# Patient Record
Sex: MALE | Race: WHITE | NOT HISPANIC OR LATINO | Employment: UNEMPLOYED | URBAN - METROPOLITAN AREA
[De-identification: names, ages, dates, MRNs, and addresses within clinical notes are randomized per-mention and may not be internally consistent; named-entity substitution may affect disease eponyms.]

---

## 2022-08-20 ENCOUNTER — APPOINTMENT (OUTPATIENT)
Dept: GENERAL RADIOLOGY | Facility: CLINIC | Age: 47
End: 2022-08-20
Attending: EMERGENCY MEDICINE

## 2022-08-20 ENCOUNTER — APPOINTMENT (OUTPATIENT)
Dept: MRI IMAGING | Facility: CLINIC | Age: 47
End: 2022-08-20
Attending: EMERGENCY MEDICINE

## 2022-08-20 ENCOUNTER — HOSPITAL ENCOUNTER (EMERGENCY)
Facility: CLINIC | Age: 47
Discharge: HOME | End: 2022-08-20
Attending: EMERGENCY MEDICINE | Admitting: EMERGENCY MEDICINE

## 2022-08-20 ENCOUNTER — APPOINTMENT (OUTPATIENT)
Dept: CT IMAGING | Facility: CLINIC | Age: 47
End: 2022-08-20
Attending: EMERGENCY MEDICINE

## 2022-08-20 VITALS
OXYGEN SATURATION: 96 % | RESPIRATION RATE: 20 BRPM | SYSTOLIC BLOOD PRESSURE: 173 MMHG | DIASTOLIC BLOOD PRESSURE: 100 MMHG | TEMPERATURE: 98.4 F | HEART RATE: 75 BPM

## 2022-08-20 DIAGNOSIS — I10 ESSENTIAL HYPERTENSION, BENIGN: ICD-10-CM

## 2022-08-20 DIAGNOSIS — R51.9 NONINTRACTABLE HEADACHE, UNSPECIFIED CHRONICITY PATTERN, UNSPECIFIED HEADACHE TYPE: ICD-10-CM

## 2022-08-20 DIAGNOSIS — I16.0 HYPERTENSIVE URGENCY: ICD-10-CM

## 2022-08-20 DIAGNOSIS — Z20.822 CONTACT WITH AND (SUSPECTED) EXPOSURE TO COVID-19: ICD-10-CM

## 2022-08-20 LAB
ALBUMIN SERPL-MCNC: 4.4 G/DL (ref 3.4–5)
ALBUMIN UR-MCNC: 50 MG/DL
ALP SERPL-CCNC: 86 U/L (ref 40–150)
ALT SERPL W P-5'-P-CCNC: 24 U/L (ref 0–70)
ANION GAP SERPL CALCULATED.3IONS-SCNC: 5 MMOL/L (ref 3–14)
APPEARANCE UR: CLEAR
AST SERPL W P-5'-P-CCNC: 14 U/L (ref 0–45)
ATRIAL RATE - MUSE: 108 BPM
BASOPHILS # BLD AUTO: 0.1 10E3/UL (ref 0–0.2)
BASOPHILS NFR BLD AUTO: 1 %
BILIRUB SERPL-MCNC: 1.1 MG/DL (ref 0.2–1.3)
BILIRUB UR QL STRIP: NEGATIVE
BUN SERPL-MCNC: 16 MG/DL (ref 7–30)
CALCIUM SERPL-MCNC: 9.4 MG/DL (ref 8.5–10.1)
CHLORIDE BLD-SCNC: 108 MMOL/L (ref 94–109)
CO2 SERPL-SCNC: 26 MMOL/L (ref 20–32)
COLOR UR AUTO: YELLOW
CREAT SERPL-MCNC: 1.25 MG/DL (ref 0.66–1.25)
DIASTOLIC BLOOD PRESSURE - MUSE: NORMAL MMHG
EOSINOPHIL # BLD AUTO: 0.2 10E3/UL (ref 0–0.7)
EOSINOPHIL NFR BLD AUTO: 2 %
ERYTHROCYTE [DISTWIDTH] IN BLOOD BY AUTOMATED COUNT: 18.6 % (ref 10–15)
GFR SERPL CREATININE-BSD FRML MDRD: 72 ML/MIN/1.73M2
GLUCOSE BLD-MCNC: 102 MG/DL (ref 70–99)
GLUCOSE UR STRIP-MCNC: NEGATIVE MG/DL
HCT VFR BLD AUTO: 48.1 % (ref 40–53)
HGB BLD-MCNC: 15 G/DL (ref 13.3–17.7)
HGB UR QL STRIP: NEGATIVE
HOLD SPECIMEN: NORMAL
IMM GRANULOCYTES # BLD: 0.1 10E3/UL
IMM GRANULOCYTES NFR BLD: 1 %
INR PPP: 0.98 (ref 0.85–1.15)
INTERPRETATION ECG - MUSE: NORMAL
KETONES UR STRIP-MCNC: ABNORMAL MG/DL
LACTATE SERPL-SCNC: 1.4 MMOL/L (ref 0.7–2)
LEUKOCYTE ESTERASE UR QL STRIP: NEGATIVE
LYMPHOCYTES # BLD AUTO: 1.2 10E3/UL (ref 0.8–5.3)
LYMPHOCYTES NFR BLD AUTO: 11 %
MCH RBC QN AUTO: 19.5 PG (ref 26.5–33)
MCHC RBC AUTO-ENTMCNC: 31.2 G/DL (ref 31.5–36.5)
MCV RBC AUTO: 63 FL (ref 78–100)
MONOCYTES # BLD AUTO: 0.7 10E3/UL (ref 0–1.3)
MONOCYTES NFR BLD AUTO: 6 %
MUCOUS THREADS #/AREA URNS LPF: PRESENT /LPF
NEUTROPHILS # BLD AUTO: 9 10E3/UL (ref 1.6–8.3)
NEUTROPHILS NFR BLD AUTO: 79 %
NITRATE UR QL: NEGATIVE
NRBC # BLD AUTO: 0 10E3/UL
NRBC BLD AUTO-RTO: 0 /100
NT-PROBNP SERPL-MCNC: 1050 PG/ML (ref 0–450)
P AXIS - MUSE: 78 DEGREES
PH UR STRIP: 5.5 [PH] (ref 5–7)
PLAT MORPH BLD: NORMAL
PLATELET # BLD AUTO: 242 10E3/UL (ref 150–450)
POTASSIUM BLD-SCNC: 3.6 MMOL/L (ref 3.4–5.3)
PR INTERVAL - MUSE: 164 MS
PROT SERPL-MCNC: 8.4 G/DL (ref 6.8–8.8)
QRS DURATION - MUSE: 94 MS
QT - MUSE: 354 MS
QTC - MUSE: 474 MS
R AXIS - MUSE: -6 DEGREES
RBC # BLD AUTO: 7.68 10E6/UL (ref 4.4–5.9)
RBC MORPH BLD: NORMAL
RBC URINE: <1 /HPF
SARS-COV-2 RNA RESP QL NAA+PROBE: NEGATIVE
SODIUM SERPL-SCNC: 139 MMOL/L (ref 133–144)
SP GR UR STRIP: 1.02 (ref 1–1.03)
SYSTOLIC BLOOD PRESSURE - MUSE: NORMAL MMHG
T AXIS - MUSE: 63 DEGREES
TROPONIN I SERPL HS-MCNC: 17 NG/L
TSH SERPL DL<=0.005 MIU/L-ACNC: 0.69 MU/L (ref 0.4–4)
UROBILINOGEN UR STRIP-MCNC: NORMAL MG/DL
VENTRICULAR RATE- MUSE: 108 BPM
WBC # BLD AUTO: 11.2 10E3/UL (ref 4–11)
WBC URINE: 1 /HPF

## 2022-08-20 PROCEDURE — 96376 TX/PRO/DX INJ SAME DRUG ADON: CPT | Performed by: EMERGENCY MEDICINE

## 2022-08-20 PROCEDURE — 84484 ASSAY OF TROPONIN QUANT: CPT | Performed by: EMERGENCY MEDICINE

## 2022-08-20 PROCEDURE — 99285 EMERGENCY DEPT VISIT HI MDM: CPT | Mod: 25 | Performed by: EMERGENCY MEDICINE

## 2022-08-20 PROCEDURE — 96375 TX/PRO/DX INJ NEW DRUG ADDON: CPT | Performed by: EMERGENCY MEDICINE

## 2022-08-20 PROCEDURE — C9803 HOPD COVID-19 SPEC COLLECT: HCPCS | Performed by: EMERGENCY MEDICINE

## 2022-08-20 PROCEDURE — 250N000011 HC RX IP 250 OP 636: Performed by: EMERGENCY MEDICINE

## 2022-08-20 PROCEDURE — 85610 PROTHROMBIN TIME: CPT | Performed by: EMERGENCY MEDICINE

## 2022-08-20 PROCEDURE — 84443 ASSAY THYROID STIM HORMONE: CPT | Performed by: EMERGENCY MEDICINE

## 2022-08-20 PROCEDURE — 93010 ELECTROCARDIOGRAM REPORT: CPT | Performed by: EMERGENCY MEDICINE

## 2022-08-20 PROCEDURE — 96365 THER/PROPH/DIAG IV INF INIT: CPT | Performed by: EMERGENCY MEDICINE

## 2022-08-20 PROCEDURE — 82040 ASSAY OF SERUM ALBUMIN: CPT | Performed by: EMERGENCY MEDICINE

## 2022-08-20 PROCEDURE — 99291 CRITICAL CARE FIRST HOUR: CPT | Mod: 25 | Performed by: EMERGENCY MEDICINE

## 2022-08-20 PROCEDURE — 83880 ASSAY OF NATRIURETIC PEPTIDE: CPT | Performed by: EMERGENCY MEDICINE

## 2022-08-20 PROCEDURE — 83605 ASSAY OF LACTIC ACID: CPT | Performed by: EMERGENCY MEDICINE

## 2022-08-20 PROCEDURE — U0003 INFECTIOUS AGENT DETECTION BY NUCLEIC ACID (DNA OR RNA); SEVERE ACUTE RESPIRATORY SYNDROME CORONAVIRUS 2 (SARS-COV-2) (CORONAVIRUS DISEASE [COVID-19]), AMPLIFIED PROBE TECHNIQUE, MAKING USE OF HIGH THROUGHPUT TECHNOLOGIES AS DESCRIBED BY CMS-2020-01-R: HCPCS | Performed by: EMERGENCY MEDICINE

## 2022-08-20 PROCEDURE — 81001 URINALYSIS AUTO W/SCOPE: CPT | Performed by: EMERGENCY MEDICINE

## 2022-08-20 PROCEDURE — 93005 ELECTROCARDIOGRAM TRACING: CPT | Performed by: EMERGENCY MEDICINE

## 2022-08-20 PROCEDURE — 71045 X-RAY EXAM CHEST 1 VIEW: CPT

## 2022-08-20 PROCEDURE — 70450 CT HEAD/BRAIN W/O DYE: CPT

## 2022-08-20 PROCEDURE — 80053 COMPREHEN METABOLIC PANEL: CPT | Performed by: EMERGENCY MEDICINE

## 2022-08-20 PROCEDURE — 250N000013 HC RX MED GY IP 250 OP 250 PS 637: Performed by: EMERGENCY MEDICINE

## 2022-08-20 PROCEDURE — 70551 MRI BRAIN STEM W/O DYE: CPT

## 2022-08-20 PROCEDURE — 36415 COLL VENOUS BLD VENIPUNCTURE: CPT | Performed by: EMERGENCY MEDICINE

## 2022-08-20 PROCEDURE — 85025 COMPLETE CBC W/AUTO DIFF WBC: CPT | Performed by: EMERGENCY MEDICINE

## 2022-08-20 PROCEDURE — 120N000002 HC R&B MED SURG/OB UMMC

## 2022-08-20 RX ORDER — ONDANSETRON 2 MG/ML
4 INJECTION INTRAMUSCULAR; INTRAVENOUS EVERY 30 MIN PRN
Status: DISCONTINUED | OUTPATIENT
Start: 2022-08-20 | End: 2022-08-21 | Stop reason: HOSPADM

## 2022-08-20 RX ORDER — LABETALOL HYDROCHLORIDE 5 MG/ML
20 INJECTION, SOLUTION INTRAVENOUS
Status: COMPLETED | OUTPATIENT
Start: 2022-08-20 | End: 2022-08-20

## 2022-08-20 RX ORDER — AMLODIPINE BESYLATE 5 MG/1
5 TABLET ORAL DAILY
Status: DISCONTINUED | OUTPATIENT
Start: 2022-08-20 | End: 2022-08-21 | Stop reason: HOSPADM

## 2022-08-20 RX ORDER — ATENOLOL 50 MG/1
50 TABLET ORAL DAILY
Status: DISCONTINUED | OUTPATIENT
Start: 2022-08-20 | End: 2022-08-21 | Stop reason: HOSPADM

## 2022-08-20 RX ORDER — AMLODIPINE BESYLATE 5 MG/1
5 TABLET ORAL DAILY
Qty: 30 TABLET | Refills: 0 | Status: ON HOLD | OUTPATIENT
Start: 2022-08-20 | End: 2024-01-16

## 2022-08-20 RX ORDER — ATENOLOL 50 MG/1
50 TABLET ORAL DAILY
Qty: 30 TABLET | Refills: 0 | Status: ON HOLD | OUTPATIENT
Start: 2022-08-20 | End: 2024-01-16

## 2022-08-20 RX ORDER — CLONIDINE HYDROCHLORIDE 0.1 MG/1
0.1 TABLET ORAL ONCE
Status: COMPLETED | OUTPATIENT
Start: 2022-08-20 | End: 2022-08-20

## 2022-08-20 RX ADMIN — AMLODIPINE BESYLATE 5 MG: 5 TABLET ORAL at 21:35

## 2022-08-20 RX ADMIN — CLONIDINE HYDROCHLORIDE 0.1 MG: 0.1 TABLET ORAL at 19:32

## 2022-08-20 RX ADMIN — ATENOLOL 50 MG: 50 TABLET ORAL at 18:49

## 2022-08-20 RX ADMIN — LABETALOL HYDROCHLORIDE 20 MG: 5 INJECTION, SOLUTION INTRAVENOUS at 18:22

## 2022-08-20 RX ADMIN — LABETALOL HYDROCHLORIDE 20 MG: 5 INJECTION, SOLUTION INTRAVENOUS at 17:55

## 2022-08-20 RX ADMIN — LABETALOL HYDROCHLORIDE 20 MG: 5 INJECTION, SOLUTION INTRAVENOUS at 17:06

## 2022-08-20 RX ADMIN — NICARDIPINE HYDROCHLORIDE 2.5 MG/HR: 0.2 INJECTION INTRAVENOUS at 21:41

## 2022-08-20 ASSESSMENT — ACTIVITIES OF DAILY LIVING (ADL)
ADLS_ACUITY_SCORE: 35

## 2022-08-20 ASSESSMENT — ENCOUNTER SYMPTOMS
NUMBNESS: 1
HEADACHES: 1
SHORTNESS OF BREATH: 0
DIAPHORESIS: 1
VOMITING: 0
DIZZINESS: 1

## 2022-08-20 NOTE — ED NOTES
Pt stat placed to [place] bed and nurse brought to  Dr Fuentes alerted to the Pt being brought back and made aware of not ordering the sepsis protocol.

## 2022-08-20 NOTE — ED PROVIDER NOTES
[facility] EMERGENCY DEPARTMENT ([facility])     August 20, 2022      History     Chief Complaint   Patient presents with     Headache     HPI  Valdo Lyons is a 47 year old male who presents to the Emergency Department with complaints of numbness in his left hand for the last 2 weeks and associated diaphoresis with elevated blood pressure.  The patient apparently has had untreated hypertension and states he did not get it treated because he does not have medical insurance and cannot afford medications.  Today he noted a throbbing headache with some blurred vision and some nonvertiginous dizziness and diaphoresis and states that his blood pressure was 248 at home when he took it.  Patient denies any shortness of breath or chest discomfort he denies any vomiting denies any color changes in his urine and denies any numbness elsewhere except his left hand in a median nerve distribution.  The patient denies any difficulty speaking.  He presents here to the ER for evaluation.    This part of the medical record was transcribed by Yana Meyers, Medical Scribe, from a dictation done by Javi Fuentes MD.     Past Medical History  No past medical history on file.     No past surgical history on file.     No current outpatient medications on file.    No Known Allergies     Family History  No family history on file.   Positive for high blood pressure    Social History       Past medical history, past surgical history, medications, allergies, family history, and social history were reviewed with the patient. No additional pertinent items.       Review of Systems   Constitutional: Positive for diaphoresis.   Eyes: Positive for visual disturbance.   Respiratory: Negative for shortness of breath.    Cardiovascular: Negative for chest pain.   Gastrointestinal: Negative for vomiting.   Neurological: Positive for dizziness, numbness (left hand in median nerve distribution) and headaches.   All other systems reviewed  and are negative.    A complete review of systems was performed with pertinent positives and negatives noted in the HPI, and all other systems negative.    Physical Exam   BP: (!) 216/146  Pulse: (!) 134  Temp: 98.4  F (36.9  C)  Resp: 22  SpO2: 99 %  Physical Exam  Vitals and nursing note reviewed.   Constitutional:       General: He is in acute distress.      Appearance: He is diaphoretic.   HENT:      Head: Atraumatic.   Eyes:      Extraocular Movements: Extraocular movements intact.      Pupils: Pupils are equal, round, and reactive to light.   Cardiovascular:      Rate and Rhythm: Regular rhythm. Tachycardia present.      Heart sounds: No murmur heard.  Pulmonary:      Breath sounds: Normal breath sounds.   Abdominal:      Palpations: Abdomen is soft.      Tenderness: There is no abdominal tenderness.   Musculoskeletal:         General: No deformity.      Cervical back: Neck supple.   Neurological:      Mental Status: He is alert and oriented to person, place, and time.      Cranial Nerves: No cranial nerve deficit.      Comments: Grossly intact strength bilaterally   Psychiatric:      Comments: Anxious         ED Course      Procedures        Patient was initially seen in the hallway but was quickly moved to a monitored room.  IV was established and blood was drawn.  Patient was placed on cardiac monitor and oximetry.  EKG revealed sinus tachycardia with no acute ST or T wave changes significant for ischemia.  Patient had a normal axis.  This is read by me personally.    Patient was started on labetalol IV to control his blood pressure.    Medications   sodium chloride (PF) 0.9% PF flush 3 mL (3 mLs Intracatheter Given 8/20/22 1713)   sodium chloride (PF) 0.9% PF flush 3 mL (has no administration in time range)   ondansetron (ZOFRAN) injection 4 mg (has no administration in time range)   atenolol (TENORMIN) tablet 50 mg (50 mg Oral Given 8/20/22 1849)   amLODIPine (NORVASC) tablet 5 mg (5 mg Oral Given 8/20/22  2135)   niCARdipine 40 mg in 200 mL NS (CARDENE) infusion (5 mg/hr Intravenous Rate/Dose Change 8/20/22 2150)   labetalol (NORMODYNE/TRANDATE) injection 20 mg (20 mg Intravenous Given 8/20/22 1822)   cloNIDine (CATAPRES) tablet 0.1 mg (0.1 mg Oral Given 8/20/22 1932)          Results for orders placed or performed during the hospital encounter of 08/20/22   Head CT w/o contrast     Status: None    Narrative    EXAM: CT HEAD W/O CONTRAST  LOCATION: [facility]  DATE/TIME: 8/20/2022 5:42 PM    INDICATION: Hypertension with left hand numbness  COMPARISON: None.  TECHNIQUE: Routine CT Head without IV contrast. Multiplanar reformats. Dose reduction techniques were used.    FINDINGS:  INTRACRANIAL CONTENTS: No intracranial hemorrhage, extraaxial collection, or mass effect.  No CT evidence of acute cortical infarct. Severe presumed chronic small vessel ischemic changes with chronic lacunar infarcts in the right centrum semiovale and   left thalamus. Mild generalized volume loss. No hydrocephalus.     VISUALIZED ORBITS/SINUSES/MASTOIDS: No intraorbital abnormality. No paranasal sinus mucosal disease. No middle ear or mastoid effusion.    BONES/SOFT TISSUES: No acute abnormality.      Impression    IMPRESSION:  1.  No acute intracranial process.  2.  Presumed severe chronic microvascular ischemic change with chronic lacunar infarcts in the right centrum semiovale and left thalamus, significantly advanced for age.   XR Chest 1 View     Status: None    Narrative    EXAM: XR CHEST 1 VIEW  LOCATION: [facility]  DATE/TIME: 8/20/2022 5:32 PM    INDICATION: htn  COMPARISON: 10/12/2004      Impression    IMPRESSION: Negative chest.   MR Brain w/o Contrast     Status: None    Narrative    EXAM: MR BRAIN W/O CONTRAST  LOCATION: [facility]  DATE/TIME: 8/20/2022 8:55 PM    INDICATION: Hypertensive  urgency with left hand numbness  COMPARISON: Head CT from earlier in the day  TECHNIQUE: Routine multiplanar multisequence head MRI without intravenous contrast.    FINDINGS:  INTRACRANIAL CONTENTS: No evidence for acute or subacute infarction by diffusion-weighted imaging. Redemonstration of chronic lacunar infarctions of the right centrum semiovale, left thalamus, and bilateral periatrial white matter. No mass, acute   hemorrhage, or extra-axial fluid collections. Confluent nonspecific T2/FLAIR hyperintensities within the cerebral white matter most consistent with advanced microvascular ischemic change. Normal ventricles and sulci. Normal position of the cerebellar   tonsils.     SELLA: No abnormality accounting for technique.    OSSEOUS STRUCTURES/SOFT TISSUES: Normal marrow signal. The major intracranial vascular flow voids are maintained.     ORBITS: No abnormality accounting for technique.     SINUSES/MASTOIDS: Polypoid mucosal thickening in the maxillary sinuses, left greater than right. No middle ear or mastoid effusion.         Impression    IMPRESSION:  1.  No acute/subacute infarction, intracranial hemorrhage, mass effect, or hydrocephalus.  2.  Redemonstration of chronic lacunar infarctions of the right centrum semiovale, left thalamus, and bilateral periatrial white matter with background of advanced chronic small vessel seen disease, much greater than expected for patient age.   INR     Status: Normal   Result Value Ref Range    INR 0.98 0.85 - 1.15   Comprehensive metabolic panel     Status: Abnormal   Result Value Ref Range    Sodium 139 133 - 144 mmol/L    Potassium 3.6 3.4 - 5.3 mmol/L    Chloride 108 94 - 109 mmol/L    Carbon Dioxide (CO2) 26 20 - 32 mmol/L    Anion Gap 5 3 - 14 mmol/L    Urea Nitrogen 16 7 - 30 mg/dL    Creatinine 1.25 0.66 - 1.25 mg/dL    Calcium 9.4 8.5 - 10.1 mg/dL    Glucose 102 (H) 70 - 99 mg/dL    Alkaline Phosphatase 86 40 - 150 U/L    AST 14 0 - 45 U/L    ALT 24 0 - 70  U/L    Protein Total 8.4 6.8 - 8.8 g/dL    Albumin 4.4 3.4 - 5.0 g/dL    Bilirubin Total 1.1 0.2 - 1.3 mg/dL    GFR Estimate 72 >60 mL/min/1.73m2   Troponin I     Status: Normal   Result Value Ref Range    Troponin I High Sensitivity 17 <79 ng/L   Nt probnp inpatient (BNP)     Status: Abnormal   Result Value Ref Range    N terminal Pro BNP Inpatient 1,050 (H) 0 - 450 pg/mL   UA with Microscopic reflex to Culture     Status: Abnormal    Specimen: Urine, Midstream   Result Value Ref Range    Color Urine Yellow Colorless, Straw, Light Yellow, Yellow    Appearance Urine Clear Clear    Glucose Urine Negative Negative mg/dL    Bilirubin Urine Negative Negative    Ketones Urine Trace (A) Negative mg/dL    Specific Gravity Urine 1.023 1.003 - 1.035    Blood Urine Negative Negative    pH Urine 5.5 5.0 - 7.0    Protein Albumin Urine 50  (A) Negative mg/dL    Urobilinogen Urine Normal Normal, 2.0 mg/dL    Nitrite Urine Negative Negative    Leukocyte Esterase Urine Negative Negative    Mucus Urine Present (A) None Seen /LPF    RBC Urine <1 <=2 /HPF    WBC Urine 1 <=5 /HPF    Narrative    Urine Culture not indicated   TSH     Status: Normal   Result Value Ref Range    TSH 0.69 0.40 - 4.00 mU/L   Lactic acid whole blood STAT     Status: Normal   Result Value Ref Range    Lactic Acid 1.4 0.7 - 2.0 mmol/L   [place] Draw     Status: None    Narrative    The following orders were created for panel order [place] Draw.  Procedure                               Abnormality         Status                     ---------                               -----------         ------                     Extra Red Top Tube[[id number]]                               Final result                 Please view results for these tests on the individual orders.   Extra Red Top Tube     Status: None   Result Value Ref Range    Hold Specimen [facility]    CBC with platelets and differential     Status: Abnormal   Result Value Ref Range    WBC Count 11.2 (H) 4.0 -  11.0 10e3/uL    RBC Count 7.68 (H) 4.40 - 5.90 10e6/uL    Hemoglobin 15.0 13.3 - 17.7 g/dL    Hematocrit 48.1 40.0 - 53.0 %    MCV 63 (L) 78 - 100 fL    MCH 19.5 (L) 26.5 - 33.0 pg    MCHC 31.2 (L) 31.5 - 36.5 g/dL    RDW 18.6 (H) 10.0 - 15.0 %    Platelet Count 242 150 - 450 10e3/uL    % Neutrophils 79 %    % Lymphocytes 11 %    % Monocytes 6 %    % Eosinophils 2 %    % Basophils 1 %    % Immature Granulocytes 1 %    NRBCs per 100 WBC 0 <1 /100    Absolute Neutrophils 9.0 (H) 1.6 - 8.3 10e3/uL    Absolute Lymphocytes 1.2 0.8 - 5.3 10e3/uL    Absolute Monocytes 0.7 0.0 - 1.3 10e3/uL    Absolute Eosinophils 0.2 0.0 - 0.7 10e3/uL    Absolute Basophils 0.1 0.0 - 0.2 10e3/uL    Absolute Immature Granulocytes 0.1 <=0.4 10e3/uL    Absolute NRBCs 0.0 10e3/uL   RBC and Platelet Morphology     Status: None   Result Value Ref Range    Platelet Assessment  Automated Count Confirmed. Platelet morphology is normal.     Automated Count Confirmed. Platelet morphology is normal.    RBC Morphology Confirmed RBC Indices    Extra Tube     Status: None ()    Narrative    The following orders were created for panel order Extra Tube.  Procedure                               Abnormality         Status                     ---------                               -----------         ------                     Extra Purple Top Tube[[id number]]                                                         Please view results for these tests on the individual orders.   EKG 12 lead     Status: None (Preliminary result)   Result Value Ref Range    Systolic Blood Pressure  mmHg    Diastolic Blood Pressure  mmHg    Ventricular Rate 108 BPM    Atrial Rate 108 BPM    WA Interval 164 ms    QRS Duration 94 ms     ms    QTc 474 ms    P Axis 78 degrees    R AXIS -6 degrees    T Axis 63 degrees    Interpretation ECG       Sinus tachycardia  Voltage criteria for left ventricular hypertrophy  Nonspecific ST abnormality  Abnormal ECG     CBC with platelets  differential     Status: Abnormal    Narrative    The following orders were created for panel order CBC with platelets differential.  Procedure                               Abnormality         Status                     ---------                               -----------         ------                     CBC with platelets and d...[[id number]]  Abnormal            Final result               RBC and Platelet Morphology[[id number]]                      Final result                 Please view results for these tests on the individual orders.     Eventually patient received atenolol orally Norvasc orally clonidine orally and will be placed on a nicardipine drip because his blood pressures are still 203/130.    Medications   sodium chloride (PF) 0.9% PF flush 3 mL (3 mLs Intracatheter Given 8/20/22 1713)   sodium chloride (PF) 0.9% PF flush 3 mL (has no administration in time range)   ondansetron (ZOFRAN) injection 4 mg (has no administration in time range)   atenolol (TENORMIN) tablet 50 mg (50 mg Oral Given 8/20/22 1849)   amLODIPine (NORVASC) tablet 5 mg (5 mg Oral Given 8/20/22 2135)   niCARdipine 40 mg in 200 mL NS (CARDENE) infusion (5 mg/hr Intravenous Rate/Dose Change 8/20/22 2150)   labetalol (NORMODYNE/TRANDATE) injection 20 mg (20 mg Intravenous Given 8/20/22 1822)   cloNIDine (CATAPRES) tablet 0.1 mg (0.1 mg Oral Given 8/20/22 1932)        Assessments & Plan (with Medical Decision Making)     I have reviewed the nursing notes.    Patient agrees to be admitted knowing that his blood pressure is still high after all the medications of the been given.  Case was discussed with the ICU attending on the [facility] and the patient was excepted.  The etiology of the patient's hypertension is unclear at this time but historically is longstanding.    I have reviewed the findings, diagnosis, and plan with the patient.    This case required critical care greater than 60 minutes independent of procedures.    Final  diagnoses:   Hypertensive urgency     I, Yana Meyers, am serving as a trained medical scribe to document services personally performed by Javi Fuentes MD, based on the provider's statements to me.   Javi VELA MD, was physically present and have reviewed and verified the accuracy of this note documented by Yana Meyers.      Javi Fuentes Md  [facility] EMERGENCY DEPARTMENT  8/20/2022     Javi Fuentes MD  08/20/22 2204      Addendum  2255:    BP (!) 194/122   Pulse 72   Temp 98.4  F (36.9  C) (Oral)   Resp 15   SpO2 97%     Patient wishes to leave AGAINST MEDICAL ADVICE and understands that it is strongly recommended for him to be admitted to the hospital and he may suffer significant morbidity if not death should he leave and his blood pressure is not controlled.    Patient will be sent home on the medications below       START taking      Dose / Directions   amLODIPine 5 MG tablet  Commonly known as: NORVASC      Dose: 5 mg  Take 1 tablet (5 mg) by mouth daily for 30 days  Quantity: 30 tablet  Refills: 0     atenolol 50 MG tablet  Commonly known as: TENORMIN      Dose: 50 mg  Take 1 tablet (50 mg) by mouth daily for 30 days  Quantity: 30 tablet  Refills: 0           Where to get your medicines      Some of these will need a paper prescription and others can be bought over the counter. Ask your nurse if you have questions.    Bring a paper prescription for each of these medications    amLODIPine 5 MG tablet    atenolol 50 MG tablet           Fill your prescriptions and take as directed    Take your blood pressure every day, about the same time of day, and write it down for your follow-up appointment.    A referral for a primary care clinic was placed into the computer system.  They should call you in the next 48 hours to help you set up an appointment to be seen sometime in the next 1 to 2 weeks.  If they do not call you please call [facility]  (phone: [phone]).    Return to the emergency department for any problems or worsening symptoms.      Routine discharge instructions were given for this diagnosis      Javi Fuentes MD, MD Fuentes, Javi Jiménez MD  08/20/22 8038

## 2022-08-20 NOTE — ED TRIAGE NOTES
Pt states hx of untreated HTN, two wk hs of left arm numbness and cant  things with his left hand. Pt states today developed a Throbbing HA, blurred vision, dizziness and diaphoresis.  Pt states his BP has been running 240 systolic.       Triage Assessment     Row Name 08/20/22 0813       Triage Assessment (Adult)    Airway WDL WDL       Respiratory WDL    Respiratory WDL WDL       Skin Circulation/Temperature WDL    Skin Circulation/Temperature WDL WDL       Cardiac WDL    Cardiac WDL WDL       Peripheral/Neurovascular WDL    Peripheral Neurovascular WDL WDL       Cognitive/Neuro/Behavioral WDL    Cognitive/Neuro/Behavioral WDL WDL

## 2022-08-21 NOTE — DISCHARGE INSTRUCTIONS
Fill your prescriptions and take as directed    Take your blood pressure every day, about the same time of day, and write it down for your follow-up appointment.    A referral for a primary care clinic was placed into the computer system.  They should call you in the next 48 hours to help you set up an appointment to be seen sometime in the next 1 to 2 weeks.  If they do not call you please call them at [facility] (phone: [phone]).    Return to the emergency department for any problems or worsening symptoms.

## 2024-01-16 ENCOUNTER — APPOINTMENT (OUTPATIENT)
Dept: GENERAL RADIOLOGY | Facility: CLINIC | Age: 49
End: 2024-01-16
Attending: STUDENT IN AN ORGANIZED HEALTH CARE EDUCATION/TRAINING PROGRAM
Payer: MEDICAID

## 2024-01-16 ENCOUNTER — APPOINTMENT (OUTPATIENT)
Dept: GENERAL RADIOLOGY | Facility: CLINIC | Age: 49
End: 2024-01-16
Payer: MEDICAID

## 2024-01-16 ENCOUNTER — APPOINTMENT (OUTPATIENT)
Dept: CARDIOLOGY | Facility: CLINIC | Age: 49
End: 2024-01-16
Payer: MEDICAID

## 2024-01-16 ENCOUNTER — HOSPITAL ENCOUNTER (INPATIENT)
Facility: CLINIC | Age: 49
LOS: 19 days | Discharge: HOME | End: 2024-02-04
Attending: STUDENT IN AN ORGANIZED HEALTH CARE EDUCATION/TRAINING PROGRAM | Admitting: INTERNAL MEDICINE
Payer: MEDICAID

## 2024-01-16 DIAGNOSIS — F43.22 ADJUSTMENT DISORDER WITH ANXIOUS MOOD: ICD-10-CM

## 2024-01-16 DIAGNOSIS — I71.00 DISSECTION OF AORTA, UNSPECIFIED PORTION OF AORTA (H): Primary | ICD-10-CM

## 2024-01-16 DIAGNOSIS — I16.0 HYPERTENSIVE URGENCY: ICD-10-CM

## 2024-01-16 LAB
ABO/RH(D): NORMAL
ALBUMIN SERPL BCG-MCNC: 3.2 G/DL (ref 3.5–5.2)
ALLEN'S TEST: NORMAL
ALP SERPL-CCNC: 42 U/L (ref 40–150)
ALT SERPL W P-5'-P-CCNC: 7 U/L (ref 0–70)
ANION GAP SERPL CALCULATED.3IONS-SCNC: 9 MMOL/L (ref 7–15)
ANTIBODY SCREEN: NEGATIVE
APTT PPP: 38 SECONDS (ref 22–38)
AST SERPL W P-5'-P-CCNC: 12 U/L (ref 0–45)
ATRIAL RATE - MUSE: 81 BPM
BASE EXCESS BLDA CALC-SCNC: -2.3 MMOL/L (ref -9–1.8)
BILIRUB DIRECT SERPL-MCNC: <0.2 MG/DL (ref 0–0.3)
BILIRUB SERPL-MCNC: 0.5 MG/DL
BUN SERPL-MCNC: 12 MG/DL (ref 6–20)
CA-I BLD-MCNC: 4.5 MG/DL (ref 4.4–5.2)
CALCIUM SERPL-MCNC: 7.7 MG/DL (ref 8.6–10)
CHLORIDE SERPL-SCNC: 106 MMOL/L (ref 98–107)
CREAT SERPL-MCNC: 1.47 MG/DL (ref 0.67–1.17)
DEPRECATED HCO3 PLAS-SCNC: 20 MMOL/L (ref 22–29)
DIASTOLIC BLOOD PRESSURE - MUSE: NORMAL MMHG
EGFRCR SERPLBLD CKD-EPI 2021: 59 ML/MIN/1.73M2
ERYTHROCYTE [DISTWIDTH] IN BLOOD BY AUTOMATED COUNT: 15.7 % (ref 10–15)
FIBRINOGEN PPP-MCNC: 425 MG/DL (ref 170–490)
GLUCOSE BLDC GLUCOMTR-MCNC: 104 MG/DL (ref 70–99)
GLUCOSE BLDC GLUCOMTR-MCNC: 108 MG/DL (ref 70–99)
GLUCOSE BLDC GLUCOMTR-MCNC: 111 MG/DL (ref 70–99)
GLUCOSE BLDC GLUCOMTR-MCNC: 112 MG/DL (ref 70–99)
GLUCOSE BLDC GLUCOMTR-MCNC: 115 MG/DL (ref 70–99)
GLUCOSE SERPL-MCNC: 115 MG/DL (ref 70–99)
HBA1C MFR BLD: 6.1 %
HCO3 BLD-SCNC: 23 MMOL/L (ref 21–28)
HCT VFR BLD AUTO: 33.6 % (ref 40–53)
HGB BLD-MCNC: 10.6 G/DL (ref 13.3–17.7)
INTERPRETATION ECG - MUSE: NORMAL
LACTATE SERPL-SCNC: 0.8 MMOL/L (ref 0.7–2)
LVEF ECHO: NORMAL
MAGNESIUM SERPL-MCNC: 1.7 MG/DL (ref 1.7–2.3)
MCH RBC QN AUTO: 19.6 PG (ref 26.5–33)
MCHC RBC AUTO-ENTMCNC: 31.5 G/DL (ref 31.5–36.5)
MCV RBC AUTO: 62 FL (ref 78–100)
MRSA DNA SPEC QL NAA+PROBE: NEGATIVE
O2/TOTAL GAS SETTING VFR VENT: 8 %
OXYHGB MFR BLD: 95 % (ref 92–100)
P AXIS - MUSE: 36 DEGREES
PCO2 BLD: 38 MM HG (ref 35–45)
PH BLD: 7.38 [PH] (ref 7.35–7.45)
PHOSPHATE SERPL-MCNC: 2.2 MG/DL (ref 2.5–4.5)
PLATELET # BLD AUTO: 157 10E3/UL (ref 150–450)
PO2 BLD: 81 MM HG (ref 80–105)
POTASSIUM SERPL-SCNC: 4.3 MMOL/L (ref 3.4–5.3)
PR INTERVAL - MUSE: 172 MS
PROT SERPL-MCNC: 5.4 G/DL (ref 6.4–8.3)
QRS DURATION - MUSE: 88 MS
QT - MUSE: 394 MS
QTC - MUSE: 457 MS
R AXIS - MUSE: 1 DEGREES
RBC # BLD AUTO: 5.41 10E6/UL (ref 4.4–5.9)
SA TARGET DNA: NEGATIVE
SODIUM SERPL-SCNC: 135 MMOL/L (ref 135–145)
SPECIMEN EXPIRATION DATE: NORMAL
SYSTOLIC BLOOD PRESSURE - MUSE: NORMAL MMHG
T AXIS - MUSE: 54 DEGREES
TROPONIN T SERPL HS-MCNC: 18 NG/L
TROPONIN T SERPL HS-MCNC: 19 NG/L
TROPONIN T SERPL HS-MCNC: 27 NG/L
VENTRICULAR RATE- MUSE: 81 BPM
WBC # BLD AUTO: 15 10E3/UL (ref 4–11)

## 2024-01-16 PROCEDURE — 83605 ASSAY OF LACTIC ACID: CPT

## 2024-01-16 PROCEDURE — 250N000013 HC RX MED GY IP 250 OP 250 PS 637: Performed by: STUDENT IN AN ORGANIZED HEALTH CARE EDUCATION/TRAINING PROGRAM

## 2024-01-16 PROCEDURE — 93306 TTE W/DOPPLER COMPLETE: CPT | Mod: 26 | Performed by: INTERNAL MEDICINE

## 2024-01-16 PROCEDURE — 93306 TTE W/DOPPLER COMPLETE: CPT

## 2024-01-16 PROCEDURE — 250N000013 HC RX MED GY IP 250 OP 250 PS 637

## 2024-01-16 PROCEDURE — 71045 X-RAY EXAM CHEST 1 VIEW: CPT | Mod: 77

## 2024-01-16 PROCEDURE — 85730 THROMBOPLASTIN TIME PARTIAL: CPT

## 2024-01-16 PROCEDURE — 250N000009 HC RX 250

## 2024-01-16 PROCEDURE — 93005 ELECTROCARDIOGRAM TRACING: CPT

## 2024-01-16 PROCEDURE — 85384 FIBRINOGEN ACTIVITY: CPT

## 2024-01-16 PROCEDURE — 250N000011 HC RX IP 250 OP 636

## 2024-01-16 PROCEDURE — 87641 MR-STAPH DNA AMP PROBE: CPT

## 2024-01-16 PROCEDURE — 71045 X-RAY EXAM CHEST 1 VIEW: CPT

## 2024-01-16 PROCEDURE — 71045 X-RAY EXAM CHEST 1 VIEW: CPT | Mod: 26 | Performed by: RADIOLOGY

## 2024-01-16 PROCEDURE — 87640 STAPH A DNA AMP PROBE: CPT

## 2024-01-16 PROCEDURE — 85027 COMPLETE CBC AUTOMATED: CPT

## 2024-01-16 PROCEDURE — 83735 ASSAY OF MAGNESIUM: CPT

## 2024-01-16 PROCEDURE — 99222 1ST HOSP IP/OBS MODERATE 55: CPT | Performed by: STUDENT IN AN ORGANIZED HEALTH CARE EDUCATION/TRAINING PROGRAM

## 2024-01-16 PROCEDURE — 250N000011 HC RX IP 250 OP 636: Performed by: STUDENT IN AN ORGANIZED HEALTH CARE EDUCATION/TRAINING PROGRAM

## 2024-01-16 PROCEDURE — 258N000003 HC RX IP 258 OP 636: Performed by: STUDENT IN AN ORGANIZED HEALTH CARE EDUCATION/TRAINING PROGRAM

## 2024-01-16 PROCEDURE — 99233 SBSQ HOSP IP/OBS HIGH 50: CPT | Mod: GC | Performed by: INTERNAL MEDICINE

## 2024-01-16 PROCEDURE — 83036 HEMOGLOBIN GLYCOSYLATED A1C: CPT

## 2024-01-16 PROCEDURE — 82805 BLOOD GASES W/O2 SATURATION: CPT

## 2024-01-16 PROCEDURE — 36569 INSJ PICC 5 YR+ W/O IMAGING: CPT

## 2024-01-16 PROCEDURE — 84100 ASSAY OF PHOSPHORUS: CPT

## 2024-01-16 PROCEDURE — 272N000451 HC KIT SHRLOCK 5FR POWER PICC DOUBLE LUMEN

## 2024-01-16 PROCEDURE — 258N000003 HC RX IP 258 OP 636

## 2024-01-16 PROCEDURE — 93010 ELECTROCARDIOGRAM REPORT: CPT | Performed by: INTERNAL MEDICINE

## 2024-01-16 PROCEDURE — 82330 ASSAY OF CALCIUM: CPT

## 2024-01-16 PROCEDURE — 71045 X-RAY EXAM CHEST 1 VIEW: CPT | Mod: 26 | Performed by: STUDENT IN AN ORGANIZED HEALTH CARE EDUCATION/TRAINING PROGRAM

## 2024-01-16 PROCEDURE — 80053 COMPREHEN METABOLIC PANEL: CPT

## 2024-01-16 PROCEDURE — 200N000002 HC R&B ICU UMMC

## 2024-01-16 PROCEDURE — 82248 BILIRUBIN DIRECT: CPT

## 2024-01-16 PROCEDURE — 80048 BASIC METABOLIC PNL TOTAL CA: CPT

## 2024-01-16 PROCEDURE — 84484 ASSAY OF TROPONIN QUANT: CPT

## 2024-01-16 PROCEDURE — 86900 BLOOD TYPING SEROLOGIC ABO: CPT

## 2024-01-16 RX ORDER — MAGNESIUM SULFATE HEPTAHYDRATE 40 MG/ML
2 INJECTION, SOLUTION INTRAVENOUS ONCE
Status: COMPLETED | OUTPATIENT
Start: 2024-01-16 | End: 2024-01-16

## 2024-01-16 RX ORDER — ESMOLOL HYDROCHLORIDE 20 MG/ML
50-300 INJECTION, SOLUTION INTRAVENOUS CONTINUOUS
Status: DISCONTINUED | OUTPATIENT
Start: 2024-01-16 | End: 2024-01-23

## 2024-01-16 RX ORDER — ONDANSETRON 2 MG/ML
4 INJECTION INTRAMUSCULAR; INTRAVENOUS EVERY 6 HOURS PRN
Status: DISCONTINUED | OUTPATIENT
Start: 2024-01-16 | End: 2024-01-23

## 2024-01-16 RX ORDER — ACETAMINOPHEN 325 MG/1
650 TABLET ORAL EVERY 4 HOURS PRN
Status: DISCONTINUED | OUTPATIENT
Start: 2024-01-16 | End: 2024-01-23

## 2024-01-16 RX ORDER — HYDRALAZINE HYDROCHLORIDE 20 MG/ML
10-20 INJECTION INTRAMUSCULAR; INTRAVENOUS EVERY 30 MIN PRN
Status: DISCONTINUED | OUTPATIENT
Start: 2024-01-16 | End: 2024-01-22

## 2024-01-16 RX ORDER — AMLODIPINE BESYLATE 10 MG/1
10 TABLET ORAL DAILY
Status: DISCONTINUED | OUTPATIENT
Start: 2024-01-16 | End: 2024-01-23

## 2024-01-16 RX ORDER — NALOXONE HYDROCHLORIDE 0.4 MG/ML
0.2 INJECTION, SOLUTION INTRAMUSCULAR; INTRAVENOUS; SUBCUTANEOUS
Status: DISCONTINUED | OUTPATIENT
Start: 2024-01-16 | End: 2024-01-23

## 2024-01-16 RX ORDER — LIDOCAINE 40 MG/G
CREAM TOPICAL
Status: ACTIVE | OUTPATIENT
Start: 2024-01-16 | End: 2024-01-19

## 2024-01-16 RX ORDER — ONDANSETRON 4 MG/1
4 TABLET, ORALLY DISINTEGRATING ORAL EVERY 6 HOURS PRN
Status: DISCONTINUED | OUTPATIENT
Start: 2024-01-16 | End: 2024-01-23

## 2024-01-16 RX ORDER — LABETALOL HYDROCHLORIDE 5 MG/ML
10 INJECTION, SOLUTION INTRAVENOUS EVERY 10 MIN PRN
Status: DISCONTINUED | OUTPATIENT
Start: 2024-01-16 | End: 2024-01-22

## 2024-01-16 RX ORDER — NALOXONE HYDROCHLORIDE 0.4 MG/ML
0.4 INJECTION, SOLUTION INTRAMUSCULAR; INTRAVENOUS; SUBCUTANEOUS
Status: DISCONTINUED | OUTPATIENT
Start: 2024-01-16 | End: 2024-01-23

## 2024-01-16 RX ORDER — NICOTINE POLACRILEX 4 MG
15-30 LOZENGE BUCCAL
Status: DISCONTINUED | OUTPATIENT
Start: 2024-01-16 | End: 2024-01-23

## 2024-01-16 RX ORDER — BISACODYL 10 MG
10 SUPPOSITORY, RECTAL RECTAL DAILY PRN
Status: DISCONTINUED | OUTPATIENT
Start: 2024-01-16 | End: 2024-01-23

## 2024-01-16 RX ORDER — POTASSIUM PHOS IN 0.9 % NACL 15MMOL/250
15 PLASTIC BAG, INJECTION (ML) INTRAVENOUS ONCE
Status: COMPLETED | OUTPATIENT
Start: 2024-01-16 | End: 2024-01-16

## 2024-01-16 RX ORDER — PROCHLORPERAZINE 25 MG
25 SUPPOSITORY, RECTAL RECTAL EVERY 12 HOURS PRN
Status: DISCONTINUED | OUTPATIENT
Start: 2024-01-16 | End: 2024-01-23

## 2024-01-16 RX ORDER — PROCHLORPERAZINE MALEATE 10 MG
10 TABLET ORAL EVERY 6 HOURS PRN
Status: DISCONTINUED | OUTPATIENT
Start: 2024-01-16 | End: 2024-01-23

## 2024-01-16 RX ORDER — POLYETHYLENE GLYCOL 3350 17 G/17G
17 POWDER, FOR SOLUTION ORAL DAILY PRN
Status: DISCONTINUED | OUTPATIENT
Start: 2024-01-16 | End: 2024-01-18

## 2024-01-16 RX ORDER — LOSARTAN POTASSIUM 50 MG/1
100 TABLET ORAL DAILY
Status: DISCONTINUED | OUTPATIENT
Start: 2024-01-16 | End: 2024-01-22

## 2024-01-16 RX ORDER — HEPARIN SODIUM,PORCINE 10 UNIT/ML
5-20 VIAL (ML) INTRAVENOUS
Status: DISCONTINUED | OUTPATIENT
Start: 2024-01-16 | End: 2024-01-23

## 2024-01-16 RX ORDER — HYDRALAZINE HYDROCHLORIDE 20 MG/ML
10-20 INJECTION INTRAMUSCULAR; INTRAVENOUS
Status: DISCONTINUED | OUTPATIENT
Start: 2024-01-16 | End: 2024-01-16

## 2024-01-16 RX ORDER — AMOXICILLIN 250 MG
1 CAPSULE ORAL 2 TIMES DAILY PRN
Status: DISCONTINUED | OUTPATIENT
Start: 2024-01-16 | End: 2024-01-18

## 2024-01-16 RX ORDER — AMOXICILLIN 250 MG
2 CAPSULE ORAL 2 TIMES DAILY PRN
Status: DISCONTINUED | OUTPATIENT
Start: 2024-01-16 | End: 2024-01-18

## 2024-01-16 RX ORDER — HYDROMORPHONE HCL IN WATER/PF 6 MG/30 ML
0.2 PATIENT CONTROLLED ANALGESIA SYRINGE INTRAVENOUS
Status: DISCONTINUED | OUTPATIENT
Start: 2024-01-16 | End: 2024-01-18

## 2024-01-16 RX ORDER — DEXTROSE MONOHYDRATE 25 G/50ML
25-50 INJECTION, SOLUTION INTRAVENOUS
Status: DISCONTINUED | OUTPATIENT
Start: 2024-01-16 | End: 2024-01-23

## 2024-01-16 RX ORDER — ACETAMINOPHEN 650 MG/1
650 SUPPOSITORY RECTAL EVERY 4 HOURS PRN
Status: DISCONTINUED | OUTPATIENT
Start: 2024-01-16 | End: 2024-01-23

## 2024-01-16 RX ORDER — FENTANYL CITRATE 50 UG/ML
100 INJECTION, SOLUTION INTRAMUSCULAR; INTRAVENOUS ONCE
Status: COMPLETED | OUTPATIENT
Start: 2024-01-16 | End: 2024-01-16

## 2024-01-16 RX ORDER — HEPARIN SODIUM,PORCINE 10 UNIT/ML
5-20 VIAL (ML) INTRAVENOUS EVERY 24 HOURS
Status: DISCONTINUED | OUTPATIENT
Start: 2024-01-16 | End: 2024-01-23

## 2024-01-16 RX ORDER — CARVEDILOL 25 MG/1
25 TABLET ORAL 2 TIMES DAILY
Status: DISCONTINUED | OUTPATIENT
Start: 2024-01-16 | End: 2024-01-17

## 2024-01-16 RX ADMIN — SODIUM NITROPRUSSIDE 2.5 MCG/KG/MIN: 25 INJECTION, SOLUTION, CONCENTRATE INTRAVENOUS at 11:49

## 2024-01-16 RX ADMIN — LOSARTAN POTASSIUM 100 MG: 50 TABLET, FILM COATED ORAL at 15:04

## 2024-01-16 RX ADMIN — LABETALOL HYDROCHLORIDE 10 MG: 5 INJECTION, SOLUTION INTRAVENOUS at 19:29

## 2024-01-16 RX ADMIN — HYDRALAZINE HYDROCHLORIDE 20 MG: 20 INJECTION INTRAMUSCULAR; INTRAVENOUS at 20:19

## 2024-01-16 RX ADMIN — HYDRALAZINE HYDROCHLORIDE 20 MG: 20 INJECTION INTRAMUSCULAR; INTRAVENOUS at 14:53

## 2024-01-16 RX ADMIN — POTASSIUM PHOSPHATE, MONOBASIC POTASSIUM PHOSPHATE, DIBASIC 15 MMOL: 224; 236 INJECTION, SOLUTION, CONCENTRATE INTRAVENOUS at 05:27

## 2024-01-16 RX ADMIN — HYDRALAZINE HYDROCHLORIDE 20 MG: 20 INJECTION INTRAMUSCULAR; INTRAVENOUS at 18:58

## 2024-01-16 RX ADMIN — SODIUM NITROPRUSSIDE 2 MCG/KG/MIN: 25 INJECTION, SOLUTION, CONCENTRATE INTRAVENOUS at 07:48

## 2024-01-16 RX ADMIN — CARVEDILOL 25 MG: 25 TABLET, FILM COATED ORAL at 19:35

## 2024-01-16 RX ADMIN — Medication 300 MCG/KG/MIN: at 16:11

## 2024-01-16 RX ADMIN — LABETALOL HYDROCHLORIDE 10 MG: 5 INJECTION, SOLUTION INTRAVENOUS at 14:28

## 2024-01-16 RX ADMIN — SODIUM NITROPRUSSIDE 1 MCG/KG/MIN: 25 INJECTION, SOLUTION, CONCENTRATE INTRAVENOUS at 03:36

## 2024-01-16 RX ADMIN — CARVEDILOL 25 MG: 25 TABLET, FILM COATED ORAL at 11:57

## 2024-01-16 RX ADMIN — HYDRALAZINE HYDROCHLORIDE 10 MG: 20 INJECTION INTRAMUSCULAR; INTRAVENOUS at 14:20

## 2024-01-16 RX ADMIN — MAGNESIUM SULFATE IN WATER 2 G: 40 INJECTION, SOLUTION INTRAVENOUS at 05:23

## 2024-01-16 RX ADMIN — Medication 300 MCG/KG/MIN: at 22:45

## 2024-01-16 RX ADMIN — HYDRALAZINE HYDROCHLORIDE 20 MG: 20 INJECTION INTRAMUSCULAR; INTRAVENOUS at 17:12

## 2024-01-16 RX ADMIN — NICARDIPINE HYDROCHLORIDE 15 MG/HR: 0.2 INJECTION, SOLUTION INTRAVENOUS at 16:05

## 2024-01-16 RX ADMIN — AMLODIPINE BESYLATE 10 MG: 10 TABLET ORAL at 12:58

## 2024-01-16 RX ADMIN — Medication 300 MCG/KG/MIN: at 03:39

## 2024-01-16 RX ADMIN — LABETALOL HYDROCHLORIDE 10 MG: 5 INJECTION, SOLUTION INTRAVENOUS at 22:44

## 2024-01-16 RX ADMIN — Medication 300 MCG/KG/MIN: at 09:57

## 2024-01-16 RX ADMIN — LABETALOL HYDROCHLORIDE 10 MG: 5 INJECTION, SOLUTION INTRAVENOUS at 20:30

## 2024-01-16 RX ADMIN — LABETALOL HYDROCHLORIDE 10 MG: 5 INJECTION, SOLUTION INTRAVENOUS at 23:49

## 2024-01-16 RX ADMIN — LABETALOL HYDROCHLORIDE 10 MG: 5 INJECTION, SOLUTION INTRAVENOUS at 20:04

## 2024-01-16 RX ADMIN — SODIUM CHLORIDE 15 MG/HR: 9 INJECTION, SOLUTION INTRAVENOUS at 23:44

## 2024-01-16 RX ADMIN — HYDRALAZINE HYDROCHLORIDE 20 MG: 20 INJECTION INTRAMUSCULAR; INTRAVENOUS at 15:52

## 2024-01-16 RX ADMIN — SODIUM CHLORIDE 15 MG/HR: 9 INJECTION, SOLUTION INTRAVENOUS at 18:32

## 2024-01-16 RX ADMIN — LABETALOL HYDROCHLORIDE 10 MG: 5 INJECTION, SOLUTION INTRAVENOUS at 14:02

## 2024-01-16 RX ADMIN — LIDOCAINE HYDROCHLORIDE 3 ML: 10 INJECTION, SOLUTION EPIDURAL; INFILTRATION; INTRACAUDAL; PERINEURAL at 18:44

## 2024-01-16 ASSESSMENT — ACTIVITIES OF DAILY LIVING (ADL)
HEARING_DIFFICULTY_OR_DEAF: NO
DIFFICULTY_EATING/SWALLOWING: NO
WALKING_OR_CLIMBING_STAIRS_DIFFICULTY: NO
CHANGE_IN_FUNCTIONAL_STATUS_SINCE_ONSET_OF_CURRENT_ILLNESS/INJURY: NO
ADLS_ACUITY_SCORE: 22
ADLS_ACUITY_SCORE: 24
CONCENTRATING,_REMEMBERING_OR_MAKING_DECISIONS_DIFFICULTY: NO
ADLS_ACUITY_SCORE: 24
DOING_ERRANDS_INDEPENDENTLY_DIFFICULTY: NO
DRESSING/BATHING_DIFFICULTY: NO
ADLS_ACUITY_SCORE: 25
WEAR_GLASSES_OR_BLIND: NO
ADLS_ACUITY_SCORE: 22
ADLS_ACUITY_SCORE: 24
ADLS_ACUITY_SCORE: 24
DIFFICULTY_COMMUNICATING: NO
ADLS_ACUITY_SCORE: 24
TOILETING_ISSUES: NO
FALL_HISTORY_WITHIN_LAST_SIX_MONTHS: NO
ADLS_ACUITY_SCORE: 24
ADLS_ACUITY_SCORE: 18
DEPENDENT_IADLS:: INDEPENDENT

## 2024-01-16 NOTE — PLAN OF CARE
Goal Outcome Evaluation:      Plan of Care Reviewed With: patient          Outcome Evaluation: Care management will continue to provide assistance with any discharge needs. SW will follow plan of care.

## 2024-01-16 NOTE — H&P
CV ICU H&P    ASSESSMENT: Valdo Lyons is a 48 year old male with PMH of HTN, childhood seizures. Presents to [facility] for consideration of surgical repair of aortic dissection on 1/16/24 from [facility].    CO-MORBIDITIES:   Patient Active Problem List   Diagnosis    Hypertensive urgency    Aortic dissection (H)       PLAN:  Neuro/ pain/ sedation:  - Monitor neurological status. Notify the MD for any acute changes in exam.  #Acute pain  - PRN: acetaminophen, dilaudid    # Mariajuana use  - Typically smokes mariajuana every night  - Denies tobacco, alcohol, or other illicit drugs    # Recent fall  # Childhood seizure disorder  - Seizure hx noted, not on any antiepileptics at time of admission  - Presented to [facility] s/p fall, CT head reported as negative for intracranial abnormalities 1/12)    Pulmonary care:   # Acute hypoxic insufficiency  - Goal SpO2 > 92%  - Titrate oxygen to meet goal SpO2.   - Encourage IS q15-30 minutes when awake.    Cardiovascular:    # Type B dissection, c/f retrograde tear  # Hx HTN  - Nipride, esmolol gtt to achieve goal hemodynamics  - Monitor hemodynamic status.   - Goal MAP >65, SBP <120  - PRN labetalol, hydralazine  - Was not taking any PTA medications.  - Trend troponin x3  - STAT ECHO  - Bilateral arterial lines placed at [facility]. Will treat based on right arterial line.    GI care / Nutrition:   # At risk for protein malnutrition  - NPO until surgical plan determined.  - Nutrition consulted, appreciate recommendations  - PPI not indicated  - Bowel regimen: MiraLAX, senna    Renal / Fluids / Electrolytes:   # Chronic kidney disease vs MIKA  # volume status  # electrolyte derangement  Unknown baseline, was 1.6 on admission to [facility], last value in 2022 was 1.25  - Strict I/O, daily weights  - Avoid/limit nephrotoxins as able  - Replete lytes PRN per protocol  - BMP on arrival, daily.    Endocrine:    # Stress hyperglycemia  Preop A1c pending.  - Gluc checks per protocol  - Goal BG <180 for optimal  "healing    ID / Antibiotics:  # Stress induced leukocytosis  - No s/sx infection  - Monitor fever curve, WBC, and inflammatory markers as appropriate    Heme:     # Acute blood loss anemia  # Acute blood loss thrombocytopenia  No s/sx active bleeding  - Continue to monitor  - CBC on arrival  - Hgb goal > 7.0    MSK / Skin:  # no acute concerns  - PT/OT/CR    Prophylaxis:     - Mechanical ppx for DVT  - Chemical DVT ppx: HOLD until surgical plan determined  - PPI: n/a  - Bowel regimen: MiraLAX, senna    Lines / Tubes / Drains:  - Arterial Line x2 (left and right)  - PIV x3    Disposition:  - CVICU    Patient seen, findings and plan discussed with CVICU staff.    Total Critical Care time spent by me, excluding procedures, was 50 minutes.    BRIANA Lira CNP      Clinically Significant Risk Factors Present on Admission                  # Hypertension: Noted on problem list      # Obesity: Estimated body mass index is 30.77 kg/m  as calculated from the following:    Height as of this encounter: 1.727 m (5' 8\").    Weight as of this encounter: 91.8 kg (202 lb 6.1 oz).                      ====================================    HPI:   Presents to CVICU from outside hospital. Patient states he is here for high blood pressure. Unsure why he transferred to [facility]. Denies any pain, nausea/vomiting, difficulty breathing, or new numbness/weakness at time of admission.     Per chart review, patient presented to [facility] with syncope and back pain. Found to have type b aortic dissection extending from the origin of left subclavian artery to the diaphragm just superior to the celiac axis. Has not had consistent medical care. Last seen in ED 2022 with hypertensive urgency. Does not take any medications.     PAST MEDICAL HISTORY: No past medical history on file.    PAST SURGICAL HISTORY: No past surgical history on file.    FAMILY HISTORY: No family history on file.    SOCIAL HISTORY:   Social History     Tobacco Use    " Smoking status: Not on file    Smokeless tobacco: Not on file   Substance Use Topics    Alcohol use: Not on file         OBJECTIVE:  1. VITAL SIGNS:   Temp:  [99  F (37.2  C)] 99  F (37.2  C)  HR 82  /51 (73) via arterial line on right arm  SpO2 97% on oxymask at 8 LPM  No data recorded      2. INTAKE/ OUTPUT:   No intake/output data recorded.      3. PHYSICAL EXAMINATION:   General: laying in bed, conversant  Neuro: A&O, grossly intact  Resp: Clear lungs, on 8LPM via oxymask  CV: S1, S2, RRR, no m/r/g   Abdomen: Soft, non-distended, non-tender  Extremities: warm and well perfused    4. INVESTIGATIONS:   Arterial Blood Gases   Recent Labs   Lab 01/16/24  0335   PH 7.38   PCO2 38   PO2 81   HCO3 23     Complete Blood Count   No lab results found in last 7 days.  Basic Metabolic Panel  Recent Labs   Lab 01/16/24  0319   *     Liver Function Tests  No lab results found in last 7 days.  Pancreatic Enzymes  No lab results found in last 7 days.  Coagulation Profile  No lab results found in last 7 days.      5. RADIOLOGY:   No results found for this or any previous visit (from the past 24 hour(s)).    =========================================

## 2024-01-16 NOTE — PLAN OF CARE
Goal Outcome Evaluation:    Pt arrived to unit around 0300 on esmolol and nipride for BP control. L radial art line reading about 30-40 points lower than R radial Art line for SBP. Per CVTS will titrate off of R ART line. Patient denies any pain. Slightly drowsy due to fentanyl dose given at prior hospital but answers questions appropriately. On 3LNC. Has 2 PIVs. Phos and mag replaced this AM. Urinal at bedside. Call light within reach. All questions answered for patient.     Nipride @ 2  Esmolol @ 300      Plan of Care Reviewed With: patient    Overall Patient Progress: improvingOverall Patient Progress: improving

## 2024-01-16 NOTE — PHARMACY-ADMISSION MEDICATION HISTORY
Pharmacist Admission Medication History    Admission medication history is complete. The information provided in this note is only as accurate as the sources available at the time of the update.    Information Source(s): Hospital records and [facility]/Sure[employer]s via in-person    Pertinent Information: Patient endorsed no PTA medications at OSH and upon admission to [facility] per chart review, this was verified by lack of [employer] information.     Changes made to PTA medication list:  Added: None  Deleted: amlodipine, atenolol   Changed: None    Medication Affordability:not assessed     Allergies reviewed with patient and updates made in EHR: unable to assess    Medication History Completed By: Norma Dee [facility] 1/16/2024 3:04 PM    No outpatient medications have been marked as taking for the 1/16/24 encounter (Hospital Encounter).

## 2024-01-16 NOTE — PROGRESS NOTES
CV ICU PROGRESS NOTE  January 16, 2024      CO-MORBIDITIES:   Aortic Dissection  Hypertensive urgency     ASSESSMENT: Valdo Lyons is a 48 year old male with PMH of HTN, childhood seizures. Patient presented to [facility] [place] [place] on 1/12 with acute type B aortic dissection from the origin of the left subclavian to the diaphragm just superior to the celiac axis.Yesterday he had worsening back pain and increased variation in BP from arterial lines in his right and left hands, so repeat CT scan was done which showed Type A dissection with a thrombosed retrograde false lumen, so he was transferred to [facility].     TODAY'S PROGRESS:   Re-scan 1/17.  MAP goals as low as able - go off of right a-line readings, not left.   Consult vascular for further surgical vs medical management decisions (see below).      PLAN:  Neuro/ pain/ sedation:  - Monitor neurological status. Notify the MD for any acute changes in exam.  #Acute pain  - PRN: acetaminophen, dilaudid     # Mariajuana use  - Typically smokes mariajuana every night  - Denies tobacco, alcohol, or other illicit drugs     # Recent fall  # Childhood seizure disorder  - Seizure hx noted, not on any antiepileptics at time of admission  - Presented to [facility] s/p fall, CT head reported as negative for intracranial abnormalities 1/12)     Pulmonary care:   # Acute hypoxic insufficiency  - Goal SpO2 > 92%  - Titrate oxygen to meet goal SpO2.   - Encourage IS q15-30 minutes when awake.     Cardiovascular:    # Type B dissection, c/f retrograde tear  # Hx HTN  - nicardipine, esmolol gtt to achieve goal hemodynamics (wean esmolol first then nicardipine).   -Carvedilol 25 mg BID and amlodipine 10 mg daily.   -Consider ACEi 1/17    - Monitor hemodynamic status.   - Goal MAP >65 but less than 80, SBP <120.  - PRN labetalol, hydralazine  - Was not taking any PTA medications.  - Bilateral arterial lines placed at [facility]. Will treat based on right arterial line.  - Ongoing discussion between  cardiothoracic surgery and vascular surgery for final decision of management of the above dissection.   Current vascular surgery recs:  RECOMMENDATIONS:  - Keep SBP <120, HR <70 with esmolol gtt and nicardipine gtt (2nd line), PO coreg BID and norvasc daily. Hydralazine and labetalol PRN (please use consistently in order to facilitate titrating down gtts)  - Serial abdominal and pulse checks  - Repeat CTA ordered for tomorrow     - if managing medically will transfer patient [place] [facility] [place]     GI care / Nutrition:   # At risk for protein malnutrition  - NPO until surgical plan determined (awaiting vascular and CT surgery plans).   - Nutrition consulted, appreciate recommendations  - PPI not indicated  - Bowel regimen: MiraLAX, senna     Renal / Fluids / Electrolytes:   # Chronic kidney disease vs MIKA  # volume status  # electrolyte derangement  Unknown baseline, was 1.6 on admission to [facility], last value in 2022 was 1.25  - Strict I/O, daily weights  - Avoid/limit nephrotoxins as able  - Replete lytes PRN per protocol  - BMP on arrival, daily.     Endocrine:    # Stress hyperglycemia  Preop A1c pending.  - Gluc checks per protocol  - Goal BG <180 for optimal healing     ID / Antibiotics:  # Stress induced leukocytosis  - No s/sx infection  - Monitor fever curve, WBC, and inflammatory markers as appropriate     Heme:     # Acute blood loss anemia  # Acute blood loss thrombocytopenia  No s/sx active bleeding  - Continue to monitor  - CBC on arrival  - Hgb goal > 7.0     MSK / Skin:  # no acute concerns  - PT/OT/CR     Prophylaxis:     - Mechanical ppx for DVT  - Chemical DVT ppx: HOLD until surgical plan determined  - PPI: n/a  - Bowel regimen: MiraLAX, senna     Lines / Tubes / Drains:  - Arterial Line x2 (left and right)  - PIV x3     Disposition:  - CVICU    Patient seen, findings and plan discussed with CV ICU staff, Dr. Meyer.     Shannon Galicia MD (CA1)  Anesthesiology Resident        ====================================    SUBJECTIVE:   presents to CVICU from outside hospital. Patient states he is here for high blood pressure. Unsure why he transferred to [facility]. Denies any pain, nausea/vomiting, difficulty breathing, or new numbness/weakness at time of admission.      Per chart review, patient presented to [facility] with syncope and back pain. Found to have type b aortic dissection extending from the origin of left subclavian artery to the diaphragm just superior to the celiac axis. Has not had consistent medical care. Last seen in ED 2022 with hypertensive urgency. Does not take any medications.    OBJECTIVE:   1. VITAL SIGNS:   Temp:  [97.9  F (36.6  C)-99  F (37.2  C)] 97.9  F (36.6  C)  Pulse:  [73-88] 85  Resp:  [4-26] 22  BP: (126-140)/(56-58) 126/56  MAP:  [62 mmHg-105 mmHg] 84 mmHg  Arterial Line BP: ()/(42-74) 137/56  SpO2:  [88 %-98 %] 88 %  Resp: 22      2. INTAKE/ OUTPUT:   I/O last 3 completed shifts:  In: 545.75 [I.V.:545.75]  Out: -     3. PHYSICAL EXAMINATION:   General: laying in bed, conversant  Neuro: A&O, grossly intact  Resp: Clear lungs, on 8LPM via oxymask  CV: S1, S2, RRR, no m/r/g   Abdomen: Soft, non-distended, non-tender  Extremities: warm and well perfused    4. INVESTIGATIONS:   Arterial Blood Gases   Recent Labs   Lab 01/16/24 0335   PH 7.38   PCO2 38   PO2 81   HCO3 23     Complete Blood Count   Recent Labs   Lab 01/16/24 0334   WBC 15.0*   HGB 10.6*        Basic Metabolic Panel  Recent Labs   Lab 01/16/24  1203 01/16/24  0906 01/16/24  0334 01/16/24 0319   NA  --   --  135  --    POTASSIUM  --   --  4.3  --    CHLORIDE  --   --  106  --    CO2  --   --  20*  --    BUN  --   --  12.0  --    CR  --   --  1.47*  --    * 108* 115* 112*     Liver Function Tests  Recent Labs   Lab 01/16/24  0334   AST 12   ALT 7   ALKPHOS 42   BILITOTAL 0.5   ALBUMIN 3.2*     Pancreatic Enzymes  No lab results found in last 7 days.  Coagulation Profile  Recent Labs    Lab 24  0334   PTT 38         5. RADIOLOGY:   Recent Results (from the past 24 hour(s))   Echocardiogram Complete   Result Value    LVEF  55-60%    Narrative    [id number]  [id number]  [id number]  [id number]^IGNACIO^BLAYNE     [facility]  Echocardiography Laboratory  [address] [place] [address]     Name: AME CHOW  MRN: [id number]  : 1975  Study Date: 2024 03:39 AM  Age: 48 yrs  Gender: Male  Patient Location: [facility]  Reason For Study: Aortic Dissection  Ordering Physician: BLAYNE PIERRE  Referring Physician: LESLY SILVEIRA  Performed By: Komal Solorio RDCS     BSA: 2.0 m2  Height: 68 in  Weight: 194 lb  BP: 173/64 mmHg  ______________________________________________________________________________  Procedure  Echocardiogram with two-dimensional, color and spectral Doppler performed.  ______________________________________________________________________________  Interpretation Summary  Left ventricular size, wall motion and function are normal. The ejection  fraction is 55-60%.  Moderate concentric wall thickening consistent with left ventricular  hypertrophy is present.  Right ventricular function, chamber size, wall motion, and thickness are  normal.     Dissection flap seen in aortic arch, descending and abdominal aorta. Aortic  root appear normal. Likely type B aortic dissection. Recommend dedicated CTA.     No pericardial effusion is present.     Previous study not available for comparison.  ______________________________________________________________________________  Left Ventricle  Left ventricular size, wall motion and function are normal. The ejection  fraction is 55-60%. Moderate concentric wall thickening consistent with left  ventricular hypertrophy is present. Left ventricular diastolic function is  normal.     Right Ventricle  Right ventricular function, chamber size, wall motion, and thickness are  normal.      Atria  Both atria appear normal.     Mitral Valve  The mitral valve is normal.     Aortic Valve  The aortic valve is tricuspid. Trace aortic insufficiency is present.     Tricuspid Valve  The tricuspid valve is normal.     Pulmonic Valve  The pulmonic valve is normal.     Vessels  Dissection flap seen in aortic arch, descending and abdominal aorta. Aortic  root appear normal. Likley type B aoartic dissection.Recommend dedicated CTA.  The aorta root is normal. Dilation of the inferior vena cava is present with  abnormal respiratory variation in diameter.     Pericardium  No pericardial effusion is present.     Miscellaneous  No significant valvular abnormalities present.     Compared to Previous Study  Previous study not available for comparison.  ______________________________________________________________________________  MMode/2D Measurements & Calculations  asc Aorta Diam: 3.7 cm  LVOT diam: 2.3 cm  LVOT area: 4.3 cm2  Asc Ao diam index BSA (cm/m2): 1.8  Asc Ao diam index Ht(cm/m): 2.1     ______________________________________________________________________________  Report approved by: Cristhian VAUGHN 01/16/2024 06:34 AM         XR Chest Port 1 View    Narrative    Exam: XR CHEST PORT 1 VIEW, 1/16/2024 4:42 AM    Indication: hypoxia    Comparison: X-ray chest 6/20/2022, multiple priors.    Findings:   AP portable upright radiograph of the chest. There is mild widening of  the mediastinum superiorly as well as aortic silhouette projecting  over the heart compared to prior radiograph 6/20/2022, consistent with  patient's history of known type B aortic dissection. Cardiac  silhouette is borderline enlarged, likely projectional.     The trachea is midline. There are no focal consolidations. Bibasilar  streaky atelectasis. No significant pleural effusions. No definitive  pneumothorax. Left midlung linear opacity, likely linear atelectasis.      Impression    Impression:   1. Mild widening of the mediastinum  compared to prior x-ray 6/20/2022,  consistent with patient's history of known type B aortic dissection  seen on outside imaging (imaging unavailable).  2. No focal consolidations. There is bibasilar atelectasis.  3. No significant pleural effusion, no discernible pneumothorax.    I have personally reviewed the examination and initial interpretation  and I agree with the findings.    GHISLAINE BOWEN MD         SYSTEM ID:  [id number]       =========================================

## 2024-01-16 NOTE — CONSULTS
Vascular Surgery Consult Note  01/16/2024    Reason for consult: Aortic Dissection  Consult requested by: CVICU      ASSESSMENT:   Patient is a 48M who presented to [facility] [place] on 1/12 with acute type B aortic dissection from the origin of the left subclavian to the diaphragm just superior to the celiac axis.Yesterday he had worsening back pain and increased variation in BP from arterial lines in his right and left hands, so repeat CT scan was done which showed Type A dissection with a thrombosed retrograde false lumen, so he was transferred to [facility]. At this point he remains asymptomatic from a dissection standpoint.    RECOMMENDATIONS:    - Keep SBP <120, HR <70 with esmolol gtt and nicardipine gtt (2nd line), PO coreg BID and norvasc daily. Hydralazine and labetalol PRN (please use consistently in order to facilitate titrating down gtts)  - Serial abdominal and pulse checks  - Repeat CTA ordered for tomorrow      Discussed with staff Dr. Warren.    Jorge Dodge MD  Vascular Surgery Resident      =======================    History of Present Illness:    Patient is a 48M with hx HTN who presented to [facility] on 1/12 with syncope and back pain and was found to have a Type B aortic dissection. On 1/15 he had worsening back pain and was found to have unequal pressures between arterial lines on his right and left arms, so repeat CT scan was done which showed a Type A dissection with thrombosis of the false lumen proximal to the left subclavian. He was transferred to [facility] for surgical evaluation.    On exam, patient appears confused but denies any discomfort, weakness, or loss of sensation. He does not remember the reason why he came to [facility] originally. He denies any abdominal or back pain at this time. He states that he has a history of hypertension but does not take any medications for this.    Past Medical History:  No past medical history on file.    Past Surgical History  No past surgical history on file.    Family  History:  No family history on file.    Social History:  Social History     Social History Narrative    Not on file        Medications:  No current outpatient medications on file.       Allergies:  No Known Allergies    Review of Symptoms:  A 10 point review of symptoms has been conducted and is negative except for that mentioned in the above HPI.    Physical Exam:    Temp:  [97.9  F (36.6  C)-99  F (37.2  C)] 97.9  F (36.6  C)  Pulse:  [73-83] 82  Resp:  [4-26] 14  BP: (140)/(58) 140/58  MAP:  [62 mmHg-105 mmHg] 74 mmHg  Arterial Line BP: ()/(44-74) 136/54  SpO2:  [90 %-98 %] 93 %    Gen: NAD, resting comfortably  HEENT: NCAT, sclera anicteric  CV: RR by pedal pulse  Resp: nonlabored respirations, comfortable on NC  Abd: soft, nontender, nondistended   Ext: Palpable femoral and DP pulses bilaterally. Sensorimotor intact and symmetric. A-lines dyssynchronous with L SBP 70-80s, R -140s  Neuro: no focal deficits  Skin: No rashes    Labs:  Last Comprehensive Metabolic Panel:  Lab Results   Component Value Date     01/16/2024    POTASSIUM 4.3 01/16/2024    CHLORIDE 106 01/16/2024    CO2 20 (L) 01/16/2024    ANIONGAP 9 01/16/2024     (H) 01/16/2024    BUN 12.0 01/16/2024    CR 1.47 (H) 01/16/2024    GFRESTIMATED 59 (L) 01/16/2024    YARON 7.7 (L) 01/16/2024       CBC RESULTS:   Recent Labs   Lab Test 01/16/24  0334   WBC 15.0*   RBC 5.41   HGB 10.6*   HCT 33.6*   MCV 62*   MCH 19.6*   MCHC 31.5   RDW 15.7*          Imaging:  CTA 1/12:  Impression:   1. Type B aortic dissection extending from the origin of the left subclavian artery to the diaphragm just superior to the celiac axis.   2. Incidental early filling and dilation of the splenic vein suggestive of splenic arteriovenous malformation.     CTA 1/16:  Type A aortic dissection with thrombosed false lumen proximal to L subclavian. All major vessels coming off true lumen. Left subclavian with limited flow.

## 2024-01-16 NOTE — CONSULTS
Care Management Initial Consult    General Information  Assessment completed with: PatientValdo  Type of CM/SW Visit: Initial Assessment    Primary Care Provider verified and updated as needed: No   Readmission within the last 30 days: no previous admission in last 30 days      Reason for Consult: insurance concerns  Advance Care Planning: Advance Care Planning Reviewed: no concerns identified  NA  General Information Comments: Placed Financial Counseling request to screen patient for MA    Communication Assessment  Patient's communication style: spoken language (English or Bilingual)    Hearing Difficulty or Deaf: no   Wear Glasses or Blind: no    Cognitive  Cognitive/Neuro/Behavioral: WDL                      Living Environment:   People in home: parent(s)  patientValdo, pt's motherKristen  Current living Arrangements: house      Able to return to prior arrangements: yes  Living Arrangement Comments: NA    Family/Social Support:  Care provided by: self  Provides care for: no one  Marital Status:   Parent(s)          Description of Support System: Other (see comments) (unable to assess at this time)    Support Assessment: Other (see comments) (Unable to assess at this time)    Current Resources:   Patient receiving home care services: No     Community Resources: None  Equipment currently used at home: none  Supplies currently used at home: None    Employment/Financial:  Employment Status: other (see comments) (unable to assess)     Employment/ Comments: NA  Financial Concerns: insurance, none   Referral to Financial Worker: Yes  Finance Comments: no current insurance - requested Financial counselor follow up with patient    Does the patient's insurance plan have a 3 day qualifying hospital stay waiver?  No    Lifestyle & Psychosocial Needs:  Social Determinants of Health     Food Insecurity: Not on file   Depression: Not on file   Housing Stability: Not on file   Tobacco Use: Not on file    Financial Resource Strain: Not on file   Alcohol Use: Not on file   Transportation Needs: Not on file   Physical Activity: Not on file   Interpersonal Safety: Not on file   Stress: Not on file   Social Connections: Not on file       Functional Status:  Prior to admission patient needed assistance:   Dependent ADLs:: Independent  Dependent IADLs:: Independent  Assesssment of Functional Status: At functional baseline    Mental Health Status:  Mental Health Status: No Current Concerns       Chemical Dependency Status:  Chemical Dependency Status: No Current Concerns             Values/Beliefs:  Spiritual, Cultural Beliefs, Latter day Practices, Values that affect care: no               Additional Information:  SW placed referral to financial counselor as patient does not have insurance and requested help getting connected to insurance. SW placed request for patient to get a call to schedule an appointment with [facility] PCP at patient's request as patient does not currently have a PCP. SW could not assess for certain aspects of the CMA at this time as patient was in pain. SW will return another day to finish assessment.     Care management will continue to follow to provide assistance with any discharge needs. SW will continue to follow to assist with insurance.     LUIS ALFREDO Perez, [facility]  4A & 4E ICU   Pager: [phone]  Phone: [phone]       LUIS ALFREDO Monson

## 2024-01-17 ENCOUNTER — APPOINTMENT (OUTPATIENT)
Dept: CT IMAGING | Facility: CLINIC | Age: 49
End: 2024-01-17
Attending: STUDENT IN AN ORGANIZED HEALTH CARE EDUCATION/TRAINING PROGRAM
Payer: MEDICAID

## 2024-01-17 LAB
ALBUMIN SERPL BCG-MCNC: 3.2 G/DL (ref 3.5–5.2)
ALP SERPL-CCNC: 42 U/L (ref 40–150)
ALT SERPL W P-5'-P-CCNC: 6 U/L (ref 0–70)
ANION GAP SERPL CALCULATED.3IONS-SCNC: 6 MMOL/L (ref 7–15)
ANION GAP SERPL CALCULATED.3IONS-SCNC: 9 MMOL/L (ref 7–15)
AST SERPL W P-5'-P-CCNC: 12 U/L (ref 0–45)
BILIRUB DIRECT SERPL-MCNC: 0.25 MG/DL (ref 0–0.3)
BILIRUB SERPL-MCNC: 0.7 MG/DL
BUN SERPL-MCNC: 15.5 MG/DL (ref 6–20)
BUN SERPL-MCNC: 22 MG/DL (ref 6–20)
CA-I BLD-MCNC: 4.6 MG/DL (ref 4.4–5.2)
CALCIUM SERPL-MCNC: 8 MG/DL (ref 8.6–10)
CALCIUM SERPL-MCNC: 8.2 MG/DL (ref 8.6–10)
CHLORIDE SERPL-SCNC: 109 MMOL/L (ref 98–107)
CHLORIDE SERPL-SCNC: 110 MMOL/L (ref 98–107)
CREAT SERPL-MCNC: 1.02 MG/DL (ref 0.67–1.17)
CREAT SERPL-MCNC: 1.06 MG/DL (ref 0.67–1.17)
DEPRECATED HCO3 PLAS-SCNC: 18 MMOL/L (ref 22–29)
DEPRECATED HCO3 PLAS-SCNC: 18 MMOL/L (ref 22–29)
EGFRCR SERPLBLD CKD-EPI 2021: 87 ML/MIN/1.73M2
EGFRCR SERPLBLD CKD-EPI 2021: >90 ML/MIN/1.73M2
ERYTHROCYTE [DISTWIDTH] IN BLOOD BY AUTOMATED COUNT: 16.5 % (ref 10–15)
GLUCOSE BLDC GLUCOMTR-MCNC: 112 MG/DL (ref 70–99)
GLUCOSE BLDC GLUCOMTR-MCNC: 132 MG/DL (ref 70–99)
GLUCOSE SERPL-MCNC: 109 MG/DL (ref 70–99)
GLUCOSE SERPL-MCNC: 148 MG/DL (ref 70–99)
HCT VFR BLD AUTO: 33.8 % (ref 40–53)
HGB BLD-MCNC: 10.7 G/DL (ref 13.3–17.7)
INR PPP: 1.18 (ref 0.85–1.15)
LACTATE SERPL-SCNC: 0.8 MMOL/L (ref 0.7–2)
LACTATE SERPL-SCNC: 1.4 MMOL/L (ref 0.7–2)
MAGNESIUM SERPL-MCNC: 2.2 MG/DL (ref 1.7–2.3)
MAGNESIUM SERPL-MCNC: 2.3 MG/DL (ref 1.7–2.3)
MCH RBC QN AUTO: 19.1 PG (ref 26.5–33)
MCHC RBC AUTO-ENTMCNC: 31.7 G/DL (ref 31.5–36.5)
MCV RBC AUTO: 61 FL (ref 78–100)
PHOSPHATE SERPL-MCNC: 1.7 MG/DL (ref 2.5–4.5)
PHOSPHATE SERPL-MCNC: 2.8 MG/DL (ref 2.5–4.5)
PLATELET # BLD AUTO: 193 10E3/UL (ref 150–450)
POTASSIUM SERPL-SCNC: 4 MMOL/L (ref 3.4–5.3)
POTASSIUM SERPL-SCNC: 4.4 MMOL/L (ref 3.4–5.3)
PROT SERPL-MCNC: 5.7 G/DL (ref 6.4–8.3)
RBC # BLD AUTO: 5.59 10E6/UL (ref 4.4–5.9)
SODIUM SERPL-SCNC: 134 MMOL/L (ref 135–145)
SODIUM SERPL-SCNC: 136 MMOL/L (ref 135–145)
WBC # BLD AUTO: 15.5 10E3/UL (ref 4–11)

## 2024-01-17 PROCEDURE — 99233 SBSQ HOSP IP/OBS HIGH 50: CPT | Mod: GC | Performed by: INTERNAL MEDICINE

## 2024-01-17 PROCEDURE — 85610 PROTHROMBIN TIME: CPT

## 2024-01-17 PROCEDURE — 83605 ASSAY OF LACTIC ACID: CPT

## 2024-01-17 PROCEDURE — 250N000011 HC RX IP 250 OP 636

## 2024-01-17 PROCEDURE — 71275 CT ANGIOGRAPHY CHEST: CPT | Mod: 26 | Performed by: RADIOLOGY

## 2024-01-17 PROCEDURE — 71275 CT ANGIOGRAPHY CHEST: CPT

## 2024-01-17 PROCEDURE — 82248 BILIRUBIN DIRECT: CPT

## 2024-01-17 PROCEDURE — 84100 ASSAY OF PHOSPHORUS: CPT

## 2024-01-17 PROCEDURE — 82310 ASSAY OF CALCIUM: CPT | Performed by: STUDENT IN AN ORGANIZED HEALTH CARE EDUCATION/TRAINING PROGRAM

## 2024-01-17 PROCEDURE — 200N000002 HC R&B ICU UMMC

## 2024-01-17 PROCEDURE — 258N000003 HC RX IP 258 OP 636: Performed by: STUDENT IN AN ORGANIZED HEALTH CARE EDUCATION/TRAINING PROGRAM

## 2024-01-17 PROCEDURE — 82330 ASSAY OF CALCIUM: CPT

## 2024-01-17 PROCEDURE — 85027 COMPLETE CBC AUTOMATED: CPT

## 2024-01-17 PROCEDURE — 250N000013 HC RX MED GY IP 250 OP 250 PS 637: Performed by: STUDENT IN AN ORGANIZED HEALTH CARE EDUCATION/TRAINING PROGRAM

## 2024-01-17 PROCEDURE — 74174 CTA ABD&PLVS W/CONTRAST: CPT | Mod: 26 | Performed by: RADIOLOGY

## 2024-01-17 PROCEDURE — 83605 ASSAY OF LACTIC ACID: CPT | Performed by: STUDENT IN AN ORGANIZED HEALTH CARE EDUCATION/TRAINING PROGRAM

## 2024-01-17 PROCEDURE — 250N000013 HC RX MED GY IP 250 OP 250 PS 637

## 2024-01-17 PROCEDURE — 80053 COMPREHEN METABOLIC PANEL: CPT

## 2024-01-17 PROCEDURE — 250N000011 HC RX IP 250 OP 636: Performed by: STUDENT IN AN ORGANIZED HEALTH CARE EDUCATION/TRAINING PROGRAM

## 2024-01-17 PROCEDURE — 250N000009 HC RX 250

## 2024-01-17 PROCEDURE — 83735 ASSAY OF MAGNESIUM: CPT | Performed by: STUDENT IN AN ORGANIZED HEALTH CARE EDUCATION/TRAINING PROGRAM

## 2024-01-17 PROCEDURE — 83735 ASSAY OF MAGNESIUM: CPT

## 2024-01-17 PROCEDURE — 84100 ASSAY OF PHOSPHORUS: CPT | Performed by: STUDENT IN AN ORGANIZED HEALTH CARE EDUCATION/TRAINING PROGRAM

## 2024-01-17 RX ORDER — HYDROXYZINE HYDROCHLORIDE 25 MG/1
25 TABLET, FILM COATED ORAL EVERY 6 HOURS PRN
Status: DISCONTINUED | OUTPATIENT
Start: 2024-01-17 | End: 2024-01-23

## 2024-01-17 RX ORDER — HYDROXYZINE HYDROCHLORIDE 25 MG/1
50 TABLET, FILM COATED ORAL EVERY 6 HOURS PRN
Status: DISCONTINUED | OUTPATIENT
Start: 2024-01-17 | End: 2024-01-23

## 2024-01-17 RX ORDER — OLANZAPINE 10 MG/2ML
2.5 INJECTION, POWDER, FOR SOLUTION INTRAMUSCULAR EVERY 5 MIN PRN
Status: DISCONTINUED | OUTPATIENT
Start: 2024-01-17 | End: 2024-01-22

## 2024-01-17 RX ORDER — HALOPERIDOL 5 MG/ML
2 INJECTION INTRAMUSCULAR EVERY 5 MIN PRN
Status: DISCONTINUED | OUTPATIENT
Start: 2024-01-17 | End: 2024-01-23

## 2024-01-17 RX ORDER — CARVEDILOL 25 MG/1
50 TABLET ORAL 2 TIMES DAILY
Status: DISCONTINUED | OUTPATIENT
Start: 2024-01-17 | End: 2024-01-23

## 2024-01-17 RX ORDER — IOPAMIDOL 755 MG/ML
90 INJECTION, SOLUTION INTRAVASCULAR ONCE
Status: COMPLETED | OUTPATIENT
Start: 2024-01-17 | End: 2024-01-17

## 2024-01-17 RX ADMIN — LABETALOL HYDROCHLORIDE 10 MG: 5 INJECTION, SOLUTION INTRAVENOUS at 00:21

## 2024-01-17 RX ADMIN — LABETALOL HYDROCHLORIDE 10 MG: 5 INJECTION, SOLUTION INTRAVENOUS at 21:47

## 2024-01-17 RX ADMIN — LABETALOL HYDROCHLORIDE 10 MG: 5 INJECTION, SOLUTION INTRAVENOUS at 06:56

## 2024-01-17 RX ADMIN — LABETALOL HYDROCHLORIDE 10 MG: 5 INJECTION, SOLUTION INTRAVENOUS at 02:45

## 2024-01-17 RX ADMIN — Medication 200 MCG/KG/MIN: at 22:28

## 2024-01-17 RX ADMIN — HYDRALAZINE HYDROCHLORIDE 20 MG: 20 INJECTION INTRAMUSCULAR; INTRAVENOUS at 05:44

## 2024-01-17 RX ADMIN — LABETALOL HYDROCHLORIDE 10 MG: 5 INJECTION, SOLUTION INTRAVENOUS at 00:35

## 2024-01-17 RX ADMIN — HYDRALAZINE HYDROCHLORIDE 20 MG: 20 INJECTION INTRAMUSCULAR; INTRAVENOUS at 09:11

## 2024-01-17 RX ADMIN — SODIUM CHLORIDE 15 MG/HR: 9 INJECTION, SOLUTION INTRAVENOUS at 07:59

## 2024-01-17 RX ADMIN — Medication 5 ML: at 19:48

## 2024-01-17 RX ADMIN — LABETALOL HYDROCHLORIDE 10 MG: 5 INJECTION, SOLUTION INTRAVENOUS at 05:02

## 2024-01-17 RX ADMIN — Medication 250 MCG/KG/MIN: at 11:23

## 2024-01-17 RX ADMIN — HYDRALAZINE HYDROCHLORIDE 20 MG: 20 INJECTION INTRAMUSCULAR; INTRAVENOUS at 03:09

## 2024-01-17 RX ADMIN — Medication 300 MCG/KG/MIN: at 07:59

## 2024-01-17 RX ADMIN — LOSARTAN POTASSIUM 100 MG: 50 TABLET, FILM COATED ORAL at 08:25

## 2024-01-17 RX ADMIN — LABETALOL HYDROCHLORIDE 10 MG: 5 INJECTION, SOLUTION INTRAVENOUS at 12:33

## 2024-01-17 RX ADMIN — LABETALOL HYDROCHLORIDE 10 MG: 5 INJECTION, SOLUTION INTRAVENOUS at 12:06

## 2024-01-17 RX ADMIN — CARVEDILOL 25 MG: 25 TABLET, FILM COATED ORAL at 08:25

## 2024-01-17 RX ADMIN — HYDRALAZINE HYDROCHLORIDE 20 MG: 20 INJECTION INTRAMUSCULAR; INTRAVENOUS at 12:18

## 2024-01-17 RX ADMIN — SODIUM CHLORIDE 15 MG/HR: 9 INJECTION, SOLUTION INTRAVENOUS at 05:41

## 2024-01-17 RX ADMIN — AMLODIPINE BESYLATE 10 MG: 10 TABLET ORAL at 08:25

## 2024-01-17 RX ADMIN — SODIUM CHLORIDE 15 MG/HR: 9 INJECTION, SOLUTION INTRAVENOUS at 19:25

## 2024-01-17 RX ADMIN — Medication 300 MCG/KG/MIN: at 04:56

## 2024-01-17 RX ADMIN — HYDRALAZINE HYDROCHLORIDE 20 MG: 20 INJECTION INTRAMUSCULAR; INTRAVENOUS at 00:52

## 2024-01-17 RX ADMIN — LABETALOL HYDROCHLORIDE 10 MG: 5 INJECTION, SOLUTION INTRAVENOUS at 07:54

## 2024-01-17 RX ADMIN — CARVEDILOL 50 MG: 25 TABLET, FILM COATED ORAL at 19:41

## 2024-01-17 RX ADMIN — SODIUM CHLORIDE 10 MG/HR: 9 INJECTION, SOLUTION INTRAVENOUS at 11:23

## 2024-01-17 RX ADMIN — IOPAMIDOL 90 ML: 755 INJECTION, SOLUTION INTRAVENOUS at 08:07

## 2024-01-17 ASSESSMENT — ACTIVITIES OF DAILY LIVING (ADL)
ADLS_ACUITY_SCORE: 25

## 2024-01-17 NOTE — PROGRESS NOTES
Shift updates:    Pt disoriented to situation/place at times. Very drowsy at start of shift but compliant with instructions. At midnight assessment patient refused to participate in assessment instructions. RN explained the important of regular assessments while in the ICU as well as CVTS provider. Patient also refusing to wear oxymask so nasal cannula 6L utilized but will intermittently pull it off. PRN hydroxyzine ordered but refused by patient.  SBP goal < 120 HR <70. Remains on esmolol @ 300 and Cardene @ 15 with several PRN doses of labetalol and hydralazine given. Plan for CTA in AM.

## 2024-01-17 NOTE — PLAN OF CARE
ICU End of Shift Summary. See flowsheets for vital signs and detailed assessment.    Changes this shift: A&O x3, disoriented to situation. Lethargic through most of the shift, however opens eyes to voice, and follows commands, PERRL. Updated team about the lethargy, team okay with not doing head CT at this time, per pt he feels tired. NPO except meds, until CVTS confirms about surgery. Voiding via urinal.     Plan: Will continue with POC and update team with changes.

## 2024-01-17 NOTE — PLAN OF CARE
Major Shift Events:  Pt is A&Ox3, disoriented to situation. Intermittent confusion, will state he does not know what is going on. Situation explained to pt multiple times. Follows commands. Afebrile. Bedrest per order. No pain. NSR, goal HR <70, HR has been <80 all day. MAP goal >65, Systolic <120. On esmolol and nicardipine, maxed doses. Using PRN hydralazine and labetalol to keep BP <120. Going off R radial ART line, placed d/t L radial ART line being dampened, difference of 20-30 SBP below. Team aware of difficulty with maintaining SBP <120. Switched from nipride to cardene, BP better managed on nipride. 6L O2 NC. No BM. Voiding in urinal. PICC line placed in evening, waiting on XR for verification. Family visited briefly in afternoon, pt BP went up to 150's so family left. Updated wife.  Plan: Continue w/ POC. CT tomorrow.  For vital signs and complete assessments, please see documentation flowsheets.

## 2024-01-17 NOTE — PROCEDURES
[facility]    Double Lumen PICC Placement    Date/Time: 1/16/2024 6:39 PM    Performed by: Makayla Bae RN  Authorized by: Shannon Galicia MD  Indications: nicardipine gtt.      UNIVERSAL PROTOCOL   Site Marked: Yes  Prior Images Obtained and Reviewed:  Yes  Required items: Required blood products, implants, devices and special equipment available    Patient identity confirmed:  Verbally with patient, arm band, provided demographic data and hospital-assigned identification number  Patient was reevaluated immediately before administering moderate or deep sedation or anesthesia  Confirmation Checklist:  Patient's identity using two indicators, relevant allergies, procedure was appropriate and matched the consent or emergent situation and correct equipment/implants were available  Time out: Immediately prior to the procedure a time out was called    Universal Protocol: the [facility] Universal Protocol was followed    Preparation: Patient was prepped and draped in usual sterile fashion       ANESTHESIA    Anesthesia:  See MAR for details  Local Anesthetic:  Lidocaine 1% with epinephrine  Anesthetic Total (mL):  3      SEDATION    Patient Sedated: No        Preparation: skin prepped with ChloraPrep  Skin prep agent: skin prep agent completely dried prior to procedure  Sterile barriers: maximum sterile barriers were used: cap, mask, sterile gown, sterile gloves, and large sterile sheet  Hand hygiene: hand hygiene performed prior to central venous catheter insertion  Type of line used: PICC  Catheter type: double lumen  Lumen type: non-valved and power PICC  Lumen Identification: Purple and Red  Catheter size: 5 Fr  Brand: Bard  Lot number: [id number]  Placement method: venipuncture, MST, ultrasound and tip navigation system  Number of attempts: 1  Difficulty threading catheter: no  Successful placement: yes  Orientation: right  Catheter to Vein (%): 27  Location:  basilic vein (vein diameter- 0.66cm)  Tip Location: SVC/RA Junction  Arm circumference: adults 10 cm  Extremity circumference: 34  Visible catheter length: 1  Total catheter length: 43  Dressing and securement: alcohol impregnated caps, blood cleaned with CHG, glue, site cleansed, statlock, sterile dressing applied, thrombin hemostasis patch applied and transparent dressing  Post procedure assessment: blood return through all ports, free fluid flow and placement verified by x-ray  PROCEDURE   Patient Tolerance:  Patient tolerated the procedure well with no immediate complicationsDescribe Procedure: Consent for PICC already taken by MD.  PICC tip will be verified by chest x-ray.  PICC tip at cavo atrial junction. PICC okay to use.  Disposal: sharps and needle count correct at the end of procedure, needles and guidewire disposed in sharps container

## 2024-01-17 NOTE — PROGRESS NOTES
"VASCULAR SURGERY PROGRESS NOTE    Subjective:  Patient feeling well this morning, denies any abdominal, chest, or back pain. On max esmolol and nicardipine gtts and used PRNs often overnight.     Objective:  Intake/Output Summary (Last 24 hours) at 1/17/2024 1011  Last data filed at 1/17/2024 0900  Gross per 24 hour   Intake 3494.97 ml   Output 1100 ml   Net 2394.97 ml     Labs:  ROUTINE IP LABS (Last four results)  BMP  Recent Labs   Lab 01/17/24  0837 01/17/24 0316 01/16/24 2353 01/16/24 2029 01/16/24  0906 01/16/24  0334   NA  --  136  --   --   --  135   POTASSIUM  --  4.0  --   --   --  4.3   CHLORIDE  --  109*  --   --   --  106   YARON  --  8.0*  --   --   --  7.7*   CO2  --  18*  --   --   --  20*   BUN  --  15.5  --   --   --  12.0   CR  --  1.06  --   --   --  1.47*   * 109* 132* 111*   < > 115*    < > = values in this interval not displayed.     CBC  Recent Labs   Lab 01/17/24 0316 01/16/24 0334   WBC 15.5* 15.0*   RBC 5.59 5.41   HGB 10.7* 10.6*   HCT 33.8* 33.6*   MCV 61* 62*   MCH 19.1* 19.6*   MCHC 31.7 31.5   RDW 16.5* 15.7*    157     INR  Recent Labs   Lab 01/17/24 0316   INR 1.18*     PHYSICAL EXAM:  /52   Pulse 70   Temp 97.5  F (36.4  C) (Oral)   Resp 20   Ht 1.727 m (5' 8\")   Wt 94.1 kg (207 lb 7.3 oz)   SpO2 91%   BMI 31.54 kg/m    General: The patient is somnolent but awakens to voice  Psych: pleasant affect, answers questions appropriately  Skin: Color appropriate for race, warm, dry.  Neuro: Sensorimotor intact in BUE and BLE, CN II-XII grossly intact  Respiratory: The patient does require 6L NC supplemental oxygen. Breathing unlabored  GI:  Abdomen soft, nontender to light palpation.  Extremities: Bilateral femoral and DP pulses palpable,  no edema noted.     Temperature normal   Sensory function: normal   Flexion (plantar/dorsi): normal range       DATA:   CTA CHEST ABDOMEN PELVIS WITH CONTRAST PANEL   Final Result   IMPRESSION:   1. Aortic dissection " extends from the left subclavian origin to the   supraceliac aorta. Dissection extends through the visualized left   subclavian artery. Left vertebral artery is thought to originate from   the true lumen. Left internal thoracic artery originates from the true   lumen.      2. Non specific fluid along the ascending aorta to arch.      3. 7 mm right upper lobe nodule. Per Fleischner Society   Recommendations, if the patient is at low risk of lung cancer, further   evaluation with CT in 6-12 months is suggested with optional CT in   18-24 months. If the patient is at high risk for lung cancer, further   evaluation with CT in 6-12 months and CT at 18-24 months is suggested.      LIZBETH BEACH MD            SYSTEM ID:  [id number]      XR Chest Port 1 View   Final Result   IMPRESSION:   1.  Right upper extremity PICC tip projects over the superior cava   junction.   2.  Stable cardiomediastinal silhouette.   3.  Streaky bibasilar opacities, right more than left may represent   atelectasis versus pulmonary edema/atypical infection.      I have personally reviewed the examination and initial interpretation   and I agree with the findings.      DEANNE FOWLER MD            SYSTEM ID:  [id number]      XR Chest Port 1 View   Final Result   Impression:    1. Mild widening of the mediastinum compared to prior x-ray 6/20/2022,   consistent with patient's history of known type B aortic dissection   seen on outside imaging (imaging unavailable).   2. No focal consolidations. There is bibasilar atelectasis.   3. No significant pleural effusion, no discernible pneumothorax.      I have personally reviewed the examination and initial interpretation   and I agree with the findings.      GHISLAINE BOWEN MD            SYSTEM ID:  [id number]      Echocardiogram Complete   Final Result          ASSESSMENT / PLAN:  Patient is a 48M who presented to [facility] on 1/12 with acute type B aortic dissection from the origin of the left subclavian to the  diaphragm just superior to the celiac axis.Due to worsening back pain and increased variation in BP from arterial lines in his right and left hands, repeat CT scan was done which showed Type A dissection with a thrombosed retrograde false lumen, so he was transferred to [facility]. He continues to remain asymptomatic from a dissection standpoint. Repeat CTA on 1/17 stable.    - Continue IV and PO regimen for SBP and HR control with goals <120 and <70  - Increase Coreg to 50 mg BID  - Will discuss whether patient needs intervention with CV Surgery team    Discussed pt history, exam, assessment and plan with Dr. Warren of the vascular surgery service, who is in agreement with the above.    Jorge Dodge MD  Vascular Surgery Resident

## 2024-01-17 NOTE — PROGRESS NOTES
CV ICU PROGRESS NOTE  January 17, 2024      CO-MORBIDITIES:   Aortic Dissection  Hypertensive urgency     ASSESSMENT: Valdo Lyons is a 48 year old male with PMH of HTN, childhood seizures. Patient presented to [facility] on 1/12 with acute type B aortic dissection from the origin of the left subclavian to the diaphragm just superior to the celiac axis.Yesterday he had worsening back pain and increased variation in BP from arterial lines in his right and left hands, so repeat CT scan was done which showed Type A dissection with a thrombosed retrograde false lumen, so he was transferred to [facility]. Repeat CTA 1/17 for vascular to decide on further management (surgery vs medical management, if medically managed transfer back to [facility]).     TODAY'S PROGRESS:   CTA at 0800    Then see what vascular says as far as a plan.     PLAN:  Neuro/ pain/ sedation:  - Monitor neurological status. Notify the MD for any acute changes in exam.  - ask mom what his baseline neurologic status is/understanding of the situation   #Acute pain  - PRN: acetaminophen, dilaudid     # Mariajuana use  - Typically smokes mariajuana every night  - Denies tobacco, alcohol, or other illicit drugs     # Recent fall  # Childhood seizure disorder  - Seizure hx noted, not on any antiepileptics at time of admission  - Presented to [facility] s/p fall, CT head reported as negative for intracranial abnormalities 1/12)     Pulmonary care:   # Acute hypoxic insufficiency  Intermittently requiring 4-6L NC to achieve SPO2% goals.   - Goal SpO2 > 92%  - Encourage IS q15-30 minutes when awake (though patient has been refusing).      Cardiovascular:    # Type B dissection, c/f retrograde tear  # Hx HTN  Patient was not taking any PTA medications for his high blood pressure. Bilateral arterial lines placed at [facility]. Will treat based on right arterial line as the left is likely dissected.   - Goal MAP >65 but less than 80, SBP <120.  - nicardipine, esmolol gtt to achieve goal  hemodynamics (wean esmolol first then nicardipine if able).   - Carvedilol 25 mg BID and amlodipine 10 mg daily.   - Losartan 100 mg daily   - PRN labetalol, hydralazine  - Was not taking any PTA medications.  - repeat CTA today - awaiting final decision on management from vascular surgery.  - if managing medically will transfer patient back [place] [facility].     GI care / Nutrition:   # At risk for protein malnutrition  - NPO until surgical plan determined (awaiting vascular and CT surgery plans).   - PPI not indicated  - Bowel regimen: MiraLAX, senna     Renal / Fluids / Electrolytes:   # Chronic kidney disease vs MIKA  # volume status  # electrolyte derangement  Unknown baseline, was 1.6 on admission to [facility], last value in 2022 was 1.25. Cr continues to improve.   - Daily BMP  - Strict I/O, daily weights  - Avoid/limit nephrotoxins as able  - Replete lytes PRN per protocol    Endocrine:    # Stress hyperglycemia  Preop A1c pending.  - Gluc checks per protocol  - Goal BG <180 for optimal healing     ID / Antibiotics:  # Stress induced leukocytosis  - No s/sx infection  - Monitor fever curve, WBC, and inflammatory markers as appropriate     Heme:     # Acute blood loss anemia, considered but ruled out   # Acute blood loss thrombocytopenia, considered and ruled out   No s/sx active bleeding  - Continue to monitor  - CBC on arrival  - Hgb goal > 7.0     MSK / Skin:  # no acute concerns  - PT/OT/CR     Prophylaxis:     - Mechanical ppx for DVT  - Chemical DVT ppx: HOLD until surgical plan determined  - PPI: n/a  - Bowel regimen: MiraLAX, senna     Lines / Tubes / Drains:  - Arterial Line x2 (left and right)  - PIV x3  - PICC      Disposition:  - CVICU    Patient seen, findings and plan discussed with CV ICU staff, Dr. Meyer.     Shannon Galicia MD (CA1)  Anesthesiology Resident       ====================================    SUBJECTIVE:   Patient sleeping comfortably. Expresses frustration with situation, would like to know what is  going to happen.     OBJECTIVE:   1. VITAL SIGNS:   Temp:  [97.5  F (36.4  C)-98  F (36.7  C)] 97.5  F (36.4  C)  Pulse:  [65-88] 70  Resp:  [4-29] 21  BP: (122-140)/(50-64) 125/52  MAP:  [61 mmHg-104 mmHg] 75 mmHg  Arterial Line BP: (102-164)/(40-72) 126/53  SpO2:  [86 %-95 %] 90 %  Resp: 21      2. INTAKE/ OUTPUT:   I/O last 3 completed shifts:  In: 3554.3 [P.O.:550; I.V.:3004.3]  Out: 1500 [Urine:1500]    3. PHYSICAL EXAMINATION:   General: laying in bed, conversant  Neuro: A&O, grossly intact  Resp: 4LPM via oxymask  CV: S1, S2, RRR, no m/r/g   Abdomen: Soft, non-distended, non-tender  Extremities: warm and well perfused    4. INVESTIGATIONS:   Arterial Blood Gases   Recent Labs   Lab 01/16/24 0335   PH 7.38   PCO2 38   PO2 81   HCO3 23     Complete Blood Count   Recent Labs   Lab 01/17/24 0316 01/16/24  0334   WBC 15.5* 15.0*   HGB 10.7* 10.6*    157     Basic Metabolic Panel  Recent Labs   Lab 01/17/24  0837 01/17/24 0316 01/16/24  2353 01/16/24 2029 01/16/24  0906 01/16/24  0334   NA  --  136  --   --   --  135   POTASSIUM  --  4.0  --   --   --  4.3   CHLORIDE  --  109*  --   --   --  106   CO2  --  18*  --   --   --  20*   BUN  --  15.5  --   --   --  12.0   CR  --  1.06  --   --   --  1.47*   * 109* 132* 111*   < > 115*    < > = values in this interval not displayed.     Liver Function Tests  Recent Labs   Lab 01/17/24 0316 01/16/24  0334   AST 12 12   ALT 6 7   ALKPHOS 42 42   BILITOTAL 0.7 0.5   ALBUMIN 3.2* 3.2*   INR 1.18*  --      Pancreatic Enzymes  No lab results found in last 7 days.  Coagulation Profile  Recent Labs   Lab 01/17/24  0316 01/16/24  0334   INR 1.18*  --    PTT  --  38         5. RADIOLOGY:   Recent Results (from the past 24 hour(s))   XR Chest Port 1 View    Narrative    EXAM: XR CHEST PORT 1 VIEW 1/16/2024 6:55 PM     HISTORY: Hypoxia, History of type B aortic dissection       COMPARISON: 1/16/2024     FINDINGS: Stable cardiomediastinal silhouette. Streaky  basilar  opacities. No pleural effusion or pneumothorax. Right upper extremity  PICC tip projects over the superior cavoatrial junction. Streaky  bibasilar opacities.      Impression    IMPRESSION:  1.  Right upper extremity PICC tip projects over the superior cava  junction.  2.  Stable cardiomediastinal silhouette.  3.  Streaky bibasilar opacities, right more than left may represent  atelectasis versus pulmonary edema/atypical infection.    I have personally reviewed the examination and initial interpretation  and I agree with the findings.    DEANNE FOWLER MD         SYSTEM ID:  [id number]   CTA CHEST ABDOMEN PELVIS WITH CONTRAST PANEL    Narrative    CTA CHEST, ABDOMEN, AND PELVIS 2024 8:34 AM    CLINICAL HISTORY: Aortic dissection.     COMPARISONS: No images available. Report from [facility] chest, abdomen,  and pelvis 2024.    REFERRING PROVIDER: ALEKS YARBROUGH    TECHNIQUE: Unenhanced CT performed through the chest, abdomen, and  pelvis. After the uneventful administration of intravenous contrast,  EKG gated CTA performed through the chest, abdomen, and pelvis.  Coronal and sagittal reconstructions were produced. Lung MIP produced.    CONTRAST: 90 mL Isovue 370    DOSE (DLP): 1526 mGy*cm    FINDINGS: CTA: Dissection extends from the left subclavian artery to  the supraceliac aorta. Dissection extends through the visualized left  subclavian artery. It is difficult to tell which lumen the left  vertebral artery originates from but its is thought to originate from  the true lumen. The left internal thoracic artery originates from the  true lumen.    Non specific fluid along the ascending aorta to the arch.    Normal aortic branching pattern. Right innominate, subclavian, and  carotid, left carotid, and left subclavian arteries are patent.  Bilateral vertebral arteries patent.    Aortic measurements:       Sinuses of Valsalva: 36 mm       Sinotubular junction: 33 mm       Ascendin mm       Arch: 27  mm       Proximal descending (including true and false lumens): 35 mm       Mid descending (including true and false lumens): 33 mm       Diaphragm (including true and false lumens): 30 mm       Infrarenal: 21 mm       Above the bifurcation: 19 mm    Celiac, superior mesenteric, renal, and inferior mesenteric arteries  patent.    Bilateral common, internal, and external iliac arteries patent.  Bilateral corona mortis. Bilateral common femoral arteries patent.    CT: Right PICC spirals through the right innominate vein with its tip  in the high superior vena cava.    7 mm right upper lobe nodule. 6 mm calcific focus thought to be a left  lower lobe calcified granuloma. Bilateral effusions and lower lobe  consolidation.    Gallstones.    Renal cysts.      Impression    IMPRESSION:  1. Aortic dissection extends from the left subclavian origin to the  supraceliac aorta. Dissection extends through the visualized left  subclavian artery. Left vertebral artery is thought to originate from  the true lumen. Left internal thoracic artery originates from the true  lumen.    2. Non specific fluid along the ascending aorta to arch.    3. 7 mm right upper lobe nodule. Per Fleischner Society  Recommendations, if the patient is at low risk of lung cancer, further  evaluation with CT in 6-12 months is suggested with optional CT in  18-24 months. If the patient is at high risk for lung cancer, further  evaluation with CT in 6-12 months and CT at 18-24 months is suggested.    LIZBETH BEACH MD         SYSTEM ID:  [id number]       =========================================       ATTENDING ATTESTATION:   I personally examined and evaluated this patient today.   I have reviewed today's vital signs, medications, labs, and imaging results. I have reviewed and edited, as necessary, the history, review of systems, physical examination, and assessment and plan. I discussed the patient with the resident and care team, and agree with the assessment  and plan of care as documented in the note below.       Charles Meyer MD, PhD  Interventional/Critical Care Cardiology  [phone]     January 17, 2024

## 2024-01-17 NOTE — CONSULTS
This patient, who is currently NPO for possible surgery today.  Nursing and I were unable to get him to understand the need for NPO status. I explained that he would need to have an empty stomach if he needed surgery, but he didn't understand that he may need chest surgery today. He continued to demand water, and started to escalate, so I withdrew and notified nursing of his risk for a code 21.   He is also confused and disoriented; clearly will need to involve his mother in decisions regarding his medical care and disposition.   QTc is 457 ms 1/16.   If he escalates;  Haldol 2-5 mg IV q 4 hours for severe agitation, and follow QTc   IM Zyprexa 5 mg q 6 hours PRN if IM preferred vs Haldol 5 mg plus 2 mg Ativan IM is option (more sedating)   I may be a good idea to offer him 5 mg of Zyprexa Zydis BID for baseline agitation.   Please re-consult us as needed     Page me as needed.  Fausto De Dios M.D.   [facility]   Contact information available via [facility] Paging/Directory or [employer]  If I am not available, then [facility] CL line ([phone]) should know who   Is covering our consult service.     No charge encounter

## 2024-01-17 NOTE — PLAN OF CARE
Time: 1300 - 1930    Major Shift Events: Neuro intact, follows commands, intermittent confusion, moods can change quickly; at 1430 pt sat up, took gown and leads off, and refused to let staff put them back in place unless he was given water; pt given ice chips, after which he has been redirectable- PRN haldol and Zyprexa ordered if needed. Of note, this episode happened after inpatient psych spoke with him. Afebrile. Denies pain. Strict bedrest. Esmolol gtt for HR <70, nicardipine gtt titrated for SBP <120, MAP goal 65-85. Tele NSR. On 2-4 L NC, but frequently removes O2; sats in low 90's on RA. Advanced to regular diet, tolerating well, denies nausea. No BM. Voiding via urinal.     Plan: Continue to maintain BP goal parameters, bedrest. Surgery likely next week per Dr. Shaw. Follow plan of care, update MD with changes.     For vital signs and complete assessments, please see documentation flowsheets.     Goal Outcome Evaluation:      Plan of Care Reviewed With: patient    Overall Patient Progress: no change

## 2024-01-18 ENCOUNTER — PREP FOR PROCEDURE (OUTPATIENT)
Dept: CARDIOLOGY | Facility: CLINIC | Age: 49
End: 2024-01-18
Payer: COMMERCIAL

## 2024-01-18 DIAGNOSIS — I71.00 DISSECTION OF AORTA, UNSPECIFIED PORTION OF AORTA (H): ICD-10-CM

## 2024-01-18 DIAGNOSIS — I77.70 DISSECTION OF UNSPECIFIED ARTERY (H): Primary | ICD-10-CM

## 2024-01-18 LAB
ALBUMIN SERPL BCG-MCNC: 3 G/DL (ref 3.5–5.2)
ALLEN'S TEST: ABNORMAL
ALP SERPL-CCNC: 41 U/L (ref 40–150)
ALT SERPL W P-5'-P-CCNC: 5 U/L (ref 0–70)
ANION GAP SERPL CALCULATED.3IONS-SCNC: 10 MMOL/L (ref 7–15)
ANION GAP SERPL CALCULATED.3IONS-SCNC: 10 MMOL/L (ref 7–15)
AST SERPL W P-5'-P-CCNC: 12 U/L (ref 0–45)
BASE EXCESS BLDA CALC-SCNC: -4.9 MMOL/L (ref -3–3)
BILIRUB DIRECT SERPL-MCNC: 0.26 MG/DL (ref 0–0.3)
BILIRUB SERPL-MCNC: 0.8 MG/DL
BUN SERPL-MCNC: 19.3 MG/DL (ref 6–20)
BUN SERPL-MCNC: 21.7 MG/DL (ref 6–20)
CA-I BLD-MCNC: 4.6 MG/DL (ref 4.4–5.2)
CALCIUM SERPL-MCNC: 7.2 MG/DL (ref 8.6–10)
CALCIUM SERPL-MCNC: 7.9 MG/DL (ref 8.6–10)
CHLORIDE SERPL-SCNC: 109 MMOL/L (ref 98–107)
CHLORIDE SERPL-SCNC: 112 MMOL/L (ref 98–107)
COHGB MFR BLD: 93.3 % (ref 96–97)
CREAT SERPL-MCNC: 0.98 MG/DL (ref 0.67–1.17)
CREAT SERPL-MCNC: 1.12 MG/DL (ref 0.67–1.17)
DEPRECATED HCO3 PLAS-SCNC: 16 MMOL/L (ref 22–29)
DEPRECATED HCO3 PLAS-SCNC: 17 MMOL/L (ref 22–29)
EGFRCR SERPLBLD CKD-EPI 2021: 82 ML/MIN/1.73M2
EGFRCR SERPLBLD CKD-EPI 2021: >90 ML/MIN/1.73M2
ERYTHROCYTE [DISTWIDTH] IN BLOOD BY AUTOMATED COUNT: 17 % (ref 10–15)
GLUCOSE SERPL-MCNC: 110 MG/DL (ref 70–99)
GLUCOSE SERPL-MCNC: 92 MG/DL (ref 70–99)
HCO3 BLD-SCNC: 18 MMOL/L (ref 21–28)
HCT VFR BLD AUTO: 34.4 % (ref 40–53)
HGB BLD-MCNC: 11.2 G/DL (ref 13.3–17.7)
INR PPP: 1.24 (ref 0.85–1.15)
LACTATE SERPL-SCNC: 0.7 MMOL/L (ref 0.7–2)
MAGNESIUM SERPL-MCNC: 1.8 MG/DL (ref 1.7–2.3)
MAGNESIUM SERPL-MCNC: 2 MG/DL (ref 1.7–2.3)
MCH RBC QN AUTO: 19.5 PG (ref 26.5–33)
MCHC RBC AUTO-ENTMCNC: 32.6 G/DL (ref 31.5–36.5)
MCV RBC AUTO: 60 FL (ref 78–100)
O2/TOTAL GAS SETTING VFR VENT: 29 %
PCO2 BLD: 28 MM HG (ref 35–45)
PH BLD: 7.43 [PH] (ref 7.35–7.45)
PHOSPHATE SERPL-MCNC: 2.9 MG/DL (ref 2.5–4.5)
PHOSPHATE SERPL-MCNC: 4 MG/DL (ref 2.5–4.5)
PLATELET # BLD AUTO: 215 10E3/UL (ref 150–450)
PO2 BLD: 64 MM HG (ref 80–105)
POTASSIUM SERPL-SCNC: 3.4 MMOL/L (ref 3.4–5.3)
POTASSIUM SERPL-SCNC: 4.3 MMOL/L (ref 3.4–5.3)
PROT SERPL-MCNC: 5.3 G/DL (ref 6.4–8.3)
RBC # BLD AUTO: 5.75 10E6/UL (ref 4.4–5.9)
SAO2 % BLDA: 92 % (ref 92–100)
SODIUM SERPL-SCNC: 136 MMOL/L (ref 135–145)
SODIUM SERPL-SCNC: 138 MMOL/L (ref 135–145)
TSH SERPL DL<=0.005 MIU/L-ACNC: 0.57 UIU/ML (ref 0.3–4.2)
WBC # BLD AUTO: 19.1 10E3/UL (ref 4–11)

## 2024-01-18 PROCEDURE — 250N000013 HC RX MED GY IP 250 OP 250 PS 637

## 2024-01-18 PROCEDURE — 85610 PROTHROMBIN TIME: CPT

## 2024-01-18 PROCEDURE — 83605 ASSAY OF LACTIC ACID: CPT

## 2024-01-18 PROCEDURE — 99231 SBSQ HOSP IP/OBS SF/LOW 25: CPT | Performed by: NURSE PRACTITIONER

## 2024-01-18 PROCEDURE — 258N000003 HC RX IP 258 OP 636: Performed by: STUDENT IN AN ORGANIZED HEALTH CARE EDUCATION/TRAINING PROGRAM

## 2024-01-18 PROCEDURE — 85027 COMPLETE CBC AUTOMATED: CPT

## 2024-01-18 PROCEDURE — 250N000011 HC RX IP 250 OP 636: Performed by: STUDENT IN AN ORGANIZED HEALTH CARE EDUCATION/TRAINING PROGRAM

## 2024-01-18 PROCEDURE — 82248 BILIRUBIN DIRECT: CPT

## 2024-01-18 PROCEDURE — 82330 ASSAY OF CALCIUM: CPT

## 2024-01-18 PROCEDURE — 84443 ASSAY THYROID STIM HORMONE: CPT | Performed by: STUDENT IN AN ORGANIZED HEALTH CARE EDUCATION/TRAINING PROGRAM

## 2024-01-18 PROCEDURE — 80053 COMPREHEN METABOLIC PANEL: CPT

## 2024-01-18 PROCEDURE — 250N000009 HC RX 250

## 2024-01-18 PROCEDURE — 82805 BLOOD GASES W/O2 SATURATION: CPT | Performed by: STUDENT IN AN ORGANIZED HEALTH CARE EDUCATION/TRAINING PROGRAM

## 2024-01-18 PROCEDURE — 99233 SBSQ HOSP IP/OBS HIGH 50: CPT | Mod: GC | Performed by: INTERNAL MEDICINE

## 2024-01-18 PROCEDURE — 250N000009 HC RX 250: Performed by: STUDENT IN AN ORGANIZED HEALTH CARE EDUCATION/TRAINING PROGRAM

## 2024-01-18 PROCEDURE — 84100 ASSAY OF PHOSPHORUS: CPT | Performed by: STUDENT IN AN ORGANIZED HEALTH CARE EDUCATION/TRAINING PROGRAM

## 2024-01-18 PROCEDURE — 250N000011 HC RX IP 250 OP 636

## 2024-01-18 PROCEDURE — 83735 ASSAY OF MAGNESIUM: CPT | Performed by: STUDENT IN AN ORGANIZED HEALTH CARE EDUCATION/TRAINING PROGRAM

## 2024-01-18 PROCEDURE — 200N000002 HC R&B ICU UMMC

## 2024-01-18 RX ORDER — FUROSEMIDE 10 MG/ML
80 INJECTION INTRAMUSCULAR; INTRAVENOUS ONCE
Status: COMPLETED | OUTPATIENT
Start: 2024-01-18 | End: 2024-01-18

## 2024-01-18 RX ORDER — AMOXICILLIN 250 MG
1 CAPSULE ORAL 2 TIMES DAILY
Status: DISCONTINUED | OUTPATIENT
Start: 2024-01-18 | End: 2024-01-19

## 2024-01-18 RX ORDER — POLYETHYLENE GLYCOL 3350 17 G/17G
17 POWDER, FOR SOLUTION ORAL DAILY
Status: DISCONTINUED | OUTPATIENT
Start: 2024-01-18 | End: 2024-01-19

## 2024-01-18 RX ORDER — POTASSIUM CHLORIDE 29.8 MG/ML
20 INJECTION INTRAVENOUS
Status: COMPLETED | OUTPATIENT
Start: 2024-01-18 | End: 2024-01-19

## 2024-01-18 RX ORDER — MAGNESIUM SULFATE HEPTAHYDRATE 40 MG/ML
2 INJECTION, SOLUTION INTRAVENOUS ONCE
Status: COMPLETED | OUTPATIENT
Start: 2024-01-18 | End: 2024-01-19

## 2024-01-18 RX ORDER — HYDRALAZINE HYDROCHLORIDE 25 MG/1
100 TABLET, FILM COATED ORAL EVERY 6 HOURS
Status: DISCONTINUED | OUTPATIENT
Start: 2024-01-18 | End: 2024-01-23

## 2024-01-18 RX ORDER — AMOXICILLIN 250 MG
2 CAPSULE ORAL 2 TIMES DAILY
Status: DISCONTINUED | OUTPATIENT
Start: 2024-01-18 | End: 2024-01-19

## 2024-01-18 RX ORDER — MAGNESIUM SULFATE HEPTAHYDRATE 40 MG/ML
2 INJECTION, SOLUTION INTRAVENOUS ONCE
Status: COMPLETED | OUTPATIENT
Start: 2024-01-18 | End: 2024-01-18

## 2024-01-18 RX ADMIN — Medication 300 MCG/KG/MIN: at 22:18

## 2024-01-18 RX ADMIN — SODIUM PHOSPHATE, MONOBASIC, MONOHYDRATE AND SODIUM PHOSPHATE, DIBASIC, ANHYDROUS 15 MMOL: 142; 276 INJECTION, SOLUTION INTRAVENOUS at 02:26

## 2024-01-18 RX ADMIN — MAGNESIUM SULFATE IN WATER 2 G: 40 INJECTION, SOLUTION INTRAVENOUS at 05:04

## 2024-01-18 RX ADMIN — AMLODIPINE BESYLATE 10 MG: 10 TABLET ORAL at 07:51

## 2024-01-18 RX ADMIN — LABETALOL HYDROCHLORIDE 10 MG: 5 INJECTION, SOLUTION INTRAVENOUS at 20:40

## 2024-01-18 RX ADMIN — Medication 250 MCG/KG/MIN: at 15:53

## 2024-01-18 RX ADMIN — SENNOSIDES AND DOCUSATE SODIUM 2 TABLET: 8.6; 5 TABLET ORAL at 19:51

## 2024-01-18 RX ADMIN — SODIUM CHLORIDE 15 MG/HR: 9 INJECTION, SOLUTION INTRAVENOUS at 02:27

## 2024-01-18 RX ADMIN — HYDRALAZINE HYDROCHLORIDE 100 MG: 25 TABLET, FILM COATED ORAL at 09:29

## 2024-01-18 RX ADMIN — HYDROXYZINE HYDROCHLORIDE 25 MG: 25 TABLET, FILM COATED ORAL at 09:29

## 2024-01-18 RX ADMIN — SODIUM CHLORIDE 15 MG/HR: 9 INJECTION, SOLUTION INTRAVENOUS at 22:57

## 2024-01-18 RX ADMIN — Medication 250 MCG/KG/MIN: at 07:26

## 2024-01-18 RX ADMIN — HYDRALAZINE HYDROCHLORIDE 20 MG: 20 INJECTION INTRAMUSCULAR; INTRAVENOUS at 21:00

## 2024-01-18 RX ADMIN — MAGNESIUM SULFATE IN WATER 2 G: 40 INJECTION, SOLUTION INTRAVENOUS at 23:31

## 2024-01-18 RX ADMIN — CARVEDILOL 50 MG: 25 TABLET, FILM COATED ORAL at 19:51

## 2024-01-18 RX ADMIN — LABETALOL HYDROCHLORIDE 10 MG: 5 INJECTION, SOLUTION INTRAVENOUS at 05:14

## 2024-01-18 RX ADMIN — DOCUSATE SODIUM 50 MG AND SENNOSIDES 8.6 MG 1 TABLET: 8.6; 5 TABLET, FILM COATED ORAL at 09:29

## 2024-01-18 RX ADMIN — FUROSEMIDE 80 MG: 10 INJECTION, SOLUTION INTRAVENOUS at 09:33

## 2024-01-18 RX ADMIN — POTASSIUM CHLORIDE 20 MEQ: 29.8 INJECTION, SOLUTION INTRAVENOUS at 22:50

## 2024-01-18 RX ADMIN — HYDRALAZINE HYDROCHLORIDE 100 MG: 25 TABLET, FILM COATED ORAL at 22:14

## 2024-01-18 RX ADMIN — LABETALOL HYDROCHLORIDE 10 MG: 5 INJECTION, SOLUTION INTRAVENOUS at 20:07

## 2024-01-18 RX ADMIN — HYDRALAZINE HYDROCHLORIDE 100 MG: 25 TABLET, FILM COATED ORAL at 15:50

## 2024-01-18 RX ADMIN — SODIUM CHLORIDE 15 MG/HR: 9 INJECTION, SOLUTION INTRAVENOUS at 16:25

## 2024-01-18 RX ADMIN — SODIUM PHOSPHATE, MONOBASIC, MONOHYDRATE AND SODIUM PHOSPHATE, DIBASIC, ANHYDROUS 15 MMOL: 142; 276 INJECTION, SOLUTION INTRAVENOUS at 00:04

## 2024-01-18 RX ADMIN — HYDRALAZINE HYDROCHLORIDE 20 MG: 20 INJECTION INTRAMUSCULAR; INTRAVENOUS at 07:03

## 2024-01-18 RX ADMIN — LOSARTAN POTASSIUM 100 MG: 50 TABLET, FILM COATED ORAL at 07:51

## 2024-01-18 RX ADMIN — CARVEDILOL 50 MG: 25 TABLET, FILM COATED ORAL at 07:51

## 2024-01-18 RX ADMIN — SODIUM CHLORIDE 15 MG/HR: 9 INJECTION, SOLUTION INTRAVENOUS at 09:28

## 2024-01-18 ASSESSMENT — ACTIVITIES OF DAILY LIVING (ADL)
ADLS_ACUITY_SCORE: 25

## 2024-01-18 NOTE — PROGRESS NOTES
Care Management Follow Up    Length of Stay (days): 2    Expected Discharge Date: 01/20/2024     Concerns to be Addressed: financial/insurance     Patient plan of care discussed at interdisciplinary rounds: Yes    Anticipated Discharge Disposition: Other (Comments) (TBD)     Anticipated Discharge Services: None  Anticipated Discharge DME: None    Patient/family educated on Medicare website which has current facility and service quality ratings: other (see comments)  Education Provided on the Discharge Plan: Other (see comments)  Patient/Family in Agreement with the Plan: unable to assess    Referrals Placed by CM/SW:  (TBD)  Private pay costs discussed: Not applicable    Additional Information:  SW read financial counseling notes which indicated that Jamila could not get a hold of patient. SW asked financial counselor if writer could provide financial counselors number to patient so that he could reach out. SW provided Jamila's phone number [phone] and showed patient where and how to operate his bedside phone. SW also confirmed with patient that the phone number in his chart is incorrect. SW will remove phone number from his chart. SW told Jamila that she could call patient at his bedside phone number and that her phone number was provided to patient.     LUIS ALFREDO Perez, LGSW  4A & 4E ICU   Pager: [phone]  Phone: [phone]       LUIS ALFREDO Monson

## 2024-01-18 NOTE — PLAN OF CARE
Patient continues on esmolol and nicardipine gtts to meet goals. Refer to emar for dosing. Has denied any pain throughout the day. Has poor appetite and has eaten very little today. Refuses to get out of bed to chair. Is incontinent of urine at times due to going while sleeping. Refer to flowsheets for documentation.

## 2024-01-18 NOTE — CONSULTS
Cardiac Surgery Consultation      Valdo Lyons MRN# [id number]   YOB: 1975 Age: 48 year old   Date of Admission: 1/16/2024     Reason for consult: Retrograde IMH; Type B aortic dissection          Assessment and Plan:   Mr. Valdo Lyons. is a 48-year-old man with a type B aortic dissection complicated by retrograde propagation of the dissection into the left subclavian artery and a retrograde IMH involving the arch and ascending aorta.    There remains no evidence of malperfusion. An initial trial of medical management has been attempted but his hypertension has cassandra difficult to control. There is an approximate 40 point difference in the SBP between right and left upper extremities (RUE>LUE). His left upper ext appears warm and well perfused.    We will repeat a scan tomorrow AM. If hypertension remains refractory to medical management, we will likely need to intervene surgically.    Cont anti-impulse therapy.  Repeat CTA tomorrow.         History of Present Illness:   This patient is a 48 year old male with a PMHx significant for hypertension and childhood seizures who [place] [facility] [place] Type B aortic dissection, which was managed medically. He had a repeat CT scan performed, which demonstrated retrograde propagation of the dissection into the left subclavian artery and a new IMH involving the aortic arch. proximally to the ascending aorta. The aortic root appears uninvolved and there was no evidence of aortic insufficiency or any wall motion abnormalities on echocardiography obtained her on admission.    He denies any significant pain.                Past Medical History:   Hypertension  Childhoood seizures          Past Surgical History:   This patient has no significant past surgical history            Social History:   I have reviewed this patient's social history          Family History:   This patient has no significant family history          Medications:   I have  reviewed this patient's current medications          Review of Systems:     The 10 point Review of Systems is negative other than noted in the HPI            Physical Exam:   Vitals were reviewed  All vitals stable    Gen: resting comfortably in NAD  CV: RRR  Pulm: normal work of breathing on room air  Abd: soft, non-tender, non-distended  Ext: warm and well perfused         Data:   All laboratory data reviewed  All cardiac studies reviewed by me.  All imaging studies reviewed by me.      KHALIF Shaw MD  Cardiothoracic Surgery  Cell: [phone]; Pager [phone]

## 2024-01-18 NOTE — PROGRESS NOTES
"VASCULAR SURGERY PROGRESS NOTE    Subjective:  Seen by psychiatry yesterday, patient was found to be disoriented and confused. This morning alert and oriented x 3, joking, verbalizing clear understanding of his dissection and surgery needed. Noted that he \"did this to myself by not taking my medications\" and now is willing to have surgery. His mental status is completed changed from yesterday.     Objective:  Intake/Output Summary (Last 24 hours) at 1/18/2024 0719  Last data filed at 1/18/2024 0500  Gross per 24 hour   Intake 2983.57 ml   Output 1200 ml   Net 1783.57 ml     Labs:  ROUTINE IP LABS (Last four results)  BMP  Recent Labs   Lab 01/18/24  0349 01/17/24  2131 01/17/24  0837 01/17/24 0316 01/16/24  0906 01/16/24  0334    134*  --  136  --  135   POTASSIUM 4.3 4.4  --  4.0  --  4.3   CHLORIDE 109* 110*  --  109*  --  106   YARON 7.9* 8.2*  --  8.0*  --  7.7*   CO2 17* 18*  --  18*  --  20*   BUN 21.7* 22.0*  --  15.5  --  12.0   CR 0.98 1.02  --  1.06  --  1.47*   * 148* 112* 109*   < > 115*    < > = values in this interval not displayed.     CBC  Recent Labs   Lab 01/18/24 0349 01/17/24 0316 01/16/24  0334   WBC 19.1* 15.5* 15.0*   RBC 5.75 5.59 5.41   HGB 11.2* 10.7* 10.6*   HCT 34.4* 33.8* 33.6*   MCV 60* 61* 62*   MCH 19.5* 19.1* 19.6*   MCHC 32.6 31.7 31.5   RDW 17.0* 16.5* 15.7*    193 157     INR  Recent Labs   Lab 01/18/24 0349 01/17/24  0316   INR 1.24* 1.18*     PHYSICAL EXAM:  /52   Pulse 72   Temp 97.7  F (36.5  C) (Oral)   Resp 13   Ht 1.727 m (5' 8\")   Wt 94.1 kg (207 lb 7.3 oz)   SpO2 92%   BMI 31.54 kg/m    General: The patient is alert, oriented to self, place, situation.   Psych: Pleasant affect, answers questions appropriately  Skin: Color appropriate for race, warm, dry.  Neuro: Sensorimotor intact in BUE and BLE, CN II-XII grossly intact  Respiratory: The patient does require 6L NC supplemental oxygen. Breathing unlabored  GI:  Abdomen soft, nontender " to light palpation.  Extremities: Bilateral femoral and DP pulses palpable,  no edema noted.     Temperature normal   Sensory function: normal   Flexion (plantar/dorsi): normal range       DATA:   CTA CHEST ABDOMEN PELVIS WITH CONTRAST PANEL   Final Result   IMPRESSION:   1. Aortic dissection extends from the left subclavian origin to the   supraceliac aorta. Dissection extends through the visualized left   subclavian artery. Left vertebral artery is thought to originate from   the true lumen. Left internal thoracic artery originates from the true   lumen.      2. Non specific fluid along the ascending aorta to arch.      3. 7 mm right upper lobe nodule. Per Fleischner Society   Recommendations, if the patient is at low risk of lung cancer, further   evaluation with CT in 6-12 months is suggested with optional CT in   18-24 months. If the patient is at high risk for lung cancer, further   evaluation with CT in 6-12 months and CT at 18-24 months is suggested.      LIZBETH BEACH MD            SYSTEM ID:  [id number]      XR Chest Port 1 View   Final Result   IMPRESSION:   1.  Right upper extremity PICC tip projects over the superior cava   junction.   2.  Stable cardiomediastinal silhouette.   3.  Streaky bibasilar opacities, right more than left may represent   atelectasis versus pulmonary edema/atypical infection.      I have personally reviewed the examination and initial interpretation   and I agree with the findings.      DEANNE FOWLER MD            SYSTEM ID:  [id number]      XR Chest Port 1 View   Final Result   Impression:    1. Mild widening of the mediastinum compared to prior x-ray 6/20/2022,   consistent with patient's history of known type B aortic dissection   seen on outside imaging (imaging unavailable).   2. No focal consolidations. There is bibasilar atelectasis.   3. No significant pleural effusion, no discernible pneumothorax.      I have personally reviewed the examination and initial interpretation    and I agree with the findings.      GHISLAINE BOWEN MD            SYSTEM ID:  [id number]      Echocardiogram Complete   Final Result          ASSESSMENT / PLAN:  Patient is a 48M who presented to [facility] on 1/12 with acute type B aortic dissection from the origin of the left subclavian to the diaphragm just superior to the celiac axis.Due to worsening back pain and increased variation in BP from arterial lines in his right and left hands, repeat CT scan was done which showed Type A dissection with a thrombosed retrograde false lumen, so he was transferred to [facility]. He continues to remain asymptomatic from a dissection standpoint. Repeat CTA on 1/17 stable.    - Continue IV and PO regimen for SBP and HR control with goals <120 and <70. Please to not increase continuous infusion of Esmolol or Nicardipine before utilizing PRNs.   - Consider hydralazine PO, wean drips as tolerated.   - Patient is planned to go to OR Tuesday with CVTS and Vascular Surgery  - Okay with OOB to chair, no walking outside room (okay with Dr. Warren and Dr. Shaw)    Seen and discussed pt history, exam, assessment and plan with Dr. Warren of the vascular surgery service, who is in agreement with the above.    Candie Parkinson [facility]  Vascular Surgery  Pager: [phone]  [email or web address]  Send message or 10 digit call back number Securely via [employer] with the Vocera Web Console (learn more here)

## 2024-01-18 NOTE — PROGRESS NOTES
CV ICU PROGRESS NOTE  January 18, 2024      CO-MORBIDITIES:   Aortic Dissection  Hypertensive urgency     ASSESSMENT: Valdo Lyons is a 48 year old male with PMH of HTN, childhood seizures. [place] [facility] [place] with acute type B aortic dissection from the origin of the left subclavian to the diaphragm just superior to the celiac axis.Yesterday he had worsening back pain and increased variation in BP from arterial lines in his right and left hands, so repeat CT scan was done which showed Type A dissection with a thrombosed retrograde false lumen, so he was transferred to [facility]. Repeat CTA 1/17 for vascular to decide on further management (surgery vs medical management, if medically managed [place] [facility] CT or vascular to operate early next week (~1/22).     TODAY'S PROGRESS:   Remove left a-line  Encourage PT/OT as tolerated by BP.  Diuresis lasix 80mg x1  Start scheduled hydalazine  Add nipride gtt prn    Urine metanephrines, TSH, T4  Renal US tomorrow (NPO for 8 hours)  - close monitor for pain - can be an indicator of worsening dissection, take it serious    PLAN:  Neuro/ pain/ sedation:  Psychiatry saw patient, see note for recs. Nothing actionable.   - Monitor neurological status. Notify the MD for any acute changes in exam.  #Acute pain  - PRN: acetaminophen      # Recent fall, atraumatic  # Childhood seizure disorder  - Seizure hx noted, not on any antiepileptics at time of admission  - Presented to [facility] s/p fall, CT head reported as negative for intracranial abnormalities 1/12)    # Hx Mariajuana use  - Typically smokes mariajuana every night  - Denies tobacco, alcohol, or other illicit drugs     Pulmonary care:   # Acute hypoxic insufficiency  Intermittently requiring 4-6L NC to achieve SPO2% goals.   - Goal SpO2 > 92%  - Encourage IS q15-30 minutes when awake (though patient has been refusing).   - Encourage up out of bed activity. Get into the chair.      Cardiovascular:    # Type  "B dissection, c/f retrograde tear  # Hx uncontrolled HTN  Patient was not taking any PTA medications for his high blood pressure. Bilateral arterial lines placed at [facility]. Will treat based on right arterial line as the left is likely dissected. Will remove left. Ordered additional testing, as his BP medication requirements are exceeding expectations, rule out other/organic causes of elevated BP.   - Goal MAP >65 but less than 80, SBP <120.  - nicardipine, esmolol, nipride gtt to achieve goal hemodynamics (wean esmolol first then nicardipine if able).   - Carvedilol 25 mg BID and amlodipine 10 mg daily, hydralazine 100mg q6h, Losartan 100 mg daily   - PRN labetalol, hydralazine  - Remove left a-line   - Surgery early next week with CV and vascular combined case   - close monitor for pain - can be an indicator of worsening dissection, take it serios   - call rads to re-read the CTA to look for renal mass, \"simple cysts bilaterally, but no other renal masses noted, unlikely pheo\"   - urine metanephrines   - renal US in AM   - TSH, T4    GI care / Nutrition:   # At risk for protein malnutrition  - Regular diet, NPO at midnight for Renal US tomorrow   - PPI not indicated  - Bowel regimen: MiraLAX, senna     Renal / Fluids / Electrolytes:   # Chronic kidney disease vs MIKA  # volume status  # electrolyte derangement  Unknown baseline, was 1.6 on admission to [facility], last value in 2022 was 1.25. Cr continues to improve.   - Daily BMP  - Strict I/O, daily weights  - Avoid/limit nephrotoxins as able  - Replete lytes PRN per protocol  - Diuresis with lasix 80 mg 1x.     Endocrine:    # Stress hyperglycemia  Preop A1c pending.  - Gluc checks per protocol  - Goal BG <180 for optimal healing     ID / Antibiotics:  # Stress induced leukocytosis  - No s/sx infection  - Monitor fever curve, WBC, and inflammatory markers as appropriate     Heme:     # Acute blood loss anemia, considered but ruled out   # Acute blood loss thrombocytopenia, " considered and ruled out   No s/sx active bleeding  - Continue to monitor  - CBC on arrival  - Hgb goal > 7.0     MSK / Skin:  # no acute concerns  - PT/OT/CR - ordered      Prophylaxis:     - Mechanical ppx for DVT  - Chemical DVT ppx: HOLD due to risk of bleed  - PPI: n/a  - Bowel regimen: MiraLAX, senna - scheduled     Lines / Tubes / Drains:  - Arterial Line (Rt. Radial)  - PIV x3  - PICC      Disposition:  - CVICU    Patient seen, findings and plan discussed with CV ICU staff, Dr. Meyer.     Shannon Galicia MD (CA1)  Anesthesiology Resident       ====================================    SUBJECTIVE:   Patient sleeping comfortably. Expresses frustration with situation, would like to know what is going to happen.     OBJECTIVE:   1. VITAL SIGNS:   Temp:  [96.7  F (35.9  C)-98.2  F (36.8  C)] 98.2  F (36.8  C)  Pulse:  [55-80] 77  Resp:  [9-27] 19  MAP:  [58 mmHg-90 mmHg] 71 mmHg  Arterial Line BP: ()/(36-68) 116/51  SpO2:  [87 %-95 %] 93 %  Resp: 19      2. INTAKE/ OUTPUT:   I/O last 3 completed shifts:  In: 3100.27 [P.O.:600; I.V.:2500.27]  Out: 1200 [Urine:1200]    3. PHYSICAL EXAMINATION:   General: laying in bed, conversant  Neuro: A&O, grossly intact  Resp: RA, NAD  CV: RR  Extremities: warm and well perfused    4. INVESTIGATIONS:   Arterial Blood Gases   Recent Labs   Lab 01/18/24  0351 01/16/24  0335   PH 7.43 7.38   PCO2 28* 38   PO2 64* 81   HCO3 18* 23     Complete Blood Count   Recent Labs   Lab 01/18/24  0349 01/17/24  0316 01/16/24  0334   WBC 19.1* 15.5* 15.0*   HGB 11.2* 10.7* 10.6*    193 157     Basic Metabolic Panel  Recent Labs   Lab 01/18/24  0349 01/17/24  2131 01/17/24  0837 01/17/24  0316 01/16/24  0906 01/16/24  0334    134*  --  136  --  135   POTASSIUM 4.3 4.4  --  4.0  --  4.3   CHLORIDE 109* 110*  --  109*  --  106   CO2 17* 18*  --  18*  --  20*   BUN 21.7* 22.0*  --  15.5  --  12.0   CR 0.98 1.02  --  1.06  --  1.47*   * 148* 112* 109*   < > 115*    < > =  values in this interval not displayed.     Liver Function Tests  Recent Labs   Lab 01/18/24  0349 01/17/24  0316 01/16/24  0334   AST 12 12 12   ALT 5 6 7   ALKPHOS 41 42 42   BILITOTAL 0.8 0.7 0.5   ALBUMIN 3.0* 3.2* 3.2*   INR 1.24* 1.18*  --      Pancreatic Enzymes  No lab results found in last 7 days.  Coagulation Profile  Recent Labs   Lab 01/18/24  0349 01/17/24  0316 01/16/24  0334   INR 1.24* 1.18*  --    PTT  --   --  38         5. RADIOLOGY:   No results found for this or any previous visit (from the past 24 hour(s)).      =========================================     ATTENDING ATTESTATION:   I personally examined and evaluated this patient today.   I have reviewed today's vital signs, medications, labs, and imaging results. I have reviewed and edited, as necessary, the history, review of systems, physical examination, and assessment and plan. I discussed the patient with the resident and care team, and agree with the assessment and plan of care as documented in the note below.       Charles Meyer MD, PhD  Interventional/Critical Care Cardiology  [phone]     January 18, 2024

## 2024-01-18 NOTE — PROGRESS NOTES
CV Surgery Progress Note    48 year old male who [place] [facility] [place] Type B aortic dissection without evidence of malperfusion and was initially managed medically. He had a repeat CT scan performed, which demonstrated retrograde propagation of the dissection into the left subclavian artery and a new IMH involving the aortic arch. proximally to the ascending aorta. The aortic root appears uninvolved and there was no evidence of aortic insufficiency or any wall motion abnormalities on echocardiography, which was obtained when he was transferred to [facility] for further management.     He was pain free on initial exam and therefore a trial of medical management was attempted given limited size of the IMH and no evidence of ongoing malperfusion.    A repeat CT scan was obtained yesterday, which demonstrated stability of the retrograde dissection/IMH.     In the interim, he has had hypertension that has been difficult to control despite high doses of anti-hypertensive medication.    I discussed this case with my surgical partners and colleagues from Vascular surgery. We are in agreement that management of the IMH in his ascending aorta should be performed given young age and that concomitant coverage of the entry tear is indicated given refractory hypertension.     We are planning on a combined cardiac/vascular procedure (ascending/hemiarch repair with TEVAR) tentatively early next week, 1/23/23.     I met the patient yesterday, 1/17/23, and explained the plan and rationale to him. He expressed understanding.    For now,     Cont aggressive BP control as outlined per Vascular.   Ok for limited ambulation; OOB to chair  Consider diuresing to achieve net even/slightly negative fluid balance given effusions on CT  Repeat CTA with any change in exam and notify cardiac/vascular surgery stat.    K. Irineo Shaw MD  Cardiothoracic Surgery  Pager [phone]

## 2024-01-19 ENCOUNTER — APPOINTMENT (OUTPATIENT)
Dept: ULTRASOUND IMAGING | Facility: CLINIC | Age: 49
End: 2024-01-19
Attending: STUDENT IN AN ORGANIZED HEALTH CARE EDUCATION/TRAINING PROGRAM
Payer: MEDICAID

## 2024-01-19 ENCOUNTER — APPOINTMENT (OUTPATIENT)
Dept: OCCUPATIONAL THERAPY | Facility: CLINIC | Age: 49
End: 2024-01-19
Attending: STUDENT IN AN ORGANIZED HEALTH CARE EDUCATION/TRAINING PROGRAM
Payer: MEDICAID

## 2024-01-19 DIAGNOSIS — I71.00 AORTIC DISSECTION (H): Primary | ICD-10-CM

## 2024-01-19 LAB
ALBUMIN SERPL BCG-MCNC: 2.8 G/DL (ref 3.5–5.2)
ALBUMIN UR-MCNC: 70 MG/DL
ALP SERPL-CCNC: 40 U/L (ref 40–150)
ALT SERPL W P-5'-P-CCNC: <5 U/L (ref 0–70)
ANION GAP SERPL CALCULATED.3IONS-SCNC: 10 MMOL/L (ref 7–15)
APPEARANCE UR: CLEAR
AST SERPL W P-5'-P-CCNC: 11 U/L (ref 0–45)
BILIRUB DIRECT SERPL-MCNC: <0.2 MG/DL (ref 0–0.3)
BILIRUB SERPL-MCNC: 0.7 MG/DL
BILIRUB UR QL STRIP: NEGATIVE
BUN SERPL-MCNC: 18.2 MG/DL (ref 6–20)
CA-I BLD-MCNC: 4.4 MG/DL (ref 4.4–5.2)
CALCIUM SERPL-MCNC: 7.6 MG/DL (ref 8.6–10)
CHLORIDE SERPL-SCNC: 111 MMOL/L (ref 98–107)
COLOR UR AUTO: YELLOW
CREAT SERPL-MCNC: 1.13 MG/DL (ref 0.67–1.17)
DEPRECATED HCO3 PLAS-SCNC: 16 MMOL/L (ref 22–29)
EGFRCR SERPLBLD CKD-EPI 2021: 81 ML/MIN/1.73M2
ERYTHROCYTE [DISTWIDTH] IN BLOOD BY AUTOMATED COUNT: 16 % (ref 10–15)
GLUCOSE SERPL-MCNC: 94 MG/DL (ref 70–99)
GLUCOSE UR STRIP-MCNC: NEGATIVE MG/DL
HCT VFR BLD AUTO: 32.4 % (ref 40–53)
HGB BLD-MCNC: 10.5 G/DL (ref 13.3–17.7)
HGB UR QL STRIP: NEGATIVE
INR PPP: 1.14 (ref 0.85–1.15)
KETONES UR STRIP-MCNC: NEGATIVE MG/DL
LEUKOCYTE ESTERASE UR QL STRIP: NEGATIVE
MAGNESIUM SERPL-MCNC: 2.2 MG/DL (ref 1.7–2.3)
MCH RBC QN AUTO: 19.4 PG (ref 26.5–33)
MCHC RBC AUTO-ENTMCNC: 32.4 G/DL (ref 31.5–36.5)
MCV RBC AUTO: 60 FL (ref 78–100)
MUCOUS THREADS #/AREA URNS LPF: PRESENT /LPF
NITRATE UR QL: NEGATIVE
PH UR STRIP: 5.5 [PH] (ref 5–7)
PHOSPHATE SERPL-MCNC: 2.9 MG/DL (ref 2.5–4.5)
PLATELET # BLD AUTO: 222 10E3/UL (ref 150–450)
POTASSIUM SERPL-SCNC: 3.7 MMOL/L (ref 3.4–5.3)
POTASSIUM SERPL-SCNC: 3.7 MMOL/L (ref 3.4–5.3)
PROT SERPL-MCNC: 5.1 G/DL (ref 6.4–8.3)
RBC # BLD AUTO: 5.42 10E6/UL (ref 4.4–5.9)
RBC URINE: 0 /HPF
SARS-COV-2 RNA RESP QL NAA+PROBE: NEGATIVE
SODIUM SERPL-SCNC: 137 MMOL/L (ref 135–145)
SP GR UR STRIP: 1.02 (ref 1–1.03)
UROBILINOGEN UR STRIP-MCNC: NORMAL MG/DL
WBC # BLD AUTO: 15.2 10E3/UL (ref 4–11)
WBC URINE: <1 /HPF

## 2024-01-19 PROCEDURE — 99233 SBSQ HOSP IP/OBS HIGH 50: CPT | Mod: GC | Performed by: INTERNAL MEDICINE

## 2024-01-19 PROCEDURE — 85027 COMPLETE CBC AUTOMATED: CPT

## 2024-01-19 PROCEDURE — 250N000009 HC RX 250

## 2024-01-19 PROCEDURE — 250N000013 HC RX MED GY IP 250 OP 250 PS 637: Performed by: STUDENT IN AN ORGANIZED HEALTH CARE EDUCATION/TRAINING PROGRAM

## 2024-01-19 PROCEDURE — 250N000013 HC RX MED GY IP 250 OP 250 PS 637

## 2024-01-19 PROCEDURE — 250N000011 HC RX IP 250 OP 636

## 2024-01-19 PROCEDURE — 97165 OT EVAL LOW COMPLEX 30 MIN: CPT | Mod: GO

## 2024-01-19 PROCEDURE — 93975 VASCULAR STUDY: CPT

## 2024-01-19 PROCEDURE — 80053 COMPREHEN METABOLIC PANEL: CPT

## 2024-01-19 PROCEDURE — 84100 ASSAY OF PHOSPHORUS: CPT

## 2024-01-19 PROCEDURE — 87635 SARS-COV-2 COVID-19 AMP PRB: CPT | Performed by: PHYSICIAN ASSISTANT

## 2024-01-19 PROCEDURE — 76770 US EXAM ABDO BACK WALL COMP: CPT | Mod: 26 | Performed by: STUDENT IN AN ORGANIZED HEALTH CARE EDUCATION/TRAINING PROGRAM

## 2024-01-19 PROCEDURE — 258N000003 HC RX IP 258 OP 636: Performed by: STUDENT IN AN ORGANIZED HEALTH CARE EDUCATION/TRAINING PROGRAM

## 2024-01-19 PROCEDURE — 200N000002 HC R&B ICU UMMC

## 2024-01-19 PROCEDURE — 85610 PROTHROMBIN TIME: CPT

## 2024-01-19 PROCEDURE — 83735 ASSAY OF MAGNESIUM: CPT

## 2024-01-19 PROCEDURE — 93975 VASCULAR STUDY: CPT | Mod: 26 | Performed by: STUDENT IN AN ORGANIZED HEALTH CARE EDUCATION/TRAINING PROGRAM

## 2024-01-19 PROCEDURE — 81001 URINALYSIS AUTO W/SCOPE: CPT | Performed by: PHYSICIAN ASSISTANT

## 2024-01-19 PROCEDURE — 82330 ASSAY OF CALCIUM: CPT

## 2024-01-19 PROCEDURE — 250N000011 HC RX IP 250 OP 636: Performed by: STUDENT IN AN ORGANIZED HEALTH CARE EDUCATION/TRAINING PROGRAM

## 2024-01-19 PROCEDURE — 97535 SELF CARE MNGMENT TRAINING: CPT | Mod: GO

## 2024-01-19 PROCEDURE — 82248 BILIRUBIN DIRECT: CPT

## 2024-01-19 PROCEDURE — 97530 THERAPEUTIC ACTIVITIES: CPT | Mod: GO

## 2024-01-19 RX ORDER — AMOXICILLIN 250 MG
3 CAPSULE ORAL 2 TIMES DAILY
Status: DISCONTINUED | OUTPATIENT
Start: 2024-01-19 | End: 2024-01-23

## 2024-01-19 RX ORDER — HEPARIN SODIUM 5000 [USP'U]/.5ML
5000 INJECTION, SOLUTION INTRAVENOUS; SUBCUTANEOUS EVERY 8 HOURS
Status: DISCONTINUED | OUTPATIENT
Start: 2024-01-19 | End: 2024-01-23

## 2024-01-19 RX ORDER — POLYETHYLENE GLYCOL 3350 17 G/17G
17 POWDER, FOR SOLUTION ORAL 3 TIMES DAILY
Status: DISCONTINUED | OUTPATIENT
Start: 2024-01-19 | End: 2024-01-23

## 2024-01-19 RX ORDER — AMOXICILLIN 250 MG
2 CAPSULE ORAL 2 TIMES DAILY
Status: DISCONTINUED | OUTPATIENT
Start: 2024-01-19 | End: 2024-01-23

## 2024-01-19 RX ADMIN — AMLODIPINE BESYLATE 10 MG: 10 TABLET ORAL at 08:27

## 2024-01-19 RX ADMIN — SODIUM CHLORIDE 15 MG/HR: 9 INJECTION, SOLUTION INTRAVENOUS at 05:49

## 2024-01-19 RX ADMIN — HEPARIN SODIUM 5000 UNITS: 10000 INJECTION, SOLUTION INTRAVENOUS; SUBCUTANEOUS at 19:35

## 2024-01-19 RX ADMIN — Medication 300 MCG/KG/MIN: at 18:09

## 2024-01-19 RX ADMIN — LABETALOL HYDROCHLORIDE 10 MG: 5 INJECTION, SOLUTION INTRAVENOUS at 01:30

## 2024-01-19 RX ADMIN — LOSARTAN POTASSIUM 100 MG: 50 TABLET, FILM COATED ORAL at 08:27

## 2024-01-19 RX ADMIN — POTASSIUM CHLORIDE 20 MEQ: 29.8 INJECTION, SOLUTION INTRAVENOUS at 00:37

## 2024-01-19 RX ADMIN — Medication 300 MCG/KG/MIN: at 11:13

## 2024-01-19 RX ADMIN — HYDRALAZINE HYDROCHLORIDE 100 MG: 25 TABLET, FILM COATED ORAL at 22:13

## 2024-01-19 RX ADMIN — CARVEDILOL 50 MG: 25 TABLET, FILM COATED ORAL at 19:35

## 2024-01-19 RX ADMIN — DOCUSATE SODIUM 50 MG AND SENNOSIDES 8.6 MG 1 TABLET: 8.6; 5 TABLET, FILM COATED ORAL at 08:27

## 2024-01-19 RX ADMIN — POLYETHYLENE GLYCOL 3350 17 G: 17 POWDER, FOR SOLUTION ORAL at 19:34

## 2024-01-19 RX ADMIN — POLYETHYLENE GLYCOL 3350 17 G: 17 POWDER, FOR SOLUTION ORAL at 15:11

## 2024-01-19 RX ADMIN — POLYETHYLENE GLYCOL 3350 17 G: 17 POWDER, FOR SOLUTION ORAL at 08:27

## 2024-01-19 RX ADMIN — SENNOSIDES AND DOCUSATE SODIUM 2 TABLET: 8.6; 5 TABLET ORAL at 19:35

## 2024-01-19 RX ADMIN — HYDRALAZINE HYDROCHLORIDE 20 MG: 20 INJECTION INTRAMUSCULAR; INTRAVENOUS at 07:52

## 2024-01-19 RX ADMIN — HYDRALAZINE HYDROCHLORIDE 100 MG: 25 TABLET, FILM COATED ORAL at 15:11

## 2024-01-19 RX ADMIN — HYDRALAZINE HYDROCHLORIDE 100 MG: 25 TABLET, FILM COATED ORAL at 04:19

## 2024-01-19 RX ADMIN — SODIUM CHLORIDE 15 MG/HR: 9 INJECTION, SOLUTION INTRAVENOUS at 18:35

## 2024-01-19 RX ADMIN — HYDRALAZINE HYDROCHLORIDE 100 MG: 25 TABLET, FILM COATED ORAL at 09:45

## 2024-01-19 RX ADMIN — CARVEDILOL 50 MG: 25 TABLET, FILM COATED ORAL at 08:27

## 2024-01-19 RX ADMIN — Medication 300 MCG/KG/MIN: at 04:49

## 2024-01-19 ASSESSMENT — ACTIVITIES OF DAILY LIVING (ADL)
ADLS_ACUITY_SCORE: 25
ADLS_ACUITY_SCORE: 27
ADLS_ACUITY_SCORE: 25

## 2024-01-19 NOTE — PROGRESS NOTES
"VASCULAR SURGERY PROGRESS NOTE    Subjective:  On max dose esmolol and nicardipine, maxed carvedilol, amlodipine, hydralazine and losartan. Pending OR Tuesday: combined cardiac/vascular procedure (ascending/hemiarch repair with TEVAR) tentatively early next week, 1/23/23. Denies abdominal pain or any other discomfort. \"tired of being here in the hospital\". Urine metanephrines are pending.     Objective:  Intake/Output Summary (Last 24 hours) at 1/19/2024 0744  Last data filed at 1/19/2024 0700  Gross per 24 hour   Intake 3961.6 ml   Output 2975 ml   Net 986.6 ml     Labs:  ROUTINE IP LABS (Last four results)  BMP  Recent Labs   Lab 01/19/24 0415 01/18/24 2116 01/18/24 0349 01/17/24  2131    138 136 134*   POTASSIUM 3.7  3.7 3.4 4.3 4.4   CHLORIDE 111* 112* 109* 110*   AYRON 7.6* 7.2* 7.9* 8.2*   CO2 16* 16* 17* 18*   BUN 18.2 19.3 21.7* 22.0*   CR 1.13 1.12 0.98 1.02   GLC 94 92 110* 148*     CBC  Recent Labs   Lab 01/19/24 0415 01/18/24 0349 01/17/24 0316 01/16/24  0334   WBC 15.2* 19.1* 15.5* 15.0*   RBC 5.42 5.75 5.59 5.41   HGB 10.5* 11.2* 10.7* 10.6*   HCT 32.4* 34.4* 33.8* 33.6*   MCV 60* 60* 61* 62*   MCH 19.4* 19.5* 19.1* 19.6*   MCHC 32.4 32.6 31.7 31.5   RDW 16.0* 17.0* 16.5* 15.7*    215 193 157     INR  Recent Labs   Lab 01/19/24 0415 01/18/24 0349 01/17/24  0316   INR 1.14 1.24* 1.18*     PHYSICAL EXAM:  /52   Pulse 73   Temp 98.3  F (36.8  C) (Oral)   Resp 20   Ht 1.727 m (5' 8\")   Wt 94.1 kg (207 lb 7.3 oz)   SpO2 91%   BMI 31.54 kg/m    General: The patient is alert, oriented to self, place, situation.   Psych: Pleasant affect, answers questions appropriately  Skin: Color appropriate for race, warm, dry.  Neuro: Sensorimotor intact in BUE and BLE, CN II-XII grossly intact  Respiratory: The patient does require 2L NC supplemental oxygen. Breathing unlabored  GI:  Abdomen soft, nontender to light palpation.  Extremities: Bilateral femoral and DP pulses palpable,  no " edema noted.     Temperature normal   Sensory function: normal   Flexion (plantar/dorsi): normal range     DATA:   CTA CHEST ABDOMEN PELVIS WITH CONTRAST PANEL   Final Result   IMPRESSION:   1. Aortic dissection extends from the left subclavian origin to the   supraceliac aorta. Dissection extends through the visualized left   subclavian artery. Left vertebral artery is thought to originate from   the true lumen. Left internal thoracic artery originates from the true   lumen.      2. Non specific fluid along the ascending aorta to arch.      3. 7 mm right upper lobe nodule. Per Fleischner Society   Recommendations, if the patient is at low risk of lung cancer, further   evaluation with CT in 6-12 months is suggested with optional CT in   18-24 months. If the patient is at high risk for lung cancer, further   evaluation with CT in 6-12 months and CT at 18-24 months is suggested.      LIZBETH BEACH MD            SYSTEM ID:  [id number]      XR Chest Port 1 View   Final Result   IMPRESSION:   1.  Right upper extremity PICC tip projects over the superior cava   junction.   2.  Stable cardiomediastinal silhouette.   3.  Streaky bibasilar opacities, right more than left may represent   atelectasis versus pulmonary edema/atypical infection.      I have personally reviewed the examination and initial interpretation   and I agree with the findings.      DEANNE FOWLER MD            SYSTEM ID:  [id number]      XR Chest Port 1 View   Final Result   Impression:    1. Mild widening of the mediastinum compared to prior x-ray 6/20/2022,   consistent with patient's history of known type B aortic dissection   seen on outside imaging (imaging unavailable).   2. No focal consolidations. There is bibasilar atelectasis.   3. No significant pleural effusion, no discernible pneumothorax.      I have personally reviewed the examination and initial interpretation   and I agree with the findings.      GHISLAINE BOWEN MD            SYSTEM ID:   [id number]      Echocardiogram Complete   Final Result      US Renal Complete w Arterial Duplex    (Results Pending)       ASSESSMENT / PLAN:  Patient is a 48M who presented to [facility] on 1/12 with acute type B aortic dissection from the origin of the left subclavian to the diaphragm just superior to the celiac axis.Due to worsening back pain and increased variation in BP from arterial lines in his right and left hands, repeat CT scan was done which showed Type A dissection with a thrombosed retrograde false lumen, so he was transferred to [facility]. He continues to remain asymptomatic from a dissection standpoint. Repeat CTA on 1/17 stable.    - Continue IV and PO regimen for SBP and HR control with goals <120 and <70.  - Patient is planned to go to OR Tuesday: combined cardiac/vascular procedure (ascending/hemiarch repair with TEVAR) tentatively early next week, 1/23/23.   - Okay with OOB to chair, no walking outside room (okay with Dr. Warren and Dr. Shaw)    Seen and discussed pt history, exam, assessment and plan with Dr. Warren of the vascular surgery service, who is in agreement with the above.    Candie Parkinson, [facility]  Vascular Surgery  Pager: [phone]  [email or web address]  Send message or 10 digit call back number Securely via [employer] with the Vocera Web Console (learn more here)

## 2024-01-19 NOTE — PROGRESS NOTES
CV ICU PROGRESS NOTE  January 19, 2024      CO-MORBIDITIES:   Aortic Dissection  Hypertensive urgency     ASSESSMENT: Valdo Lyons is a 48 year old male with PMH of HTN, childhood seizures. 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 Repeat CTA 1/17 for vascular to decide on further management (surgery vs medical management). CT or vascular to operate early next week (~1/23). BP management until then.     TODAY'S PROGRESS:   Encourage PT/OT as tolerated by BP.  Urine metanephrines  Renal US   - close monitor for pain - can be an indicator of worsening dissection  Miralax and senna increased. No BM yet.     PLAN:  Neuro/ pain/ sedation:  Psychiatry saw patient, see note for recs. Nothing actionable.   - Monitor neurological status. Notify the MD for any acute changes in exam.  #Acute pain - resolved  - PRN: acetaminophen      # Recent fall, atraumatic  # Childhood seizure disorder  - Seizure hx noted, not on any antiepileptics at time of admission  - Presented to [facility] s/p fall, CT head reported as negative for intracranial abnormalities 1/12)    # Hx Mariajuana use  - Typically smokes mariajuana every night  - Denies tobacco, alcohol, or other illicit drugs     Pulmonary care:   # Acute hypoxic insufficiency  Intermittently requiring 4-6L NC to achieve SPO2% goals.   - Goal SpO2 > 92%  - Encourage IS q15-30 minutes when awake (though patient has been refusing).   - Encourage up out of bed activity. Get into the chair.      Cardiovascular:    # Type B dissection, c/f retrograde tear  # Hx uncontrolled HTN  Patient was not taking any PTA medications for his  high blood pressure. Bilateral arterial lines placed at [facility]. Will treat based on right arterial line as the left is likely dissected. Will remove left. Ordered additional testing, as his BP medication requirements are exceeding expectations, rule out other/organic causes of elevated BP. TSH normal. 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 Surgery planned for ~1/23.  - Goal MAP >65 but less than 80, SBP <120.  - nicardipine, esmolol, nipride gtt to achieve goal hemodynamics (wean esmolol first then nicardipine if able).   - Carvedilol 25 mg BID and amlodipine 10 mg daily, hydralazine 100mg q6h, Losartan 100 mg daily   - PRN labetalol, hydralazine  - renal US in AM     GI care / Nutrition:   # At risk for protein malnutrition  - Regular diet after renal US.   - PPI not indicated  - Bowel regimen: MiraLAX, senna, increased 1/19 - still no BM    Renal / Fluids / Electrolytes:   # Chronic kidney disease vs MIKA  # volume status  # electrolyte derangement, resolved   Unknown baseline, was 1.6 on admission to [facility], last value in 2022 was 1.25. Cr continues to improve. Decent response to lasix x1.   - Daily BMP  - Strict I/O, daily weights  - Avoid/limit nephrotoxins as able  - Replete lytes PRN per protocol    Endocrine:    # Stress hyperglycemia  Preop A1c pending.  - Gluc checks per protocol  - Goal BG <180 for optimal healing     ID / Antibiotics:  # Stress induced leukocytosis  - No s/sx infection  - Monitor fever curve, WBC, and inflammatory markers as appropriate     Heme:     # Acute blood loss anemia, considered but ruled out   # Acute blood loss thrombocytopenia, considered and ruled out   No s/sx active bleeding  - Continue to monitor  - CBC on arrival  - Hgb goal > 7.0     MSK / Skin:  # no acute concerns  - PT/OT/CR - ordered      Prophylaxis:     - Mechanical ppx for DVT  - Chemical DVT ppx: HOLD due to risk of bleed  - PPI: n/a  - Bowel regimen: MiraLAX,  senna - scheduled     Lines / Tubes / Drains:  - Arterial Line (Rt. Radial)  - PIV x3  - PICC      Disposition:  - CVICU    Patient seen, findings and plan discussed with CV ICU staff, Dr. Meyer.     Shannon Galicia MD (CA1)  Anesthesiology Resident       ====================================    SUBJECTIVE:   Patient sleeping comfortably. Expresses frustration with situation, would like to know what is going to happen.     OBJECTIVE:   1. VITAL SIGNS:   Temp:  [98  F (36.7  C)-98.3  F (36.8  C)] 98.3  F (36.8  C)  Pulse:  [68-80] 73  Resp:  [11-24] 20  MAP:  [58 mmHg-87 mmHg] 75 mmHg  Arterial Line BP: ()/(43-65) 124/53  SpO2:  [88 %-96 %] 90 %  Resp: 20      2. INTAKE/ OUTPUT:   I/O last 3 completed shifts:  In: 3836.9 [P.O.:840; I.V.:2996.9]  Out: 2975 [Urine:2975]    3. PHYSICAL EXAMINATION:   General: laying in bed, conversant  Neuro: A&O, grossly intact  Resp: RA, NAD  CV: RR  Extremities: warm and well perfused    4. INVESTIGATIONS:   Arterial Blood Gases   Recent Labs   Lab 01/18/24  0351 01/16/24  0335   PH 7.43 7.38   PCO2 28* 38   PO2 64* 81   HCO3 18* 23     Complete Blood Count   Recent Labs   Lab 01/19/24  0415 01/18/24  0349 01/17/24  0316 01/16/24  0334   WBC 15.2* 19.1* 15.5* 15.0*   HGB 10.5* 11.2* 10.7* 10.6*    215 193 157     Basic Metabolic Panel  Recent Labs   Lab 01/19/24  0415 01/18/24  2116 01/18/24  0349 01/17/24  2131    138 136 134*   POTASSIUM 3.7  3.7 3.4 4.3 4.4   CHLORIDE 111* 112* 109* 110*   CO2 16* 16* 17* 18*   BUN 18.2 19.3 21.7* 22.0*   CR 1.13 1.12 0.98 1.02   GLC 94 92 110* 148*     Liver Function Tests  Recent Labs   Lab 01/19/24 0415 01/18/24 0349 01/17/24  0316 01/16/24  0334   AST 11 12 12 12   ALT <5 5 6 7   ALKPHOS 40 41 42 42   BILITOTAL 0.7 0.8 0.7 0.5   ALBUMIN 2.8* 3.0* 3.2* 3.2*   INR 1.14 1.24* 1.18*  --      Pancreatic Enzymes  No lab results found in last 7 days.  Coagulation Profile  Recent Labs   Lab 01/19/24 0415 01/18/24  0349  01/17/24  0316 01/16/24  0334   INR 1.14 1.24* 1.18*  --    PTT  --   --   --  38         5. RADIOLOGY:   No results found for this or any previous visit (from the past 24 hour(s)).      =========================================     ATTENDING ATTESTATION:   I personally examined and evaluated this patient today.   I have reviewed today's vital signs, medications, labs, and imaging results. I have reviewed and edited, as necessary, the history, review of systems, physical examination, and assessment and plan. I discussed the patient with the resident and care team, and agree with the assessment and plan of care as documented in the note below.       Charles Meyer MD, PhD  Interventional/Critical Care Cardiology  [phone]     January 19, 2024

## 2024-01-19 NOTE — PROGRESS NOTES
01/19/24 1000   Appointment Info   Signing Clinician's Name / Credentials (OT) Janneth Andrews OTR/L   Living Environment   People in Home parent(s)   Current Living Arrangements house  (Split level)   Home Accessibility stairs to enter home;stairs within home   Number of Stairs, Main Entrance 3   Stair Railings, Main Entrance none   Number of Stairs, Within Home, Primary six  (Up to ML and down to basement)   Stair Railings, Within Home, Primary railings safe and in good condition   Transportation Anticipated family or friend will provide   Living Environment Comments Pt reports living with mother in a split level home. Pt lives in basement with a tub/shower no DME.   Self-Care   Usual Activity Tolerance good   Current Activity Tolerance moderate   Regular Exercise No   Equipment Currently Used at Home none   Fall history within last six months no   Activity/Exercise/Self-Care Comment Pt report PLOF as IND with all mobility and cares. Pt not working and would not state activities he completed in the home or home often his mother in home.   General Information   Onset of Illness/Injury or Date of Surgery 01/16/23   Referring Physician Bruce Starr MD   Patient/Family Therapy Goal Statement (OT) Return home   Additional Occupational Profile Info/Pertinent History of Current Problem Valdo Lyons is a 48 year old male with PMH of HTN, childhood seizures. 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 Repeat CTA 1/17 for vascular to decide on further management (surgery vs medical management). CT or vascular to operate early next week (~1/23). BP management  until then.   Existing Precautions/Restrictions cardiac   Limitations/Impairments safety/cognitive   Left Upper Extremity (Weight-bearing Status) full weight-bearing (FWB)   Right Upper Extremity (Weight-bearing Status) full weight-bearing (FWB)   Left Lower Extremity (Weight-bearing Status) full weight-bearing (FWB)   Right Lower Extremity (Weight-bearing Status) full weight-bearing (FWB)   Cognitive Status Examination   Orientation Status orientation to person, place and time   Behavioral Issues withdrawn;disinhibition   Affect/Mental Status (Cognitive) confused;flat/blunted affect;low arousal/lethargic   Follows Commands follows one-step commands;50-74% accuracy   Safety Deficit judgment;problem-solving;safety precautions awareness;insight into deficits/self-awareness   Cognitive Status Comments Unclear from EMR if pt has baseline cog deficits but showing flat affect and poor safety awarness this time. Will benefit from cog screening in furture session   Visual Perception   Visual Impairment/Limitations WNL   Sensory   Sensory Quick Adds sensation intact   Pain Assessment   Patient Currently in Pain No   Posture   Posture not impaired   Range of Motion Comprehensive   General Range of Motion bilateral upper extremity ROM WFL   Strength Comprehensive (MMT)   General Manual Muscle Testing (MMT) Assessment no strength deficits identified   Coordination   Upper Extremity Coordination No deficits were identified   Bed Mobility   Bed Mobility supine-sit   Comment (Bed Mobility) CGA   Balance   Balance Assessment sitting dynamic balance;standing dynamic balance   Balance Comments CGA   Activities of Daily Living   BADL Assessment/Intervention lower body dressing;toileting;bathing   Bathing Assessment/Intervention   [place] Level (Bathing) minimum assist (75% patient effort)   Comment, (Bathing) Per clinical judgement   Lower Body Dressing Assessment/Training   [place] Level (Lower Body Dressing) maximum assist  (25% patient effort)   Toileting   [place] Level (Toileting) maximum assist (25% patient effort)   Clinical Impression   Criteria for Skilled Therapeutic Interventions Met (OT) Yes, treatment indicated   OT Diagnosis Decreased ADL function   OT Problem List-Impairments impacting ADL problems related to;activity tolerance impaired;cognition;balance;mobility   Assessment of Occupational Performance 1-3 Performance Deficits   Planned Therapy Interventions (OT) ADL retraining;IADL retraining;cognition;home program guidelines;progressive activity/exercise;strengthening;transfer training   Clinical Decision Making Complexity (OT) problem focused assessment/low complexity   Risk & Benefits of therapy have been explained evaluation/treatment results reviewed;care plan/treatment goals reviewed;risks/benefits reviewed;current/potential barriers reviewed;participants voiced agreement with care plan;participants included;patient   Clinical Impression Comments Pt below baseline and will benefit from skilled OT throughout hospital stay to promote return to functional baseline and assist with d/c planning after OP.   OT Total Evaluation Time   OT Eval, Low Complexity Minutes (16329) 10   OT Goals   Therapy Frequency (OT) 5 times/week   OT Predicted Duration/Target Date for Goal Attainment 02/16/24   OT Goals Lower Body Bathing;Lower Body Dressing;Toilet Transfer/Toileting;Cognition;Home Management   OT: Lower Body Dressing Modified independent;using adaptive equipment   OT: Lower Body Bathing Modified independent;using adaptive equipment   OT: Toilet Transfer/Toileting Modified independent;using adaptive equipment   OT: Home Management Modified independent;with light demand household tasks;ambulatory level   OT: Cognitive Patient/caregiver will verbalize understanding of cognitive assessment results/recommendations as needed for safe discharge planning   OT Discharge Planning   OT Plan Cog assessment, standing ADLs post op,  LB dressing   OT Discharge Recommendation (DC Rec) home with outpatient cardiac rehab   OT Rationale for DC Rec Pt with planned card OP next week. Pending LOS and post op presentation, anticipate pt progressing well with therapy and being safe for d/c home with family support and OP card therapy when medically ready.   OT Brief overview of current status CGA STS and pivot

## 2024-01-20 LAB
ALBUMIN SERPL BCG-MCNC: 3 G/DL (ref 3.5–5.2)
ALP SERPL-CCNC: 44 U/L (ref 40–150)
ALT SERPL W P-5'-P-CCNC: 7 U/L (ref 0–70)
ANION GAP SERPL CALCULATED.3IONS-SCNC: 7 MMOL/L (ref 7–15)
AST SERPL W P-5'-P-CCNC: 13 U/L (ref 0–45)
BILIRUB DIRECT SERPL-MCNC: <0.2 MG/DL (ref 0–0.3)
BILIRUB SERPL-MCNC: 0.5 MG/DL
BUN SERPL-MCNC: 21.9 MG/DL (ref 6–20)
CA-I BLD-MCNC: 4.7 MG/DL (ref 4.4–5.2)
CALCIUM SERPL-MCNC: 7.8 MG/DL (ref 8.6–10)
CHLORIDE SERPL-SCNC: 112 MMOL/L (ref 98–107)
CREAT SERPL-MCNC: 1.11 MG/DL (ref 0.67–1.17)
DEPRECATED HCO3 PLAS-SCNC: 18 MMOL/L (ref 22–29)
EGFRCR SERPLBLD CKD-EPI 2021: 82 ML/MIN/1.73M2
ERYTHROCYTE [DISTWIDTH] IN BLOOD BY AUTOMATED COUNT: 15.9 % (ref 10–15)
GLUCOSE SERPL-MCNC: 93 MG/DL (ref 70–99)
HCT VFR BLD AUTO: 31.1 % (ref 40–53)
HGB BLD-MCNC: 10.3 G/DL (ref 13.3–17.7)
INR PPP: 1.13 (ref 0.85–1.15)
MAGNESIUM SERPL-MCNC: 2 MG/DL (ref 1.7–2.3)
MCH RBC QN AUTO: 20 PG (ref 26.5–33)
MCHC RBC AUTO-ENTMCNC: 33.1 G/DL (ref 31.5–36.5)
MCV RBC AUTO: 61 FL (ref 78–100)
PHOSPHATE SERPL-MCNC: 3.3 MG/DL (ref 2.5–4.5)
PLATELET # BLD AUTO: 240 10E3/UL (ref 150–450)
POTASSIUM SERPL-SCNC: 3.8 MMOL/L (ref 3.4–5.3)
PROT SERPL-MCNC: 5.2 G/DL (ref 6.4–8.3)
RBC # BLD AUTO: 5.14 10E6/UL (ref 4.4–5.9)
SODIUM SERPL-SCNC: 137 MMOL/L (ref 135–145)
WBC # BLD AUTO: 12.5 10E3/UL (ref 4–11)

## 2024-01-20 PROCEDURE — 250N000013 HC RX MED GY IP 250 OP 250 PS 637

## 2024-01-20 PROCEDURE — 250N000011 HC RX IP 250 OP 636

## 2024-01-20 PROCEDURE — 83735 ASSAY OF MAGNESIUM: CPT

## 2024-01-20 PROCEDURE — 85610 PROTHROMBIN TIME: CPT

## 2024-01-20 PROCEDURE — 250N000013 HC RX MED GY IP 250 OP 250 PS 637: Performed by: STUDENT IN AN ORGANIZED HEALTH CARE EDUCATION/TRAINING PROGRAM

## 2024-01-20 PROCEDURE — 200N000002 HC R&B ICU UMMC

## 2024-01-20 PROCEDURE — 85014 HEMATOCRIT: CPT

## 2024-01-20 PROCEDURE — 250N000011 HC RX IP 250 OP 636: Performed by: STUDENT IN AN ORGANIZED HEALTH CARE EDUCATION/TRAINING PROGRAM

## 2024-01-20 PROCEDURE — 250N000009 HC RX 250

## 2024-01-20 PROCEDURE — 258N000003 HC RX IP 258 OP 636: Performed by: STUDENT IN AN ORGANIZED HEALTH CARE EDUCATION/TRAINING PROGRAM

## 2024-01-20 PROCEDURE — 82374 ASSAY BLOOD CARBON DIOXIDE: CPT

## 2024-01-20 PROCEDURE — 80076 HEPATIC FUNCTION PANEL: CPT

## 2024-01-20 PROCEDURE — 84100 ASSAY OF PHOSPHORUS: CPT

## 2024-01-20 PROCEDURE — 82330 ASSAY OF CALCIUM: CPT

## 2024-01-20 PROCEDURE — 99233 SBSQ HOSP IP/OBS HIGH 50: CPT | Mod: GC | Performed by: INTERNAL MEDICINE

## 2024-01-20 RX ORDER — MAGNESIUM OXIDE 400 MG/1
400 TABLET ORAL EVERY 4 HOURS
Status: COMPLETED | OUTPATIENT
Start: 2024-01-20 | End: 2024-01-20

## 2024-01-20 RX ADMIN — LABETALOL HYDROCHLORIDE 10 MG: 5 INJECTION, SOLUTION INTRAVENOUS at 18:55

## 2024-01-20 RX ADMIN — Medication 5 ML: at 19:44

## 2024-01-20 RX ADMIN — POLYETHYLENE GLYCOL 3350 17 G: 17 POWDER, FOR SOLUTION ORAL at 08:07

## 2024-01-20 RX ADMIN — SENNOSIDES AND DOCUSATE SODIUM 2 TABLET: 8.6; 5 TABLET ORAL at 19:47

## 2024-01-20 RX ADMIN — Medication 300 MCG/KG/MIN: at 13:27

## 2024-01-20 RX ADMIN — SENNOSIDES AND DOCUSATE SODIUM 2 TABLET: 8.6; 5 TABLET ORAL at 08:07

## 2024-01-20 RX ADMIN — AMLODIPINE BESYLATE 10 MG: 10 TABLET ORAL at 08:07

## 2024-01-20 RX ADMIN — HYDRALAZINE HYDROCHLORIDE 100 MG: 25 TABLET, FILM COATED ORAL at 03:48

## 2024-01-20 RX ADMIN — SODIUM CHLORIDE 15 MG/HR: 9 INJECTION, SOLUTION INTRAVENOUS at 06:33

## 2024-01-20 RX ADMIN — HYDRALAZINE HYDROCHLORIDE 100 MG: 25 TABLET, FILM COATED ORAL at 15:28

## 2024-01-20 RX ADMIN — Medication 300 MCG/KG/MIN: at 00:18

## 2024-01-20 RX ADMIN — SODIUM CHLORIDE 12.5 MG/HR: 9 INJECTION, SOLUTION INTRAVENOUS at 13:28

## 2024-01-20 RX ADMIN — SODIUM CHLORIDE 15 MG/HR: 9 INJECTION, SOLUTION INTRAVENOUS at 18:25

## 2024-01-20 RX ADMIN — HEPARIN SODIUM 5000 UNITS: 10000 INJECTION, SOLUTION INTRAVENOUS; SUBCUTANEOUS at 03:49

## 2024-01-20 RX ADMIN — HYDRALAZINE HYDROCHLORIDE 100 MG: 25 TABLET, FILM COATED ORAL at 11:39

## 2024-01-20 RX ADMIN — CARVEDILOL 50 MG: 25 TABLET, FILM COATED ORAL at 19:43

## 2024-01-20 RX ADMIN — POLYETHYLENE GLYCOL 3350 17 G: 17 POWDER, FOR SOLUTION ORAL at 13:28

## 2024-01-20 RX ADMIN — HEPARIN SODIUM 5000 UNITS: 10000 INJECTION, SOLUTION INTRAVENOUS; SUBCUTANEOUS at 19:44

## 2024-01-20 RX ADMIN — LABETALOL HYDROCHLORIDE 10 MG: 5 INJECTION, SOLUTION INTRAVENOUS at 23:15

## 2024-01-20 RX ADMIN — MAGNESIUM OXIDE TAB 400 MG (241.3 MG ELEMENTAL MG) 400 MG: 400 (241.3 MG) TAB at 06:33

## 2024-01-20 RX ADMIN — HYDRALAZINE HYDROCHLORIDE 100 MG: 25 TABLET, FILM COATED ORAL at 22:12

## 2024-01-20 RX ADMIN — Medication 300 MCG/KG/MIN: at 18:25

## 2024-01-20 RX ADMIN — Medication 300 MCG/KG/MIN: at 06:34

## 2024-01-20 RX ADMIN — SODIUM CHLORIDE 15 MG/HR: 9 INJECTION, SOLUTION INTRAVENOUS at 01:21

## 2024-01-20 RX ADMIN — Medication 5 ML: at 13:32

## 2024-01-20 RX ADMIN — POLYETHYLENE GLYCOL 3350 17 G: 17 POWDER, FOR SOLUTION ORAL at 19:44

## 2024-01-20 RX ADMIN — LOSARTAN POTASSIUM 100 MG: 50 TABLET, FILM COATED ORAL at 08:07

## 2024-01-20 RX ADMIN — MAGNESIUM OXIDE TAB 400 MG (241.3 MG ELEMENTAL MG) 400 MG: 400 (241.3 MG) TAB at 11:38

## 2024-01-20 RX ADMIN — HEPARIN SODIUM 5000 UNITS: 10000 INJECTION, SOLUTION INTRAVENOUS; SUBCUTANEOUS at 11:39

## 2024-01-20 RX ADMIN — CARVEDILOL 50 MG: 25 TABLET, FILM COATED ORAL at 08:07

## 2024-01-20 RX ADMIN — HYDROXYZINE HYDROCHLORIDE 50 MG: 25 TABLET, FILM COATED ORAL at 22:49

## 2024-01-20 ASSESSMENT — ACTIVITIES OF DAILY LIVING (ADL)
ADLS_ACUITY_SCORE: 27
ADLS_ACUITY_SCORE: 29
ADLS_ACUITY_SCORE: 26
ADLS_ACUITY_SCORE: 25
ADLS_ACUITY_SCORE: 27
ADLS_ACUITY_SCORE: 27
ADLS_ACUITY_SCORE: 28
ADLS_ACUITY_SCORE: 26
ADLS_ACUITY_SCORE: 27
ADLS_ACUITY_SCORE: 28
ADLS_ACUITY_SCORE: 27
ADLS_ACUITY_SCORE: 25

## 2024-01-20 NOTE — PROGRESS NOTES
CV ICU PROGRESS NOTE  January 20, 2024      CO-MORBIDITIES:   Aortic Dissection  Hypertensive urgency     ASSESSMENT: Valdo Lyons is a 48 year old male with PMH of HTN, childhood seizures. 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 Repeat CTA 1/17 for vascular to decide on further management (surgery vs medical management). CT or vascular to operate early next week (~1/23). BP management until then.     TODAY'S PROGRESS:   Encourage PT/OT up in chair as tolerated by BP  - close monitor for pain - can be an indicator of worsening dissection  Miralax and senna increased. No BM yet - been refusing bowel regimen.     PLAN:  Neuro/ pain/ sedation:  Psychiatry saw patient, see note for recs. Nothing actionable.   - Monitor neurological status. Notify the MD for any acute changes in exam.  #Acute pain - resolved  - PRN: acetaminophen      # Recent fall, atraumatic  # Hx Childhood seizure disorder  - Seizure hx noted, not on any antiepileptics at time of admission  - Presented to [facility] s/p fall, CT head reported as negative for intracranial abnormalities 1/12)    # Hx Mariajuana use  - Typically smokes mariajuana every night  - Denies tobacco, alcohol, or other illicit drugs     Pulmonary care:   # Acute hypoxic insufficiency  Intermittently requiring 4-6L NC to achieve SPO2% goals.   - Goal SpO2 > 92%  - Encourage IS q15-30 minutes when awake (though patient has been refusing).   - Encourage up out of bed activity. Get into the chair.      Cardiovascular:    # Type B dissection, c/f retrograde tear  # Hx uncontrolled HTN  Patient was not taking any PTA  medications for his high blood pressure. Bilateral arterial lines placed at [facility]. Will treat based on right arterial line as the left is likely dissected. Will remove left. Ordered additional testing, as his BP medication requirements are exceeding expectations, rule out other/organic causes of elevated BP. TSH normal. 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 Surgery planned for ~1/23.  - Goal MAP >65 but less than 80, SBP <120.  - nicardipine, esmolol, nipride gtt to achieve goal hemodynamics (wean esmolol first then nicardipine if able).   - Carvedilol 25 mg BID and amlodipine 10 mg daily, hydralazine 100mg q6h, Losartan 100 mg daily   - PRN labetalol, hydralazine  - renal US in AM     GI care / Nutrition:   # At risk for protein malnutrition  - Regular diet after renal US.   - PPI not indicated  - Bowel regimen: MiraLAX, senna, increased 1/19 - still no BM    Renal / Fluids / Electrolytes:   # Chronic kidney disease vs MIKA  # volume status  # electrolyte derangement, resolved   Unknown baseline, was 1.6 on admission to [facility], last value in 2022 was 1.25. Cr continues to improve. Decent response to lasix x1. Renal ultrasound showed expected changes from the dissection (increased velocities) and a right pleural effusion (NTD).   - Daily BMP  - Strict I/O, daily weights - patient intermittently incontinent   - Avoid/limit nephrotoxins as able  - Replete lytes PRN per protocol  - ua showed mucous in urine     Endocrine:    # Stress hyperglycemia  Preop A1c pending.  - Gluc checks per protocol  - Goal BG <180 for optimal healing     ID / Antibiotics:  # Stress induced leukocytosis  - No s/sx infection  - Monitor fever curve, WBC, and inflammatory markers as appropriate     Heme:     # Acute blood loss anemia, considered but ruled out   # Acute blood loss thrombocytopenia, considered and ruled out   No s/sx active bleeding  - Continue to monitor  - CBC on arrival  -  Hgb goal > 7.0     MSK / Skin:  # no acute concerns  - PT/OT/CR - ordered      Prophylaxis:     - Mechanical ppx for DVT  - Chemical DVT ppx: SQH  - PPI: n/a  - Bowel regimen: MiraLAX, senna - scheduled     Lines / Tubes / Drains:  - Arterial Line (Rt. Radial)  - PIV x3  - PICC      Disposition:  - CVICU    Patient seen, findings and plan discussed with CV ICU staff, Dr. Meyer.     Shannon Galicia MD (CA1)  Anesthesiology Resident       ====================================    SUBJECTIVE:   Patient sleeping comfortably. Expresses frustration with situation, would like to know what is going to happen.     OBJECTIVE:   1. VITAL SIGNS:   Temp:  [97.4  F (36.3  C)-97.9  F (36.6  C)] 97.4  F (36.3  C)  Pulse:  [66-77] 67  Resp:  [10-25] 21  MAP:  [58 mmHg-84 mmHg] 71 mmHg  Arterial Line BP: ()/(38-64) 114/49  SpO2:  [87 %-96 %] 93 %  Resp: 21      2. INTAKE/ OUTPUT:   I/O last 3 completed shifts:  In: 3262.76 [P.O.:360; I.V.:2902.76]  Out: 1300 [Urine:1300]    3. PHYSICAL EXAMINATION:   General: laying in bed, conversant  Neuro: A&O, grossly intact  Resp: RA, NAD  CV: RR  Extremities: warm and well perfused    4. INVESTIGATIONS:   Arterial Blood Gases   Recent Labs   Lab 01/18/24  0351 01/16/24  0335   PH 7.43 7.38   PCO2 28* 38   PO2 64* 81   HCO3 18* 23     Complete Blood Count   Recent Labs   Lab 01/20/24  0357 01/19/24  0415 01/18/24  0349 01/17/24  0316   WBC 12.5* 15.2* 19.1* 15.5*   HGB 10.3* 10.5* 11.2* 10.7*    222 215 193     Basic Metabolic Panel  Recent Labs   Lab 01/20/24  0357 01/19/24  0415 01/18/24 2116 01/18/24  0349    137 138 136   POTASSIUM 3.8 3.7  3.7 3.4 4.3   CHLORIDE 112* 111* 112* 109*   CO2 18* 16* 16* 17*   BUN 21.9* 18.2 19.3 21.7*   CR 1.11 1.13 1.12 0.98   GLC 93 94 92 110*     Liver Function Tests  Recent Labs   Lab 01/20/24  0357 01/19/24  0415 01/18/24  0349 01/17/24  0316   AST 13 11 12 12   ALT 7 <5 5 6   ALKPHOS 44 40 41 42   BILITOTAL 0.5 0.7 0.8 0.7   ALBUMIN 3.0*  2.8* 3.0* 3.2*   INR 1.13 1.14 1.24* 1.18*     Pancreatic Enzymes  No lab results found in last 7 days.  Coagulation Profile  Recent Labs   Lab 01/20/24  0357 01/19/24  0415 01/18/24  0349 01/17/24  0316 01/16/24  0334   INR 1.13 1.14 1.24* 1.18*  --    PTT  --   --   --   --  38         5. RADIOLOGY:   No results found for this or any previous visit (from the past 24 hour(s)).        =========================================     ATTENDING ATTESTATION:   I personally examined and evaluated this patient today.   I have reviewed today's vital signs, medications, labs, and imaging results. I have reviewed and edited, as necessary, the history, review of systems, physical examination, and assessment and plan. I discussed the patient with the resident and care team, and agree with the assessment and plan of care as documented in the note below.       Charles Meyer MD, PhD  Interventional/Critical Care Cardiology  [phone]     January 20, 2024

## 2024-01-20 NOTE — PLAN OF CARE
Major Shift Events:  Pt remains on vasoactive gtts for BP and HR management, awaiting aortic dissection repair. No acute events.     Labs/Protocols: High K and Mag protocols. Standard Phos protocol. No electrolyte or product replacements this shift.   Neuro: Alert. Oriented to person, intermittently to place. Disoriented to place and situation. Impulsive/Restless, sitter at bedside, bed and chair alarms in place for safety. Denied pain throughout shift. Afebrile.   Cardiac: SR, HR 60s-70s. HR goal <70, maxed on esmolol gtt throughout shift. SBP goal <120, managed with cardene gtt, PRN IV and scheduled PO medications. PRN labetolol given x1.  Respiratory: 2-4L O2 per NC.   GI/: No BM since admit, bowel meds given. Incontinent of urine intermittently this shift, unable to assess strict I&O.   Skin: No concerns  LDAs: R DL PICC. R radial A line. R PIV. L PIV.   GTTs: Esmolol, Cardene  Diet: Regular, poor appetite.   Activity: SBA, no mobility deficits, but lacks awareness of lines/safety measures, sitter remains at bedside for safety.   Teaching: Patient reoriented frequently throughout shift. Patient educated on plan of care and cares throughout shift.     Plan: Continue strict HR and BP management, continue reorientation/delirium prevention measures  For vital signs and complete assessments, please see documentation flowsheets.     Goal Outcome Evaluation:      Plan of Care Reviewed With: patient    Overall Patient Progress: no change

## 2024-01-20 NOTE — PLAN OF CARE
Patient continues on esmolol and nicardipine gtts to meet goals. Refer to emar for dosing. Has denied any pain throughout the day. Has poor appetite and has eaten 1/2 of a pizza. Refuses to get out of bed to chair. Is incontinent of urine at times due to going while sleeping. Intermittently confused and problems following directions. No awareness of safety or restrictions. Bed alarm on. Refer to flowsheets for documentation.

## 2024-01-21 LAB
ALBUMIN SERPL BCG-MCNC: 3 G/DL (ref 3.5–5.2)
ALP SERPL-CCNC: 47 U/L (ref 40–150)
ALT SERPL W P-5'-P-CCNC: 8 U/L (ref 0–70)
ANION GAP SERPL CALCULATED.3IONS-SCNC: 10 MMOL/L (ref 7–15)
ANION GAP SERPL CALCULATED.3IONS-SCNC: 9 MMOL/L (ref 7–15)
AST SERPL W P-5'-P-CCNC: 12 U/L (ref 0–45)
BILIRUB DIRECT SERPL-MCNC: <0.2 MG/DL (ref 0–0.3)
BILIRUB SERPL-MCNC: 0.5 MG/DL
BLD PROD TYP BPU: NORMAL
BLOOD COMPONENT TYPE: NORMAL
BUN SERPL-MCNC: 18.1 MG/DL (ref 6–20)
BUN SERPL-MCNC: 22.2 MG/DL (ref 6–20)
CA-I BLD-MCNC: 4.6 MG/DL (ref 4.4–5.2)
CALCIUM SERPL-MCNC: 7.9 MG/DL (ref 8.6–10)
CALCIUM SERPL-MCNC: 8.1 MG/DL (ref 8.6–10)
CHLORIDE SERPL-SCNC: 107 MMOL/L (ref 98–107)
CHLORIDE SERPL-SCNC: 112 MMOL/L (ref 98–107)
CODING SYSTEM: NORMAL
CREAT SERPL-MCNC: 1.11 MG/DL (ref 0.67–1.17)
CREAT SERPL-MCNC: 1.62 MG/DL (ref 0.67–1.17)
CROSSMATCH: NORMAL
DEPRECATED HCO3 PLAS-SCNC: 18 MMOL/L (ref 22–29)
DEPRECATED HCO3 PLAS-SCNC: 18 MMOL/L (ref 22–29)
EGFRCR SERPLBLD CKD-EPI 2021: 52 ML/MIN/1.73M2
EGFRCR SERPLBLD CKD-EPI 2021: 82 ML/MIN/1.73M2
ERYTHROCYTE [DISTWIDTH] IN BLOOD BY AUTOMATED COUNT: 15.8 % (ref 10–15)
GLUCOSE SERPL-MCNC: 122 MG/DL (ref 70–99)
GLUCOSE SERPL-MCNC: 89 MG/DL (ref 70–99)
HCT VFR BLD AUTO: 30.7 % (ref 40–53)
HGB BLD-MCNC: 9.9 G/DL (ref 13.3–17.7)
INR PPP: 1.21 (ref 0.85–1.15)
ISSUE DATE AND TIME: NORMAL
MAGNESIUM SERPL-MCNC: 1.9 MG/DL (ref 1.7–2.3)
MAGNESIUM SERPL-MCNC: 2 MG/DL (ref 1.7–2.3)
MCH RBC QN AUTO: 19.6 PG (ref 26.5–33)
MCHC RBC AUTO-ENTMCNC: 32.2 G/DL (ref 31.5–36.5)
MCV RBC AUTO: 61 FL (ref 78–100)
PHOSPHATE SERPL-MCNC: 3 MG/DL (ref 2.5–4.5)
PLATELET # BLD AUTO: 265 10E3/UL (ref 150–450)
POTASSIUM SERPL-SCNC: 3.6 MMOL/L (ref 3.4–5.3)
POTASSIUM SERPL-SCNC: 3.7 MMOL/L (ref 3.4–5.3)
PROT SERPL-MCNC: 5.4 G/DL (ref 6.4–8.3)
RBC # BLD AUTO: 5.06 10E6/UL (ref 4.4–5.9)
SODIUM SERPL-SCNC: 135 MMOL/L (ref 135–145)
SODIUM SERPL-SCNC: 139 MMOL/L (ref 135–145)
UNIT ABO/RH: NORMAL
UNIT NUMBER: NORMAL
UNIT STATUS: NORMAL
UNIT TYPE ISBT: 5100
WBC # BLD AUTO: 10.8 10E3/UL (ref 4–11)

## 2024-01-21 PROCEDURE — 250N000009 HC RX 250

## 2024-01-21 PROCEDURE — 258N000003 HC RX IP 258 OP 636

## 2024-01-21 PROCEDURE — 250N000011 HC RX IP 250 OP 636

## 2024-01-21 PROCEDURE — 83735 ASSAY OF MAGNESIUM: CPT

## 2024-01-21 PROCEDURE — 85610 PROTHROMBIN TIME: CPT

## 2024-01-21 PROCEDURE — 250N000013 HC RX MED GY IP 250 OP 250 PS 637: Performed by: STUDENT IN AN ORGANIZED HEALTH CARE EDUCATION/TRAINING PROGRAM

## 2024-01-21 PROCEDURE — 250N000011 HC RX IP 250 OP 636: Performed by: STUDENT IN AN ORGANIZED HEALTH CARE EDUCATION/TRAINING PROGRAM

## 2024-01-21 PROCEDURE — 250N000013 HC RX MED GY IP 250 OP 250 PS 637

## 2024-01-21 PROCEDURE — 83735 ASSAY OF MAGNESIUM: CPT | Performed by: STUDENT IN AN ORGANIZED HEALTH CARE EDUCATION/TRAINING PROGRAM

## 2024-01-21 PROCEDURE — 86923 COMPATIBILITY TEST ELECTRIC: CPT

## 2024-01-21 PROCEDURE — 86900 BLOOD TYPING SEROLOGIC ABO: CPT | Performed by: PHYSICIAN ASSISTANT

## 2024-01-21 PROCEDURE — 80053 COMPREHEN METABOLIC PANEL: CPT

## 2024-01-21 PROCEDURE — 82330 ASSAY OF CALCIUM: CPT

## 2024-01-21 PROCEDURE — 250N000011 HC RX IP 250 OP 636: Mod: JZ

## 2024-01-21 PROCEDURE — 200N000002 HC R&B ICU UMMC

## 2024-01-21 PROCEDURE — 250N000011 HC RX IP 250 OP 636: Mod: JZ | Performed by: STUDENT IN AN ORGANIZED HEALTH CARE EDUCATION/TRAINING PROGRAM

## 2024-01-21 PROCEDURE — 258N000003 HC RX IP 258 OP 636: Performed by: STUDENT IN AN ORGANIZED HEALTH CARE EDUCATION/TRAINING PROGRAM

## 2024-01-21 PROCEDURE — 250N000011 HC RX IP 250 OP 636: Performed by: NURSE PRACTITIONER

## 2024-01-21 PROCEDURE — 85027 COMPLETE CBC AUTOMATED: CPT

## 2024-01-21 PROCEDURE — 99233 SBSQ HOSP IP/OBS HIGH 50: CPT | Mod: GC | Performed by: INTERNAL MEDICINE

## 2024-01-21 PROCEDURE — 250N000009 HC RX 250: Performed by: STUDENT IN AN ORGANIZED HEALTH CARE EDUCATION/TRAINING PROGRAM

## 2024-01-21 PROCEDURE — 84100 ASSAY OF PHOSPHORUS: CPT

## 2024-01-21 RX ORDER — FUROSEMIDE 10 MG/ML
60 INJECTION INTRAMUSCULAR; INTRAVENOUS EVERY 6 HOURS
Status: COMPLETED | OUTPATIENT
Start: 2024-01-21 | End: 2024-01-21

## 2024-01-21 RX ORDER — MAGNESIUM OXIDE 400 MG/1
400 TABLET ORAL EVERY 4 HOURS
Status: COMPLETED | OUTPATIENT
Start: 2024-01-21 | End: 2024-01-21

## 2024-01-21 RX ORDER — DEXMEDETOMIDINE HYDROCHLORIDE 4 UG/ML
.1-1.2 INJECTION, SOLUTION INTRAVENOUS CONTINUOUS
Status: DISCONTINUED | OUTPATIENT
Start: 2024-01-21 | End: 2024-01-23

## 2024-01-21 RX ORDER — QUETIAPINE FUMARATE 25 MG/1
25 TABLET, FILM COATED ORAL
Status: DISCONTINUED | OUTPATIENT
Start: 2024-01-21 | End: 2024-01-23

## 2024-01-21 RX ADMIN — HEPARIN SODIUM 5000 UNITS: 10000 INJECTION, SOLUTION INTRAVENOUS; SUBCUTANEOUS at 19:35

## 2024-01-21 RX ADMIN — Medication 5 ML: at 19:36

## 2024-01-21 RX ADMIN — CARVEDILOL 50 MG: 25 TABLET, FILM COATED ORAL at 19:35

## 2024-01-21 RX ADMIN — MAGNESIUM OXIDE TAB 400 MG (241.3 MG ELEMENTAL MG) 400 MG: 400 (241.3 MG) TAB at 23:59

## 2024-01-21 RX ADMIN — HALOPERIDOL LACTATE 2 MG: 5 INJECTION, SOLUTION INTRAMUSCULAR at 08:20

## 2024-01-21 RX ADMIN — DOCUSATE SODIUM 50 MG AND SENNOSIDES 8.6 MG 3 TABLET: 8.6; 5 TABLET, FILM COATED ORAL at 19:36

## 2024-01-21 RX ADMIN — SODIUM CHLORIDE 15 MG/HR: 9 INJECTION, SOLUTION INTRAVENOUS at 12:06

## 2024-01-21 RX ADMIN — MAGNESIUM OXIDE TAB 400 MG (241.3 MG ELEMENTAL MG) 400 MG: 400 (241.3 MG) TAB at 05:09

## 2024-01-21 RX ADMIN — QUETIAPINE FUMARATE 25 MG: 25 TABLET ORAL at 21:23

## 2024-01-21 RX ADMIN — HYDRALAZINE HYDROCHLORIDE 20 MG: 20 INJECTION INTRAMUSCULAR; INTRAVENOUS at 03:45

## 2024-01-21 RX ADMIN — Medication 300 MCG/KG/MIN: at 01:00

## 2024-01-21 RX ADMIN — POLYETHYLENE GLYCOL 3350 17 G: 17 POWDER, FOR SOLUTION ORAL at 07:39

## 2024-01-21 RX ADMIN — DEXMEDETOMIDINE HYDROCHLORIDE 0.2 MCG/KG/HR: 4 INJECTION, SOLUTION INTRAVENOUS at 08:55

## 2024-01-21 RX ADMIN — SODIUM CHLORIDE 15 MG/HR: 9 INJECTION, SOLUTION INTRAVENOUS at 20:12

## 2024-01-21 RX ADMIN — DOCUSATE SODIUM 50 MG AND SENNOSIDES 8.6 MG 3 TABLET: 8.6; 5 TABLET, FILM COATED ORAL at 07:38

## 2024-01-21 RX ADMIN — HEPARIN SODIUM 5000 UNITS: 10000 INJECTION, SOLUTION INTRAVENOUS; SUBCUTANEOUS at 12:04

## 2024-01-21 RX ADMIN — FUROSEMIDE 60 MG: 10 INJECTION, SOLUTION INTRAMUSCULAR; INTRAVENOUS at 16:29

## 2024-01-21 RX ADMIN — DEXMEDETOMIDINE HYDROCHLORIDE 1 MCG/KG/HR: 4 INJECTION, SOLUTION INTRAVENOUS at 20:47

## 2024-01-21 RX ADMIN — SODIUM CHLORIDE 15 MG/HR: 9 INJECTION, SOLUTION INTRAVENOUS at 01:01

## 2024-01-21 RX ADMIN — POLYETHYLENE GLYCOL 3350 17 G: 17 POWDER, FOR SOLUTION ORAL at 15:05

## 2024-01-21 RX ADMIN — HYDRALAZINE HYDROCHLORIDE 100 MG: 25 TABLET, FILM COATED ORAL at 10:29

## 2024-01-21 RX ADMIN — LABETALOL HYDROCHLORIDE 10 MG: 5 INJECTION, SOLUTION INTRAVENOUS at 02:46

## 2024-01-21 RX ADMIN — HEPARIN SODIUM 5000 UNITS: 10000 INJECTION, SOLUTION INTRAVENOUS; SUBCUTANEOUS at 03:28

## 2024-01-21 RX ADMIN — HALOPERIDOL LACTATE 2 MG: 5 INJECTION, SOLUTION INTRAMUSCULAR at 08:35

## 2024-01-21 RX ADMIN — DEXMEDETOMIDINE HYDROCHLORIDE 1.2 MCG/KG/HR: 4 INJECTION, SOLUTION INTRAVENOUS at 12:05

## 2024-01-21 RX ADMIN — HYDRALAZINE HYDROCHLORIDE 100 MG: 25 TABLET, FILM COATED ORAL at 03:00

## 2024-01-21 RX ADMIN — SODIUM CHLORIDE 15 MG/HR: 9 INJECTION, SOLUTION INTRAVENOUS at 06:34

## 2024-01-21 RX ADMIN — FUROSEMIDE 60 MG: 10 INJECTION, SOLUTION INTRAMUSCULAR; INTRAVENOUS at 10:29

## 2024-01-21 RX ADMIN — POLYETHYLENE GLYCOL 3350 17 G: 17 POWDER, FOR SOLUTION ORAL at 19:35

## 2024-01-21 RX ADMIN — HYDRALAZINE HYDROCHLORIDE 100 MG: 25 TABLET, FILM COATED ORAL at 15:06

## 2024-01-21 RX ADMIN — MAGNESIUM OXIDE TAB 400 MG (241.3 MG ELEMENTAL MG) 400 MG: 400 (241.3 MG) TAB at 21:23

## 2024-01-21 RX ADMIN — HYDRALAZINE HYDROCHLORIDE 100 MG: 25 TABLET, FILM COATED ORAL at 21:23

## 2024-01-21 RX ADMIN — Medication 300 MCG/KG/MIN: at 06:34

## 2024-01-21 RX ADMIN — HYDROXYZINE HYDROCHLORIDE 50 MG: 25 TABLET, FILM COATED ORAL at 03:55

## 2024-01-21 RX ADMIN — Medication 300 MCG/KG/MIN: at 18:29

## 2024-01-21 RX ADMIN — Medication 300 MCG/KG/MIN: at 12:05

## 2024-01-21 RX ADMIN — SODIUM NITROPRUSSIDE 0.25 MCG/KG/MIN: 25 INJECTION, SOLUTION, CONCENTRATE INTRAVENOUS at 05:09

## 2024-01-21 RX ADMIN — MAGNESIUM OXIDE TAB 400 MG (241.3 MG ELEMENTAL MG) 400 MG: 400 (241.3 MG) TAB at 10:29

## 2024-01-21 ASSESSMENT — ACTIVITIES OF DAILY LIVING (ADL)
ADLS_ACUITY_SCORE: 26
ADLS_ACUITY_SCORE: 26
ADLS_ACUITY_SCORE: 30
ADLS_ACUITY_SCORE: 26
ADLS_ACUITY_SCORE: 26
ADLS_ACUITY_SCORE: 30
ADLS_ACUITY_SCORE: 26
ADLS_ACUITY_SCORE: 30
ADLS_ACUITY_SCORE: 26
ADLS_ACUITY_SCORE: 30

## 2024-01-21 NOTE — PLAN OF CARE
ICU End of Shift Summary. See flowsheets for vital signs, I&Os, and detailed assessment.     Changes this shift:   Neuro: A&Ox1-3. Intermittently restless overnight. Cooperative, impulsive but redirectable. Frequently reoriented throughout shift, sitter at bedside for safety. SBA, lack of awareness of lines.     CV: SR 70s -130.     Goal SBP <120, HR <70, MAP 65-80. Pt is maxed on Nicardipine and Esmolol gtts throughout shift. Hydralazine IV administered x1, Labetalol x2 overnight. Nitroprusside gtt started.     Pulm: NC 4 LPM overnight.     GI/: BM PTA- bowel meds given. Voids spontaneously, intermittently incontinent of urine.     Plan: Monitor hemodynamic status, monitor electrolytes and replace per protocol. Plan for OR on 01/23.

## 2024-01-21 NOTE — PROGRESS NOTES
CV ICU PROGRESS NOTE  January 16, 2024      CO-MORBIDITIES:   Aortic Dissection  Hypertensive urgency     ASSESSMENT: Valdo Lyons is a 48 year old male with PMH of HTN, childhood seizures. [place] [facility] [place] with acute type B aortic dissection from the origin of the left subclavian to the diaphragm just superior to the celiac axis.Yesterday he had worsening back pain and increased variation in BP from arterial lines in his right and left hands, so repeat CT scan was done which showed Type A dissection with a thrombosed retrograde false lumen, so he was transferred to [facility]. Repeat CTA 1/17 for vascular to decide on further management (surgery vs medical management). CT or vascular to operate early next week (~1/23). BP management until then.     TODAY'S PROGRESS:   - close monitor for pain - can be an indicator of worsening dissection  Milk of Mag today. No BM yet.    PLAN:  Neuro/ pain/ sedation:  Psychiatry saw patient, see note for recs. Nothing actionable.   - Monitor neurological status. Notify the MD for any acute changes in exam.  - precedex gtt started d/t agitation  #Acute pain - resolved  - PRN: acetaminophen    # Recent fall, atraumatic  # Hx Childhood seizure disorder  - Seizure hx noted, not on any antiepileptics at time of admission  - Presented to [facility] s/p fall, CT head reported as negative for intracranial abnormalities 1/12)    # Hx Mariajuana use  - Typically smokes mariajuana every night  - Denies tobacco, alcohol, or other illicit drugs     Pulmonary care:   # Acute hypoxic insufficiency  Intermittently requiring 4-6L NC to achieve SPO2% goals.   - Goal SpO2 > 92%  - Encourage IS q15-30 minutes when awake (though patient has been refusing).   - Encourage up out of bed activity. Get into the chair.      Cardiovascular:    # Type B dissection, c/f retrograde tear  # Hx uncontrolled HTN  Patient was not taking any PTA medications for his high blood pressure. Bilateral arterial  lines placed at [facility]. Will treat based on right arterial line. Ordered additional testing, as his BP medication requirements are exceeding expectations, rule out other/organic causes of elevated BP. TSH normal. 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 Surgery planned for ~1/23.   - Goal MAP >65 but less than 80, SBP <120.  - nicardipine, esmolol, nipride gtt to achieve goal hemodynamics (wean esmolol first then nicardipine if able).   - Carvedilol 25 mg BID and amlodipine 10 mg daily, hydralazine 100mg q6h, Losartan 100 mg daily   - PRN labetalol, hydralazine    GI care / Nutrition:   # At risk for protein malnutrition  - Regular diet after renal US.   - PPI not indicated  - Bowel regimen: MiraLAX TID, senna 2 tablet BID - still no BM, will trial milk of magnesia today    Renal / Fluids / Electrolytes:   # Chronic kidney disease vs MIKA  # volume status  # electrolyte derangement, resolved   Unknown baseline, was 1.6 on admission to [facility], last value in 2022 was 1.25. Cr continues to improve. Decent response to lasix x1. Renal ultrasound showed expected changes from the dissection (increased velocities) and a right pleural effusion (NTD).   - Daily BMP  - Strict I/O, daily weights - patient intermittently incontinent   - Avoid/limit nephrotoxins as able  - Replete lytes PRN per protocol  - ua showed mucous in urine   - Lasix 60mg x2 today, goal net negative    Endocrine:    # Stress hyperglycemia  Preop A1c pending.  - Gluc checks per protocol  - Goal BG <180 for optimal healing     ID / Antibiotics:  # Stress induced leukocytosis  - No s/sx infection  - Monitor fever curve, WBC, and inflammatory markers as appropriate     Heme:     # Acute blood loss anemia, considered but ruled out   # Acute blood loss thrombocytopenia, considered and ruled out   No s/sx active bleeding  - Continue to monitor  - CBC on arrival  - Hgb goal > 7.0     MSK / Skin:  # no acute  concerns  - PT/OT/CR - ordered      Prophylaxis:     - Mechanical ppx for DVT  - Chemical DVT ppx: SQH  - PPI: n/a  - Bowel regimen: MiraLAX, senna - scheduled     Lines / Tubes / Drains:  - Arterial Line (Rt. Radial)  - PIV x3  - PICC      Disposition:  - CVICU    Patient seen, findings and plan discussed with CV ICU staff, Dr. Meyer.     Luis Brown MD on 1/21/2024 at 8:22 AM   Anesthesiology Resident PGY3      ====================================    SUBJECTIVE:   Alert to name, place, and time. Aware of why he is in the hospital. Intermittently confused. Frustrated.     OBJECTIVE:   1. VITAL SIGNS:   Temp:  [97.4  F (36.3  C)-97.9  F (36.6  C)] 97.4  F (36.3  C)  Pulse:  [65-80] 74  Resp:  [13-27] 20  MAP:  [56 mmHg-87 mmHg] 71 mmHg  Arterial Line BP: ()/(38-64) 109/55  SpO2:  [86 %-97 %] 86 %  Resp: 20      2. INTAKE/ OUTPUT:   I/O last 3 completed shifts:  In: 4100.96 [P.O.:1200; I.V.:2900.96]  Out: -     3. PHYSICAL EXAMINATION:   General: laying in bed, conversant  Neuro: A&O, grossly intact  Resp: RA, NAD  CV: RR  Extremities: warm and well perfused    4. INVESTIGATIONS:   Arterial Blood Gases   Recent Labs   Lab 01/18/24  0351 01/16/24 0335   PH 7.43 7.38   PCO2 28* 38   PO2 64* 81   HCO3 18* 23     Complete Blood Count   Recent Labs   Lab 01/21/24  0339 01/20/24  0357 01/19/24  0415 01/18/24  0349   WBC 10.8 12.5* 15.2* 19.1*   HGB 9.9* 10.3* 10.5* 11.2*    240 222 215     Basic Metabolic Panel  Recent Labs   Lab 01/21/24  0339 01/20/24  0357 01/19/24  0415 01/18/24 2116    137 137 138   POTASSIUM 3.7 3.8 3.7  3.7 3.4   CHLORIDE 112* 112* 111* 112*   CO2 18* 18* 16* 16*   BUN 18.1 21.9* 18.2 19.3   CR 1.11 1.11 1.13 1.12   GLC 89 93 94 92     Liver Function Tests  Recent Labs   Lab 01/21/24  0339 01/20/24  0357 01/19/24  0415 01/18/24  0349   AST 12 13 11 12   ALT 8 7 <5 5   ALKPHOS 47 44 40 41   BILITOTAL 0.5 0.5 0.7 0.8   ALBUMIN 3.0* 3.0* 2.8* 3.0*   INR 1.21* 1.13 1.14 1.24*      Pancreatic Enzymes  No lab results found in last 7 days.  Coagulation Profile  Recent Labs   Lab 01/21/24  0339 01/20/24  0357 01/19/24  0415 01/18/24  0349 01/17/24  0316 01/16/24  0334   INR 1.21* 1.13 1.14 1.24*   < >  --    PTT  --   --   --   --   --  38    < > = values in this interval not displayed.         5. RADIOLOGY:   No results found for this or any previous visit (from the past 24 hour(s)).        =========================================

## 2024-01-21 NOTE — PROGRESS NOTES
"VASCULAR SURGERY PROGRESS NOTE    Subjective:  NAEON. On max dose nicardipine and esmolol so nitroprusside was added. Somnolent this morning. Denies any abdominal pain or discomfort.    Objective:  Intake/Output Summary (Last 24 hours) at 1/19/2024 0744  Last data filed at 1/19/2024 0700  Gross per 24 hour   Intake 3961.6 ml   Output 2975 ml   Net 986.6 ml     Labs:  ROUTINE IP LABS (Last four results)  BMP  Recent Labs   Lab 01/21/24 0339 01/20/24 0357 01/19/24 0415 01/18/24  2116    137 137 138   POTASSIUM 3.7 3.8 3.7  3.7 3.4   CHLORIDE 112* 112* 111* 112*   YARON 7.9* 7.8* 7.6* 7.2*   CO2 18* 18* 16* 16*   BUN 18.1 21.9* 18.2 19.3   CR 1.11 1.11 1.13 1.12   GLC 89 93 94 92     CBC  Recent Labs   Lab 01/21/24 0339 01/20/24 0357 01/19/24 0415 01/18/24 0349   WBC 10.8 12.5* 15.2* 19.1*   RBC 5.06 5.14 5.42 5.75   HGB 9.9* 10.3* 10.5* 11.2*   HCT 30.7* 31.1* 32.4* 34.4*   MCV 61* 61* 60* 60*   MCH 19.6* 20.0* 19.4* 19.5*   MCHC 32.2 33.1 32.4 32.6   RDW 15.8* 15.9* 16.0* 17.0*    240 222 215     INR  Recent Labs   Lab 01/21/24 0339 01/20/24 0357 01/19/24 0415 01/18/24  0349   INR 1.21* 1.13 1.14 1.24*     PHYSICAL EXAM:  /52   Pulse 66   Temp 97.4  F (36.3  C) (Oral)   Resp 22   Ht 1.727 m (5' 8\")   Wt 99.9 kg (220 lb 3.8 oz)   SpO2 100%   BMI 33.49 kg/m    General: The patient is somnolent but arousable  Psych: Pleasant affect, answers questions appropriately  Skin: Color appropriate for race, warm, dry.  Neuro: Sensorimotor intact in BUE and BLE, CN II-XII grossly intact  Respiratory: The patient does require 4L NC supplemental oxygen. Breathing unlabored  GI:  Abdomen soft, nontender to light palpation.  Extremities: Bilateral DP pulses palpable,  no edema noted.     Temperature normal   Sensory function: normal   Flexion (plantar/dorsi): normal range     DATA:   US Renal Complete w Arterial Duplex   Final Result   Impression:   1. Normal grayscale and color Doppler evaluation " of the kidneys.   2. Large gradient of the peak velocities between the suprarenal and   infrarenal abdominal aorta which is likely related to the aortic   dissection flap in the upper abdominal aorta.  After comparing with   the CT from 1/17/2024, it seems as though the scan was likely of the   true lumen in the upper abdomen.  This finding is of unknown clinical   significance.    3. Right pleural effusion.      I have personally reviewed the examination and initial interpretation   and I agree with the findings.      VANDANA HERRERA MD            SYSTEM ID:  [id number]      CTA CHEST ABDOMEN PELVIS WITH CONTRAST PANEL   Final Result   IMPRESSION:   1. Aortic dissection extends from the left subclavian origin to the   supraceliac aorta. Dissection extends through the visualized left   subclavian artery. Left vertebral artery is thought to originate from   the true lumen. Left internal thoracic artery originates from the true   lumen.      2. Non specific fluid along the ascending aorta to arch.      3. 7 mm right upper lobe nodule. Per Fleischner Society   Recommendations, if the patient is at low risk of lung cancer, further   evaluation with CT in 6-12 months is suggested with optional CT in   18-24 months. If the patient is at high risk for lung cancer, further   evaluation with CT in 6-12 months and CT at 18-24 months is suggested.      LIZBETH BEACH MD            SYSTEM ID:  [id number]      XR Chest Port 1 View   Final Result   IMPRESSION:   1.  Right upper extremity PICC tip projects over the superior cava   junction.   2.  Stable cardiomediastinal silhouette.   3.  Streaky bibasilar opacities, right more than left may represent   atelectasis versus pulmonary edema/atypical infection.      I have personally reviewed the examination and initial interpretation   and I agree with the findings.      DEANNE FOWLER MD            SYSTEM ID:  [id number]      XR Chest Port 1 View   Final Result   Impression:    1.  Mild widening of the mediastinum compared to prior x-ray 6/20/2022,   consistent with patient's history of known type B aortic dissection   seen on outside imaging (imaging unavailable).   2. No focal consolidations. There is bibasilar atelectasis.   3. No significant pleural effusion, no discernible pneumothorax.      I have personally reviewed the examination and initial interpretation   and I agree with the findings.      GHISLIANE BOWEN MD            SYSTEM ID:  [id number]      Echocardiogram Complete   Final Result          ASSESSMENT / PLAN:  Patient is a 48M who [place] [facility] [place] acute type B aortic dissection from the origin of the left subclavian to the diaphragm just superior to the celiac axis.Due to worsening back pain and increased variation in BP from arterial lines in his right and left hands, repeat CT scan was done which showed Type A dissection with a thrombosed retrograde false lumen, so he was transferred to [facility]. He continues to remain asymptomatic from a dissection standpoint. Repeat CTA on 1/17 stable.    - Continue IV and PO regimen for SBP and HR control with goals <120 and <70.  - Patient is planned to go to OR Tuesday: combined cardiac/vascular procedure (ascending/hemiarch repair with TEVAR) 1/23/23    Seen and discussed pt history, exam, assessment and plan with Dr. Warren of the vascular surgery service, who is in agreement with the above.    Jorge Dodge MD  Vascular Surgery Resident

## 2024-01-21 NOTE — PLAN OF CARE
Goal Outcome Evaluation:      Plan of Care Reviewed With: patient, family    Overall Patient Progress: no changeOverall Patient Progress: no change    Outcome Evaluation: Requiring 2-3 IV vasodialators for BP control. Oriented to self only. Minimal PO intake.    Major Shift Events:  Pt agitated, aggressive, and impulsive at beginning of shift. Noted to be pushing nursing staff, spitting out medications, and intentionally knocking over water pitcher. Continuous attempts to exit room despite redirection attempts from 1:1 assistant and nursing staff. PRN haldol given x2 with no improved behavior, precedex gtt started per MD order, titrated to RASS goal. Alert to self only, 1:1 in place for fall/line safety. Sinus rhythm. Esmolol, nicardipine, and nitroprusside titrated for BP and HR control. Sats >92% on 4L via NC. Minimal appetite, no BM. Lasix given x1, unmeasured urine output d/t incontinence.     Plan: Surgery 1/23. Maintain BP WDL.    For vital signs and complete assessments, please see documentation flowsheets.

## 2024-01-22 ENCOUNTER — ANESTHESIA EVENT (OUTPATIENT)
Dept: SURGERY | Facility: CLINIC | Age: 49
End: 2024-01-22
Payer: MEDICAID

## 2024-01-22 ENCOUNTER — APPOINTMENT (OUTPATIENT)
Dept: OCCUPATIONAL THERAPY | Facility: CLINIC | Age: 49
End: 2024-01-22
Attending: STUDENT IN AN ORGANIZED HEALTH CARE EDUCATION/TRAINING PROGRAM
Payer: MEDICAID

## 2024-01-22 PROBLEM — N17.9 ACUTE KIDNEY FAILURE, UNSPECIFIED (H): Status: ACTIVE | Noted: 2024-01-22

## 2024-01-22 LAB
ABO/RH(D): NORMAL
ALBUMIN SERPL BCG-MCNC: 3 G/DL (ref 3.5–5.2)
ALP SERPL-CCNC: 47 U/L (ref 40–150)
ALT SERPL W P-5'-P-CCNC: 8 U/L (ref 0–70)
ANION GAP SERPL CALCULATED.3IONS-SCNC: 10 MMOL/L (ref 7–15)
ANION GAP SERPL CALCULATED.3IONS-SCNC: 8 MMOL/L (ref 7–15)
ANTIBODY SCREEN: NEGATIVE
AST SERPL W P-5'-P-CCNC: 12 U/L (ref 0–45)
BILIRUB DIRECT SERPL-MCNC: <0.2 MG/DL (ref 0–0.3)
BILIRUB SERPL-MCNC: 0.4 MG/DL
BLD PROD TYP BPU: NORMAL
BLD PROD TYP BPU: NORMAL
BLOOD COMPONENT TYPE: NORMAL
BLOOD COMPONENT TYPE: NORMAL
BUN SERPL-MCNC: 22.6 MG/DL (ref 6–20)
BUN SERPL-MCNC: 24.7 MG/DL (ref 6–20)
CA-I BLD-MCNC: 4.6 MG/DL (ref 4.4–5.2)
CALCIUM SERPL-MCNC: 7.9 MG/DL (ref 8.6–10)
CALCIUM SERPL-MCNC: 8.1 MG/DL (ref 8.6–10)
CHLORIDE SERPL-SCNC: 108 MMOL/L (ref 98–107)
CHLORIDE SERPL-SCNC: 108 MMOL/L (ref 98–107)
CODING SYSTEM: NORMAL
CODING SYSTEM: NORMAL
CREAT SERPL-MCNC: 1.65 MG/DL (ref 0.67–1.17)
CREAT SERPL-MCNC: 1.72 MG/DL (ref 0.67–1.17)
CROSSMATCH: NORMAL
CROSSMATCH: NORMAL
DEPRECATED HCO3 PLAS-SCNC: 19 MMOL/L (ref 22–29)
DEPRECATED HCO3 PLAS-SCNC: 19 MMOL/L (ref 22–29)
EGFRCR SERPLBLD CKD-EPI 2021: 49 ML/MIN/1.73M2
EGFRCR SERPLBLD CKD-EPI 2021: 51 ML/MIN/1.73M2
ERYTHROCYTE [DISTWIDTH] IN BLOOD BY AUTOMATED COUNT: 16.5 % (ref 10–15)
GLUCOSE BLDC GLUCOMTR-MCNC: 97 MG/DL (ref 70–99)
GLUCOSE SERPL-MCNC: 91 MG/DL (ref 70–99)
GLUCOSE SERPL-MCNC: 92 MG/DL (ref 70–99)
HCT VFR BLD AUTO: 28.7 % (ref 40–53)
HGB BLD-MCNC: 9 G/DL (ref 13.3–17.7)
INR PPP: 1.17 (ref 0.85–1.15)
ISSUE DATE AND TIME: NORMAL
ISSUE DATE AND TIME: NORMAL
MAGNESIUM SERPL-MCNC: 2 MG/DL (ref 1.7–2.3)
MAGNESIUM SERPL-MCNC: 2.2 MG/DL (ref 1.7–2.3)
MCH RBC QN AUTO: 19.6 PG (ref 26.5–33)
MCHC RBC AUTO-ENTMCNC: 31.4 G/DL (ref 31.5–36.5)
MCV RBC AUTO: 62 FL (ref 78–100)
PHOSPHATE SERPL-MCNC: 3.9 MG/DL (ref 2.5–4.5)
PHOSPHATE SERPL-MCNC: 4.1 MG/DL (ref 2.5–4.5)
PLATELET # BLD AUTO: 229 10E3/UL (ref 150–450)
POTASSIUM SERPL-SCNC: 3.3 MMOL/L (ref 3.4–5.3)
POTASSIUM SERPL-SCNC: 3.4 MMOL/L (ref 3.4–5.3)
POTASSIUM SERPL-SCNC: 3.6 MMOL/L (ref 3.4–5.3)
PROT SERPL-MCNC: 5.2 G/DL (ref 6.4–8.3)
RBC # BLD AUTO: 4.6 10E6/UL (ref 4.4–5.9)
SODIUM SERPL-SCNC: 135 MMOL/L (ref 135–145)
SODIUM SERPL-SCNC: 137 MMOL/L (ref 135–145)
SPECIMEN EXPIRATION DATE: NORMAL
UNIT ABO/RH: NORMAL
UNIT ABO/RH: NORMAL
UNIT NUMBER: NORMAL
UNIT NUMBER: NORMAL
UNIT STATUS: NORMAL
UNIT STATUS: NORMAL
UNIT TYPE ISBT: 5100
UNIT TYPE ISBT: 5100
WBC # BLD AUTO: 8.8 10E3/UL (ref 4–11)

## 2024-01-22 PROCEDURE — 80053 COMPREHEN METABOLIC PANEL: CPT

## 2024-01-22 PROCEDURE — 258N000003 HC RX IP 258 OP 636: Performed by: STUDENT IN AN ORGANIZED HEALTH CARE EDUCATION/TRAINING PROGRAM

## 2024-01-22 PROCEDURE — 250N000013 HC RX MED GY IP 250 OP 250 PS 637: Performed by: STUDENT IN AN ORGANIZED HEALTH CARE EDUCATION/TRAINING PROGRAM

## 2024-01-22 PROCEDURE — 250N000009 HC RX 250: Performed by: STUDENT IN AN ORGANIZED HEALTH CARE EDUCATION/TRAINING PROGRAM

## 2024-01-22 PROCEDURE — 250N000013 HC RX MED GY IP 250 OP 250 PS 637

## 2024-01-22 PROCEDURE — 83735 ASSAY OF MAGNESIUM: CPT | Performed by: STUDENT IN AN ORGANIZED HEALTH CARE EDUCATION/TRAINING PROGRAM

## 2024-01-22 PROCEDURE — 250N000013 HC RX MED GY IP 250 OP 250 PS 637: Performed by: NURSE PRACTITIONER

## 2024-01-22 PROCEDURE — 84100 ASSAY OF PHOSPHORUS: CPT | Performed by: STUDENT IN AN ORGANIZED HEALTH CARE EDUCATION/TRAINING PROGRAM

## 2024-01-22 PROCEDURE — 250N000011 HC RX IP 250 OP 636: Performed by: STUDENT IN AN ORGANIZED HEALTH CARE EDUCATION/TRAINING PROGRAM

## 2024-01-22 PROCEDURE — 250N000011 HC RX IP 250 OP 636: Performed by: NURSE PRACTITIONER

## 2024-01-22 PROCEDURE — 97535 SELF CARE MNGMENT TRAINING: CPT | Mod: GO

## 2024-01-22 PROCEDURE — 84132 ASSAY OF SERUM POTASSIUM: CPT | Performed by: STUDENT IN AN ORGANIZED HEALTH CARE EDUCATION/TRAINING PROGRAM

## 2024-01-22 PROCEDURE — 84132 ASSAY OF SERUM POTASSIUM: CPT | Performed by: NURSE PRACTITIONER

## 2024-01-22 PROCEDURE — 82330 ASSAY OF CALCIUM: CPT

## 2024-01-22 PROCEDURE — 85610 PROTHROMBIN TIME: CPT

## 2024-01-22 PROCEDURE — 250N000011 HC RX IP 250 OP 636

## 2024-01-22 PROCEDURE — 250N000009 HC RX 250

## 2024-01-22 PROCEDURE — 200N000002 HC R&B ICU UMMC

## 2024-01-22 PROCEDURE — 99291 CRITICAL CARE FIRST HOUR: CPT | Mod: 24 | Performed by: ANESTHESIOLOGY

## 2024-01-22 PROCEDURE — 85027 COMPLETE CBC AUTOMATED: CPT

## 2024-01-22 PROCEDURE — 82248 BILIRUBIN DIRECT: CPT

## 2024-01-22 PROCEDURE — 97530 THERAPEUTIC ACTIVITIES: CPT | Mod: GO

## 2024-01-22 PROCEDURE — 83735 ASSAY OF MAGNESIUM: CPT

## 2024-01-22 PROCEDURE — 84100 ASSAY OF PHOSPHORUS: CPT

## 2024-01-22 RX ORDER — GABAPENTIN 300 MG/1
300 CAPSULE ORAL
Status: DISCONTINUED | OUTPATIENT
Start: 2024-01-23 | End: 2024-01-23 | Stop reason: HOSPADM

## 2024-01-22 RX ORDER — FAMOTIDINE 20 MG/1
20 TABLET, FILM COATED ORAL
Status: DISCONTINUED | OUTPATIENT
Start: 2024-01-23 | End: 2024-01-23 | Stop reason: HOSPADM

## 2024-01-22 RX ORDER — NOREPINEPHRINE BITARTRATE 0.06 MG/ML
.01-.1 INJECTION, SOLUTION INTRAVENOUS CONTINUOUS
Status: DISCONTINUED | OUTPATIENT
Start: 2024-01-22 | End: 2024-01-23 | Stop reason: HOSPADM

## 2024-01-22 RX ORDER — SODIUM CHLORIDE, SODIUM GLUCONATE, SODIUM ACETATE, POTASSIUM CHLORIDE AND MAGNESIUM CHLORIDE 526; 502; 368; 37; 30 MG/100ML; MG/100ML; MG/100ML; MG/100ML; MG/100ML
1-3 INJECTION, SOLUTION INTRAVENOUS CONTINUOUS
Status: DISCONTINUED | OUTPATIENT
Start: 2024-01-22 | End: 2024-01-23 | Stop reason: HOSPADM

## 2024-01-22 RX ORDER — MAGNESIUM OXIDE 400 MG/1
400 TABLET ORAL EVERY 4 HOURS
Status: COMPLETED | OUTPATIENT
Start: 2024-01-22 | End: 2024-01-22

## 2024-01-22 RX ORDER — FUROSEMIDE 10 MG/ML
60 INJECTION INTRAMUSCULAR; INTRAVENOUS EVERY 6 HOURS
Status: COMPLETED | OUTPATIENT
Start: 2024-01-22 | End: 2024-01-22

## 2024-01-22 RX ORDER — ISOSORBIDE DINITRATE 40 MG/1
40 TABLET ORAL
Status: DISCONTINUED | OUTPATIENT
Start: 2024-01-22 | End: 2024-01-23

## 2024-01-22 RX ORDER — DEXMEDETOMIDINE HYDROCHLORIDE 4 UG/ML
.1-1.2 INJECTION, SOLUTION INTRAVENOUS CONTINUOUS
Status: DISCONTINUED | OUTPATIENT
Start: 2024-01-22 | End: 2024-01-23 | Stop reason: HOSPADM

## 2024-01-22 RX ORDER — POTASSIUM CHLORIDE 750 MG/1
40 TABLET, EXTENDED RELEASE ORAL ONCE
Status: COMPLETED | OUTPATIENT
Start: 2024-01-22 | End: 2024-01-22

## 2024-01-22 RX ORDER — LABETALOL HYDROCHLORIDE 5 MG/ML
20 INJECTION, SOLUTION INTRAVENOUS
Status: DISCONTINUED | OUTPATIENT
Start: 2024-01-22 | End: 2024-01-23

## 2024-01-22 RX ORDER — NOREPINEPHRINE BITARTRATE 0.06 MG/ML
.01-.6 INJECTION, SOLUTION INTRAVENOUS CONTINUOUS
Status: CANCELLED | OUTPATIENT
Start: 2024-01-22

## 2024-01-22 RX ORDER — POTASSIUM CHLORIDE 29.8 MG/ML
20 INJECTION INTRAVENOUS
Status: COMPLETED | OUTPATIENT
Start: 2024-01-22 | End: 2024-01-22

## 2024-01-22 RX ORDER — LIDOCAINE 40 MG/G
CREAM TOPICAL
Status: DISCONTINUED | OUTPATIENT
Start: 2024-01-22 | End: 2024-01-23

## 2024-01-22 RX ORDER — CHLORHEXIDINE GLUCONATE ORAL RINSE 1.2 MG/ML
10 SOLUTION DENTAL ONCE
Qty: 15 ML | Refills: 0 | Status: DISCONTINUED | OUTPATIENT
Start: 2024-01-23 | End: 2024-01-23 | Stop reason: HOSPADM

## 2024-01-22 RX ORDER — ESMOLOL HYDROCHLORIDE 20 MG/ML
50-300 INJECTION, SOLUTION INTRAVENOUS CONTINUOUS
Status: CANCELLED | OUTPATIENT
Start: 2024-01-22

## 2024-01-22 RX ORDER — CEFAZOLIN SODIUM/WATER 2 G/20 ML
2 SYRINGE (ML) INTRAVENOUS
Status: DISCONTINUED | OUTPATIENT
Start: 2024-01-22 | End: 2024-01-23 | Stop reason: HOSPADM

## 2024-01-22 RX ORDER — LIDOCAINE 40 MG/G
CREAM TOPICAL
Status: DISCONTINUED | OUTPATIENT
Start: 2024-01-22 | End: 2024-01-23 | Stop reason: HOSPADM

## 2024-01-22 RX ORDER — ACETAMINOPHEN 325 MG/1
975 TABLET ORAL ONCE
Qty: 3 TABLET | Refills: 0 | Status: DISCONTINUED | OUTPATIENT
Start: 2024-01-23 | End: 2024-01-23 | Stop reason: HOSPADM

## 2024-01-22 RX ORDER — PHENYLEPHRINE HCL IN 0.9% NACL 50MG/250ML
.1-6 PLASTIC BAG, INJECTION (ML) INTRAVENOUS CONTINUOUS
Status: DISCONTINUED | OUTPATIENT
Start: 2024-01-22 | End: 2024-01-23 | Stop reason: HOSPADM

## 2024-01-22 RX ORDER — HYDRALAZINE HYDROCHLORIDE 20 MG/ML
20 INJECTION INTRAMUSCULAR; INTRAVENOUS
Status: DISCONTINUED | OUTPATIENT
Start: 2024-01-22 | End: 2024-01-23

## 2024-01-22 RX ORDER — SODIUM CHLORIDE, SODIUM LACTATE, POTASSIUM CHLORIDE, CALCIUM CHLORIDE 600; 310; 30; 20 MG/100ML; MG/100ML; MG/100ML; MG/100ML
INJECTION, SOLUTION INTRAVENOUS CONTINUOUS
Status: DISCONTINUED | OUTPATIENT
Start: 2024-01-22 | End: 2024-01-23 | Stop reason: HOSPADM

## 2024-01-22 RX ORDER — CEFAZOLIN SODIUM/WATER 2 G/20 ML
2 SYRINGE (ML) INTRAVENOUS SEE ADMIN INSTRUCTIONS
Status: DISCONTINUED | OUTPATIENT
Start: 2024-01-22 | End: 2024-01-23 | Stop reason: HOSPADM

## 2024-01-22 RX ORDER — SODIUM CHLORIDE, SODIUM LACTATE, POTASSIUM CHLORIDE, CALCIUM CHLORIDE 600; 310; 30; 20 MG/100ML; MG/100ML; MG/100ML; MG/100ML
1-3 INJECTION, SOLUTION INTRAVENOUS CONTINUOUS
Status: DISCONTINUED | OUTPATIENT
Start: 2024-01-22 | End: 2024-01-23 | Stop reason: HOSPADM

## 2024-01-22 RX ADMIN — DEXMEDETOMIDINE HYDROCHLORIDE 1 MCG/KG/HR: 4 INJECTION, SOLUTION INTRAVENOUS at 00:42

## 2024-01-22 RX ADMIN — MAGNESIUM OXIDE TAB 400 MG (241.3 MG ELEMENTAL MG) 400 MG: 400 (241.3 MG) TAB at 04:11

## 2024-01-22 RX ADMIN — FUROSEMIDE 60 MG: 10 INJECTION, SOLUTION INTRAMUSCULAR; INTRAVENOUS at 16:42

## 2024-01-22 RX ADMIN — HEPARIN SODIUM 5000 UNITS: 10000 INJECTION, SOLUTION INTRAVENOUS; SUBCUTANEOUS at 11:17

## 2024-01-22 RX ADMIN — FUROSEMIDE 60 MG: 10 INJECTION, SOLUTION INTRAMUSCULAR; INTRAVENOUS at 21:32

## 2024-01-22 RX ADMIN — LOSARTAN POTASSIUM 100 MG: 50 TABLET, FILM COATED ORAL at 08:15

## 2024-01-22 RX ADMIN — SODIUM CHLORIDE 10 MG/HR: 9 INJECTION, SOLUTION INTRAVENOUS at 15:09

## 2024-01-22 RX ADMIN — HEPARIN SODIUM 5000 UNITS: 10000 INJECTION, SOLUTION INTRAVENOUS; SUBCUTANEOUS at 21:37

## 2024-01-22 RX ADMIN — HYDRALAZINE HYDROCHLORIDE 100 MG: 25 TABLET, FILM COATED ORAL at 11:16

## 2024-01-22 RX ADMIN — SENNOSIDES AND DOCUSATE SODIUM 2 TABLET: 8.6; 5 TABLET ORAL at 08:15

## 2024-01-22 RX ADMIN — MAGNESIUM OXIDE TAB 400 MG (241.3 MG ELEMENTAL MG) 400 MG: 400 (241.3 MG) TAB at 08:15

## 2024-01-22 RX ADMIN — ISOSORBIDE DINITRATE 40 MG: 40 TABLET ORAL at 16:42

## 2024-01-22 RX ADMIN — MAGNESIUM HYDROXIDE 30 ML: 400 SUSPENSION ORAL at 11:29

## 2024-01-22 RX ADMIN — POTASSIUM CHLORIDE 20 MEQ: 29.8 INJECTION, SOLUTION INTRAVENOUS at 21:00

## 2024-01-22 RX ADMIN — POLYETHYLENE GLYCOL 3350 17 G: 17 POWDER, FOR SOLUTION ORAL at 15:09

## 2024-01-22 RX ADMIN — HYDRALAZINE HYDROCHLORIDE 100 MG: 25 TABLET, FILM COATED ORAL at 16:42

## 2024-01-22 RX ADMIN — AMLODIPINE BESYLATE 10 MG: 10 TABLET ORAL at 08:15

## 2024-01-22 RX ADMIN — ISOSORBIDE DINITRATE 40 MG: 40 TABLET ORAL at 11:16

## 2024-01-22 RX ADMIN — Medication 300 MCG/KG/MIN: at 13:03

## 2024-01-22 RX ADMIN — CARVEDILOL 50 MG: 25 TABLET, FILM COATED ORAL at 08:15

## 2024-01-22 RX ADMIN — Medication 300 MCG/KG/MIN: at 00:42

## 2024-01-22 RX ADMIN — SODIUM CHLORIDE 15 MG/HR: 9 INJECTION, SOLUTION INTRAVENOUS at 08:16

## 2024-01-22 RX ADMIN — FUROSEMIDE 60 MG: 10 INJECTION, SOLUTION INTRAMUSCULAR; INTRAVENOUS at 11:16

## 2024-01-22 RX ADMIN — HYDRALAZINE HYDROCHLORIDE 100 MG: 25 TABLET, FILM COATED ORAL at 21:32

## 2024-01-22 RX ADMIN — POLYETHYLENE GLYCOL 3350 17 G: 17 POWDER, FOR SOLUTION ORAL at 08:15

## 2024-01-22 RX ADMIN — Medication 300 MCG/KG/MIN: at 18:56

## 2024-01-22 RX ADMIN — Medication 300 MCG/KG/MIN: at 07:09

## 2024-01-22 RX ADMIN — CARVEDILOL 50 MG: 25 TABLET, FILM COATED ORAL at 21:03

## 2024-01-22 RX ADMIN — POTASSIUM CHLORIDE 40 MEQ: 750 TABLET, EXTENDED RELEASE ORAL at 04:11

## 2024-01-22 RX ADMIN — HYDRALAZINE HYDROCHLORIDE 100 MG: 25 TABLET, FILM COATED ORAL at 04:11

## 2024-01-22 RX ADMIN — HEPARIN SODIUM 5000 UNITS: 10000 INJECTION, SOLUTION INTRAVENOUS; SUBCUTANEOUS at 04:11

## 2024-01-22 RX ADMIN — SODIUM CHLORIDE 15 MG/HR: 9 INJECTION, SOLUTION INTRAVENOUS at 01:30

## 2024-01-22 RX ADMIN — LABETALOL HYDROCHLORIDE 20 MG: 5 INJECTION, SOLUTION INTRAVENOUS at 11:16

## 2024-01-22 RX ADMIN — POTASSIUM CHLORIDE 20 MEQ: 29.8 INJECTION, SOLUTION INTRAVENOUS at 22:18

## 2024-01-22 ASSESSMENT — ACTIVITIES OF DAILY LIVING (ADL)
ADLS_ACUITY_SCORE: 26

## 2024-01-22 ASSESSMENT — ENCOUNTER SYMPTOMS: SEIZURES: 1

## 2024-01-22 NOTE — ANESTHESIA PREPROCEDURE EVALUATION
Anesthesia Pre-Procedure Evaluation    Patient: Valdo Lyons   MRN: [id number] : 1975        Procedure : Procedure(s):  ASCENDING AORTA ANEURYSM REPAIR AND ALL ASSOCIATED PROCEDURES  possible Thoracic endovascular aortic repair          No past medical history on file.   Past Surgical History:   Procedure Laterality Date    PICC DOUBLE LUMEN PLACEMENT Right 2024    5FR DL PICC, basilic vein. L-43cm, 1cm out.      No Known Allergies   Social History     Tobacco Use    Smoking status: Not on file    Smokeless tobacco: Not on file   Substance Use Topics    Alcohol use: Not on file      Wt Readings from Last 1 Encounters:   24 102 kg (224 lb 13.9 oz)        Anesthesia Evaluation   Pt has had prior anesthetic.         ROS/MED HX  ENT/Pulmonary:  - neg pulmonary ROS     Neurologic: Comment: Hx of baseline cognitive dysfunction    (+)       seizures, last seizure: In childhood. Not on AEDs,                        Cardiovascular: Comment: Type A dissection extending to diaphragm, just above celiac   CTA 24:  FINDINGS: CTA: Dissection extends from the left subclavian artery to  the supraceliac aorta. Dissection extends through the visualized left  subclavian artery. It is difficult to tell which lumen the left  vertebral artery originates from but its is thought to originate from  the true lumen. The left internal thoracic artery originates from the  true lumen.      (+)  hypertension- -   -  - -                                 Previous cardiac testing   Echo: Date: 24 Results:  Interpretation Summary  Left ventricular size, wall motion and function are normal. The ejection  fraction is 55-60%.  Moderate concentric wall thickening consistent with left ventricular  hypertrophy is present.  Right ventricular function, chamber size, wall motion, and thickness are  normal.     Dissection flap seen in aortic arch, descending and abdominal aorta. Aortic  root appear normal. Likely type B aortic  "dissection. Recommend dedicated CTA.     No pericardial effusion is present.     Previous study not available for comparison.    Stress Test:  Date: Results:    ECG Reviewed:  Date: 1/16/24 Results:  NSR  Cath:  Date: Results:      METS/Exercise Tolerance:     Hematologic:     (+)      anemia,          Musculoskeletal:  - neg musculoskeletal ROS     GI/Hepatic:  - neg GI/hepatic ROS     Renal/Genitourinary:     (+) renal disease, type: CRI and ARF, Pt does not require dialysis,           Endo:  - neg endo ROS     Psychiatric/Substance Use:  - neg psychiatric ROS   (+)     Recreational drug usage: Cannabis.    Infectious Disease:  - neg infectious disease ROS     Malignancy:  - neg malignancy ROS     Other:            Physical Exam    Airway        Mallampati: II   TM distance: > 3 FB   Neck ROM: full   Mouth opening: > 3 cm    Respiratory Devices and Support         Dental       (+) Minor Abnormalities - some fillings, tiny chips      Cardiovascular          Rhythm and rate: regular and normal     Pulmonary           breath sounds clear to auscultation           OUTSIDE LABS:  CBC:   Lab Results   Component Value Date    WBC 10.8 01/21/2024    WBC 12.5 (H) 01/20/2024    HGB 9.9 (L) 01/21/2024    HGB 10.3 (L) 01/20/2024    HCT 30.7 (L) 01/21/2024    HCT 31.1 (L) 01/20/2024     01/21/2024     01/20/2024     BMP:   Lab Results   Component Value Date     01/22/2024     01/21/2024    POTASSIUM 3.6 01/22/2024    POTASSIUM 3.4 01/22/2024    CHLORIDE 108 (H) 01/22/2024    CHLORIDE 107 01/21/2024    CO2 19 (L) 01/22/2024    CO2 18 (L) 01/21/2024    BUN 22.6 (H) 01/22/2024    BUN 22.2 (H) 01/21/2024    CR 1.65 (H) 01/22/2024    CR 1.62 (H) 01/21/2024    GLC 97 01/22/2024    GLC 91 01/22/2024     COAGS:   Lab Results   Component Value Date    PTT 38 01/16/2024    INR 1.17 (H) 01/22/2024    FIBR 425 01/16/2024     POC: No results found for: \"BGM\", \"HCG\", \"HCGS\"  HEPATIC:   Lab Results   Component Value " Date    ALBUMIN 3.0 (L) 01/22/2024    PROTTOTAL 5.2 (L) 01/22/2024    ALT 8 01/22/2024    AST 12 01/22/2024    ALKPHOS 47 01/22/2024    BILITOTAL 0.4 01/22/2024     OTHER:   Lab Results   Component Value Date    PH 7.43 01/18/2024    LACT 0.7 01/18/2024    A1C 6.1 (H) 01/16/2024    YARON 7.9 (L) 01/22/2024    PHOS 4.1 01/22/2024    MAG 2.0 01/22/2024    TSH 0.57 01/18/2024       Anesthesia Plan    ASA Status:  4    NPO Status:  NPO Appropriate    Anesthesia Type: General.     - Airway: ETT   Induction: Intravenous.   Maintenance: Balanced.   Techniques and Equipment:     - Lines/Monitors: 2nd IV, Arterial Line, Central Line, CVP, BIS, NIRS, WILLIAM, PICC in situ            WILLIAM Absolute Contra-indication: NONE            WILLIAM Relative Contra-indication: NONE     - Blood: Blood in Room     - Drips/Meds: Norepi, Vasopressin, Epinephrine, Nitroprusside, Nicardipine     Consents    Anesthesia Plan(s) and associated risks, benefits, and realistic alternatives discussed. Questions answered and patient/representative(s) expressed understanding.     - Discussed:     - Discussed with:  Patient      - Extended Intubation/Ventilatory Support Discussed: Yes.      - Patient is DNR/DNI Status: No     Use of blood products discussed: Yes.     - Discussed with: Patient, Parent (Mother and/or Father).     - Consented: consented to blood products     Postoperative Care    Pain management: IV analgesics, Multi-modal analgesia.        Comments:               Myra Villalobos MD    I have reviewed the pertinent notes and labs in the chart from the past 30 days and (re)examined the patient.  Any updates or changes from those notes are reflected in this note.           I have seen and evaluated the patient myself and agree with the above assessment and plan.  I have explained the risks/benefits of anesthesia to the patient who understands and agrees to proceed.    Carlotta Ratliff MD  Staff Anesthesiologist  Pager [phone]

## 2024-01-22 NOTE — CONSULTS
"Care Management Follow Up    Length of Stay (days): 6    Expected Discharge Date:       Concerns to be Addressed: financial/insurance     Patient plan of care discussed at interdisciplinary rounds: Yes    Anticipated Discharge Disposition: Other (Comments) (TBD)     Anticipated Discharge Services: None  Anticipated Discharge DME: None    Patient/family educated on Medicare website which has current facility and service quality ratings: other (see comments)  Education Provided on the Discharge Plan: Other (see comments)  Patient/Family in Agreement with the Plan: unable to assess    Referrals Placed by CM/SW:  (TBD)  Private pay costs discussed: Not applicable    Additional Information:  KATHIE received consult request to speak with patient's mom regarding insurance for the patient. KATHIE called patient's mom she said \"she looks after him and helps him.\" She stated \"he has no income and no savings.\" KATHIE provided her the financial counselor's number so that she can assist with providing the necessary information to get him screened for MA. KATHIE also told financial counselor that she can call patient's mom and possibly obtain the information she needs to screen him for MA.     KATHIE will continue to follow to provide assistance with any discharge needs.     Addendum 1:30pm  Jamila, the financial counselor messaged to say that she connected with patient's mother and that his mom requested the MA application via email. The patient's mother stated she would fill it out and return it to [employer].   Addendum 1:48pm  KATHIE met with patient and patient's mother bedside. She stated that she received the application from [employer] and would reach out to Jamila with any questions.             LUIS ALFREDO Perez, LGSW  4A & 4E ICU   Pager: [phone]  Phone: [phone]           "

## 2024-01-22 NOTE — PLAN OF CARE
ICU End of Shift Summary. See flowsheets for vital signs, I&Os, and detailed assessment.    Changes this shift:   Neuro: Alert. Oriented to self, occasionally to place. Disoriented to time and situation. Impulsive. Overnight PRN Seroquel and Dexmedetomidine gtt for agitation.    CV: SR HR 60s-70s. HR goal <70. SBP goal <120. Pt is on scheduled PO medications, as well as Esmolol, Nicardipine, and Nitroprusside gtts for BP management. See eMAR for dosing.    Pulm: RA during day, 2-4L NC while sleeping.    GI: Regular diet, minimal intake. LBM PTA.    : Incontinent, primofit in place. Adequate UOP.  Plan: Monitor hemodynamic status, monitor electrolytes and replace per protocol. Plan for OR on 01/23, scheduled for 0800.     Plan of Care Reviewed With: patient, family    Overall Patient Progress: no changeOverall Patient Progress: no change

## 2024-01-22 NOTE — PROGRESS NOTES
"VASCULAR SURGERY PROGRESS NOTE    Subjective:  JOSE ELIAS. On max dose nicardipine and esmolol as well as nitroprusside gtt to keep MAP <120 and HR <70. He denies any pain or discomfort this morning.     Objective:    Intake/Output Summary (Last 24 hours) at 1/22/2024 0854  Last data filed at 1/22/2024 0700  Gross per 24 hour   Intake 3365.98 ml   Output 2450 ml   Net 915.98 ml       Labs:  ROUTINE IP LABS (Last four results)  BMP  Recent Labs   Lab 01/22/24  0307 01/21/24  1833 01/21/24  0339 01/20/24 0357    135 139 137   POTASSIUM 3.4 3.6 3.7 3.8   CHLORIDE 108* 107 112* 112*   YARON 7.9* 8.1* 7.9* 7.8*   CO2 19* 18* 18* 18*   BUN 22.6* 22.2* 18.1 21.9*   CR 1.65* 1.62* 1.11 1.11   GLC 91 122* 89 93     CBC  Recent Labs   Lab 01/21/24 0339 01/20/24 0357 01/19/24 0415 01/18/24  0349   WBC 10.8 12.5* 15.2* 19.1*   RBC 5.06 5.14 5.42 5.75   HGB 9.9* 10.3* 10.5* 11.2*   HCT 30.7* 31.1* 32.4* 34.4*   MCV 61* 61* 60* 60*   MCH 19.6* 20.0* 19.4* 19.5*   MCHC 32.2 33.1 32.4 32.6   RDW 15.8* 15.9* 16.0* 17.0*    240 222 215     INR  Recent Labs   Lab 01/22/24  0307 01/21/24  0339 01/20/24 0357 01/19/24  0415   INR 1.17* 1.21* 1.13 1.14     PHYSICAL EXAM:  /52   Pulse 68   Temp 97.5  F (36.4  C) (Oral)   Resp 17   Ht 1.727 m (5' 8\")   Wt 102 kg (224 lb 13.9 oz)   SpO2 93%   BMI 34.19 kg/m    General: The patient is awake and alert, NAD  Psych: Pleasant affect, answers questions appropriately  Skin: Color appropriate for race, warm, dry.  Neuro: Sensorimotor intact in BUE and BLE, CN II-XII grossly intact  Respiratory: The patient does require 3L NC supplemental oxygen. Breathing unlabored  GI:  Abdomen soft, nontender to light palpation.  Extremities: Bilateral DP pulses palpable,  no edema noted.     Temperature normal   Sensory function: normal   Flexion (plantar/dorsi): normal range     DATA:   US Renal Complete w Arterial Duplex   Final Result   Impression:   1. Normal grayscale and color " Doppler evaluation of the kidneys.   2. Large gradient of the peak velocities between the suprarenal and   infrarenal abdominal aorta which is likely related to the aortic   dissection flap in the upper abdominal aorta.  After comparing with   the CT from 1/17/2024, it seems as though the scan was likely of the   true lumen in the upper abdomen.  This finding is of unknown clinical   significance.    3. Right pleural effusion.      I have personally reviewed the examination and initial interpretation   and I agree with the findings.      VANDANA HERRERA MD            SYSTEM ID:  [id number]      CTA CHEST ABDOMEN PELVIS WITH CONTRAST PANEL   Final Result   IMPRESSION:   1. Aortic dissection extends from the left subclavian origin to the   supraceliac aorta. Dissection extends through the visualized left   subclavian artery. Left vertebral artery is thought to originate from   the true lumen. Left internal thoracic artery originates from the true   lumen.      2. Non specific fluid along the ascending aorta to arch.      3. 7 mm right upper lobe nodule. Per Fleischner Society   Recommendations, if the patient is at low risk of lung cancer, further   evaluation with CT in 6-12 months is suggested with optional CT in   18-24 months. If the patient is at high risk for lung cancer, further   evaluation with CT in 6-12 months and CT at 18-24 months is suggested.      LIZBETH BEACH MD            SYSTEM ID:  [id number]      XR Chest Port 1 View   Final Result   IMPRESSION:   1.  Right upper extremity PICC tip projects over the superior cava   junction.   2.  Stable cardiomediastinal silhouette.   3.  Streaky bibasilar opacities, right more than left may represent   atelectasis versus pulmonary edema/atypical infection.      I have personally reviewed the examination and initial interpretation   and I agree with the findings.      DEANNE FOWLER MD            SYSTEM ID:  [id number]      XR Chest Port 1 View   Final Result    Impression:    1. Mild widening of the mediastinum compared to prior x-ray 6/20/2022,   consistent with patient's history of known type B aortic dissection   seen on outside imaging (imaging unavailable).   2. No focal consolidations. There is bibasilar atelectasis.   3. No significant pleural effusion, no discernible pneumothorax.      I have personally reviewed the examination and initial interpretation   and I agree with the findings.      GHISLAINE BOWEN MD            SYSTEM ID:  [id number]      Echocardiogram Complete   Final Result          ASSESSMENT / PLAN:  Patient is a 48M who presented to [facility] on 1/12 with acute type B aortic dissection from the origin of the left subclavian to the diaphragm just superior to the celiac axis.Due to worsening back pain and increased variation in BP from arterial lines in his right and left hands, repeat CT scan was done which showed Type A dissection with a thrombosed retrograde false lumen, so he was transferred to [facility]. He continues to remain asymptomatic from a dissection standpoint. Repeat CTA on 1/17 stable. Plan for OR 1/23.    - Continue IV and PO regimen for SBP and HR control with goals <120 and <70.  - Patient is planned to go to OR Tuesday: combined cardiac/vascular procedure (ascending/hemiarch repair with TEVAR) 1/23/23  - Will obtain consent from patient today    Seen and discussed pt history, exam, assessment and plan with Dr. Singh of the vascular surgery service, who is in agreement with the above.    Jorge Dodge MD  Vascular Surgery Resident

## 2024-01-22 NOTE — PROGRESS NOTES
CV ICU PROGRESS NOTE  January 16, 2024      CO-MORBIDITIES:   Aortic Dissection  Hypertensive urgency     ASSESSMENT: Valdo Lyons is a 48 year old male with PMH of uncontrolled HTN, childhood seizures. [place] [facility] [place] with acute type B aortic dissection from the origin of the left subclavian to the diaphragm just superior to the celiac axis.Yesterday he had worsening back pain and increased variation in BP from arterial lines in his right and left hands, so repeat CT scan was done which showed Type A dissection with a thrombosed retrograde false lumen, so he was transferred to [facility]. Repeat CTA 1/17 for vascular to decide on further management (surgery vs medical management). CT or vascular to operate 1/23. BP management until then.     TODAY'S PROGRESS:   Close monitor for pain - can be an indicator of worsening dissection  No BM yet. Milk of mag today.   SW consult today  NPO at midnight for procedure tomorrow      PLAN:  Neuro/ pain/ sedation:  # Baseline cognitive dysfunction  Patient's mother is his caregiver at home due to baseline cognitive dysfunction. The patient's overall understanding of the situation is inconsistent. He also developed some agitation/behavioral issues (spitting and kicking) 1/21 requiring sedation. Psychiatry saw patient earlier in his stay, see note for recs. Nothing actionable.   - Monitor neurological status. Notify the MD for any acute changes in exam.  - precedex gtt at night started d/t agitation  - Lights on during the day - delirium prevention measures  - Melatonin HS  - Haldol prn for agitation   - discontinue IM zyprexa  - SW consult per mother's request for additional caregiver resources.      #Acute pain - resolved  - PRN: acetaminophen    # Recent fall, atraumatic  # Hx Childhood seizure disorder  - Seizure hx noted, not on any antiepileptics at time of admission  - Presented to [facility] s/p fall, CT head reported as negative for intracranial abnormalities  1/12.    # Hx Mariajuana use  - Typically smokes mariajuana every night  - Denies tobacco, alcohol, or other illicit drugs     Pulmonary care:   # Acute hypoxic insufficiency - resolved  Intermittently requiring 3L NC to achieve SPO2% goals.   - Goal SpO2 > 92%  - Encourage IS q15-30 minutes when awake (though patient has been refusing).   - Encourage up out of bed activity. Get into the chair.      Cardiovascular:    # Type B dissection, c/f retrograde tear  # Hx uncontrolled HTN  Patient was not taking any PTA medications for his high blood pressure. Bilateral arterial lines placed at [facility]. Will treat based on right arterial line. Ordered additional testing, as his BP medication requirements are exceeding expectations, rule out other/organic causes of elevated BP. TSH normal. 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 Surgery planned for ~1/23.   - Goal MAP >65 but less than 80, SBP <120.  - nicardipine, esmolol, nipride gtt to achieve goal hemodynamics (wean nipride first if able, then wean nicardipine)    - Carvedilol 25 mg BID, amlodipine 10 mg daily, hydralazine 100mg q6h, Losartan 100 mg daily   - Add isodril 40 mg TID  - PRN labetalol, hydralazine q1h   - discontinue losaartan to hold for procedure  - Lasix 60 x2 again today   - NPO at midnight     GI care / Nutrition:   # At risk for protein malnutrition  - Regular diet.   - PPI not indicated  - Bowel regimen: MiraLAX TID, senna 2 tablet BID - still no BM, will trial milk of magnesia today. Can add lactulose tomorrow if still no BM after surgery.     Renal / Fluids / Electrolytes:   # Chronic kidney disease vs MIKA  # volume status  # electrolyte derangement, resolved   Unknown baseline, was 1.6 on admission to [facility], last value in 2022 was 1.25. Cr continues to improve. Decent response to lasix x1. Renal ultrasound showed expected changes from the dissection (increased velocities) and a right pleural  effusion (NTD). Ua with mucous in urine. Monitor Cr, increased after lasix dose.   - Daily BMP  - BMP 6pm  - Strict I/O, daily weights - patient intermittently incontinent   - Avoid/limit nephrotoxins as able  - Replete lytes PRN per protocol  - s/p Lasix x2 with good response - will repeat this again today   - Aim for net negative 1L goal      Endocrine:    # Stress hyperglycemia - resolved   Preop A1c pending.  - Gluc checks per protocol  - Goal BG <180 for optimal healing     ID / Antibiotics:  # Stress induced leukocytosis - resolved   - No s/sx infection  - Monitor fever curve, WBC, and inflammatory markers as appropriate     Heme:     # Acute blood loss anemia, considered but ruled out   # Acute blood loss thrombocytopenia, considered and ruled out   No s/sx active bleeding  - CBC daily   - Hgb goal > 7.0     MSK / Skin:  # no acute concerns  - PT/OT - ordered      Prophylaxis:     - Mechanical ppx for DVT  - Chemical DVT ppx: SQH  - PPI: n/a  - Bowel regimen: MiraLAX, senna - scheduled. Milk of mag.      Lines / Tubes / Drains:  - Arterial Line (Rt. Radial, 1/16)  - PIV (1/16)  - PICC (1/16)     Disposition:  - CVICU    CVICU checklist run through and appropriately applied to the patient's care.     Patient seen, findings and plan discussed with CV ICU staff and cardiothoracic service attending and fellow.     Shannon Galicia MD   Anesthesiology Resident PGY2      ====================================    SUBJECTIVE:   Sleeping comfortably with the precedex on board.     OBJECTIVE:   1. VITAL SIGNS:   Temp:  [97.1  F (36.2  C)-97.9  F (36.6  C)] 97.1  F (36.2  C)  Pulse:  [58-81] 70  Resp:  [17-18] 18  MAP:  [55 mmHg-94 mmHg] 65 mmHg  Arterial Line BP: ()/(35-82) 118/47  SpO2:  [86 %-97 %] 93 %  Resp: 18      2. INTAKE/ OUTPUT:   I/O last 3 completed shifts:  In: 3603.98 [P.O.:420; I.V.:3183.98]  Out: 2450 [Urine:2450]    3. PHYSICAL EXAMINATION:   General: laying in bed, conversant, cooperative with  bedside TTE.   Neuro: A&O, grossly intact  Resp: RA, NAD  CV: RR  Extremities: warm and well perfused    4. INVESTIGATIONS:   Arterial Blood Gases   Recent Labs   Lab 01/18/24 0351 01/16/24 0335   PH 7.43 7.38   PCO2 28* 38   PO2 64* 81   HCO3 18* 23     Complete Blood Count   Recent Labs   Lab 01/21/24 0339 01/20/24 0357 01/19/24 0415 01/18/24 0349   WBC 10.8 12.5* 15.2* 19.1*   HGB 9.9* 10.3* 10.5* 11.2*    240 222 215     Basic Metabolic Panel  Recent Labs   Lab 01/22/24  0845 01/22/24  0844 01/22/24 0307 01/21/24  1833 01/21/24 0339 01/20/24 0357   NA  --   --  135 135 139 137   POTASSIUM  --  3.6 3.4 3.6 3.7 3.8   CHLORIDE  --   --  108* 107 112* 112*   CO2  --   --  19* 18* 18* 18*   BUN  --   --  22.6* 22.2* 18.1 21.9*   CR  --   --  1.65* 1.62* 1.11 1.11   GLC 97  --  91 122* 89 93     Liver Function Tests  Recent Labs   Lab 01/22/24 0307 01/21/24 0339 01/20/24 0357 01/19/24 0415   AST 12 12 13 11   ALT 8 8 7 <5   ALKPHOS 47 47 44 40   BILITOTAL 0.4 0.5 0.5 0.7   ALBUMIN 3.0* 3.0* 3.0* 2.8*   INR 1.17* 1.21* 1.13 1.14     Pancreatic Enzymes  No lab results found in last 7 days.  Coagulation Profile  Recent Labs   Lab 01/22/24 0307 01/21/24 0339 01/20/24 0357 01/19/24 0415 01/17/24 0316 01/16/24 0334   INR 1.17* 1.21* 1.13 1.14   < >  --    PTT  --   --   --   --   --  38    < > = values in this interval not displayed.         5. RADIOLOGY:   No results found for this or any previous visit (from the past 24 hour(s)).        =========================================

## 2024-01-23 ENCOUNTER — ANESTHESIA (OUTPATIENT)
Dept: SURGERY | Facility: CLINIC | Age: 49
End: 2024-01-23
Payer: MEDICAID

## 2024-01-23 ENCOUNTER — APPOINTMENT (OUTPATIENT)
Dept: GENERAL RADIOLOGY | Facility: CLINIC | Age: 49
End: 2024-01-23
Attending: SURGERY
Payer: MEDICAID

## 2024-01-23 ENCOUNTER — APPOINTMENT (OUTPATIENT)
Dept: GENERAL RADIOLOGY | Facility: CLINIC | Age: 49
End: 2024-01-23
Attending: STUDENT IN AN ORGANIZED HEALTH CARE EDUCATION/TRAINING PROGRAM
Payer: MEDICAID

## 2024-01-23 ENCOUNTER — APPOINTMENT (OUTPATIENT)
Dept: INTERVENTIONAL RADIOLOGY/VASCULAR | Facility: CLINIC | Age: 49
End: 2024-01-23
Attending: SURGERY
Payer: MEDICAID

## 2024-01-23 LAB
ALBUMIN SERPL BCG-MCNC: 2.8 G/DL (ref 3.5–5.2)
ALBUMIN SERPL BCG-MCNC: 2.9 G/DL (ref 3.5–5.2)
ALLEN'S TEST: ABNORMAL
ALLEN'S TEST: ABNORMAL
ALP SERPL-CCNC: 41 U/L (ref 40–150)
ALP SERPL-CCNC: 49 U/L (ref 40–150)
ALT SERPL W P-5'-P-CCNC: 10 U/L (ref 0–70)
ALT SERPL W P-5'-P-CCNC: 9 U/L (ref 0–70)
ANION GAP SERPL CALCULATED.3IONS-SCNC: 10 MMOL/L (ref 7–15)
ANION GAP SERPL CALCULATED.3IONS-SCNC: 12 MMOL/L (ref 7–15)
ANION GAP SERPL CALCULATED.3IONS-SCNC: 9 MMOL/L (ref 7–15)
APTT PPP: 36 SECONDS (ref 22–38)
APTT PPP: 38 SECONDS (ref 22–38)
APTT PPP: 49 SECONDS (ref 22–38)
AST SERPL W P-5'-P-CCNC: 14 U/L (ref 0–45)
AST SERPL W P-5'-P-CCNC: 37 U/L (ref 0–45)
BASE EXCESS BLDA CALC-SCNC: -1.7 MMOL/L (ref -3–3)
BASE EXCESS BLDA CALC-SCNC: -1.9 MMOL/L (ref -3–3)
BASE EXCESS BLDA CALC-SCNC: -2 MMOL/L (ref -3–3)
BASE EXCESS BLDA CALC-SCNC: -2.2 MMOL/L (ref -3–3)
BASE EXCESS BLDA CALC-SCNC: -2.2 MMOL/L (ref -3–3)
BASE EXCESS BLDA CALC-SCNC: -2.4 MMOL/L (ref -3–3)
BASE EXCESS BLDA CALC-SCNC: -2.5 MMOL/L (ref -3–3)
BASE EXCESS BLDA CALC-SCNC: -2.7 MMOL/L (ref -3–3)
BASE EXCESS BLDA CALC-SCNC: -3.2 MMOL/L (ref -3–3)
BASE EXCESS BLDA CALC-SCNC: -3.3 MMOL/L (ref -3–3)
BASE EXCESS BLDA CALC-SCNC: -3.5 MMOL/L (ref -3–3)
BASE EXCESS BLDA CALC-SCNC: -4.7 MMOL/L (ref -3–3)
BASE EXCESS BLDA CALC-SCNC: -5.6 MMOL/L (ref -3–3)
BASE EXCESS BLDV CALC-SCNC: -0.9 MMOL/L (ref -3–3)
BASE EXCESS BLDV CALC-SCNC: -1.6 MMOL/L (ref -3–3)
BASE EXCESS BLDV CALC-SCNC: -3 MMOL/L (ref -3–3)
BILIRUB DIRECT SERPL-MCNC: <0.2 MG/DL (ref 0–0.3)
BILIRUB SERPL-MCNC: 0.5 MG/DL
BILIRUB SERPL-MCNC: 0.7 MG/DL
BLD PROD TYP BPU: NORMAL
BLOOD COMPONENT TYPE: NORMAL
BUN SERPL-MCNC: 21.4 MG/DL (ref 6–20)
BUN SERPL-MCNC: 23 MG/DL (ref 6–20)
BUN SERPL-MCNC: 26.1 MG/DL (ref 6–20)
CA-I BLD-MCNC: 4.2 MG/DL (ref 4.4–5.2)
CA-I BLD-MCNC: 4.3 MG/DL (ref 4.4–5.2)
CA-I BLD-MCNC: 4.4 MG/DL (ref 4.4–5.2)
CA-I BLD-MCNC: 4.4 MG/DL (ref 4.4–5.2)
CA-I BLD-MCNC: 4.5 MG/DL (ref 4.4–5.2)
CA-I BLD-MCNC: 4.5 MG/DL (ref 4.4–5.2)
CA-I BLD-MCNC: 4.6 MG/DL (ref 4.4–5.2)
CA-I BLD-MCNC: 4.8 MG/DL (ref 4.4–5.2)
CALCIUM SERPL-MCNC: 7.8 MG/DL (ref 8.6–10)
CALCIUM SERPL-MCNC: 8 MG/DL (ref 8.6–10)
CALCIUM SERPL-MCNC: 8.3 MG/DL (ref 8.6–10)
CF REDUC 60M P MA LENFR BLD TEG: 2.8 % (ref 0–15)
CFT BLD TEG: 0.9 MINUTE (ref 1–3)
CHLORIDE SERPL-SCNC: 108 MMOL/L (ref 98–107)
CHLORIDE SERPL-SCNC: 109 MMOL/L (ref 98–107)
CHLORIDE SERPL-SCNC: 111 MMOL/L (ref 98–107)
CI (COAGULATION INDEX)(Z) NON NATIVE: 3.4 (ref -3–3)
CLOT ANGLE BLD TEG: 76.1 DEGREES (ref 53–72)
CLOT INIT BLD TEG: 4.8 MINUTE (ref 5–10)
CLOT INIT KAOL IND TO POST HEP NEUT TRTO: 0.9 {RATIO}
CLOT INIT KAOL IND TO POST HEP NEUT TRTO: 1 {RATIO}
CLOT INIT KAOL IND TO POST HEP NEUT TRTO: ABNORMAL {RATIO}
CLOT INIT KAOLIN IND BLD US: 129 SEC (ref 113–166)
CLOT INIT KAOLIN IND BLD US: 154 SEC (ref 113–166)
CLOT INIT KAOLIN IND BLD US: ABNORMAL S
CLOT INIT KAOLIN IND P HEP NEUT BLD US: 138 SEC (ref 103–153)
CLOT INIT KAOLIN IND P HEP NEUT BLD US: 155 SEC (ref 103–153)
CLOT INIT KAOLIN IND P HEP NEUT BLD US: 161 SEC (ref 103–153)
CLOT LYSIS 30M P MA LENFR BLD TEG: 0.4 % (ref 0–8)
CLOT STIFF PLT CONT BLD CALC: 20 HPA (ref 11.9–29.8)
CLOT STIFF PLT CONT BLD CALC: 21.4 HPA (ref 11.9–29.8)
CLOT STIFF PLT CONT BLD CALC: 25.5 HPA (ref 11.9–29.8)
CLOT STIFF TF IND P HEP NEUT BLD US: 22.8 HPA (ref 13–33.2)
CLOT STIFF TF IND P HEP NEUT BLD US: 24.6 HPA (ref 13–33.2)
CLOT STIFF TF IND P HEP NEUT BLD US: 29.5 HPA (ref 13–33.2)
CLOT STIFF TF IND+IIB-IIIA INH P HEP NEU: 2.8 HPA (ref 1–3.7)
CLOT STIFF TF IND+IIB-IIIA INH P HEP NEU: 3.2 HPA (ref 1–3.7)
CLOT STIFF TF IND+IIB-IIIA INH P HEP NEU: 4 HPA (ref 1–3.7)
CLOT STRENGTH BLD TEG: 13.2 KD/SC (ref 4.5–11)
CODING SYSTEM: NORMAL
COHGB MFR BLD: 98.5 % (ref 96–97)
COHGB MFR BLD: 99.6 % (ref 96–97)
CREAT SERPL-MCNC: 1.65 MG/DL (ref 0.67–1.17)
CREAT SERPL-MCNC: 1.68 MG/DL (ref 0.67–1.17)
CREAT SERPL-MCNC: 1.77 MG/DL (ref 0.67–1.17)
CROSSMATCH: NORMAL
CROSSMATCH: NORMAL
DEPRECATED HCO3 PLAS-SCNC: 19 MMOL/L (ref 22–29)
DEPRECATED HCO3 PLAS-SCNC: 21 MMOL/L (ref 22–29)
DEPRECATED HCO3 PLAS-SCNC: 22 MMOL/L (ref 22–29)
EGFRCR SERPLBLD CKD-EPI 2021: 47 ML/MIN/1.73M2
EGFRCR SERPLBLD CKD-EPI 2021: 50 ML/MIN/1.73M2
EGFRCR SERPLBLD CKD-EPI 2021: 51 ML/MIN/1.73M2
ERYTHROCYTE [DISTWIDTH] IN BLOOD BY AUTOMATED COUNT: 15.9 % (ref 10–15)
FIBRINOGEN PPP-MCNC: 314 MG/DL (ref 170–490)
FIBRINOGEN PPP-MCNC: 321 MG/DL (ref 170–490)
FIBRINOGEN PPP-MCNC: 333 MG/DL (ref 170–490)
GLUCOSE BLD-MCNC: 101 MG/DL (ref 70–99)
GLUCOSE BLD-MCNC: 105 MG/DL (ref 70–99)
GLUCOSE BLD-MCNC: 105 MG/DL (ref 70–99)
GLUCOSE BLD-MCNC: 136 MG/DL (ref 70–99)
GLUCOSE BLD-MCNC: 142 MG/DL (ref 70–99)
GLUCOSE BLD-MCNC: 142 MG/DL (ref 70–99)
GLUCOSE BLD-MCNC: 146 MG/DL (ref 70–99)
GLUCOSE BLD-MCNC: 147 MG/DL (ref 70–99)
GLUCOSE BLD-MCNC: 164 MG/DL (ref 70–99)
GLUCOSE BLD-MCNC: 90 MG/DL (ref 70–99)
GLUCOSE BLD-MCNC: 92 MG/DL (ref 70–99)
GLUCOSE BLD-MCNC: 98 MG/DL (ref 70–99)
GLUCOSE BLDC GLUCOMTR-MCNC: 121 MG/DL (ref 70–99)
GLUCOSE BLDC GLUCOMTR-MCNC: 129 MG/DL (ref 70–99)
GLUCOSE BLDC GLUCOMTR-MCNC: 143 MG/DL (ref 70–99)
GLUCOSE SERPL-MCNC: 123 MG/DL (ref 70–99)
GLUCOSE SERPL-MCNC: 137 MG/DL (ref 70–99)
GLUCOSE SERPL-MCNC: 84 MG/DL (ref 70–99)
HCO3 BLD-SCNC: 23 MMOL/L (ref 21–28)
HCO3 BLD-SCNC: 23 MMOL/L (ref 21–28)
HCO3 BLDA-SCNC: 21 MMOL/L (ref 21–28)
HCO3 BLDA-SCNC: 21 MMOL/L (ref 21–28)
HCO3 BLDA-SCNC: 22 MMOL/L (ref 21–28)
HCO3 BLDA-SCNC: 23 MMOL/L (ref 21–28)
HCO3 BLDA-SCNC: 24 MMOL/L (ref 21–28)
HCO3 BLDA-SCNC: 24 MMOL/L (ref 21–28)
HCO3 BLDA-SCNC: 25 MMOL/L (ref 21–28)
HCO3 BLDA-SCNC: 26 MMOL/L (ref 21–28)
HCO3 BLDV-SCNC: 24 MMOL/L (ref 21–28)
HCT VFR BLD AUTO: 23.3 % (ref 40–53)
HCT VFR BLD AUTO: 24.9 % (ref 40–53)
HCT VFR BLD AUTO: 25.9 % (ref 40–53)
HCT VFR BLD AUTO: 27.7 % (ref 40–53)
HGB BLD-MCNC: 7.7 G/DL (ref 13.3–17.7)
HGB BLD-MCNC: 7.9 G/DL (ref 13.3–17.7)
HGB BLD-MCNC: 8.1 G/DL (ref 13.3–17.7)
HGB BLD-MCNC: 8.1 G/DL (ref 13.3–17.7)
HGB BLD-MCNC: 8.4 G/DL (ref 13.3–17.7)
HGB BLD-MCNC: 8.5 G/DL (ref 13.3–17.7)
HGB BLD-MCNC: 8.6 G/DL (ref 13.3–17.7)
HGB BLD-MCNC: 8.6 G/DL (ref 13.3–17.7)
HGB BLD-MCNC: 8.7 G/DL (ref 13.3–17.7)
HGB BLD-MCNC: 8.8 G/DL (ref 13.3–17.7)
HGB BLD-MCNC: 8.8 G/DL (ref 13.3–17.7)
HGB BLD-MCNC: 9 G/DL (ref 13.3–17.7)
HGB BLD-MCNC: 9 G/DL (ref 13.3–17.7)
HGB BLD-MCNC: 9.1 G/DL (ref 13.3–17.7)
INR PPP: 1.12 (ref 0.85–1.15)
INR PPP: 1.24 (ref 0.85–1.15)
INR PPP: 1.24 (ref 0.85–1.15)
INR PPP: 1.62 (ref 0.85–1.15)
ISSUE DATE AND TIME: NORMAL
LACTATE BLD-SCNC: 0.5 MMOL/L (ref 0.7–2)
LACTATE BLD-SCNC: 0.6 MMOL/L (ref 0.7–2)
LACTATE BLD-SCNC: 0.6 MMOL/L (ref 0.7–2)
LACTATE BLD-SCNC: 0.9 MMOL/L (ref 0.7–2)
LACTATE BLD-SCNC: 0.9 MMOL/L (ref 0.7–2)
LACTATE BLD-SCNC: 1 MMOL/L (ref 0.7–2)
LACTATE BLD-SCNC: 1.1 MMOL/L (ref 0.7–2)
LACTATE BLD-SCNC: 1.2 MMOL/L (ref 0.7–2)
LACTATE BLD-SCNC: 2 MMOL/L (ref 0.7–2)
LACTATE SERPL-SCNC: 0.6 MMOL/L (ref 0.7–2)
LACTATE SERPL-SCNC: 0.8 MMOL/L (ref 0.7–2)
MAGNESIUM SERPL-MCNC: 2 MG/DL (ref 1.7–2.3)
MAGNESIUM SERPL-MCNC: 2.1 MG/DL (ref 1.7–2.3)
MAGNESIUM SERPL-MCNC: 2.7 MG/DL (ref 1.7–2.3)
MAGNESIUM SERPL-MCNC: 2.8 MG/DL (ref 1.7–2.3)
MCF BLD TEG: 72.6 MM (ref 50–70)
MCH RBC QN AUTO: 19.3 PG (ref 26.5–33)
MCH RBC QN AUTO: 19.4 PG (ref 26.5–33)
MCH RBC QN AUTO: 19.9 PG (ref 26.5–33)
MCH RBC QN AUTO: 20 PG (ref 26.5–33)
MCHC RBC AUTO-ENTMCNC: 32.5 G/DL (ref 31.5–36.5)
MCHC RBC AUTO-ENTMCNC: 32.5 G/DL (ref 31.5–36.5)
MCHC RBC AUTO-ENTMCNC: 33 G/DL (ref 31.5–36.5)
MCHC RBC AUTO-ENTMCNC: 33.2 G/DL (ref 31.5–36.5)
MCV RBC AUTO: 59 FL (ref 78–100)
MCV RBC AUTO: 60 FL (ref 78–100)
MCV RBC AUTO: 60 FL (ref 78–100)
MCV RBC AUTO: 61 FL (ref 78–100)
O2/TOTAL GAS SETTING VFR VENT: 100 %
O2/TOTAL GAS SETTING VFR VENT: 60 %
O2/TOTAL GAS SETTING VFR VENT: 60 %
O2/TOTAL GAS SETTING VFR VENT: 65 %
O2/TOTAL GAS SETTING VFR VENT: 72 %
O2/TOTAL GAS SETTING VFR VENT: 80 %
O2/TOTAL GAS SETTING VFR VENT: 85 %
O2/TOTAL GAS SETTING VFR VENT: 89 %
OXYHGB MFR BLDA: 94 % (ref 92–100)
OXYHGB MFR BLDA: 96 % (ref 92–100)
OXYHGB MFR BLDA: 98 % (ref 92–100)
OXYHGB MFR BLDA: 98 % (ref 92–100)
OXYHGB MFR BLDA: >99 % (ref 92–100)
OXYHGB MFR BLDV: 57 % (ref 70–75)
OXYHGB MFR BLDV: 69 % (ref 70–75)
OXYHGB MFR BLDV: 86 % (ref 70–75)
PCO2 BLD: 38 MM HG (ref 35–45)
PCO2 BLD: 40 MM HG (ref 35–45)
PCO2 BLDA: 36 MM HG (ref 35–45)
PCO2 BLDA: 36 MM HG (ref 35–45)
PCO2 BLDA: 39 MM HG (ref 35–45)
PCO2 BLDA: 40 MM HG (ref 35–45)
PCO2 BLDA: 41 MM HG (ref 35–45)
PCO2 BLDA: 42 MM HG (ref 35–45)
PCO2 BLDA: 47 MM HG (ref 35–45)
PCO2 BLDA: 50 MM HG (ref 35–45)
PCO2 BLDA: 55 MM HG (ref 35–45)
PCO2 BLDA: 68 MM HG (ref 35–45)
PCO2 BLDA: 70 MM HG (ref 35–45)
PCO2 BLDV: 43 MM HG (ref 40–50)
PCO2 BLDV: 45 MM HG (ref 40–50)
PCO2 BLDV: 49 MM HG (ref 40–50)
PH BLD: 7.37 [PH] (ref 7.35–7.45)
PH BLD: 7.39 [PH] (ref 7.35–7.45)
PH BLDA: 7.14 [PH] (ref 7.35–7.45)
PH BLDA: 7.18 [PH] (ref 7.35–7.45)
PH BLDA: 7.26 [PH] (ref 7.35–7.45)
PH BLDA: 7.3 [PH] (ref 7.35–7.45)
PH BLDA: 7.3 [PH] (ref 7.35–7.45)
PH BLDA: 7.32 [PH] (ref 7.35–7.45)
PH BLDA: 7.36 [PH] (ref 7.35–7.45)
PH BLDA: 7.37 [PH] (ref 7.35–7.45)
PH BLDA: 7.37 [PH] (ref 7.35–7.45)
PH BLDA: 7.38 [PH] (ref 7.35–7.45)
PH BLDA: 7.39 [PH] (ref 7.35–7.45)
PH BLDV: 7.29 [PH] (ref 7.32–7.43)
PH BLDV: 7.34 [PH] (ref 7.32–7.43)
PH BLDV: 7.37 [PH] (ref 7.32–7.43)
PHOSPHATE SERPL-MCNC: 3.9 MG/DL (ref 2.5–4.5)
PHOSPHATE SERPL-MCNC: 4 MG/DL (ref 2.5–4.5)
PHOSPHATE SERPL-MCNC: 4.3 MG/DL (ref 2.5–4.5)
PLATELET # BLD AUTO: 173 10E3/UL (ref 150–450)
PLATELET # BLD AUTO: 178 10E3/UL (ref 150–450)
PLATELET # BLD AUTO: 197 10E3/UL (ref 150–450)
PLATELET # BLD AUTO: 247 10E3/UL (ref 150–450)
PO2 BLD: 106 MM HG (ref 80–105)
PO2 BLD: 124 MM HG (ref 80–105)
PO2 BLDA: 110 MM HG (ref 80–105)
PO2 BLDA: 112 MM HG (ref 80–105)
PO2 BLDA: 181 MM HG (ref 80–105)
PO2 BLDA: 249 MM HG (ref 80–105)
PO2 BLDA: 428 MM HG (ref 80–105)
PO2 BLDA: 450 MM HG (ref 80–105)
PO2 BLDA: 459 MM HG (ref 80–105)
PO2 BLDA: 469 MM HG (ref 80–105)
PO2 BLDA: 81 MM HG (ref 80–105)
PO2 BLDA: >488 MM HG (ref 80–105)
PO2 BLDA: >488 MM HG (ref 80–105)
PO2 BLDV: 34 MM HG (ref 25–47)
PO2 BLDV: 40 MM HG (ref 25–47)
PO2 BLDV: 63 MM HG (ref 25–47)
POTASSIUM BLD-SCNC: 3.5 MMOL/L (ref 3.4–5.3)
POTASSIUM BLD-SCNC: 3.6 MMOL/L (ref 3.4–5.3)
POTASSIUM BLD-SCNC: 3.6 MMOL/L (ref 3.4–5.3)
POTASSIUM BLD-SCNC: 3.7 MMOL/L (ref 3.4–5.3)
POTASSIUM BLD-SCNC: 4.1 MMOL/L (ref 3.4–5.3)
POTASSIUM BLD-SCNC: 4.2 MMOL/L (ref 3.4–5.3)
POTASSIUM BLD-SCNC: 4.8 MMOL/L (ref 3.4–5.3)
POTASSIUM BLD-SCNC: 4.9 MMOL/L (ref 3.4–5.3)
POTASSIUM BLD-SCNC: 5.1 MMOL/L (ref 3.4–5.3)
POTASSIUM BLD-SCNC: 5.2 MMOL/L (ref 3.4–5.3)
POTASSIUM SERPL-SCNC: 3.4 MMOL/L (ref 3.4–5.3)
POTASSIUM SERPL-SCNC: 3.6 MMOL/L (ref 3.4–5.3)
POTASSIUM SERPL-SCNC: 4.4 MMOL/L (ref 3.4–5.3)
POTASSIUM SERPL-SCNC: 4.6 MMOL/L (ref 3.4–5.3)
PROT SERPL-MCNC: 4.5 G/DL (ref 6.4–8.3)
PROT SERPL-MCNC: 5 G/DL (ref 6.4–8.3)
RBC # BLD AUTO: 3.85 10E6/UL (ref 4.4–5.9)
RBC # BLD AUTO: 4.17 10E6/UL (ref 4.4–5.9)
RBC # BLD AUTO: 4.32 10E6/UL (ref 4.4–5.9)
RBC # BLD AUTO: 4.66 10E6/UL (ref 4.4–5.9)
SAO2 % BLDA: 100 % (ref 96–97)
SAO2 % BLDA: 96 % (ref 96–97)
SAO2 % BLDA: 97 % (ref 92–100)
SAO2 % BLDA: 98 % (ref 92–100)
SAO2 % BLDA: 98 % (ref 96–97)
SAO2 % BLDA: 99 % (ref 96–97)
SAO2 % BLDA: >100 % (ref 96–97)
SAO2 % BLDV: 58.2 % (ref 70–75)
SAO2 % BLDV: 68.7 % (ref 70–75)
SAO2 % BLDV: 88 % (ref 70–75)
SODIUM BLD-SCNC: 137 MMOL/L (ref 135–145)
SODIUM BLD-SCNC: 139 MMOL/L (ref 135–145)
SODIUM BLD-SCNC: 140 MMOL/L (ref 135–145)
SODIUM BLD-SCNC: 141 MMOL/L (ref 135–145)
SODIUM BLD-SCNC: 142 MMOL/L (ref 135–145)
SODIUM SERPL-SCNC: 139 MMOL/L (ref 135–145)
SODIUM SERPL-SCNC: 141 MMOL/L (ref 135–145)
SODIUM SERPL-SCNC: 141 MMOL/L (ref 135–145)
UNIT ABO/RH: NORMAL
UNIT NUMBER: NORMAL
UNIT STATUS: NORMAL
UNIT TYPE ISBT: 5100
UNIT TYPE ISBT: 6200
WBC # BLD AUTO: 19 10E3/UL (ref 4–11)
WBC # BLD AUTO: 20.8 10E3/UL (ref 4–11)
WBC # BLD AUTO: 22.2 10E3/UL (ref 4–11)
WBC # BLD AUTO: 9 10E3/UL (ref 4–11)

## 2024-01-23 PROCEDURE — 250N000013 HC RX MED GY IP 250 OP 250 PS 637

## 2024-01-23 PROCEDURE — 84132 ASSAY OF SERUM POTASSIUM: CPT | Performed by: STUDENT IN AN ORGANIZED HEALTH CARE EDUCATION/TRAINING PROGRAM

## 2024-01-23 PROCEDURE — 82330 ASSAY OF CALCIUM: CPT

## 2024-01-23 PROCEDURE — 85396 CLOTTING ASSAY WHOLE BLOOD: CPT

## 2024-01-23 PROCEDURE — 999N000065 XR CHEST PORT 1 VIEW

## 2024-01-23 PROCEDURE — 258N000003 HC RX IP 258 OP 636: Performed by: STUDENT IN AN ORGANIZED HEALTH CARE EDUCATION/TRAINING PROGRAM

## 2024-01-23 PROCEDURE — 99207 PR NO BILLABLE SERVICE THIS VISIT: CPT | Performed by: NURSE PRACTITIONER

## 2024-01-23 PROCEDURE — P9037 PLATE PHERES LEUKOREDU IRRAD: HCPCS

## 2024-01-23 PROCEDURE — 999N000157 HC STATISTIC RCP TIME EA 10 MIN

## 2024-01-23 PROCEDURE — 250N000011 HC RX IP 250 OP 636: Performed by: STUDENT IN AN ORGANIZED HEALTH CARE EDUCATION/TRAINING PROGRAM

## 2024-01-23 PROCEDURE — 85384 FIBRINOGEN ACTIVITY: CPT | Performed by: STUDENT IN AN ORGANIZED HEALTH CARE EDUCATION/TRAINING PROGRAM

## 2024-01-23 PROCEDURE — 85384 FIBRINOGEN ACTIVITY: CPT | Performed by: SURGERY

## 2024-01-23 PROCEDURE — 250N000009 HC RX 250

## 2024-01-23 PROCEDURE — 258N000003 HC RX IP 258 OP 636: Performed by: PHYSICIAN ASSISTANT

## 2024-01-23 PROCEDURE — 85730 THROMBOPLASTIN TIME PARTIAL: CPT | Performed by: STUDENT IN AN ORGANIZED HEALTH CARE EDUCATION/TRAINING PROGRAM

## 2024-01-23 PROCEDURE — 250N000011 HC RX IP 250 OP 636: Performed by: NURSE PRACTITIONER

## 2024-01-23 PROCEDURE — 250N000011 HC RX IP 250 OP 636: Performed by: SURGERY

## 2024-01-23 PROCEDURE — 83735 ASSAY OF MAGNESIUM: CPT | Performed by: STUDENT IN AN ORGANIZED HEALTH CARE EDUCATION/TRAINING PROGRAM

## 2024-01-23 PROCEDURE — 370N000017 HC ANESTHESIA TECHNICAL FEE, PER MIN: Performed by: STUDENT IN AN ORGANIZED HEALTH CARE EDUCATION/TRAINING PROGRAM

## 2024-01-23 PROCEDURE — 02HP32Z INSERTION OF MONITORING DEVICE INTO PULMONARY TRUNK, PERCUTANEOUS APPROACH: ICD-10-PCS | Performed by: STUDENT IN AN ORGANIZED HEALTH CARE EDUCATION/TRAINING PROGRAM

## 2024-01-23 PROCEDURE — 83605 ASSAY OF LACTIC ACID: CPT | Performed by: STUDENT IN AN ORGANIZED HEALTH CARE EDUCATION/TRAINING PROGRAM

## 2024-01-23 PROCEDURE — 250N000011 HC RX IP 250 OP 636: Mod: JZ | Performed by: STUDENT IN AN ORGANIZED HEALTH CARE EDUCATION/TRAINING PROGRAM

## 2024-01-23 PROCEDURE — 84132 ASSAY OF SERUM POTASSIUM: CPT | Performed by: SURGERY

## 2024-01-23 PROCEDURE — 85610 PROTHROMBIN TIME: CPT

## 2024-01-23 PROCEDURE — 94002 VENT MGMT INPAT INIT DAY: CPT

## 2024-01-23 PROCEDURE — 999N000180 XR SURGERY CARM FLUORO LESS THAN 5 MIN: Mod: TC

## 2024-01-23 PROCEDURE — C1768 GRAFT, VASCULAR: HCPCS | Performed by: STUDENT IN AN ORGANIZED HEALTH CARE EDUCATION/TRAINING PROGRAM

## 2024-01-23 PROCEDURE — 250N000011 HC RX IP 250 OP 636: Performed by: PHYSICIAN ASSISTANT

## 2024-01-23 PROCEDURE — 80048 BASIC METABOLIC PNL TOTAL CA: CPT

## 2024-01-23 PROCEDURE — 85027 COMPLETE CBC AUTOMATED: CPT | Performed by: STUDENT IN AN ORGANIZED HEALTH CARE EDUCATION/TRAINING PROGRAM

## 2024-01-23 PROCEDURE — 85610 PROTHROMBIN TIME: CPT | Performed by: STUDENT IN AN ORGANIZED HEALTH CARE EDUCATION/TRAINING PROGRAM

## 2024-01-23 PROCEDURE — 250N000009 HC RX 250: Performed by: STUDENT IN AN ORGANIZED HEALTH CARE EDUCATION/TRAINING PROGRAM

## 2024-01-23 PROCEDURE — 360N000086 HC SURGERY LEVEL 6 W/ FLUORO, PER MIN: Performed by: STUDENT IN AN ORGANIZED HEALTH CARE EDUCATION/TRAINING PROGRAM

## 2024-01-23 PROCEDURE — 250N000025 HC SEVOFLURANE, PER MIN: Performed by: STUDENT IN AN ORGANIZED HEALTH CARE EDUCATION/TRAINING PROGRAM

## 2024-01-23 PROCEDURE — C1769 GUIDE WIRE: HCPCS | Performed by: STUDENT IN AN ORGANIZED HEALTH CARE EDUCATION/TRAINING PROGRAM

## 2024-01-23 PROCEDURE — 02VX3DZ RESTRICTION OF THORACIC AORTA, ASCENDING/ARCH WITH INTRALUMINAL DEVICE, PERCUTANEOUS APPROACH: ICD-10-PCS | Performed by: STUDENT IN AN ORGANIZED HEALTH CARE EDUCATION/TRAINING PROGRAM

## 2024-01-23 PROCEDURE — 84100 ASSAY OF PHOSPHORUS: CPT

## 2024-01-23 PROCEDURE — 88304 TISSUE EXAM BY PATHOLOGIST: CPT | Mod: TC | Performed by: STUDENT IN AN ORGANIZED HEALTH CARE EDUCATION/TRAINING PROGRAM

## 2024-01-23 PROCEDURE — 85027 COMPLETE CBC AUTOMATED: CPT

## 2024-01-23 PROCEDURE — 82805 BLOOD GASES W/O2 SATURATION: CPT | Performed by: STUDENT IN AN ORGANIZED HEALTH CARE EDUCATION/TRAINING PROGRAM

## 2024-01-23 PROCEDURE — C1760 CLOSURE DEV, VASC: HCPCS | Performed by: STUDENT IN AN ORGANIZED HEALTH CARE EDUCATION/TRAINING PROGRAM

## 2024-01-23 PROCEDURE — 30283B1 TRANSFUSION OF NONAUTOLOGOUS 4-FACTOR PROTHROMBIN COMPLEX CONCENTRATE INTO VEIN, PERCUTANEOUS APPROACH: ICD-10-PCS | Performed by: STUDENT IN AN ORGANIZED HEALTH CARE EDUCATION/TRAINING PROGRAM

## 2024-01-23 PROCEDURE — 258N000003 HC RX IP 258 OP 636: Performed by: SURGERY

## 2024-01-23 PROCEDURE — 83735 ASSAY OF MAGNESIUM: CPT | Performed by: SURGERY

## 2024-01-23 PROCEDURE — 999N000083 IR OR ANGIOGRAM: Mod: TC

## 2024-01-23 PROCEDURE — 4A133B3 MONITORING OF ARTERIAL PRESSURE, PULMONARY, PERCUTANEOUS APPROACH: ICD-10-PCS | Performed by: STUDENT IN AN ORGANIZED HEALTH CARE EDUCATION/TRAINING PROGRAM

## 2024-01-23 PROCEDURE — P9047 ALBUMIN (HUMAN), 25%, 50ML: HCPCS

## 2024-01-23 PROCEDURE — 250N000009 HC RX 250: Performed by: PHYSICIAN ASSISTANT

## 2024-01-23 PROCEDURE — 250N000011 HC RX IP 250 OP 636

## 2024-01-23 PROCEDURE — 99291 CRITICAL CARE FIRST HOUR: CPT | Mod: 24 | Performed by: ANESTHESIOLOGY

## 2024-01-23 PROCEDURE — C1898 LEAD, PMKR, OTHER THAN TRANS: HCPCS | Performed by: STUDENT IN AN ORGANIZED HEALTH CARE EDUCATION/TRAINING PROGRAM

## 2024-01-23 PROCEDURE — 83605 ASSAY OF LACTIC ACID: CPT | Performed by: SURGERY

## 2024-01-23 PROCEDURE — 84100 ASSAY OF PHOSPHORUS: CPT | Performed by: SURGERY

## 2024-01-23 PROCEDURE — 85610 PROTHROMBIN TIME: CPT | Performed by: SURGERY

## 2024-01-23 PROCEDURE — B440ZZ3 ULTRASONOGRAPHY OF ABDOMINAL AORTA, INTRAVASCULAR: ICD-10-PCS | Performed by: SURGERY

## 2024-01-23 PROCEDURE — 250N000013 HC RX MED GY IP 250 OP 250 PS 637: Performed by: SURGERY

## 2024-01-23 PROCEDURE — 74018 RADEX ABDOMEN 1 VIEW: CPT | Mod: 26 | Performed by: RADIOLOGY

## 2024-01-23 PROCEDURE — 33858 AS-AORT GRF F/AORTIC DSJ: CPT | Mod: GC | Performed by: STUDENT IN AN ORGANIZED HEALTH CARE EDUCATION/TRAINING PROGRAM

## 2024-01-23 PROCEDURE — 82330 ASSAY OF CALCIUM: CPT | Performed by: SURGERY

## 2024-01-23 PROCEDURE — 255N000002 HC RX 255 OP 636: Performed by: SURGERY

## 2024-01-23 PROCEDURE — 999N000065 XR ABDOMEN PORT 1 VIEW

## 2024-01-23 PROCEDURE — 272N000001 HC OR GENERAL SUPPLY STERILE: Performed by: STUDENT IN AN ORGANIZED HEALTH CARE EDUCATION/TRAINING PROGRAM

## 2024-01-23 PROCEDURE — 82805 BLOOD GASES W/O2 SATURATION: CPT | Performed by: SURGERY

## 2024-01-23 PROCEDURE — 88304 TISSUE EXAM BY PATHOLOGIST: CPT | Mod: 26 | Performed by: STUDENT IN AN ORGANIZED HEALTH CARE EDUCATION/TRAINING PROGRAM

## 2024-01-23 PROCEDURE — C1781 MESH (IMPLANTABLE): HCPCS | Performed by: STUDENT IN AN ORGANIZED HEALTH CARE EDUCATION/TRAINING PROGRAM

## 2024-01-23 PROCEDURE — 71045 X-RAY EXAM CHEST 1 VIEW: CPT | Mod: 26 | Performed by: RADIOLOGY

## 2024-01-23 PROCEDURE — 85396 CLOTTING ASSAY WHOLE BLOOD: CPT | Performed by: PHYSICIAN ASSISTANT

## 2024-01-23 PROCEDURE — 250N000024 HC ISOFLURANE, PER MIN: Performed by: STUDENT IN AN ORGANIZED HEALTH CARE EDUCATION/TRAINING PROGRAM

## 2024-01-23 PROCEDURE — 80053 COMPREHEN METABOLIC PANEL: CPT | Performed by: STUDENT IN AN ORGANIZED HEALTH CARE EDUCATION/TRAINING PROGRAM

## 2024-01-23 PROCEDURE — P9016 RBC LEUKOCYTES REDUCED: HCPCS

## 2024-01-23 PROCEDURE — 85730 THROMBOPLASTIN TIME PARTIAL: CPT | Performed by: SURGERY

## 2024-01-23 PROCEDURE — 272N000088 HC PUMP APP ADULT PERFUSION: Performed by: STUDENT IN AN ORGANIZED HEALTH CARE EDUCATION/TRAINING PROGRAM

## 2024-01-23 PROCEDURE — 83605 ASSAY OF LACTIC ACID: CPT

## 2024-01-23 PROCEDURE — 85027 COMPLETE CBC AUTOMATED: CPT | Performed by: SURGERY

## 2024-01-23 PROCEDURE — 410N000004: Performed by: STUDENT IN AN ORGANIZED HEALTH CARE EDUCATION/TRAINING PROGRAM

## 2024-01-23 PROCEDURE — 272N000085 HC PACK CELL SAVER CSP: Performed by: STUDENT IN AN ORGANIZED HEALTH CARE EDUCATION/TRAINING PROGRAM

## 2024-01-23 PROCEDURE — 83735 ASSAY OF MAGNESIUM: CPT

## 2024-01-23 PROCEDURE — C1894 INTRO/SHEATH, NON-LASER: HCPCS | Performed by: STUDENT IN AN ORGANIZED HEALTH CARE EDUCATION/TRAINING PROGRAM

## 2024-01-23 PROCEDURE — 82248 BILIRUBIN DIRECT: CPT

## 2024-01-23 PROCEDURE — 84100 ASSAY OF PHOSPHORUS: CPT | Performed by: STUDENT IN AN ORGANIZED HEALTH CARE EDUCATION/TRAINING PROGRAM

## 2024-01-23 PROCEDURE — 02VW3DZ RESTRICTION OF THORACIC AORTA, DESCENDING WITH INTRALUMINAL DEVICE, PERCUTANEOUS APPROACH: ICD-10-PCS | Performed by: SURGERY

## 2024-01-23 PROCEDURE — C1753 CATH, INTRAVAS ULTRASOUND: HCPCS | Performed by: STUDENT IN AN ORGANIZED HEALTH CARE EDUCATION/TRAINING PROGRAM

## 2024-01-23 PROCEDURE — 82805 BLOOD GASES W/O2 SATURATION: CPT | Performed by: ANESTHESIOLOGY

## 2024-01-23 PROCEDURE — C1763 CONN TISS, NON-HUMAN: HCPCS | Performed by: STUDENT IN AN ORGANIZED HEALTH CARE EDUCATION/TRAINING PROGRAM

## 2024-01-23 PROCEDURE — 2894A VOIDCORRECT: CPT | Mod: 24 | Performed by: ANESTHESIOLOGY

## 2024-01-23 PROCEDURE — 4A1239Z MONITORING OF CARDIAC OUTPUT, PERCUTANEOUS APPROACH: ICD-10-PCS | Performed by: STUDENT IN AN ORGANIZED HEALTH CARE EDUCATION/TRAINING PROGRAM

## 2024-01-23 PROCEDURE — C1887 CATHETER, GUIDING: HCPCS | Performed by: STUDENT IN AN ORGANIZED HEALTH CARE EDUCATION/TRAINING PROGRAM

## 2024-01-23 PROCEDURE — 410N000003 HC PER-PERFUSION 1ST 30 MIN: Performed by: STUDENT IN AN ORGANIZED HEALTH CARE EDUCATION/TRAINING PROGRAM

## 2024-01-23 PROCEDURE — 5A1221Z PERFORMANCE OF CARDIAC OUTPUT, CONTINUOUS: ICD-10-PCS | Performed by: STUDENT IN AN ORGANIZED HEALTH CARE EDUCATION/TRAINING PROGRAM

## 2024-01-23 PROCEDURE — 200N000002 HC R&B ICU UMMC

## 2024-01-23 DEVICE — GRAFT PATCH VASC XENOSURE BIOLOGIC 0.8X08CM E0.8P8: Type: IMPLANTABLE DEVICE | Site: SUBCLAVIAN | Status: FUNCTIONAL

## 2024-01-23 DEVICE — GELWEAVE GELATIN IMPREGNATED WOVEN VASCULAR PROSTHESIS STRAIGHT
Type: IMPLANTABLE DEVICE | Site: SUBCLAVIAN | Status: FUNCTIONAL
Brand: GELWEAVE™

## 2024-01-23 DEVICE — CTAG CMDS 37MMX37MMX10CM 22FR
Type: IMPLANTABLE DEVICE | Site: AORTA | Status: FUNCTIONAL
Brand: GORE TAG CONFORMABLE THORACIC STENT GRAFT

## 2024-01-23 DEVICE — BARD® PTFE FELT, 15.2 CM X 15.2 CM
Type: IMPLANTABLE DEVICE | Site: HEART | Status: FUNCTIONAL
Brand: BARD® PTFE FELT

## 2024-01-23 DEVICE — GELWEAVE GELATIN IMPREGNATED WOVEN VASCULAR PROSTHESIS STRAIGHT
Type: IMPLANTABLE DEVICE | Site: HEART | Status: FUNCTIONAL
Brand: GELWEAVE™

## 2024-01-23 RX ORDER — PROTAMINE SULFATE 10 MG/ML
INJECTION, SOLUTION INTRAVENOUS PRN
Status: DISCONTINUED | OUTPATIENT
Start: 2024-01-23 | End: 2024-01-23

## 2024-01-23 RX ORDER — HYDRALAZINE HYDROCHLORIDE 20 MG/ML
10 INJECTION INTRAMUSCULAR; INTRAVENOUS EVERY 30 MIN PRN
Status: DISCONTINUED | OUTPATIENT
Start: 2024-01-23 | End: 2024-01-27

## 2024-01-23 RX ORDER — ACETAMINOPHEN 325 MG/1
975 TABLET ORAL EVERY 8 HOURS
Status: DISCONTINUED | OUTPATIENT
Start: 2024-01-23 | End: 2024-01-25

## 2024-01-23 RX ORDER — CHLORHEXIDINE GLUCONATE ORAL RINSE 1.2 MG/ML
15 SOLUTION DENTAL EVERY 12 HOURS
Status: DISCONTINUED | OUTPATIENT
Start: 2024-01-23 | End: 2024-01-24

## 2024-01-23 RX ORDER — HEPARIN SODIUM 1000 [USP'U]/ML
INJECTION, SOLUTION INTRAVENOUS; SUBCUTANEOUS PRN
Status: DISCONTINUED | OUTPATIENT
Start: 2024-01-23 | End: 2024-01-23

## 2024-01-23 RX ORDER — POTASSIUM CHLORIDE 29.8 MG/ML
20 INJECTION INTRAVENOUS
Qty: 100 ML | Refills: 0 | Status: COMPLETED | OUTPATIENT
Start: 2024-01-23 | End: 2024-01-23

## 2024-01-23 RX ORDER — CALCIUM GLUCONATE 20 MG/ML
1 INJECTION, SOLUTION INTRAVENOUS
Status: DISPENSED | OUTPATIENT
Start: 2024-01-23 | End: 2024-01-29

## 2024-01-23 RX ORDER — ESMOLOL HYDROCHLORIDE 10 MG/ML
INJECTION INTRAVENOUS PRN
Status: DISCONTINUED | OUTPATIENT
Start: 2024-01-23 | End: 2024-01-23

## 2024-01-23 RX ORDER — HYDROMORPHONE HCL IN WATER/PF 6 MG/30 ML
0.4 PATIENT CONTROLLED ANALGESIA SYRINGE INTRAVENOUS
Status: DISCONTINUED | OUTPATIENT
Start: 2024-01-23 | End: 2024-01-30

## 2024-01-23 RX ORDER — PROCHLORPERAZINE MALEATE 5 MG
10 TABLET ORAL EVERY 6 HOURS PRN
Status: DISCONTINUED | OUTPATIENT
Start: 2024-01-23 | End: 2024-02-03

## 2024-01-23 RX ORDER — DEXTROSE MONOHYDRATE 50 MG/ML
10-20 INJECTION, SOLUTION INTRAVENOUS ONCE
Status: DISCONTINUED | OUTPATIENT
Start: 2024-01-23 | End: 2024-01-23

## 2024-01-23 RX ORDER — FIBRINOGEN (HUMAN) 700-1300MG
1.15 KIT INTRAVENOUS
Status: DISCONTINUED | OUTPATIENT
Start: 2024-01-23 | End: 2024-01-23

## 2024-01-23 RX ORDER — NOREPINEPHRINE BITARTRATE 0.06 MG/ML
.01-.4 INJECTION, SOLUTION INTRAVENOUS CONTINUOUS PRN
Status: DISCONTINUED | OUTPATIENT
Start: 2024-01-23 | End: 2024-01-23

## 2024-01-23 RX ORDER — NALOXONE HYDROCHLORIDE 0.4 MG/ML
0.4 INJECTION, SOLUTION INTRAMUSCULAR; INTRAVENOUS; SUBCUTANEOUS
Status: DISCONTINUED | OUTPATIENT
Start: 2024-01-23 | End: 2024-02-04 | Stop reason: HOSPADM

## 2024-01-23 RX ORDER — MAGNESIUM SULFATE HEPTAHYDRATE 40 MG/ML
2 INJECTION, SOLUTION INTRAVENOUS ONCE
Status: COMPLETED | OUTPATIENT
Start: 2024-01-23 | End: 2024-01-23

## 2024-01-23 RX ORDER — PROPOFOL 10 MG/ML
5-75 INJECTION, EMULSION INTRAVENOUS CONTINUOUS
Status: DISCONTINUED | OUTPATIENT
Start: 2024-01-23 | End: 2024-01-24

## 2024-01-23 RX ORDER — MAGNESIUM OXIDE 400 MG/1
400 TABLET ORAL EVERY 4 HOURS
Status: DISCONTINUED | OUTPATIENT
Start: 2024-01-23 | End: 2024-01-23

## 2024-01-23 RX ORDER — VASOPRESSIN IN 0.9 % NACL 2 UNIT/2ML
SYRINGE (ML) INTRAVENOUS PRN
Status: DISCONTINUED | OUTPATIENT
Start: 2024-01-23 | End: 2024-01-23

## 2024-01-23 RX ORDER — POLYETHYLENE GLYCOL 3350 17 G/17G
17 POWDER, FOR SOLUTION ORAL DAILY
Status: DISCONTINUED | OUTPATIENT
Start: 2024-01-24 | End: 2024-01-25

## 2024-01-23 RX ORDER — DEXTROSE MONOHYDRATE 50 MG/ML
10-20 INJECTION, SOLUTION INTRAVENOUS ONCE
Status: COMPLETED | OUTPATIENT
Start: 2024-01-23 | End: 2024-01-23

## 2024-01-23 RX ORDER — OXYCODONE HYDROCHLORIDE 5 MG/1
5 TABLET ORAL EVERY 4 HOURS PRN
Status: DISCONTINUED | OUTPATIENT
Start: 2024-01-23 | End: 2024-02-04 | Stop reason: HOSPADM

## 2024-01-23 RX ORDER — ACETAMINOPHEN 325 MG/1
650 TABLET ORAL EVERY 4 HOURS PRN
Status: DISCONTINUED | OUTPATIENT
Start: 2024-01-26 | End: 2024-01-25

## 2024-01-23 RX ORDER — HYDROMORPHONE HCL IN WATER/PF 6 MG/30 ML
0.2 PATIENT CONTROLLED ANALGESIA SYRINGE INTRAVENOUS
Status: DISCONTINUED | OUTPATIENT
Start: 2024-01-23 | End: 2024-01-30

## 2024-01-23 RX ORDER — LIDOCAINE HYDROCHLORIDE 20 MG/ML
INJECTION, SOLUTION INFILTRATION; PERINEURAL PRN
Status: DISCONTINUED | OUTPATIENT
Start: 2024-01-23 | End: 2024-01-23

## 2024-01-23 RX ORDER — HEPARIN SODIUM 5000 [USP'U]/.5ML
5000 INJECTION, SOLUTION INTRAVENOUS; SUBCUTANEOUS EVERY 8 HOURS
Status: DISCONTINUED | OUTPATIENT
Start: 2024-01-24 | End: 2024-01-24

## 2024-01-23 RX ORDER — PHENYLEPHRINE HCL IN 0.9% NACL 50MG/250ML
.1-4 PLASTIC BAG, INJECTION (ML) INTRAVENOUS CONTINUOUS PRN
Status: DISCONTINUED | OUTPATIENT
Start: 2024-01-23 | End: 2024-01-25

## 2024-01-23 RX ORDER — CALCIUM CHLORIDE 100 MG/ML
INJECTION INTRAVENOUS; INTRAVENTRICULAR PRN
Status: DISCONTINUED | OUTPATIENT
Start: 2024-01-23 | End: 2024-01-23

## 2024-01-23 RX ORDER — PROPOFOL 10 MG/ML
INJECTION, EMULSION INTRAVENOUS CONTINUOUS PRN
Status: DISCONTINUED | OUTPATIENT
Start: 2024-01-23 | End: 2024-01-23

## 2024-01-23 RX ORDER — NOREPINEPHRINE BITARTRATE 0.06 MG/ML
.01-.6 INJECTION, SOLUTION INTRAVENOUS CONTINUOUS
Status: DISCONTINUED | OUTPATIENT
Start: 2024-01-23 | End: 2024-01-25

## 2024-01-23 RX ORDER — NALOXONE HYDROCHLORIDE 0.4 MG/ML
0.2 INJECTION, SOLUTION INTRAMUSCULAR; INTRAVENOUS; SUBCUTANEOUS
Status: DISCONTINUED | OUTPATIENT
Start: 2024-01-23 | End: 2024-02-04 | Stop reason: HOSPADM

## 2024-01-23 RX ORDER — ONDANSETRON 2 MG/ML
4 INJECTION INTRAMUSCULAR; INTRAVENOUS EVERY 6 HOURS PRN
Status: DISCONTINUED | OUTPATIENT
Start: 2024-01-23 | End: 2024-02-04 | Stop reason: HOSPADM

## 2024-01-23 RX ORDER — BISACODYL 10 MG
10 SUPPOSITORY, RECTAL RECTAL DAILY PRN
Status: DISCONTINUED | OUTPATIENT
Start: 2024-01-23 | End: 2024-02-04 | Stop reason: HOSPADM

## 2024-01-23 RX ORDER — CEFAZOLIN SODIUM 2 G/100ML
2 INJECTION, SOLUTION INTRAVENOUS EVERY 8 HOURS
Qty: 300 ML | Refills: 0 | Status: COMPLETED | OUTPATIENT
Start: 2024-01-23 | End: 2024-01-24

## 2024-01-23 RX ORDER — DEXTROSE MONOHYDRATE 25 G/50ML
25-50 INJECTION, SOLUTION INTRAVENOUS
Status: DISCONTINUED | OUTPATIENT
Start: 2024-01-23 | End: 2024-02-04 | Stop reason: HOSPADM

## 2024-01-23 RX ORDER — SODIUM CHLORIDE, SODIUM GLUCONATE, SODIUM ACETATE, POTASSIUM CHLORIDE AND MAGNESIUM CHLORIDE 526; 502; 368; 37; 30 MG/100ML; MG/100ML; MG/100ML; MG/100ML; MG/100ML
INJECTION, SOLUTION INTRAVENOUS CONTINUOUS PRN
Status: DISCONTINUED | OUTPATIENT
Start: 2024-01-23 | End: 2024-01-23

## 2024-01-23 RX ORDER — OXYCODONE HYDROCHLORIDE 10 MG/1
10 TABLET ORAL EVERY 4 HOURS PRN
Status: DISCONTINUED | OUTPATIENT
Start: 2024-01-23 | End: 2024-02-04 | Stop reason: HOSPADM

## 2024-01-23 RX ORDER — DEXTROSE MONOHYDRATE 25 G/50ML
25-50 INJECTION, SOLUTION INTRAVENOUS
Status: DISCONTINUED | OUTPATIENT
Start: 2024-01-23 | End: 2024-01-23

## 2024-01-23 RX ORDER — IODIXANOL 320 MG/ML
INJECTION, SOLUTION INTRAVASCULAR PRN
Status: DISCONTINUED | OUTPATIENT
Start: 2024-01-23 | End: 2024-01-23 | Stop reason: HOSPADM

## 2024-01-23 RX ORDER — FENTANYL CITRATE 50 UG/ML
INJECTION, SOLUTION INTRAMUSCULAR; INTRAVENOUS PRN
Status: DISCONTINUED | OUTPATIENT
Start: 2024-01-23 | End: 2024-01-23

## 2024-01-23 RX ORDER — PANTOPRAZOLE SODIUM 40 MG/1
40 TABLET, DELAYED RELEASE ORAL DAILY
Status: DISCONTINUED | OUTPATIENT
Start: 2024-01-23 | End: 2024-01-23

## 2024-01-23 RX ORDER — PROPOFOL 10 MG/ML
INJECTION, EMULSION INTRAVENOUS PRN
Status: DISCONTINUED | OUTPATIENT
Start: 2024-01-23 | End: 2024-01-23

## 2024-01-23 RX ORDER — AMOXICILLIN 250 MG
1 CAPSULE ORAL 2 TIMES DAILY
Status: DISCONTINUED | OUTPATIENT
Start: 2024-01-23 | End: 2024-01-25

## 2024-01-23 RX ORDER — DEXTROSE MONOHYDRATE 100 MG/ML
INJECTION, SOLUTION INTRAVENOUS CONTINUOUS PRN
Status: DISCONTINUED | OUTPATIENT
Start: 2024-01-23 | End: 2024-02-04 | Stop reason: HOSPADM

## 2024-01-23 RX ORDER — ASPIRIN 81 MG/1
81 TABLET, CHEWABLE ORAL DAILY
Status: DISCONTINUED | OUTPATIENT
Start: 2024-01-24 | End: 2024-02-04 | Stop reason: HOSPADM

## 2024-01-23 RX ORDER — HYDROMORPHONE HYDROCHLORIDE 4 MG/ML
INJECTION, SOLUTION INTRAMUSCULAR; INTRAVENOUS; SUBCUTANEOUS PRN
Status: DISCONTINUED | OUTPATIENT
Start: 2024-01-23 | End: 2024-01-23

## 2024-01-23 RX ORDER — NICOTINE POLACRILEX 4 MG
15-30 LOZENGE BUCCAL
Status: DISCONTINUED | OUTPATIENT
Start: 2024-01-23 | End: 2024-01-23

## 2024-01-23 RX ORDER — ONDANSETRON 4 MG/1
4 TABLET, ORALLY DISINTEGRATING ORAL EVERY 6 HOURS PRN
Status: DISCONTINUED | OUTPATIENT
Start: 2024-01-23 | End: 2024-02-04 | Stop reason: HOSPADM

## 2024-01-23 RX ORDER — CALCIUM GLUCONATE 20 MG/ML
2 INJECTION, SOLUTION INTRAVENOUS
Status: ACTIVE | OUTPATIENT
Start: 2024-01-23 | End: 2024-01-29

## 2024-01-23 RX ORDER — NICOTINE POLACRILEX 4 MG
15-30 LOZENGE BUCCAL
Status: DISCONTINUED | OUTPATIENT
Start: 2024-01-23 | End: 2024-02-04 | Stop reason: HOSPADM

## 2024-01-23 RX ORDER — EPINEPHRINE 0.1 MG/ML
INJECTION INTRAVENOUS PRN
Status: DISCONTINUED | OUTPATIENT
Start: 2024-01-23 | End: 2024-01-23

## 2024-01-23 RX ORDER — POTASSIUM CHLORIDE 29.8 MG/ML
20 INJECTION INTRAVENOUS ONCE
Status: COMPLETED | OUTPATIENT
Start: 2024-01-23 | End: 2024-01-23

## 2024-01-23 RX ORDER — DEXMEDETOMIDINE HYDROCHLORIDE 4 UG/ML
.2-.7 INJECTION, SOLUTION INTRAVENOUS CONTINUOUS
Status: DISCONTINUED | OUTPATIENT
Start: 2024-01-23 | End: 2024-01-25

## 2024-01-23 RX ADMIN — Medication 20 MG: at 10:59

## 2024-01-23 RX ADMIN — PROTHROMBIN, COAGULATION FACTOR VII HUMAN, COAGULATION FACTOR IX HUMAN, COAGULATION FACTOR X HUMAN, PROTEIN C, PROTEIN S HUMAN, AND WATER 1078 UNITS: KIT at 15:49

## 2024-01-23 RX ADMIN — PROPOFOL 50 MCG/KG/MIN: 10 INJECTION, EMULSION INTRAVENOUS at 16:44

## 2024-01-23 RX ADMIN — CEFAZOLIN SODIUM 2 G: 2 INJECTION, SOLUTION INTRAVENOUS at 21:01

## 2024-01-23 RX ADMIN — HYDRALAZINE HYDROCHLORIDE 100 MG: 25 TABLET, FILM COATED ORAL at 04:00

## 2024-01-23 RX ADMIN — HYDRALAZINE HYDROCHLORIDE 20 MG: 20 INJECTION INTRAMUSCULAR; INTRAVENOUS at 02:00

## 2024-01-23 RX ADMIN — PROTAMINE SULFATE 10 MG: 10 INJECTION, SOLUTION INTRAVENOUS at 15:27

## 2024-01-23 RX ADMIN — SODIUM CHLORIDE 7.5 MG/HR: 9 INJECTION, SOLUTION INTRAVENOUS at 00:41

## 2024-01-23 RX ADMIN — CALCIUM CHLORIDE INJECTION 1 G: 100 INJECTION, SOLUTION INTRAVENOUS at 15:43

## 2024-01-23 RX ADMIN — Medication 300 MCG/KG/MIN: at 00:38

## 2024-01-23 RX ADMIN — EPINEPHRINE 0.03 MCG/KG/MIN: 1 INJECTION INTRAMUSCULAR; INTRAVENOUS; SUBCUTANEOUS at 14:01

## 2024-01-23 RX ADMIN — SUGAMMADEX 200 MG: 100 INJECTION, SOLUTION INTRAVENOUS at 16:59

## 2024-01-23 RX ADMIN — EPINEPHRINE 20 MCG: 0.1 INJECTION INTRACARDIAC; INTRAVENOUS at 14:05

## 2024-01-23 RX ADMIN — NOREPINEPHRINE BITARTRATE 6.4 MCG: 1 INJECTION, SOLUTION, CONCENTRATE INTRAVENOUS at 11:14

## 2024-01-23 RX ADMIN — NOREPINEPHRINE BITARTRATE 1.6 MCG: 1 INJECTION, SOLUTION, CONCENTRATE INTRAVENOUS at 11:11

## 2024-01-23 RX ADMIN — POTASSIUM CHLORIDE 20 MEQ: 29.8 INJECTION, SOLUTION INTRAVENOUS at 03:18

## 2024-01-23 RX ADMIN — METHYLENE BLUE 0.5 MG/KG/HR: 5 INJECTION INTRAVENOUS at 16:40

## 2024-01-23 RX ADMIN — NOREPINEPHRINE BITARTRATE 3.2 MCG: 1 INJECTION, SOLUTION, CONCENTRATE INTRAVENOUS at 11:13

## 2024-01-23 RX ADMIN — SODIUM CHLORIDE 15 MG/HR: 9 INJECTION, SOLUTION INTRAVENOUS at 06:09

## 2024-01-23 RX ADMIN — Medication 2 G: at 13:03

## 2024-01-23 RX ADMIN — DEXTROSE MONOHYDRATE 10 ML: 50 INJECTION, SOLUTION INTRAVENOUS at 16:38

## 2024-01-23 RX ADMIN — VANCOMYCIN HYDROCHLORIDE 1500 MG: 10 INJECTION, POWDER, LYOPHILIZED, FOR SOLUTION INTRAVENOUS at 18:23

## 2024-01-23 RX ADMIN — ANGIOTENSIN II 20 NG/KG/MIN: 2.5 INJECTION INTRAVENOUS at 13:57

## 2024-01-23 RX ADMIN — SENNOSIDES AND DOCUSATE SODIUM 1 TABLET: 8.6; 5 TABLET ORAL at 19:42

## 2024-01-23 RX ADMIN — EPINEPHRINE 20 MCG: 0.1 INJECTION INTRACARDIAC; INTRAVENOUS at 14:01

## 2024-01-23 RX ADMIN — Medication 2 UNITS: at 13:40

## 2024-01-23 RX ADMIN — DEXMEDETOMIDINE HYDROCHLORIDE 0.6 MCG/KG/HR: 4 INJECTION, SOLUTION INTRAVENOUS at 02:47

## 2024-01-23 RX ADMIN — Medication 0.5 UNITS: at 11:26

## 2024-01-23 RX ADMIN — PROPOFOL 40 MG: 10 INJECTION, EMULSION INTRAVENOUS at 08:08

## 2024-01-23 RX ADMIN — FENTANYL CITRATE 250 MCG: 50 INJECTION INTRAMUSCULAR; INTRAVENOUS at 08:08

## 2024-01-23 RX ADMIN — HYDROMORPHONE HYDROCHLORIDE 1 MG: 4 INJECTION, SOLUTION INTRAMUSCULAR; INTRAVENOUS; SUBCUTANEOUS at 16:10

## 2024-01-23 RX ADMIN — Medication 300 MCG/KG/MIN: at 06:09

## 2024-01-23 RX ADMIN — SODIUM CHLORIDE, SODIUM GLUCONATE, SODIUM ACETATE, POTASSIUM CHLORIDE AND MAGNESIUM CHLORIDE: 526; 502; 368; 37; 30 INJECTION, SOLUTION INTRAVENOUS at 07:54

## 2024-01-23 RX ADMIN — FENTANYL CITRATE 100 MCG: 50 INJECTION INTRAMUSCULAR; INTRAVENOUS at 15:29

## 2024-01-23 RX ADMIN — NOREPINEPHRINE BITARTRATE 0.05 MCG/KG/MIN: 0.06 INJECTION, SOLUTION INTRAVENOUS at 11:22

## 2024-01-23 RX ADMIN — NOREPINEPHRINE BITARTRATE 6.4 MCG: 1 INJECTION, SOLUTION, CONCENTRATE INTRAVENOUS at 13:29

## 2024-01-23 RX ADMIN — Medication 0.5 UNITS/HR: at 11:30

## 2024-01-23 RX ADMIN — SODIUM CHLORIDE, POTASSIUM CHLORIDE, SODIUM LACTATE AND CALCIUM CHLORIDE 500 ML: 600; 310; 30; 20 INJECTION, SOLUTION INTRAVENOUS at 21:29

## 2024-01-23 RX ADMIN — Medication 1 UNITS: at 13:33

## 2024-01-23 RX ADMIN — LIDOCAINE HYDROCHLORIDE 80 MG: 20 INJECTION, SOLUTION INFILTRATION; PERINEURAL at 08:08

## 2024-01-23 RX ADMIN — CHLORHEXIDINE GLUCONATE 15 ML: 1.2 SOLUTION ORAL at 19:42

## 2024-01-23 RX ADMIN — MAGNESIUM SULFATE HEPTAHYDRATE 2 G: 40 INJECTION, SOLUTION INTRAVENOUS at 06:05

## 2024-01-23 RX ADMIN — HEPARIN SODIUM 5000 UNITS: 1000 INJECTION INTRAVENOUS; SUBCUTANEOUS at 10:17

## 2024-01-23 RX ADMIN — Medication 30 MG: at 09:47

## 2024-01-23 RX ADMIN — MIDAZOLAM 2 MG: 1 INJECTION INTRAMUSCULAR; INTRAVENOUS at 07:50

## 2024-01-23 RX ADMIN — MIDAZOLAM 2 MG: 1 INJECTION INTRAMUSCULAR; INTRAVENOUS at 08:08

## 2024-01-23 RX ADMIN — POTASSIUM CHLORIDE 20 MEQ: 29.8 INJECTION, SOLUTION INTRAVENOUS at 02:16

## 2024-01-23 RX ADMIN — Medication 2 G: at 09:04

## 2024-01-23 RX ADMIN — SODIUM CHLORIDE 7.5 G: 900 INJECTION, SOLUTION INTRAVENOUS at 08:46

## 2024-01-23 RX ADMIN — NOREPINEPHRINE BITARTRATE 6.4 MCG: 1 INJECTION, SOLUTION, CONCENTRATE INTRAVENOUS at 11:23

## 2024-01-23 RX ADMIN — METHYLENE BLUE 50 MG: 5 INJECTION INTRAVENOUS at 16:38

## 2024-01-23 RX ADMIN — NOREPINEPHRINE BITARTRATE 3.2 MCG: 1 INJECTION, SOLUTION, CONCENTRATE INTRAVENOUS at 11:12

## 2024-01-23 RX ADMIN — POTASSIUM CHLORIDE 20 MEQ: 29.8 INJECTION, SOLUTION INTRAVENOUS at 05:07

## 2024-01-23 RX ADMIN — HEPARIN SODIUM 7000 UNITS: 1000 INJECTION INTRAVENOUS; SUBCUTANEOUS at 09:33

## 2024-01-23 RX ADMIN — NOREPINEPHRINE BITARTRATE 1.6 MCG: 1 INJECTION, SOLUTION, CONCENTRATE INTRAVENOUS at 11:10

## 2024-01-23 RX ADMIN — Medication 50 MCG/HR: at 18:17

## 2024-01-23 RX ADMIN — EPINEPHRINE 10 MCG: 0.1 INJECTION INTRACARDIAC; INTRAVENOUS at 13:51

## 2024-01-23 RX ADMIN — AMINOCAPROIC ACID 1 G/HR: 250 INJECTION, SOLUTION INTRAVENOUS at 09:47

## 2024-01-23 RX ADMIN — FENTANYL CITRATE 150 MCG: 50 INJECTION INTRAMUSCULAR; INTRAVENOUS at 15:50

## 2024-01-23 RX ADMIN — ESMOLOL HYDROCHLORIDE 20 MG: 10 INJECTION, SOLUTION INTRAVENOUS at 08:01

## 2024-01-23 RX ADMIN — DEXTROSE MONOHYDRATE 20 ML: 50 INJECTION, SOLUTION INTRAVENOUS at 21:01

## 2024-01-23 RX ADMIN — ACETAMINOPHEN 975 MG: 325 TABLET, FILM COATED ORAL at 19:43

## 2024-01-23 RX ADMIN — Medication 100 MG: at 08:08

## 2024-01-23 RX ADMIN — PROPOFOL 30 MCG/KG/MIN: 10 INJECTION, EMULSION INTRAVENOUS at 19:43

## 2024-01-23 RX ADMIN — Medication 1 UNITS: at 13:27

## 2024-01-23 RX ADMIN — PROTAMINE SULFATE 140 MG: 10 INJECTION, SOLUTION INTRAVENOUS at 15:29

## 2024-01-23 RX ADMIN — HEPARIN SODIUM 18000 UNITS: 1000 INJECTION INTRAVENOUS; SUBCUTANEOUS at 10:57

## 2024-01-23 ASSESSMENT — ACTIVITIES OF DAILY LIVING (ADL)
ADLS_ACUITY_SCORE: 26
ADLS_ACUITY_SCORE: 26
ADLS_ACUITY_SCORE: 28
ADLS_ACUITY_SCORE: 26
ADLS_ACUITY_SCORE: 28

## 2024-01-23 NOTE — ANESTHESIA CARE TRANSFER NOTE
Patient: Valdo Lyons    Procedure: Procedure(s):  MEDIAN STERNOTOMY, RIGHT AXILLARY CUTDOWN, ON CARDIOPULMONARY BYPASS (CIRCULATORY ARREST), ASCENDING AORTA ANEURYSM REPAIR (Gelweave 30mm), INTRAOPERATIVE TRANSESOPHAGEAL ECHOCARDIOGRAM BY ANESTHESIA  Antegrade Thoracic Endovascular Aortic Repair (TEVAR) with [place] Conformable Thoracic Stent Graft 37mm x 10cm. Intravascular Ultrasound (IVUS), Aortography, Right common femoral access       Diagnosis: Dissection of aorta, unspecified portion of aorta (H) [I71.00]  Diagnosis Additional Information: No value filed.    Anesthesia Type:   General     Note:    Oropharynx: ventilatory support, endotracheal tube in place and oral gastic tube in place  Level of Consciousness: iatrogenic sedation      Independent Airway: airway patency not satisfactory and stable  Dentition: dentition unchanged  Vital Signs Stable: post-procedure vital signs reviewed and stable  Report to RN Given: handoff report given  Patient transferred to: ICU    ICU Handoff: Call for PAUSE to initiate/utilize ICU HANDOFF, Identified Patient, Identified Responsible Provider, Reviewed the Pertinent Medical History, Discussed Surgical Course, Reviewed Intra-OP Anesthesia Management and Issues during Anesthesia, Set Expectations for Post Procedure Period and Allowed Opportunity for Questions and Acknowledgement of Understanding      Vitals:  Vitals Value Taken Time   /65    Temp     Pulse 73 01/23/24 1716   Resp 17 01/23/24 1716   SpO2 100 % 01/23/24 1715   Vitals shown include unfiled device data.    Electronically Signed By: Myra Villalobos MD  January 23, 2024  5:16 PM

## 2024-01-23 NOTE — BRIEF OP NOTE
"[facility]    Brief Operative Note    Pre-operative diagnosis: Dissection of aorta, unspecified portion of aorta (H) [I71.00]  Post-operative diagnosis Same as pre-operative diagnosis    Procedure: MEDIAN STERNOTOMY, RIGHT AXILLARY CUTDOWN, ON CARDIOPULMONARY BYPASS (CIRCULATORY ARREST), ASCENDING AORTA ANEURYSM REPAIR (Gelweave 30mm), INTRAOPERATIVE TRANSESOPHAGEAL ECHOCARDIOGRAM BY ANESTHESIA, N/A - Chest  Antegrade Thoracic Endovascular Aortic Repair (TEVAR) with [place] Conformable Thoracic Stent Graft 37mm x 10cm. Intravascular Ultrasound (IVUS), Aortography, Right common femoral access, N/A - Abdomen    Surgeon: Surgeon(s) and Role:  Panel 1:     * Addi Shaw MD - Primary     * Yazmin Crandall PA-C - Assisting     * Jose Aviles MD - Fellow - Assisting  Panel 2:     * Artur Singh MD - Primary     * Steven Rawls MD - Resident - Assisting     * Jorge Dodge MD - Resident - Assisting  Anesthesia: General   Estimated Blood Loss: 1L    Drains: 3 mediastinal, one right pleural  Specimens:   ID Type Source Tests Collected by Time Destination   1 : Ascending Aorta Aneurysm Tissue Aorta SURGICAL PATHOLOGY EXAM Addi Shaw MD 1/23/2024 12:47 PM      Findings:   Hematoma and dissection of ascending aorta into arch, replaced with hemiarch 30mm graft under circ arrest with deployment of antegrade TEVAR by vascular team under circ arrest .  Complications: None.  Implants:   Implant Name Type Inv. Item Serial No.  Lot No. LRB No. Used Action   GELWEAVE GELATIN IMPREGNATED WOVEN VASCULAR PROSTHESIS 8MM X 30CM Graft  [id number]  [id number] N/A 1 Implanted   GRAFT PATCH VASC XENOSURE BIOLOGIC 0.8X08CM E0.8P8 - [id number]  GRAFT PATCH VASC XENOSURE BIOLOGIC 0.8X08CM E0.8P8  [facility] VASCULAR IN [id number] Right 1 Implanted   GRAFT PTFE FELT 6X6\" [id number] - [id number] Graft GRAFT PTFE FELT 6X6\" [id number]  [employer] [id number] " N/A 1 Implanted   GRAFT STNT 10CM 37MM GORE TAG THOR ACT CNTRL CNFRM - [id number] Graft GRAFT STNT 10CM 37MM GORE TAG THOR ACT CNTRL CNFRM [id number] [employer]  N/A 1 Implanted   GRAFT VASCUTEK GELWEAVE STRAIGHT [id number] - [id number] Graft GRAFT VASCUTEK GELWEAVE STRAIGHT [id number] VASCUTEK [id number] N/A 1 Implanted         Signed:    Jose Aviles MD 1/23/2024 at 4:42 PM  Cardiothoracic Surgery Fellow  Pager: [phone]

## 2024-01-23 NOTE — PROGRESS NOTES
VASCULAR SURGERY PROGRESS NOTE    Subjective:  NAEON. On max dose nicardipine and esmolol as on/off nitroprusside gtt to keep MAP <120 and HR <70. He denies any pain or discomfort this morning. Pending OR this morning.    Objective:  Intake/Output Summary (Last 24 hours) at 1/23/2024 1204  Last data filed at 1/23/2024 1117  Gross per 24 hour   Intake 2698.99 ml   Output 8965 ml   Net -6266.01 ml     Labs:  ROUTINE IP LABS (Last four results)  BMP  Recent Labs   Lab 01/23/24  1130 01/23/24  1129 01/23/24  1103 01/23/24  0940 01/23/24  0856 01/23/24  0354 01/23/24  0005 01/22/24  1823 01/22/24  0844 01/22/24  0307 01/21/24  1833    141 140 137   < > 139  --  137  --  135 135   POTASSIUM 3.6 3.6 3.7 3.5   < > 3.6   < > 3.3*   < > 3.4 3.6   CHLORIDE  --   --   --   --   --  108*  --  108*  --  108* 107   YARON  --   --   --   --   --  7.8*  --  8.1*  --  7.9* 8.1*   CO2  --   --   --   --   --  19*  --  19*  --  19* 18*   BUN  --   --   --   --   --  21.4*  --  24.7*  --  22.6* 22.2*   CR  --   --   --   --   --  1.65*  --  1.72*  --  1.65* 1.62*   * 101* 98 90   < > 84  --  92   < > 91 122*    < > = values in this interval not displayed.     CBC  Recent Labs   Lab 01/23/24  1130 01/23/24  1129 01/23/24  1103 01/23/24  0940 01/23/24  0856 01/23/24  0354 01/22/24  0307 01/21/24  0339 01/20/24  0357   WBC  --   --   --   --   --  9.0 8.8 10.8 12.5*   RBC  --   --   --   --   --  4.66 4.60 5.06 5.14   HGB 8.7* 8.4* 8.4* 8.8*   < > 9.0* 9.0* 9.9* 10.3*   HCT  --   --   --   --   --  27.7* 28.7* 30.7* 31.1*   MCV  --   --   --   --   --  59* 62* 61* 61*   MCH  --   --   --   --   --  19.3* 19.6* 19.6* 20.0*   MCHC  --   --   --   --   --  32.5 31.4* 32.2 33.1   RDW  --   --   --   --   --  15.9* 16.5* 15.8* 15.9*   PLT  --   --   --   --   --  247 229 265 240    < > = values in this interval not displayed.     INR  Recent Labs   Lab 01/23/24  0354 01/22/24  0307 01/21/24  0339 01/20/24 0357   INR 1.12 1.17* 1.21*  "1.13     PHYSICAL EXAM:  /51   Pulse 64   Temp 97.4  F (36.3  C) (Axillary)   Resp 18   Ht 1.727 m (5' 8\")   Wt 97.8 kg (215 lb 9.8 oz)   SpO2 93%   BMI 32.78 kg/m    General: The patient is awake and alert, NAD  Psych: Pleasant affect, answers questions appropriately  Skin: Color appropriate for race, warm, dry.  Neuro: Sensorimotor intact in BUE and BLE, CN II-XII grossly intact  Respiratory: The patient does require 2L NC supplemental oxygen. Breathing unlabored  GI:  Abdomen soft, nontender to light palpation.  Extremities: Bilateral DP pulses palpable,  no edema noted.     Temperature normal   Sensory function: normal   Flexion (plantar/dorsi): normal range     DATA:   US Renal Complete w Arterial Duplex   Final Result   Impression:   1. Normal grayscale and color Doppler evaluation of the kidneys.   2. Large gradient of the peak velocities between the suprarenal and   infrarenal abdominal aorta which is likely related to the aortic   dissection flap in the upper abdominal aorta.  After comparing with   the CT from 1/17/2024, it seems as though the scan was likely of the   true lumen in the upper abdomen.  This finding is of unknown clinical   significance.    3. Right pleural effusion.      I have personally reviewed the examination and initial interpretation   and I agree with the findings.      VANDANA HERRERA MD            SYSTEM ID:  [id number]      CTA CHEST ABDOMEN PELVIS WITH CONTRAST PANEL   Final Result   IMPRESSION:   1. Aortic dissection extends from the left subclavian origin to the   supraceliac aorta. Dissection extends through the visualized left   subclavian artery. Left vertebral artery is thought to originate from   the true lumen. Left internal thoracic artery originates from the true   lumen.      2. Non specific fluid along the ascending aorta to arch.      3. 7 mm right upper lobe nodule. Per Fleischner Society   Recommendations, if the patient is at low risk of lung " cancer, further   evaluation with CT in 6-12 months is suggested with optional CT in   18-24 months. If the patient is at high risk for lung cancer, further   evaluation with CT in 6-12 months and CT at 18-24 months is suggested.      LIZBETH BEACH MD            SYSTEM ID:  [id number]      XR Chest Port 1 View   Final Result   IMPRESSION:   1.  Right upper extremity PICC tip projects over the superior cava   junction.   2.  Stable cardiomediastinal silhouette.   3.  Streaky bibasilar opacities, right more than left may represent   atelectasis versus pulmonary edema/atypical infection.      I have personally reviewed the examination and initial interpretation   and I agree with the findings.      DEANNE FOWLER MD            SYSTEM ID:  [id number]      XR Chest Port 1 View   Final Result   Impression:    1. Mild widening of the mediastinum compared to prior x-ray 6/20/2022,   consistent with patient's history of known type B aortic dissection   seen on outside imaging (imaging unavailable).   2. No focal consolidations. There is bibasilar atelectasis.   3. No significant pleural effusion, no discernible pneumothorax.      I have personally reviewed the examination and initial interpretation   and I agree with the findings.      GHISLAINE BOWEN MD            SYSTEM ID:  [id number]      Echocardiogram Complete   Final Result      IR OR Angiogram    (Results Pending)   Echo WILLIAM - OR    (Results Pending)   XR Surgery HINA Fluoro Less Than 5 Min    (Results Pending)       ASSESSMENT / PLAN:  Patient is a 48M who presented to [facility] [place] [place] on 1/12 with acute type B aortic dissection from the origin of the left subclavian to the diaphragm just superior to the celiac axis.Due to worsening back pain and increased variation in BP from arterial lines in his right and left hands, repeat CT scan was done which showed Type A dissection with a thrombosed retrograde false lumen, so he was transferred to [facility]. He continues to remain asymptomatic  from a dissection standpoint. Repeat CTA on 1/17 stable. Plan for OR today..    - Continue IV and PO regimen for SBP and HR control with goals <120 and <70.  - To OR today: combined cardiac/vascular procedure (ascending/hemiarch repair with TEVAR) 1/23/23  - NPO    Seen and discussed pt history, exam, assessment and plan with Dr. Singh of the vascular surgery service, who is in agreement with the above.    Candie Parkinson, [facility]  Vascular Surgery  Pager: [phone]  [email or web address]  Send message or 10 digit call back number Securely via [employer] with the [employer] Web Console (learn more here)

## 2024-01-23 NOTE — PLAN OF CARE
Major Shift Events:    Agitation has been decreased. Precedex off @ 1100. Still impulsive but redirectable. AxO X4 but doesn't always understand situations ie when he spilled water the necessity to stay in bed until a green sheet was located. Sitter remains due to impulsivity and speed with impulsivity.  Sys goal< 120, MAP>65. Nicardipine weaned to 7.5, esmolol remains 300, Nipride off. Oral regiment rearranged. 2 L NC for comfort and when sleeping, sats mid 90s when awake  Ate lunch. Two very large continent bowel movements.  Lasix given X2, good output.  Plan: Npo midnight, dissection in morning @0800.   For vital signs and complete assessments, please see documentation flowsheets.

## 2024-01-23 NOTE — ANESTHESIA POSTPROCEDURE EVALUATION
Patient: Valdo Lyons    Procedure: Procedure(s):  MEDIAN STERNOTOMY, RIGHT AXILLARY CUTDOWN, ON CARDIOPULMONARY BYPASS (CIRCULATORY ARREST), ASCENDING AORTA ANEURYSM REPAIR ([place] 30mm), INTRAOPERATIVE TRANSESOPHAGEAL ECHOCARDIOGRAM BY ANESTHESIA  Antegrade Thoracic Endovascular Aortic Repair (TEVAR) with [place] Conformable Thoracic Stent Graft 37mm x 10cm. Intravascular Ultrasound (IVUS), Aortography, Right common femoral access       Anesthesia Type:  General    Note:  Disposition: Inpatient; ICU            ICU Sign Out: Anesthesiologist/ICU physician sign out WAS performed   Postop Pain Control: Uneventful            Sign Out: Well controlled pain   PONV: No   Neuro/Psych: Uneventful            Sign Out: Acceptable/Baseline neuro status   Airway/Respiratory: Uneventful            Sign Out: AIRWAY IN SITU/Resp. Support               Airway in situ/Resp. Support: ETT                 Reason: Planned Pre-op   CV/Hemodynamics: Uneventful            Sign Out: Acceptable CV status; No obvious hypovolemia; No obvious fluid overload   Other NRE: NONE   DID A NON-ROUTINE EVENT OCCUR? No           Last vitals:  Vitals:    01/23/24 0645 01/23/24 0656 01/23/24 0700   BP:      Pulse: 66 65 64   Resp:      Temp:      SpO2: 93% 94% 93%       Electronically Signed By: Steve Ballard MD  January 23, 2024  5:19 PM

## 2024-01-23 NOTE — ANESTHESIA PROCEDURE NOTES
Perioperative WILLIAM Procedure Note    Staff -        Anesthesiologist:  Carlotta Ratliff MD       Performed By: anesthesiologist  Preanesthesia Checklist:  Patient identified, IV assessed, risks and benefits discussed, monitors and equipment assessed, procedure being performed at surgeon's request and anesthesia consent obtained.    WILLIAM Probe Insertion  Probe Number: x8  Probe Status PRE Insertion: NO obvious damage  Probe type:  Adult 3D  Bite block used:   Molar  Insertion Technique: Jaw Lift  Insertion complications: None obvious  Billing Report:WILLIAM report by Anesthesiologist (See Separate Report note)  Probe Status POST Removal: NO obvious damage    WILLIAM Report  General Procedure Information  Images for this study have been archived.  Modalities: 2D, CW Doppler, PW Doppler and Color flow mapping  Diagnostic indications for WILLIAM:   Thoracic aorta dissection  Echocardiographic and Doppler Measurements  Right Ventricle:  Cavity size normal.    Hypertrophy not present.   Thrombus not present.    Global function normal.     Left Ventricle:  Cavity size normal.    Hypertrophy present.   Thrombus not present.   Global Function normal.   Ejection Fraction 55%.      Ventricular Regional Function:  1- Basal Anteroseptal:  normal  2- Basal Anterior:  normal  3- Basal Anterolateral:  normal  4- Basal Inferolateral:  normal  5- Basal Inferior:  normal  6- Basal Inferoseptal:  normal  7- Mid Anteroseptal:  normal  8- Mid Anterior:  normal  9- Mid Anterolateral:  normal  10- Mid Inferolateral:  normal  11- Mid Inferior:  normal  12- Mid Inferoseptal:  normal  13- Apical Anterior:  normal  14- Apical Lateral:  normal  15- Apical Inferior:  normal  16- Apical Septal:  normal  17- [place]:  normal    Valves  Aortic Valve: Annulus normal.  Stenosis not present.  Regurgitation absent.  Leaflets normal.  Leaflet motions normal.    Mitral Valve: Annulus normal.  Stenosis not present.  Regurgitation absent.  Leaflets normal.  Leaflet  motions normal.    Tricuspid Valve: Annulus normal.  Stenosis not present.  Regurgitation +1.  Leaflets normal.  Leaflet motions normal.    Pulmonic Valve: Annulus normal.  Stenosis not present.  Regurgitation absent.      Aorta: Ascending Aorta: Size normal.  Dissection not present.  Plaque thickness less than 3 mm.  Mobile plaque not present.    Aortic Arch: Size normal.    Dissection present.    Mobile plaque not present.    Descending Aorta: Size normal.    Dissection present.    Mobile plaque not present.      Right Atrium:  Size normal.   Spontaneous echo contrast not present.   Thrombus not present.   Tumor not present.   Device not present.     Left Atrium: Size normal.  Spontaneous echo contrast not present.  Thrombus not present.  Tumor not present.  Device not present.    Left atrial appendage normal.     Atrial Septum: Intra-atrial septal morphology normal.       Ventricular Septum: Intra-ventricular septum morphology normal.        Other Findings:   Pericardium:  pericardial effusion. Pleural Effusion:  left.   Cornoary sinus catheter present.    Post Intervention Findings  Procedure(s) performed:  Aortic Arch Repair (Hemiarch replacement and thoracic aortic stent graft). Global function:  Unchanged. Regional wall motion: Unchanged. Surgeon(s) notified of all postintervention findings: Yes.      Aortic arch:  Aortic arch repaired with graft.       Post Intervention comments: Post CPB: Biventricular function unchanged, Trace MR, mild TR, no AI post hemiarch replacement and thoracic stent graft. Dissection flap remained in descending aorta. Surgeon notified of all findings..    Echocardiogram Comments  Echocardiogram comments: Pre CPB: Normal biventricular function, mild TR, trace MR, no AI.  No PFO by CFD.  Aortic dissection flap noted in distal aortic arch extending into descending aorta.  Thrombosis noted in distal arch.  No dissection flap noted in ascending aorta. Small left pleural effusion. Surgeon  notified of all findings in real time..

## 2024-01-23 NOTE — ANESTHESIA PROCEDURE NOTES
Airway       Patient location during procedure: OR       Procedure Start/Stop Times: 1/23/2024 8:11 AM  Staff -        Anesthesiologist:  Carlotta Ratliff MD       Resident/Fellow: Myra Villalobos MD       Performed By: resident  Consent for Airway        Urgency: elective  Indications and Patient Condition       Indications for airway management: jhoana-procedural       Induction type:intravenous       Mask difficulty assessment: 2 - vent by mask + OA or adjuvant +/- NMBA    Final Airway Details       Final airway type: endotracheal airway       Successful airway: ETT - single  Endotracheal Airway Details        ETT size (mm): 8.0       Cuffed: yes       Successful intubation technique: direct laryngoscopy       DL Blade Type: MAC 4       Grade View of Cords: 1       Adjucts: stylet       Position: Right       Measured from: gums/teeth       Secured at (cm): 23       Bite block used: None    Post intubation assessment        Placement verified by: capnometry, equal breath sounds and chest rise        Number of attempts at approach: 1       Number of other approaches attempted: 0       Secured with: pink tape       Ease of procedure: easy       Dentition: Intact and Unchanged    Medication(s) Administered   Medication Administration Time: 1/23/2024 8:11 AM

## 2024-01-23 NOTE — ANESTHESIA PROCEDURE NOTES
Central Line/PA Catheter Placement    Pre-Procedure   Staff -        Anesthesiologist:  Carlotta Ratliff MD       Resident/Fellow: Myra Villalobos MD       Performed By: resident       Location: OR       Pre-Anesthestic Checklist: patient identified, IV checked, site marked, risks and benefits discussed, informed consent, monitors and equipment checked, pre-op evaluation and at physician/surgeon's request  Timeout:       Correct Patient: Yes        Correct Procedure: Yes        Correct Site: Yes        Correct Position: Yes        Correct Laterality: Yes   Line Placement:   This line was placed Post Induction    Procedure   Procedure: central line       Laterality: right       Insertion Site: internal jugular.       Patient Position: Trendelenburg and supine  Sterile Prep        All elements of maximal sterile barrier technique followed       Patient Prep/Sterile Barriers: draped, hand hygiene, gloves , hat , mask , draped, gown, sterile gel and probe cover       Skin prep: Chloraprep  Insertion/Injection        Technique: ultrasound guided and Seldinger Technique        1. Ultrasound was used to evaluate the access site.       2. Vein evaluated via ultrasound for patency/adequacy.       3. Using real-time ultrasound the needle/catheter was observed entering the artery/vein.       Introducer Type: 9 Fr, 2-lumen MAC        Type: PA/CVC with Introducer       Number of Lumens: single lumen       PA Catheter Type: None  Narrative         Secured by: suture       Tegaderm and Biopatch dressing used.       Complications: None apparent,        blood aspirated from all lumens,        All lumens flushed: Yes       Verification method: Ultrasound, Placement to be verified post-op and WILLIAM

## 2024-01-23 NOTE — PLAN OF CARE
Neuro: AXO X4 with question prompting. Dex restarted for restlessness and confusion.    CV: SR HR 60ss. HR goal <70. SBP goal <120.   Nepride restarted r/t to SBP >120. Provider notified and aware.   Pulm: RA, 2-4L NC while sleeping    GI: Reg diet. NPO since midnight for surgery    : 60 of lasix, continent,    Skin: WNL    Drains: N/A    Drips:   Nicardipine 15  Esmolol 300  Nipride 0.25  Dex 0.7  Scheduled PO meds for SBP    Labs: K and Mag replaced per protocol.

## 2024-01-23 NOTE — PROGRESS NOTES
"  CVICU Progress Note   01/23/2024       CO-MORBIDITIES:   Aortic Dissection  Hypertensive urgency     ASSESSMENT: Valdo Lyons is a 48 year old male with PMH of uncontrolled HTN, childhood seizures. 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 Repeat CTA 1/17 with decision for surgical management, medically managed with strict BP goals in interim. Now status post ascending aortic aneurysm hemiarch repair and TEVAR with CVTS and Vascular Surgery on 1/23/24.     Arrives from the OR to the CVICU intubated and sedated on propofol gtt. On 0.03 epi gtt, 0.03 norepi gtt, 1 vasopressin and 0.5 methylene blue for pressor and inotropic support. Hemiarch repair and TEVAR, 4x (3 mediastinal, 1 right pleural) CT placed. Bypass time 221 minutes, UOP 1.5L ( mL during bypass), off bypass required no shocks. Required no intraoperative blood products, 450 cell saver, 1000 kcentra, amicar, and 2.5L fluid. Access is RIJ with [place], R rad A line, and PICC. Reversed. Pre procedure Echo LVEF 55-60%, Post procedure \"normal.\"      Pre-procedure WILLIAM:  Left ventricular size, wall motion and function are normal. The ejection fraction is 55-60%. Moderate concentric wall thickening consistent with left ventricular hypertrophy is present. Right ventricular function, chamber size, wall motion, and thickness are normal.  Dissection flap seen in aortic arch, descending and abdominal aorta. Aortic root appear normal. Likely type B aortic dissection. Recommend dedicated CTA. No pericardial effusion is present.  Post-procedure WILLIAM:  Pre CPB: Normal biventricular function, mild TR, trace " MR, no AI.  No PFO by CFD.  Aortic dissection flap noted in distal aortic arch extending into descending aorta.  Thrombosis noted in distal arch.  No dissection flap noted in ascending aorta. Small left pleural effusion. Surgeon notified of all findings in real time.     Post CPB: Biventricular function unchanged, Trace MR, mild TR, no AI post hemiarch replacement and thoracic stent   graft. Dissection flap remained in descending aorta. Surgeon notified of all findings.       PLAN:  Neuro:  # Baseline cognitive dysfunction  Patient's mother is his caregiver at home due to baseline cognitive dysfunction. The patient's overall understanding of the situation is inconsistent. He also developed some agitation/behavioral issues (spitting and kicking) 1/21 requiring sedation. Psychiatry saw patient earlier in his stay, see note for recs. Nothing actionable.   - Monitor neurological status. Notify the MD for any acute changes in exam.  - Delirium prevention   - SW consult per mother's request for additional caregiver resources.     # Acute post-operative pain  - Monica: tylenol  - PRN: tylenol, oxycodone, dilaudid   - Fentanyl and propofol as needed while intubated    # Sedation   - Propofol gtt while intubated  - RASS goal 0 to -1   - sedation holiday for motor exam after 6 hrs of bedrest, attention to hip flexion to assess spine ischemia; notify vascular surgery team for exam    # Recent fall, atraumatic  # Hx Childhood seizure disorder  - Seizure hx noted, not on any antiepileptics at time of admission  - Presented to [facility] s/p fall, CT head reported as negative for intracranial abnormalities 1/12.     # Hx Mariajuana use  - Typically smokes mariajuana every night  - Denies tobacco, alcohol, or other illicit drugs    Pulmonary:   # mechanical ventilation   # respiratory insufficiency   FiO2: 80%  Resp Rate: 14  Tidal Volume: 450 mL  PEEP: 7  - Titrate FiO2 for SpO2 >92%  - Ventilator bundle, plan to rest overnight and  extubate tomorrow.   - Encourage IS q15-30 minutes when awake      Cardiovascular:   # Cardiogenic shock   # Type B dissection with c/f retrograde tear s/p hemiarch repair and TEVAR  # Hx of uncontrolled HTN   Patient was not taking any PTA medications for his high blood pressure. Bilateral arterial lines placed at [facility]. Will treat based on right arterial line. Ordered additional testing, as his BP medication requirements are exceeding expectations, rule out other/organic causes of elevated BP. TSH normal. Rads did not appreciate any renal mass other than a simple cyst - lower likelihood  pheochromocytoma   - On 0.03 epi, 0.03 norepi, 1 vaso, and 0.5 methylene blue for inotropic and vasopressor support, wean as able  - goal MAP 65-85, SBP<120  - allow methylene blue to run out, no need to maintain low dose epi overnight   - BB: Hold  - Statin: Hold  - ASA tomorrow  - PRN hydralazine and nicardipine      GI:   # At risk for protein malnutrition   - NPO, bedside swallow once extubated  - PPI prophylaxis  - Bowel regimen: Miralax daily, Senna 1 tab BID, suppository/milk of magnesia PRN      Renal/FEN:   # CKD vs MIKA  # Electrolyte Derangements, monitor  # Volume Status, monitor  Unknown baseline, was 1.6 on admission to [facility], last value in 2022 was 1.25. Cr continues to improve. Renal ultrasound showed expected changes from the dissection (increased velocities) and a right pleural effusion (NTD).   - Strict I/Os, daily weight  - Avoid nephrotoxic agents as able   - Replete electrolytes PRN per protocol   - Post-op lactate 0.8  - Follow-up post-operative CMP and electrolytes     Endocrine:    # Stress hyperglycemia  Preop A1c 6.1  - Insulin gtt  - Goal BG<180 for optimal healing      ID:  # Stress induced leukocytosis  - WBC 20.8 post-op, trend   - No s/sx infection  - Complete perioperative antibiotic regimen of cefazolin and vancomycin  - Monitor fever curve, WBC, and inflammatory markers as appropriate     Heme:     #  "Acute blood loss anemia  # Acute blood loss thrombocytopenia  No s/sx active bleeding  - Continue to monitor  - CBC on arrival and daily  - Hgb goal > 7.0, no indication for transfusion at this time  - Post-operative Hgb 8.6  - If concern for bleeding, give platelets first     MSK:  # Healing sternotomy  # Surgical Incision  - sternal precautions   - post-operative incision care per protocol   - flat bedrest x6 hours due to groin access  - PT/OT/CR     Prophylaxis:    - SCDs  - SQH to start POD1  - PPI prophylaxis     LDA:  - RIJ CVC with PAC   - Right radial arterial line  - PICC  - Chest tubes x4  - Bundy  - ETT     Bruce Starr MD  Urology PGY-1     ====================================    SUBJECTIVE:   Patient arrives from the OR to the CV ICU intubated and sedated       PAST MEDICAL HISTORY:   Past Medical History:   Diagnosis Date    Dissecting aneurysm of thoracic aorta, [place] type B (H)     HTN (hypertension)        PAST SURGICAL HISTORY:   Past Surgical History:   Procedure Laterality Date    IR OR ANGIOGRAM  1/23/2024    PICC DOUBLE LUMEN PLACEMENT Right 01/16/2024    5FR DL PICC, basilic vein. L-43cm, 1cm out.       FAMILY HISTORY: No family history on file.    SOCIAL HISTORY:   Social History     Tobacco Use    Smoking status: Not on file    Smokeless tobacco: Not on file   Substance Use Topics    Alcohol use: Not on file       OBJECTIVE:  VITAL SIGNS:   /51   Pulse 64   Temp 97.4  F (36.3  C) (Axillary)   Resp 18   Ht 1.727 m (5' 8\")   Wt 97.8 kg (215 lb 9.8 oz)   SpO2 93%   BMI 32.78 kg/m      Resp: 18      PHYSICAL EXAMINATION:   General: Intubated and sedated  Neuro: sedated  Resp: MV  CV: RRR, 4x chest tubes no air leak   Abdomen: Soft, Non-distended  Incisions: c/d/i  Extremities: warm and well perfused    INTAKE/ OUTPUT:   I/O last 3 completed shifts:  In: 2932.16 [I.V.:2932.16]  Out: 7825 [Urine:7825]    INVESTIGATIONS:   Recent Labs   Lab 01/23/24  1620 01/23/24  1541 " 01/23/24  1434 01/23/24  1401   PH 7.37 7.37 7.36 7.30*   PCO2 39 40 42 50*   PO2 249* 110* 450* 459*   HCO3 23 23 24 24     Complete Blood Count   Recent Labs   Lab 01/23/24  1620 01/23/24  1604 01/23/24  1541 01/23/24  1434 01/23/24  0856 01/23/24  0354 01/22/24  0307 01/21/24  0339   WBC  --  22.2*  --   --   --  9.0 8.8 10.8   HGB 8.4* 7.7* 8.1* 8.6*   < > 9.0* 9.0* 9.9*   PLT  --  197  --   --   --  247 229 265    < > = values in this interval not displayed.     Basic Metabolic Panel  Recent Labs   Lab 01/23/24  1620 01/23/24  1541 01/23/24  1434 01/23/24  1401 01/23/24  0856 01/23/24  0354 01/23/24  0005 01/22/24  1823 01/22/24  0844 01/22/24  0307 01/21/24  1833    139 140 140   < > 139  --  137  --  135 135   POTASSIUM 4.2 4.1 4.8 5.1   < > 3.6   < > 3.3*   < > 3.4 3.6   CHLORIDE  --   --   --   --   --  108*  --  108*  --  108* 107   CO2  --   --   --   --   --  19*  --  19*  --  19* 18*   BUN  --   --   --   --   --  21.4*  --  24.7*  --  22.6* 22.2*   CR  --   --   --   --   --  1.65*  --  1.72*  --  1.65* 1.62*   * 142* 147* 142*   < > 84  --  92   < > 91 122*    < > = values in this interval not displayed.     Liver Function Tests  Recent Labs   Lab 01/23/24  1604 01/23/24  0354 01/22/24  0307 01/21/24  0339 01/20/24  0357   AST  --  14 12 12 13   ALT  --  9 8 8 7   ALKPHOS  --  49 47 47 44   BILITOTAL  --  0.5 0.4 0.5 0.5   ALBUMIN  --  2.9* 3.0* 3.0* 3.0*   INR 1.62* 1.12 1.17* 1.21* 1.13     Pancreatic Enzymes  No lab results found in last 7 days.  Coagulation Profile  Recent Labs   Lab 01/23/24  1604 01/23/24  0354 01/22/24  0307 01/21/24  0339   INR 1.62* 1.12 1.17* 1.21*   PTT 36  --   --   --        RADIOLOGY:   Recent Results (from the past 24 hour(s))   WILLIAM with Report    Narrative    Carlotta Ratliff MD     1/23/2024  3:50 PM  Perioperative WILLIAM Procedure Note    Staff -        Anesthesiologist:  Carlotta Ratliff MD       Performed By: anesthesiologist  Preanesthesia  Checklist:  Patient identified, IV assessed, risks and   benefits discussed, monitors and equipment assessed, procedure being   performed at surgeon's request and anesthesia consent obtained.    WILLIAM Probe Insertion  Probe Number: x8  Probe Status PRE Insertion: NO obvious damage  Probe type:  Adult 3D  Bite block used:   Molar  Insertion Technique: Jaw Lift  Insertion complications: None obvious  Billing Report:WILLIAM report by Anesthesiologist (See Separate Report note)  Probe Status POST Removal: NO obvious damage    WILLIAM Report  General Procedure Information  Images for this study have been archived.  Modalities: 2D, CW Doppler, PW Doppler and Color flow mapping  Diagnostic indications for WILLIAM:   Thoracic aorta dissection  Echocardiographic and Doppler Measurements  Right Ventricle:  Cavity size normal.    Hypertrophy not present.     Thrombus not present.    Global function normal.     Left Ventricle:  Cavity size normal.    Hypertrophy present.   Thrombus   not present.   Global Function normal.   Ejection Fraction 55%.      Ventricular Regional Function:  1- Basal Anteroseptal:  normal  2- Basal Anterior:  normal  3- Basal Anterolateral:  normal  4- Basal Inferolateral:  normal  5- Basal Inferior:  normal  6- Basal Inferoseptal:  normal  7- Mid Anteroseptal:  normal  8- Mid Anterior:  normal  9- Mid Anterolateral:  normal  10- Mid Inferolateral:  normal  11- Mid Inferior:  normal  12- Mid Inferoseptal:  normal  13- Apical Anterior:  normal  14- Apical Lateral:  normal  15- Apical Inferior:  normal  16- Apical Septal:  normal  17- Umpire:  normal    Valves  Aortic Valve: Annulus normal.  Stenosis not present.  Regurgitation   absent.  Leaflets normal.  Leaflet motions normal.    Mitral Valve: Annulus normal.  Stenosis not present.  Regurgitation   absent.  Leaflets normal.  Leaflet motions normal.    Tricuspid Valve: Annulus normal.  Stenosis not present.  Regurgitation +1.    Leaflets normal.  Leaflet motions  normal.    Pulmonic Valve: Annulus normal.  Stenosis not present.  Regurgitation   absent.      Aorta: Ascending Aorta: Size normal.  Dissection not present.  Plaque   thickness less than 3 mm.  Mobile plaque not present.    Aortic Arch: Size normal.    Dissection present.    Mobile plaque not   present.    Descending Aorta: Size normal.    Dissection present.    Mobile plaque not   present.      Right Atrium:  Size normal.   Spontaneous echo contrast not present.     Thrombus not present.   Tumor not present.   Device not present.     Left Atrium: Size normal.  Spontaneous echo contrast not present.    Thrombus not present.  Tumor not present.  Device not present.    Left atrial appendage normal.     Atrial Septum: Intra-atrial septal morphology normal.       Ventricular Septum: Intra-ventricular septum morphology normal.        Other Findings:   Pericardium:  pericardial effusion. Pleural Effusion:  left.   Cornoary   sinus catheter present.    Post Intervention Findings  Procedure(s) performed:  Aortic Arch Repair (Hemiarch replacement and   thoracic aortic stent graft). Global function:  Unchanged. Regional wall   motion: Unchanged. Surgeon(s) notified of all postintervention findings:   Yes.      Aortic arch:  Aortic arch repaired with graft.       Post Intervention comments: Post CPB: Biventricular function unchanged,   Trace MR, mild TR, no AI post hemiarch replacement and thoracic stent   graft. Dissection flap remained in descending aorta. Surgeon notified of   all findings..    Echocardiogram Comments  Echocardiogram comments: Pre CPB: Normal biventricular function, mild TR,   trace MR, no AI.  No PFO by CFD.  Aortic dissection flap noted in distal   aortic arch extending into descending aorta.  Thrombosis noted in distal   arch.  No dissection flap noted in ascending aorta. Small left pleural   effusion. Surgeon notified of all findings in real time..   IR OR Angiogram    Narrative    This exam was marked  as non-reportable because it will not be read by a   radiologist or a [place] non-radiologist provider.         XR Surgery HINA Fluoro Less Than 5 Min    Narrative    This exam was marked as non-reportable because it will not be read by a   radiologist or a [place] non-radiologist provider.             =========================================

## 2024-01-24 ENCOUNTER — APPOINTMENT (OUTPATIENT)
Dept: GENERAL RADIOLOGY | Facility: CLINIC | Age: 49
End: 2024-01-24
Attending: NURSE PRACTITIONER
Payer: MEDICAID

## 2024-01-24 ENCOUNTER — APPOINTMENT (OUTPATIENT)
Dept: OCCUPATIONAL THERAPY | Facility: CLINIC | Age: 49
End: 2024-01-24
Attending: STUDENT IN AN ORGANIZED HEALTH CARE EDUCATION/TRAINING PROGRAM
Payer: MEDICAID

## 2024-01-24 LAB
ABO/RH(D): NORMAL
ALBUMIN SERPL BCG-MCNC: 2.8 G/DL (ref 3.5–5.2)
ALLEN'S TEST: ABNORMAL
ALP SERPL-CCNC: 43 U/L (ref 40–150)
ALT SERPL W P-5'-P-CCNC: 10 U/L (ref 0–70)
ANION GAP SERPL CALCULATED.3IONS-SCNC: 10 MMOL/L (ref 7–15)
ANION GAP SERPL CALCULATED.3IONS-SCNC: 10 MMOL/L (ref 7–15)
ANION GAP SERPL CALCULATED.3IONS-SCNC: 9 MMOL/L (ref 7–15)
ANTIBODY SCREEN: NEGATIVE
AST SERPL W P-5'-P-CCNC: 30 U/L (ref 0–45)
BASE EXCESS BLDA CALC-SCNC: -1.3 MMOL/L (ref -3–3)
BASE EXCESS BLDA CALC-SCNC: -1.7 MMOL/L (ref -3–3)
BASE EXCESS BLDA CALC-SCNC: -2.8 MMOL/L (ref -3–3)
BASE EXCESS BLDA CALC-SCNC: -3.2 MMOL/L (ref -3–3)
BASE EXCESS BLDV CALC-SCNC: -0.4 MMOL/L (ref -3–3)
BASE EXCESS BLDV CALC-SCNC: -0.9 MMOL/L (ref -3–3)
BASE EXCESS BLDV CALC-SCNC: -1.9 MMOL/L (ref -3–3)
BILIRUB SERPL-MCNC: 0.3 MG/DL
BUN SERPL-MCNC: 27.6 MG/DL (ref 6–20)
BUN SERPL-MCNC: 27.6 MG/DL (ref 6–20)
BUN SERPL-MCNC: 29.5 MG/DL (ref 6–20)
CA-I BLD-MCNC: 4.7 MG/DL (ref 4.4–5.2)
CALCIUM SERPL-MCNC: 8 MG/DL (ref 8.6–10)
CALCIUM SERPL-MCNC: 8 MG/DL (ref 8.6–10)
CALCIUM SERPL-MCNC: 8.1 MG/DL (ref 8.6–10)
CHLORIDE SERPL-SCNC: 106 MMOL/L (ref 98–107)
CHLORIDE SERPL-SCNC: 111 MMOL/L (ref 98–107)
CHLORIDE SERPL-SCNC: 111 MMOL/L (ref 98–107)
COHGB MFR BLD: 96.6 % (ref 96–97)
COHGB MFR BLD: 97.7 % (ref 96–97)
COHGB MFR BLD: 98.6 % (ref 96–97)
COHGB MFR BLD: 99.5 % (ref 96–97)
CREAT SERPL-MCNC: 1.58 MG/DL (ref 0.67–1.17)
CREAT SERPL-MCNC: 1.71 MG/DL (ref 0.67–1.17)
CREAT SERPL-MCNC: 1.71 MG/DL (ref 0.67–1.17)
DEPRECATED HCO3 PLAS-SCNC: 22 MMOL/L (ref 22–29)
EGFRCR SERPLBLD CKD-EPI 2021: 49 ML/MIN/1.73M2
EGFRCR SERPLBLD CKD-EPI 2021: 49 ML/MIN/1.73M2
EGFRCR SERPLBLD CKD-EPI 2021: 54 ML/MIN/1.73M2
ERYTHROCYTE [DISTWIDTH] IN BLOOD BY AUTOMATED COUNT: 15.9 % (ref 10–15)
ERYTHROCYTE [DISTWIDTH] IN BLOOD BY AUTOMATED COUNT: 16.2 % (ref 10–15)
GLUCOSE BLDC GLUCOMTR-MCNC: 101 MG/DL (ref 70–99)
GLUCOSE BLDC GLUCOMTR-MCNC: 117 MG/DL (ref 70–99)
GLUCOSE BLDC GLUCOMTR-MCNC: 127 MG/DL (ref 70–99)
GLUCOSE BLDC GLUCOMTR-MCNC: 130 MG/DL (ref 70–99)
GLUCOSE BLDC GLUCOMTR-MCNC: 136 MG/DL (ref 70–99)
GLUCOSE BLDC GLUCOMTR-MCNC: 141 MG/DL (ref 70–99)
GLUCOSE BLDC GLUCOMTR-MCNC: 141 MG/DL (ref 70–99)
GLUCOSE BLDC GLUCOMTR-MCNC: 143 MG/DL (ref 70–99)
GLUCOSE BLDC GLUCOMTR-MCNC: 151 MG/DL (ref 70–99)
GLUCOSE BLDC GLUCOMTR-MCNC: 171 MG/DL (ref 70–99)
GLUCOSE BLDC GLUCOMTR-MCNC: 190 MG/DL (ref 70–99)
GLUCOSE BLDC GLUCOMTR-MCNC: 227 MG/DL (ref 70–99)
GLUCOSE SERPL-MCNC: 137 MG/DL (ref 70–99)
GLUCOSE SERPL-MCNC: 137 MG/DL (ref 70–99)
GLUCOSE SERPL-MCNC: 139 MG/DL (ref 70–99)
HCO3 BLD-SCNC: 23 MMOL/L (ref 21–28)
HCO3 BLD-SCNC: 24 MMOL/L (ref 21–28)
HCO3 BLDV-SCNC: 24 MMOL/L (ref 21–28)
HCO3 BLDV-SCNC: 25 MMOL/L (ref 21–28)
HCO3 BLDV-SCNC: 25 MMOL/L (ref 21–28)
HCT VFR BLD AUTO: 23.1 % (ref 40–53)
HCT VFR BLD AUTO: 24.3 % (ref 40–53)
HGB BLD-MCNC: 7.5 G/DL (ref 13.3–17.7)
HGB BLD-MCNC: 7.5 G/DL (ref 13.3–17.7)
HGB BLD-MCNC: 7.9 G/DL (ref 13.3–17.7)
INR PPP: 1.2 (ref 0.85–1.15)
LACTATE SERPL-SCNC: 0.6 MMOL/L (ref 0.7–2)
LACTATE SERPL-SCNC: 0.7 MMOL/L (ref 0.7–2)
LACTATE SERPL-SCNC: 0.9 MMOL/L (ref 0.7–2)
LACTATE SERPL-SCNC: 1.6 MMOL/L (ref 0.7–2)
MAGNESIUM SERPL-MCNC: 2.5 MG/DL (ref 1.7–2.3)
MAGNESIUM SERPL-MCNC: 2.7 MG/DL (ref 1.7–2.3)
MCH RBC QN AUTO: 19.7 PG (ref 26.5–33)
MCH RBC QN AUTO: 20.2 PG (ref 26.5–33)
MCHC RBC AUTO-ENTMCNC: 32.5 G/DL (ref 31.5–36.5)
MCHC RBC AUTO-ENTMCNC: 32.5 G/DL (ref 31.5–36.5)
MCV RBC AUTO: 61 FL (ref 78–100)
MCV RBC AUTO: 62 FL (ref 78–100)
O2/TOTAL GAS SETTING VFR VENT: 2 %
O2/TOTAL GAS SETTING VFR VENT: 40 %
OXYHGB MFR BLDV: 54 % (ref 70–75)
OXYHGB MFR BLDV: 58 % (ref 70–75)
OXYHGB MFR BLDV: 60 % (ref 70–75)
PCO2 BLD: 40 MM HG (ref 35–45)
PCO2 BLD: 43 MM HG (ref 35–45)
PCO2 BLDV: 44 MM HG (ref 40–50)
PCO2 BLDV: 47 MM HG (ref 40–50)
PCO2 BLDV: 47 MM HG (ref 40–50)
PEEP: 5 CM H2O
PEEP: 6 CM H2O
PEEP: 6 CM H2O
PH BLD: 7.33 [PH] (ref 7.35–7.45)
PH BLD: 7.36 [PH] (ref 7.35–7.45)
PH BLD: 7.38 [PH] (ref 7.35–7.45)
PH BLD: 7.38 [PH] (ref 7.35–7.45)
PH BLDV: 7.34 [PH] (ref 7.32–7.43)
PH BLDV: 7.34 [PH] (ref 7.32–7.43)
PH BLDV: 7.35 [PH] (ref 7.32–7.43)
PHOSPHATE SERPL-MCNC: 4.3 MG/DL (ref 2.5–4.5)
PHOSPHATE SERPL-MCNC: 4.9 MG/DL (ref 2.5–4.5)
PLATELET # BLD AUTO: 119 10E3/UL (ref 150–450)
PLATELET # BLD AUTO: 200 10E3/UL (ref 150–450)
PO2 BLD: 103 MM HG (ref 80–105)
PO2 BLD: 109 MM HG (ref 80–105)
PO2 BLD: 81 MM HG (ref 80–105)
PO2 BLD: 91 MM HG (ref 80–105)
PO2 BLDV: 32 MM HG (ref 25–47)
PO2 BLDV: 36 MM HG (ref 25–47)
PO2 BLDV: 37 MM HG (ref 25–47)
POTASSIUM SERPL-SCNC: 4.1 MMOL/L (ref 3.4–5.3)
POTASSIUM SERPL-SCNC: 4.3 MMOL/L (ref 3.4–5.3)
POTASSIUM SERPL-SCNC: 4.3 MMOL/L (ref 3.4–5.3)
PROT SERPL-MCNC: 4.7 G/DL (ref 6.4–8.3)
RBC # BLD AUTO: 3.72 10E6/UL (ref 4.4–5.9)
RBC # BLD AUTO: 4.01 10E6/UL (ref 4.4–5.9)
SAO2 % BLDA: 94 % (ref 92–100)
SAO2 % BLDA: 96 % (ref 92–100)
SAO2 % BLDA: 97 % (ref 92–100)
SAO2 % BLDA: 98 % (ref 92–100)
SAO2 % BLDV: 54.6 % (ref 70–75)
SAO2 % BLDV: 59.4 % (ref 70–75)
SAO2 % BLDV: 61.2 % (ref 70–75)
SODIUM SERPL-SCNC: 137 MMOL/L (ref 135–145)
SODIUM SERPL-SCNC: 143 MMOL/L (ref 135–145)
SODIUM SERPL-SCNC: 143 MMOL/L (ref 135–145)
SPECIMEN EXPIRATION DATE: NORMAL
WBC # BLD AUTO: 14.2 10E3/UL (ref 4–11)
WBC # BLD AUTO: 18.8 10E3/UL (ref 4–11)

## 2024-01-24 PROCEDURE — 85027 COMPLETE CBC AUTOMATED: CPT | Performed by: STUDENT IN AN ORGANIZED HEALTH CARE EDUCATION/TRAINING PROGRAM

## 2024-01-24 PROCEDURE — 82330 ASSAY OF CALCIUM: CPT | Performed by: STUDENT IN AN ORGANIZED HEALTH CARE EDUCATION/TRAINING PROGRAM

## 2024-01-24 PROCEDURE — 99232 SBSQ HOSP IP/OBS MODERATE 35: CPT | Mod: 24 | Performed by: ANESTHESIOLOGY

## 2024-01-24 PROCEDURE — 71045 X-RAY EXAM CHEST 1 VIEW: CPT

## 2024-01-24 PROCEDURE — 82805 BLOOD GASES W/O2 SATURATION: CPT | Performed by: STUDENT IN AN ORGANIZED HEALTH CARE EDUCATION/TRAINING PROGRAM

## 2024-01-24 PROCEDURE — 84100 ASSAY OF PHOSPHORUS: CPT | Performed by: STUDENT IN AN ORGANIZED HEALTH CARE EDUCATION/TRAINING PROGRAM

## 2024-01-24 PROCEDURE — 250N000013 HC RX MED GY IP 250 OP 250 PS 637: Performed by: SURGERY

## 2024-01-24 PROCEDURE — 71045 X-RAY EXAM CHEST 1 VIEW: CPT | Mod: 26 | Performed by: RADIOLOGY

## 2024-01-24 PROCEDURE — 94003 VENT MGMT INPAT SUBQ DAY: CPT

## 2024-01-24 PROCEDURE — 250N000011 HC RX IP 250 OP 636: Mod: JZ | Performed by: SURGERY

## 2024-01-24 PROCEDURE — 97168 OT RE-EVAL EST PLAN CARE: CPT | Mod: GO

## 2024-01-24 PROCEDURE — 82805 BLOOD GASES W/O2 SATURATION: CPT

## 2024-01-24 PROCEDURE — 97535 SELF CARE MNGMENT TRAINING: CPT | Mod: GO

## 2024-01-24 PROCEDURE — 80053 COMPREHEN METABOLIC PANEL: CPT | Performed by: SURGERY

## 2024-01-24 PROCEDURE — 250N000013 HC RX MED GY IP 250 OP 250 PS 637

## 2024-01-24 PROCEDURE — 84100 ASSAY OF PHOSPHORUS: CPT | Performed by: SURGERY

## 2024-01-24 PROCEDURE — 83735 ASSAY OF MAGNESIUM: CPT | Performed by: STUDENT IN AN ORGANIZED HEALTH CARE EDUCATION/TRAINING PROGRAM

## 2024-01-24 PROCEDURE — C9113 INJ PANTOPRAZOLE SODIUM, VIA: HCPCS

## 2024-01-24 PROCEDURE — 258N000003 HC RX IP 258 OP 636: Performed by: SURGERY

## 2024-01-24 PROCEDURE — 250N000011 HC RX IP 250 OP 636

## 2024-01-24 PROCEDURE — 200N000002 HC R&B ICU UMMC

## 2024-01-24 PROCEDURE — 83605 ASSAY OF LACTIC ACID: CPT | Performed by: NURSE PRACTITIONER

## 2024-01-24 PROCEDURE — 85018 HEMOGLOBIN: CPT | Performed by: NURSE PRACTITIONER

## 2024-01-24 PROCEDURE — 83605 ASSAY OF LACTIC ACID: CPT | Performed by: SURGERY

## 2024-01-24 PROCEDURE — 999N000253 HC STATISTIC WEANING TRIALS

## 2024-01-24 PROCEDURE — 85610 PROTHROMBIN TIME: CPT | Performed by: SURGERY

## 2024-01-24 PROCEDURE — 94002 VENT MGMT INPAT INIT DAY: CPT

## 2024-01-24 PROCEDURE — 80048 BASIC METABOLIC PNL TOTAL CA: CPT | Performed by: NURSE PRACTITIONER

## 2024-01-24 PROCEDURE — 250N000009 HC RX 250: Performed by: SURGERY

## 2024-01-24 PROCEDURE — 258N000003 HC RX IP 258 OP 636: Performed by: STUDENT IN AN ORGANIZED HEALTH CARE EDUCATION/TRAINING PROGRAM

## 2024-01-24 PROCEDURE — 86900 BLOOD TYPING SEROLOGIC ABO: CPT | Performed by: STUDENT IN AN ORGANIZED HEALTH CARE EDUCATION/TRAINING PROGRAM

## 2024-01-24 PROCEDURE — 999N000259 HC STATISTIC EXTUBATION

## 2024-01-24 PROCEDURE — 999N000157 HC STATISTIC RCP TIME EA 10 MIN

## 2024-01-24 PROCEDURE — 83605 ASSAY OF LACTIC ACID: CPT | Performed by: STUDENT IN AN ORGANIZED HEALTH CARE EDUCATION/TRAINING PROGRAM

## 2024-01-24 PROCEDURE — 86923 COMPATIBILITY TEST ELECTRIC: CPT | Performed by: STUDENT IN AN ORGANIZED HEALTH CARE EDUCATION/TRAINING PROGRAM

## 2024-01-24 PROCEDURE — 250N000009 HC RX 250

## 2024-01-24 RX ORDER — GABAPENTIN 100 MG/1
100 CAPSULE ORAL 3 TIMES DAILY PRN
Status: DISCONTINUED | OUTPATIENT
Start: 2024-01-24 | End: 2024-01-25

## 2024-01-24 RX ORDER — METHOCARBAMOL 500 MG/1
500 TABLET, FILM COATED ORAL 4 TIMES DAILY PRN
Status: DISCONTINUED | OUTPATIENT
Start: 2024-01-24 | End: 2024-01-30

## 2024-01-24 RX ORDER — HEPARIN SODIUM 5000 [USP'U]/.5ML
5000 INJECTION, SOLUTION INTRAVENOUS; SUBCUTANEOUS EVERY 8 HOURS
Status: DISCONTINUED | OUTPATIENT
Start: 2024-01-24 | End: 2024-02-04 | Stop reason: HOSPADM

## 2024-01-24 RX ADMIN — DEXMEDETOMIDINE HYDROCHLORIDE 0.2 MCG/KG/HR: 4 INJECTION, SOLUTION INTRAVENOUS at 08:07

## 2024-01-24 RX ADMIN — OXYCODONE HYDROCHLORIDE 10 MG: 10 TABLET ORAL at 16:19

## 2024-01-24 RX ADMIN — GABAPENTIN 100 MG: 100 CAPSULE ORAL at 23:35

## 2024-01-24 RX ADMIN — HYDROMORPHONE HYDROCHLORIDE 0.4 MG: 0.2 INJECTION, SOLUTION INTRAMUSCULAR; INTRAVENOUS; SUBCUTANEOUS at 14:38

## 2024-01-24 RX ADMIN — GABAPENTIN 100 MG: 100 CAPSULE ORAL at 15:42

## 2024-01-24 RX ADMIN — ACETAMINOPHEN 975 MG: 325 TABLET, FILM COATED ORAL at 11:05

## 2024-01-24 RX ADMIN — ONDANSETRON 4 MG: 2 INJECTION INTRAMUSCULAR; INTRAVENOUS at 17:35

## 2024-01-24 RX ADMIN — CEFAZOLIN SODIUM 2 G: 2 INJECTION, SOLUTION INTRAVENOUS at 04:01

## 2024-01-24 RX ADMIN — SENNOSIDES AND DOCUSATE SODIUM 1 TABLET: 8.6; 5 TABLET ORAL at 07:43

## 2024-01-24 RX ADMIN — CEFAZOLIN SODIUM 2 G: 2 INJECTION, SOLUTION INTRAVENOUS at 13:09

## 2024-01-24 RX ADMIN — POLYETHYLENE GLYCOL 3350 17 G: 17 POWDER, FOR SOLUTION ORAL at 07:43

## 2024-01-24 RX ADMIN — GABAPENTIN 100 MG: 100 CAPSULE ORAL at 12:35

## 2024-01-24 RX ADMIN — HYDROMORPHONE HYDROCHLORIDE 0.2 MG: 0.2 INJECTION, SOLUTION INTRAMUSCULAR; INTRAVENOUS; SUBCUTANEOUS at 11:35

## 2024-01-24 RX ADMIN — OXYCODONE HYDROCHLORIDE 5 MG: 5 TABLET ORAL at 11:05

## 2024-01-24 RX ADMIN — VASOPRESSIN 2 UNITS/HR: 20 INJECTION, SOLUTION INTRAMUSCULAR; SUBCUTANEOUS at 00:37

## 2024-01-24 RX ADMIN — METHOCARBAMOL 500 MG: 500 TABLET ORAL at 13:54

## 2024-01-24 RX ADMIN — ACETAMINOPHEN 975 MG: 325 TABLET, FILM COATED ORAL at 04:01

## 2024-01-24 RX ADMIN — METHOCARBAMOL 500 MG: 500 TABLET ORAL at 18:22

## 2024-01-24 RX ADMIN — HEPARIN SODIUM 5000 UNITS: 5000 INJECTION, SOLUTION INTRAVENOUS; SUBCUTANEOUS at 21:50

## 2024-01-24 RX ADMIN — SODIUM CHLORIDE, POTASSIUM CHLORIDE, SODIUM LACTATE AND CALCIUM CHLORIDE 500 ML: 600; 310; 30; 20 INJECTION, SOLUTION INTRAVENOUS at 00:07

## 2024-01-24 RX ADMIN — OXYCODONE HYDROCHLORIDE 10 MG: 10 TABLET ORAL at 19:45

## 2024-01-24 RX ADMIN — OXYCODONE HYDROCHLORIDE 5 MG: 5 TABLET ORAL at 07:43

## 2024-01-24 RX ADMIN — HEPARIN SODIUM 5000 UNITS: 5000 INJECTION, SOLUTION INTRAVENOUS; SUBCUTANEOUS at 14:45

## 2024-01-24 RX ADMIN — PROPOFOL 25 MCG/KG/MIN: 10 INJECTION, EMULSION INTRAVENOUS at 02:00

## 2024-01-24 RX ADMIN — PROPOFOL 30 MCG/KG/MIN: 10 INJECTION, EMULSION INTRAVENOUS at 06:26

## 2024-01-24 RX ADMIN — INSULIN HUMAN 1.5 UNITS/HR: 1 INJECTION, SOLUTION INTRAVENOUS at 12:54

## 2024-01-24 RX ADMIN — CHLORHEXIDINE GLUCONATE 15 ML: 1.2 SOLUTION ORAL at 07:45

## 2024-01-24 RX ADMIN — SENNOSIDES AND DOCUSATE SODIUM 1 TABLET: 8.6; 5 TABLET ORAL at 19:44

## 2024-01-24 RX ADMIN — OXYCODONE HYDROCHLORIDE 10 MG: 10 TABLET ORAL at 23:35

## 2024-01-24 RX ADMIN — VANCOMYCIN HYDROCHLORIDE 1500 MG: 10 INJECTION, POWDER, LYOPHILIZED, FOR SOLUTION INTRAVENOUS at 04:39

## 2024-01-24 RX ADMIN — PANTOPRAZOLE SODIUM 40 MG: 40 INJECTION, POWDER, FOR SOLUTION INTRAVENOUS at 07:42

## 2024-01-24 RX ADMIN — ASPIRIN 81 MG CHEWABLE TABLET 81 MG: 81 TABLET CHEWABLE at 07:43

## 2024-01-24 RX ADMIN — ACETAMINOPHEN 975 MG: 325 TABLET, FILM COATED ORAL at 19:44

## 2024-01-24 RX ADMIN — HYDROMORPHONE HYDROCHLORIDE 0.2 MG: 0.2 INJECTION, SOLUTION INTRAMUSCULAR; INTRAVENOUS; SUBCUTANEOUS at 20:18

## 2024-01-24 ASSESSMENT — ACTIVITIES OF DAILY LIVING (ADL)
ADLS_ACUITY_SCORE: 28
ADLS_ACUITY_SCORE: 27
ADLS_ACUITY_SCORE: 28
ADLS_ACUITY_SCORE: 27
ADLS_ACUITY_SCORE: 28

## 2024-01-24 NOTE — PROGRESS NOTES
Care Management Follow Up    Length of Stay (days): 8    Expected Discharge Date: 01/29/2024     Concerns to be Addressed: financial/insurance     Patient plan of care discussed at interdisciplinary rounds: Yes    Anticipated Discharge Disposition: Other (Comments) (TBD)     Anticipated Discharge Services: None  Anticipated Discharge DME: None    Patient/family educated on Medicare website which has current facility and service quality ratings: other (see comments)  Education Provided on the Discharge Plan: Other (see comments)  Patient/Family in Agreement with the Plan: unable to assess    Referrals Placed by CM/SW:  (TBD)  Private pay costs discussed: Not applicable    Additional Information:  KATHIE checked in with financial counselor, Jamila, to see if the patient's mother filled out and returned the MA application. Jamila stated she received the application and sent it to the On license of [facility].     LUIS ALFREDO Perez, SW  4A & 4E ICU   Pager: [phone]  Phone: [phone]       LUIS ALFREDO Monson

## 2024-01-24 NOTE — PROGRESS NOTES
Overnight patient remains sedated with Hourly nuerovascular checks. Waking patient hourly to lift legs off the bed per vascular surgery. Patient able to follow commands, no sign of neuro changes.   BP remains in goal of SBP<120, MAP>65. Pulses equal, HR normal.   Vent settings minimal. ABG's WNL.   Good UOP overnight through [facility]anova  Plan for SBT and possible extubation in the AM

## 2024-01-24 NOTE — PLAN OF CARE
Major Shift Events:   Pt arrived to floor from OR ~ 1700.    Sedated. Pupils round and reactive. ETT 24 @ teeth CMV 60%/450/14/7. X4 CT's- moderate OP, ~180 total since arrival from OR. SR (60s-70s). [place] @ 50. Latest KESHAV #'s: PAP 26/15 CVP 9 SvO2 69% CO 7.5 CI 3.4  .9. BL upper and lower extremities palpable and dopplerable. LBM: pta. Bundy- adequate UOP, teal color from methylene blue. No new skin concerns.     Epi 0.01, NE 0.01, prop 20, fent 50.    Plan: Methylene blue to finish infusing, titrate pressors as needed to maintain SBP < 120, and MAP >65 but <80. HOB flat until 2300 for access in R groin. Assess neuro status and page vascular night covered.   For vital signs and complete assessments, please see documentation flowsheets.     Zandra Jasso RN on 1/23/2024 at 7:31 PM

## 2024-01-24 NOTE — PROGRESS NOTES
CVICU Progress Note   01/24/2024       CO-MORBIDITIES:   Aortic Dissection  Hypertensive urgency     ASSESSMENT: Valdo Lyons is a 48 year old male with PMH of uncontrolled HTN, childhood seizures. 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 Repeat CTA 1/17 with decision for surgical management, medically managed with strict BP goals in interim. Now status post ascending aortic aneurysm hemiarch repair and TEVAR with CVTS and Vascular Surgery on 1/23/24. Patient extubated 1/24 and progressing appropriately post operatively.     PLAN:  Neuro:  Close monitoring and neuro checks (q1h) to monitor for spinal chord ischemia post operatively. Will look for post operative protocol regarding this if change in neuro status occurs.     # Baseline cognitive dysfunction  Patient's mother is his caregiver at home due to baseline cognitive dysfunction. The patient's overall understanding of the situation is inconsistent. He also developed some agitation/behavioral issues (spitting and kicking) 1/21 requiring sedation. Psychiatry saw patient earlier in his stay, see note for recs. Nothing actionable.   - Monitor neurological status. Notify the MD for any acute changes in exam.  - Delirium prevention   -  consult per mother's request for additional caregiver resources.   - Precedex gtt     # Acute post-operative pain  - Monica: tylenol  - PRN: tylenol, oxycodone, dilaudid   - fentanyl and prop gtt stopped     # Recent fall, atraumatic  # Hx Childhood seizure disorder  - Seizure hx noted, not on any antiepileptics at time of admission  - Presented to [facility] s/p fall, CT head  reported as negative for intracranial abnormalities 1/12.     # Hx Mariajuana use  - Typically smokes mariajuana every night  - Denies tobacco, alcohol, or other illicit drugs    Pulmonary:   # mechanical ventilation - extubated 1/24  # respiratory insufficiency   - Titrate nasal cannula for SpO2 >92%  - Encourage IS q15-30 minutes when awake   - CXR today  - pulmonary hygiene      Cardiovascular:   # Cardiogenic shock   # Type B dissection with c/f retrograde tear s/p hemiarch repair and TEVAR  # Hx of uncontrolled HTN   Pre-procedure: Patient was not taking any PTA medications for his high blood pressure. Bilateral arterial lines placed at [facility]. Will treat based on right arterial line. Ordered additional testing, as his BP medication requirements are exceeding expectations, rule out other/organic causes of elevated BP. TSH normal. Rads did not appreciate any renal mass other than a simple cyst - lower likelihood  pheochromocytoma. EF 55-60%.   - Currently off of vasopressors   - goal MAP 65-85, SBP<120  - BB: Hold  - ASA 81  - PRN hydralazine and nicardipine   - Statin: Hold    GI:   # At risk for protein malnutrition   - NPO, bedside swallow once extubated  - PPI prophylaxis  - Bowel regimen: Miralax daily, Senna 1 tab BID, suppository/milk of magnesia PRN   - Some concern for ileus on post op abdominal XR. Abdomen soft and non-tender, BM x2 on 1/22, will continue to monitor clinically.      Renal/FEN:   # CKD vs MIKA  # Electrolyte Derangements, monitor  # Volume Status, monitor  Unknown baseline, was 1.6 on admission to [facility], last value in 2022 was 1.25. Cr continues to improve. Renal ultrasound showed expected changes from the dissection (increased velocities) and a right pleural effusion (NTD). Cr normalized then became elevated again post operatively, now trending back down.   - Strict I/Os, daily weight  - Avoid nephrotoxic agents as able   - Replete electrolytes PRN per protocol   - BMP q12h   - Lactate q12h    - daily mag and phos     Endocrine:    # Stress hyperglycemia  Preop A1c 6.1  - Insulin gtt - has not needed since procedure   - Goal BG<180 for optimal healing      ID:  # Stress induced leukocytosis  WBC 20.8 post-op, trending downward.   - No s/sx infection  - Complete perioperative antibiotic regimen of cefazolin and vancomycin  - Monitor fever curve, WBC, and inflammatory markers as appropriate     Heme:     # Acute blood loss anemia  # Acute blood loss thrombocytopenia  No s/sx active bleeding. Chest tubes still in place and   - Continue to monitor  - CBC q12h   - Hgb goal > 7.0, no indication for transfusion at this time    MSK:  # Healing sternotomy  # Surgical Incision  - sternal precautions   - post-operative incision care per protocol   - flat bedrest x6 hours post operatively due to groin access  - PT/OT     Prophylaxis:    - SCDs  - SQH to start POD1  - PPI prophylaxis     LDA:  - RIJ CVC with PAC 1/23  - Right radial arterial line 1/23  - PICC 1/16  - Chest tubes x4  - Bundy 1/23    CVICU checklist run through and appropriately applied to the patient's care.      Patient seen, findings and plan discussed with CV ICU staff and cardiothoracic service attending and fellow.      Shannon Galicia, CA1   Anesthesia resident     ====================================    SUBJECTIVE:   Patient extubated while on rounds. Immediately after extubation appears to be breathing and mentating well. Continued good neuro checks.        PAST MEDICAL HISTORY:   Past Medical History:   Diagnosis Date    Dissecting aneurysm of thoracic aorta, [place] type B (H)     HTN (hypertension)        PAST SURGICAL HISTORY:   Past Surgical History:   Procedure Laterality Date    IR OR ANGIOGRAM  1/23/2024    PICC DOUBLE LUMEN PLACEMENT Right 01/16/2024    5FR DL PICC, basilic vein. L-43cm, 1cm out.       FAMILY HISTORY: No family history on file.    SOCIAL HISTORY:   Social History     Tobacco Use    Smoking status: Not on file     "Smokeless tobacco: Not on file   Substance Use Topics    Alcohol use: Not on file       OBJECTIVE:  VITAL SIGNS:   BP 93/63 (BP Location: Left arm)   Pulse 77   Temp 98.6  F (37  C)   Resp 16   Ht 1.727 m (5' 8\")   Wt 97.8 kg (215 lb 9.8 oz)   SpO2 94%   BMI 32.78 kg/m      Vent Mode: (S) CPAP/PS  (Continuous positive airway pressure with Pressure Support)  FiO2 (%): 40 %  Resp Rate (Set): 14 breaths/min  Tidal Volume (Set, mL): 450 mL  PEEP (cm H2O): 5 cmH2O  Pressure Support (cm H2O): 8 cmH2O  Resp: 16      PHYSICAL EXAMINATION:   General: Laying in bed, just extubated.   Neuro: adequate neuro checks q1h. Hip flexion in tact.   Resp: NAD on RA.   CV: RRR, 4x chest tubes no air leak   Abdomen: Soft, Non-distended  Incisions: c/d/i  Extremities: warm and well perfused, no edema.     INTAKE/ OUTPUT:   I/O last 3 completed shifts:  In: 5177.99 [I.V.:3607.99; Other:450; NG/GT:120; IV Piggyback:1000]  Out: 3339 [Urine:2859; Emesis/NG output:100; Chest Tube:380]    INVESTIGATIONS:   Recent Labs   Lab 01/24/24  0841 01/24/24  0358 01/23/24 2355 01/23/24 1956   PH 7.38 7.38 7.36 7.39   PCO2 40 40 40 38   PO2 109* 103 91 106*   HCO3 24 23 23 23     Complete Blood Count   Recent Labs   Lab 01/24/24  0358 01/23/24 1956 01/23/24  1703 01/23/24  1620 01/23/24  1604   WBC 18.8* 19.0* 20.8*  --  22.2*   HGB 7.9* 8.1* 8.6* 8.4* 7.7*    173 178  --  197     Basic Metabolic Panel  Recent Labs   Lab 01/24/24  1040 01/24/24  0844 01/24/24  0403 01/24/24  0358 01/23/24 2008 01/23/24 1956 01/23/24  1758 01/23/24  1703 01/23/24  1620 01/23/24  0856 01/23/24  0354   NA  --   --   --  143  143  --  141  --  141 141   < > 139   POTASSIUM  --   --   --  4.3  4.3  --  4.6  --  4.4 4.2   < > 3.6   CHLORIDE  --   --   --  111*  111*  --  111*  --  109*  --   --  108*   CO2  --   --   --  22  22  --  21*  --  22  --   --  19*   BUN  --   --   --  27.6*  27.6*  --  26.1*  --  23.0*  --   --  21.4*   CR  --   --   --  1.71* "  1.71*  --  1.77*  --  1.68*  --   --  1.65*   * 143* 130* 137*  137*   < > 123*   < > 137* 136*   < > 84    < > = values in this interval not displayed.     Liver Function Tests  Recent Labs   Lab 01/24/24 0358 01/23/24 2154 01/23/24  1703 01/23/24  1604 01/23/24  0354 01/22/24  0307   AST 30  --  37  --  14 12   ALT 10  --  10  --  9 8   ALKPHOS 43  --  41  --  49 47   BILITOTAL 0.3  --  0.7  --  0.5 0.4   ALBUMIN 2.8*  --  2.8*  --  2.9* 3.0*   INR 1.20* 1.24* 1.24* 1.62* 1.12 1.17*     Pancreatic Enzymes  No lab results found in last 7 days.  Coagulation Profile  Recent Labs   Lab 01/24/24 0358 01/23/24 2154 01/23/24 1703 01/23/24  1604   INR 1.20* 1.24* 1.24* 1.62*   PTT  --  49* 38 36       RADIOLOGY:   Recent Results (from the past 24 hour(s))   IR OR Angiogram    Narrative    This exam was marked as non-reportable because it will not be read by a   radiologist or a [facility] non-radiologist provider.         XR Surgery HINA Fluoro Less Than 5 Min    Narrative    This exam was marked as non-reportable because it will not be read by a   radiologist or a [facility] non-radiologist provider.         XR Chest Port 1 View    Narrative    Exam: XR CHEST PORT 1 VIEW, 1/23/2024 5:56 PM    Indication: s/p ascending aortic aneurysm repair with TEVAR    Comparison: 1/17/2024, 1/16/2024    Findings:   Endotracheal tube tip at the mid thoracic trachea. Mediastinal drains  and right chest tube. Right upper extremity PICC tip at the mid right  atrium. Right internal jugular [place]-Jasmin catheter tip at the main  pulmonary artery. Enteric tube tip and sidehole projecting over the  stomach. Proximal descending thoracic aortic stent graft. Intact  median sternotomy wires. Mediastinal and axillary surgical clips. The  cardiomediastinal silhouette is within normal limits. The pulmonary  vasculature is indistinct. No appreciable pleural effusion or  pneumothorax. Perihilar predominant streaky airspace opacities. Low  lung  volumes.      Impression    Impression:   1. Support devices as detailed above.  2. Streaky perihilar opacities, likely postoperative atelectasis in  the setting of low lung volumes.    EDILMA VALENZUELA DO         SYSTEM ID:  [id number]   XR Abdomen Port 1 View    Narrative    EXAMINATION:  XR ABDOMEN PORT 1 VIEW 1/23/2024     COMPARISON: None.    HISTORY: OGT advanced.    TECHNIQUE: One frontal supine view of the abdomen.    FINDINGS: Postsurgical changes of the chest with intact median  sternotomy wires. Surgical clips in the superior mediastinum. Thoracic  aortic aneurysm repair. Three mediastinal chest tubes. Right basilar  pleural chest tube. Right IJ catheter terminates at the mid SVC and  the pulmonary catheter is overlying the left pulmonary artery.     Enteric tube tip and sidehole are projected over the gastric fundus.  Gaseous distention of colon in the upper abdomen, no pneumatosis or  portal venous gas. The lung bases are unremarkable.       Impression    IMPRESSION:   1. Gastric tube tip and sidehole project over the stomach.  2. Postsurgical changes of the chest with intact median sternotomy  wires.  3. Gaseous distention of the colon in the upper abdomen, may represent  postoperative ileus.    I have personally reviewed the examination and initial interpretation  and I agree with the findings.    EDILMA VALENZUELA DO         SYSTEM ID:  [id number]   XR Chest Port 1 View    Narrative    Portable chest    INDICATION: Post extubation    COMPARISON: Yesterday 1726 hours    FINDINGS: Interval removal of endotracheal tube. No ectopic air. Right  basilar thoracostomy tube, right IJ [place]-Jasmin catheter with tip in the  proximal right main branch pulmonary artery comment descending  thoracic aortic stent graft and right upper extremity PICC line with  tip near the cavoatrial junction along with mediastinal drains are  unchanged. Interval removal of NG/OG tube as well.  Increased silhouetting the left hemidiaphragm  and prominent  retrocardiac density. Mild loss of the left costophrenic angle now  present.      Impression    IMPRESSION: New small left pleural effusion with likely associated  retrocardiac atelectasis. Recommend follow-up to clearing to exclude  superimposed infectious consolidation. Interval extubation. Continued  appearance of descending thoracic aortic stent graft and other chest  drains.    ADONAY HAUSER MD         SYSTEM ID:  [id number]       =========================================

## 2024-01-24 NOTE — OP NOTE
VASCULAR SURGERY OPERATIVE REPORT     LOCATION:    [facility]    Valdo Lyons  Medical Record #:  [id number]  YOB: 1975  Age:  48 year old     Date of Service: 1/16/2024 - 1/23/2024     Preoperative diagnosis    -Acute Type B [0-5] Aortic Dissection with Retrograde Intramural Hematoma  -Refractory hypertension    Postoperative diagnosis    -Acute Type B [0-5] Aortic Dissection with Retrograde Intramural Hematoma  -Refractory hypertension    Surgeon: Artur Singh MD  First Assistant: Jorge Dodge MD (Pgy 2 Vascular Surgery Resident)  Other Assistant: Steven Rawls MD (Pgy 4 Vascular Surgery Resident)    Cardiac Surgeon: Addi Shaw MD    A first assistant actively participated and was necessary for one or more of the following: opening, exposure and visualization during the case, maintaining hemostasis, wound closure resulting in its safe and expeditious completion.         Procedures:  Antegrade Thoracic Endovascular repair of acute Type B dissection using a 37x100 mm [place] cTAG stent-graft deployed in Zone  3-4  Stented Aggressive Hemiarch under deep hypothermic circulatory arrest with through and through right common femoral to ascending aortic access.  Thoracic and Abdominal Aortogram and radiologic interpretation.  Intravascular ultrasound of the thoracoabdominal aorta and aortic arch.  Total percutaneous left femoral approach with Perclose device, 8-Fr sheath on the right.    Findings:   True lumen confirmed with IVUS. Antegrade TEVAR on circulatory arrest.  Hemostatic groin post procedure with palpable pedal pulses post operatively stable from pre.    Indication for procedure:    Mr. Lyons is a 48 year old male with a history of acute type B dissection initially extending from zone 2 through zone 5 managed medically elsewhere.  Unfortunately he had retrograde extension of intramural hematoma to the ascending aorta prompting transfer  to our institution. Risks, benefits, alternatives and indications including but not limited to the risk of death, anesthetic or cardiopulmonary complications, bleeding, infection, myocardial infarction, stroke, renal insufficiency requiring temporary or permanent dialysis, bowel infarction necessitating bowel resection and colostomy, access complication, vessel injury, dissection, distal embolization, conversion to open, perforation, extremity ischemia with either compartment syndrome or limb loss, and spinal cord injury.  Discussed the potential need for bank blood products, advanced directives, and the need for a team approach as well as the potential for medical photography. The patient and his mother understand the risks and would like to proceed with endovascular repair of his acute dissection.    Description of procedure:    Operative details:    The patient was brought to the operating room.  Under satisfactory general anesthesia, he was placed in supine position with all pressure points padded in standard fashion.  The chest, abdomen, groins, and legs were widely prepped and draped in sterile, standard fashion.  A surgical pause was performed with all members of the surgical team to verify patient, medical record number, birth date, planned surgical procedure, surgical site, allergies, fire risk and perioperative antibiotics.    Under realtime ultrasound guidance, percutaneous left retrograde transfemoral access was established with a micropuncture set and exchanged for 0.035-inch system.  The systems were upsized to a 6 [place] sheath. The patient was systemically heparinized with intravenous heparin. An ACT greater than 250 seconds was obtained. The left femoral puncture site was preclosed using two Perclose devices and an 8 Fr sheath was placed. Access was gained into the ascending aorta with a Kumpe catheter and a Glidewire. Intravascular ultrasound was obtained which revealed true lumen access through  the entirety of the aorta. At this point the procedure was turned over to Dr. Shaw and his team.    After institution of cardiopulmonary bypass and deep hypothermia, I returned to the operating room for antegrade TEVAR.  The wire in the ascending aorta had been exteriorized forming through and through access.  Circulatory arrest with antegrade cerebral perfusion was instituted and the aortic graft was prepped while the proximal aorta was prepared.  The aggressive hemiarch was prepared for TEVAR and distal anastomosis. Over the through and through wire a 37x100-mm conformable active control [place] cTAG aortic stent graft was introduced via the ascending aorta. The device was deployed at the level of the left subclavian with brief pause in ACP.  Dr. Shaw performed pledgeted tacking sutures through the stent graft and aortic wall.  The wire retracted slightly into the stent graft. At this point, the procedure was again turned over to Dr. Shaw.    After completion of the elizabeth-arch repair and rewarming, I returned to the operation room.  The patient was weaned from cardiopulmonary bypass. Repeat intravascular ultrasound was obtained which revealed continued dissection of the subclavian as expected. No evidence of new entry tear or true lumen collapse in the visceral segment.  The thoracic and abdominal aortogram was performed demonstrating patent stent graft with late filling of the false lumen distal to the graft, with no false lumen in the abdomen without signs of malperfusion. The celiac, SMA, and bilateral renal arteries filled as did the aortic bifurcation. There were no signs of stent induced new entry tear, rupture, or any other technical defect.  All sheaths and wires were removed.  The Perclose devices were tightened and were hemostatic.  All needle and sponge counts were correct. The groin incision was closed using running Monocryl sutures for the subcuticular layer. A sterile dressing was applied. The patient  had reasonable cardiac function so the chest was closed.  The patient was transferred to the ICU directly in stable condition.    The patient had palpable pulses at completion, stable from his baseline.    Estimated blood loss: 25 ml from our portion    Specimens: none     Complications: None apparent    Plan:  -ASA 81 mg when able  -Flat for 6 hours, followed by bedrest  -Site checks  -Neurovascular checks, must be able to lift legs off bed. Please wean sedation as frequently as possible to assess neurologic status  -MAP goal: per cardiac surgery although ideally >75 for spinal cord perfusion  -q6 CBC, BMP, Lactate, Fibrinogen, PT/INR  -Hgb > 9, Plt >100, Fibrinogen <200, INR <1.5  -Spinal drain at bedside          Artur Singh MD, VI  VASCULAR AND ENDOVASCULAR SURGERY   [facility] Vascular Surgery

## 2024-01-24 NOTE — PLAN OF CARE
Major Shift Events:  Neurologically intact, however disoriented to situation.  Patient requesting water/juice every 15-30min this shift.  Writer provided ongoing education to pace oral intact while awaiting return of GI motility post CV surgery.  Diet advanced to full liquid over the course of the day  Neuros advanced today to Q4h.  C/O sternal pain for which is is getting oxy, robaxin, APAP, and gabapentin with occasion dilaudid dosing. Extubated this AM (see note).  Strong productive cough with tan thick sputum.  Pressors weaned off today.  Pulses good x4.  Insulin drip started for BG >150.  Up into the chair x 2 today.  Tolerates well, however orthostatic Hypotension noted and dizziness.          Plan: Continue to increase activity.  Expect further line removal tomorrow and step down orders.      For vital signs and complete assessments, please see documentation flowsheets.

## 2024-01-24 NOTE — PROGRESS NOTES
[facility], Procedure Note          Extubation:       Valdo Lyons  MRN# [id number]   January 24, 2024, 09:10 AM         Patient extubated at: January 24, 2024, 0910 AM   Supplemental Oxygen: Via nasal cannula at 4 liters per minute   Cough: The cough is good and productive   Secretion Mode: PRN suction with assistance   Secretion Amount: Moderate amount, moderately thick and white / yellow and some blood in color   Respiratory Exam:: Breath sounds: equal and clear     Location: all lobes   Skin Exam:: Patient color: pink   Patient Status: Currently appears comfortable   Arterial Blood Gasses: pH Arterial (no units)   Date Value   01/24/2024 7.38     pO2 Arterial (mm Hg)   Date Value   01/24/2024 109 (H)     pCO2 Arterial (mm Hg)   Date Value   01/24/2024 40     Bicarbonate Arterial (mmol/L)   Date Value   01/24/2024 24            Recorded by Festus Mcgill RN

## 2024-01-24 NOTE — PROGRESS NOTES
Brief Progress Note     Interval summary note to document updates and changes to care plan/s. Please see daily progress note for full assessment and plan/s.     Interval history: Patient assessed at bedside 1940 after holding sedation. Patient is awake, alert and tracks/regards examiner bilaterally. Lifts BUE off bed level, reaching both arms to ETT. Squeezes hands 4+5/5 bilaterally. Wiggles fingers to command on both sides. Wiggles toes to command in BLE. Moving BLE spontaneously. With prodding, able to pull bilateral lower extremities nearly up to chest while flat lying.     CVTS Fellow, Vascular fellow communicated exam findings. Propofol resumed.       Guilherme Aguilera PA-C   01/23/2024  7:50 PM

## 2024-01-24 NOTE — PROGRESS NOTES
CLINICAL NUTRITION SERVICES - BRIEF NOTE    Received provider consult for nutrition education with comments post op cardiovascular surgery (automatic consult on post-op order set). S/p ascending aortic aneurysm hemiarch repair, TEVAR on 1/23. Nutrition education not indicated.    RD will follow per LOS protocol or if re-consulted.     Harriet Chacon MS, RD, LD  4A (CVICU) RD pager: [phone]  Ascom: [phone]  Weekend/Holiday RD pager: [phone]

## 2024-01-24 NOTE — PROGRESS NOTES
Patient suctioned and electively extubated per physician order. Placed on LFNC @ 4 LPM, breath sounds were clear, diminished, labs pending. Patient tolerated procedure well without any immediate complications.    Geronimo Coelho, TONYA, RT  1/24/2024 9:18 AM

## 2024-01-25 ENCOUNTER — APPOINTMENT (OUTPATIENT)
Dept: GENERAL RADIOLOGY | Facility: CLINIC | Age: 49
End: 2024-01-25
Attending: SURGERY
Payer: MEDICAID

## 2024-01-25 ENCOUNTER — APPOINTMENT (OUTPATIENT)
Dept: PHYSICAL THERAPY | Facility: CLINIC | Age: 49
End: 2024-01-25
Attending: NURSE PRACTITIONER
Payer: MEDICAID

## 2024-01-25 LAB
ALBUMIN SERPL BCG-MCNC: 3 G/DL (ref 3.5–5.2)
ALP SERPL-CCNC: 45 U/L (ref 40–150)
ALT SERPL W P-5'-P-CCNC: 16 U/L (ref 0–70)
ANION GAP SERPL CALCULATED.3IONS-SCNC: 8 MMOL/L (ref 7–15)
ANION GAP SERPL CALCULATED.3IONS-SCNC: 9 MMOL/L (ref 7–15)
AST SERPL W P-5'-P-CCNC: 36 U/L (ref 0–45)
BILIRUB SERPL-MCNC: 0.5 MG/DL
BUN SERPL-MCNC: 33.2 MG/DL (ref 6–20)
BUN SERPL-MCNC: 43.1 MG/DL (ref 6–20)
CA-I BLD-MCNC: 4.8 MG/DL (ref 4.4–5.2)
CALCIUM SERPL-MCNC: 8.2 MG/DL (ref 8.6–10)
CALCIUM SERPL-MCNC: 8.6 MG/DL (ref 8.6–10)
CHLORIDE SERPL-SCNC: 100 MMOL/L (ref 98–107)
CHLORIDE SERPL-SCNC: 99 MMOL/L (ref 98–107)
CHOLEST SERPL-MCNC: 89 MG/DL
CREAT SERPL-MCNC: 1.75 MG/DL (ref 0.67–1.17)
CREAT SERPL-MCNC: 2.25 MG/DL (ref 0.67–1.17)
DEPRECATED HCO3 PLAS-SCNC: 22 MMOL/L (ref 22–29)
DEPRECATED HCO3 PLAS-SCNC: 23 MMOL/L (ref 22–29)
EGFRCR SERPLBLD CKD-EPI 2021: 35 ML/MIN/1.73M2
EGFRCR SERPLBLD CKD-EPI 2021: 48 ML/MIN/1.73M2
ERYTHROCYTE [DISTWIDTH] IN BLOOD BY AUTOMATED COUNT: 15.9 % (ref 10–15)
ERYTHROCYTE [DISTWIDTH] IN BLOOD BY AUTOMATED COUNT: 16.1 % (ref 10–15)
GLUCOSE BLDC GLUCOMTR-MCNC: 103 MG/DL (ref 70–99)
GLUCOSE BLDC GLUCOMTR-MCNC: 105 MG/DL (ref 70–99)
GLUCOSE BLDC GLUCOMTR-MCNC: 116 MG/DL (ref 70–99)
GLUCOSE BLDC GLUCOMTR-MCNC: 116 MG/DL (ref 70–99)
GLUCOSE BLDC GLUCOMTR-MCNC: 121 MG/DL (ref 70–99)
GLUCOSE BLDC GLUCOMTR-MCNC: 128 MG/DL (ref 70–99)
GLUCOSE BLDC GLUCOMTR-MCNC: 135 MG/DL (ref 70–99)
GLUCOSE SERPL-MCNC: 116 MG/DL (ref 70–99)
GLUCOSE SERPL-MCNC: 98 MG/DL (ref 70–99)
HCT VFR BLD AUTO: 22.5 % (ref 40–53)
HCT VFR BLD AUTO: 23.7 % (ref 40–53)
HDLC SERPL-MCNC: 27 MG/DL
HGB BLD-MCNC: 7.2 G/DL (ref 13.3–17.7)
HGB BLD-MCNC: 7.4 G/DL (ref 13.3–17.7)
INR PPP: 1.18 (ref 0.85–1.15)
LACTATE SERPL-SCNC: 0.8 MMOL/L (ref 0.7–2)
LDLC SERPL CALC-MCNC: 41 MG/DL
MAGNESIUM SERPL-MCNC: 2.5 MG/DL (ref 1.7–2.3)
MCH RBC QN AUTO: 19.7 PG (ref 26.5–33)
MCH RBC QN AUTO: 19.8 PG (ref 26.5–33)
MCHC RBC AUTO-ENTMCNC: 31.2 G/DL (ref 31.5–36.5)
MCHC RBC AUTO-ENTMCNC: 32 G/DL (ref 31.5–36.5)
MCV RBC AUTO: 62 FL (ref 78–100)
MCV RBC AUTO: 64 FL (ref 78–100)
NONHDLC SERPL-MCNC: 62 MG/DL
PATH REPORT.COMMENTS IMP SPEC: NORMAL
PATH REPORT.COMMENTS IMP SPEC: NORMAL
PATH REPORT.FINAL DX SPEC: NORMAL
PATH REPORT.GROSS SPEC: NORMAL
PATH REPORT.MICROSCOPIC SPEC OTHER STN: NORMAL
PATH REPORT.RELEVANT HX SPEC: NORMAL
PHOSPHATE SERPL-MCNC: 4.3 MG/DL (ref 2.5–4.5)
PHOTO IMAGE: NORMAL
PLATELET # BLD AUTO: 107 10E3/UL (ref 150–450)
PLATELET # BLD AUTO: 111 10E3/UL (ref 150–450)
POTASSIUM SERPL-SCNC: 4.4 MMOL/L (ref 3.4–5.3)
POTASSIUM SERPL-SCNC: 4.6 MMOL/L (ref 3.4–5.3)
POTASSIUM SERPL-SCNC: 4.8 MMOL/L (ref 3.4–5.3)
PROT SERPL-MCNC: 5.5 G/DL (ref 6.4–8.3)
RBC # BLD AUTO: 3.66 10E6/UL (ref 4.4–5.9)
RBC # BLD AUTO: 3.73 10E6/UL (ref 4.4–5.9)
SODIUM SERPL-SCNC: 130 MMOL/L (ref 135–145)
SODIUM SERPL-SCNC: 131 MMOL/L (ref 135–145)
TRIGL SERPL-MCNC: 105 MG/DL
WBC # BLD AUTO: 13.7 10E3/UL (ref 4–11)
WBC # BLD AUTO: 16.9 10E3/UL (ref 4–11)

## 2024-01-25 PROCEDURE — 80053 COMPREHEN METABOLIC PANEL: CPT | Performed by: SURGERY

## 2024-01-25 PROCEDURE — 250N000011 HC RX IP 250 OP 636: Performed by: SURGERY

## 2024-01-25 PROCEDURE — 83605 ASSAY OF LACTIC ACID: CPT | Performed by: NURSE PRACTITIONER

## 2024-01-25 PROCEDURE — 84132 ASSAY OF SERUM POTASSIUM: CPT

## 2024-01-25 PROCEDURE — 258N000003 HC RX IP 258 OP 636

## 2024-01-25 PROCEDURE — 80061 LIPID PANEL: CPT | Performed by: STUDENT IN AN ORGANIZED HEALTH CARE EDUCATION/TRAINING PROGRAM

## 2024-01-25 PROCEDURE — 250N000011 HC RX IP 250 OP 636

## 2024-01-25 PROCEDURE — 200N000002 HC R&B ICU UMMC

## 2024-01-25 PROCEDURE — 84100 ASSAY OF PHOSPHORUS: CPT | Performed by: SURGERY

## 2024-01-25 PROCEDURE — 71045 X-RAY EXAM CHEST 1 VIEW: CPT

## 2024-01-25 PROCEDURE — 82330 ASSAY OF CALCIUM: CPT | Performed by: NURSE PRACTITIONER

## 2024-01-25 PROCEDURE — 99232 SBSQ HOSP IP/OBS MODERATE 35: CPT | Mod: GC | Performed by: ANESTHESIOLOGY

## 2024-01-25 PROCEDURE — 97530 THERAPEUTIC ACTIVITIES: CPT | Mod: GP

## 2024-01-25 PROCEDURE — 250N000013 HC RX MED GY IP 250 OP 250 PS 637

## 2024-01-25 PROCEDURE — 85027 COMPLETE CBC AUTOMATED: CPT

## 2024-01-25 PROCEDURE — 85610 PROTHROMBIN TIME: CPT | Performed by: SURGERY

## 2024-01-25 PROCEDURE — 83735 ASSAY OF MAGNESIUM: CPT | Performed by: NURSE PRACTITIONER

## 2024-01-25 PROCEDURE — 250N000013 HC RX MED GY IP 250 OP 250 PS 637: Performed by: SURGERY

## 2024-01-25 PROCEDURE — C9113 INJ PANTOPRAZOLE SODIUM, VIA: HCPCS

## 2024-01-25 PROCEDURE — 71045 X-RAY EXAM CHEST 1 VIEW: CPT | Mod: 26 | Performed by: RADIOLOGY

## 2024-01-25 PROCEDURE — 97163 PT EVAL HIGH COMPLEX 45 MIN: CPT | Mod: GP

## 2024-01-25 PROCEDURE — 85014 HEMATOCRIT: CPT | Performed by: NURSE PRACTITIONER

## 2024-01-25 PROCEDURE — 82310 ASSAY OF CALCIUM: CPT

## 2024-01-25 RX ORDER — DEXTROSE MONOHYDRATE 25 G/50ML
25-50 INJECTION, SOLUTION INTRAVENOUS
Status: DISCONTINUED | OUTPATIENT
Start: 2024-01-25 | End: 2024-01-25

## 2024-01-25 RX ORDER — FUROSEMIDE 10 MG/ML
60 INJECTION INTRAMUSCULAR; INTRAVENOUS ONCE
Status: COMPLETED | OUTPATIENT
Start: 2024-01-25 | End: 2024-01-25

## 2024-01-25 RX ORDER — PANTOPRAZOLE SODIUM 40 MG/1
40 TABLET, DELAYED RELEASE ORAL
Status: DISCONTINUED | OUTPATIENT
Start: 2024-01-26 | End: 2024-02-04 | Stop reason: HOSPADM

## 2024-01-25 RX ORDER — ACETAMINOPHEN 325 MG/1
975 TABLET ORAL 4 TIMES DAILY
Status: DISCONTINUED | OUTPATIENT
Start: 2024-01-25 | End: 2024-01-26

## 2024-01-25 RX ORDER — AMOXICILLIN 250 MG
2 CAPSULE ORAL 2 TIMES DAILY
Status: DISCONTINUED | OUTPATIENT
Start: 2024-01-25 | End: 2024-01-28

## 2024-01-25 RX ORDER — GABAPENTIN 100 MG/1
100 CAPSULE ORAL 3 TIMES DAILY
Status: DISCONTINUED | OUTPATIENT
Start: 2024-01-25 | End: 2024-02-02

## 2024-01-25 RX ORDER — ROSUVASTATIN CALCIUM 5 MG/1
20 TABLET, COATED ORAL DAILY
Status: DISCONTINUED | OUTPATIENT
Start: 2024-01-25 | End: 2024-02-04 | Stop reason: HOSPADM

## 2024-01-25 RX ORDER — NICOTINE POLACRILEX 4 MG
15-30 LOZENGE BUCCAL
Status: DISCONTINUED | OUTPATIENT
Start: 2024-01-25 | End: 2024-01-25

## 2024-01-25 RX ORDER — FUROSEMIDE 10 MG/ML
80 INJECTION INTRAMUSCULAR; INTRAVENOUS ONCE
Status: COMPLETED | OUTPATIENT
Start: 2024-01-25 | End: 2024-01-25

## 2024-01-25 RX ORDER — POLYETHYLENE GLYCOL 3350 17 G/17G
17 POWDER, FOR SOLUTION ORAL 2 TIMES DAILY
Status: DISCONTINUED | OUTPATIENT
Start: 2024-01-25 | End: 2024-01-28

## 2024-01-25 RX ADMIN — OXYCODONE HYDROCHLORIDE 10 MG: 10 TABLET ORAL at 03:34

## 2024-01-25 RX ADMIN — HYDROMORPHONE HYDROCHLORIDE 0.2 MG: 0.2 INJECTION, SOLUTION INTRAMUSCULAR; INTRAVENOUS; SUBCUTANEOUS at 17:14

## 2024-01-25 RX ADMIN — ASPIRIN 81 MG CHEWABLE TABLET 81 MG: 81 TABLET CHEWABLE at 07:50

## 2024-01-25 RX ADMIN — ACETAMINOPHEN 975 MG: 325 TABLET, FILM COATED ORAL at 12:15

## 2024-01-25 RX ADMIN — POLYETHYLENE GLYCOL 3350 17 G: 17 POWDER, FOR SOLUTION ORAL at 07:50

## 2024-01-25 RX ADMIN — ACETAMINOPHEN 975 MG: 325 TABLET, FILM COATED ORAL at 16:14

## 2024-01-25 RX ADMIN — FUROSEMIDE 80 MG: 10 INJECTION, SOLUTION INTRAVENOUS at 16:59

## 2024-01-25 RX ADMIN — HEPARIN SODIUM 5000 UNITS: 5000 INJECTION, SOLUTION INTRAVENOUS; SUBCUTANEOUS at 05:55

## 2024-01-25 RX ADMIN — OXYCODONE HYDROCHLORIDE 10 MG: 10 TABLET ORAL at 13:59

## 2024-01-25 RX ADMIN — HEPARIN SODIUM 5000 UNITS: 5000 INJECTION, SOLUTION INTRAVENOUS; SUBCUTANEOUS at 14:00

## 2024-01-25 RX ADMIN — HYDROMORPHONE HYDROCHLORIDE 0.4 MG: 0.2 INJECTION, SOLUTION INTRAMUSCULAR; INTRAVENOUS; SUBCUTANEOUS at 10:03

## 2024-01-25 RX ADMIN — ONDANSETRON 4 MG: 2 INJECTION INTRAMUSCULAR; INTRAVENOUS at 10:02

## 2024-01-25 RX ADMIN — GABAPENTIN 100 MG: 100 CAPSULE ORAL at 12:15

## 2024-01-25 RX ADMIN — ACETAMINOPHEN 975 MG: 325 TABLET, FILM COATED ORAL at 03:34

## 2024-01-25 RX ADMIN — POLYETHYLENE GLYCOL 3350 17 G: 17 POWDER, FOR SOLUTION ORAL at 20:44

## 2024-01-25 RX ADMIN — ACETAMINOPHEN 975 MG: 325 TABLET, FILM COATED ORAL at 20:44

## 2024-01-25 RX ADMIN — OXYCODONE HYDROCHLORIDE 10 MG: 10 TABLET ORAL at 20:44

## 2024-01-25 RX ADMIN — ROSUVASTATIN CALCIUM 20 MG: 20 TABLET, FILM COATED ORAL at 13:59

## 2024-01-25 RX ADMIN — HYDROMORPHONE HYDROCHLORIDE 0.2 MG: 0.2 INJECTION, SOLUTION INTRAMUSCULAR; INTRAVENOUS; SUBCUTANEOUS at 01:56

## 2024-01-25 RX ADMIN — HEPARIN SODIUM 5000 UNITS: 5000 INJECTION, SOLUTION INTRAVENOUS; SUBCUTANEOUS at 22:15

## 2024-01-25 RX ADMIN — SODIUM CHLORIDE, POTASSIUM CHLORIDE, SODIUM LACTATE AND CALCIUM CHLORIDE 500 ML: 600; 310; 30; 20 INJECTION, SOLUTION INTRAVENOUS at 15:16

## 2024-01-25 RX ADMIN — HYDROMORPHONE HYDROCHLORIDE 0.2 MG: 0.2 INJECTION, SOLUTION INTRAMUSCULAR; INTRAVENOUS; SUBCUTANEOUS at 04:27

## 2024-01-25 RX ADMIN — DOCUSATE SODIUM 50 MG AND SENNOSIDES 8.6 MG 2 TABLET: 8.6; 5 TABLET, FILM COATED ORAL at 20:43

## 2024-01-25 RX ADMIN — METHOCARBAMOL 500 MG: 500 TABLET ORAL at 01:56

## 2024-01-25 RX ADMIN — FUROSEMIDE 60 MG: 10 INJECTION, SOLUTION INTRAMUSCULAR; INTRAVENOUS at 12:15

## 2024-01-25 RX ADMIN — PANTOPRAZOLE SODIUM 40 MG: 40 INJECTION, POWDER, FOR SOLUTION INTRAVENOUS at 07:50

## 2024-01-25 RX ADMIN — DOCUSATE SODIUM 50 MG AND SENNOSIDES 8.6 MG 2 TABLET: 8.6; 5 TABLET, FILM COATED ORAL at 07:50

## 2024-01-25 RX ADMIN — GABAPENTIN 100 MG: 100 CAPSULE ORAL at 20:44

## 2024-01-25 RX ADMIN — OXYCODONE HYDROCHLORIDE 10 MG: 10 TABLET ORAL at 07:50

## 2024-01-25 ASSESSMENT — ACTIVITIES OF DAILY LIVING (ADL)
ADLS_ACUITY_SCORE: 27

## 2024-01-25 NOTE — PROGRESS NOTES
Major Shift Events:  Pt A&Ox4, able to make needs known. PRN oxy given x2, IV dilaudid x1. IV lasix 60mg given x1. A-line and MAC removed.  Plan: Transfer to [facility] when bed available.  For vital signs and complete assessments, please see documentation flowsheets.

## 2024-01-25 NOTE — PROGRESS NOTES
"VASCULAR SURGERY PROGRESS NOTE    Subjective:  NAEO. POD #2 from hemiarch repair and TEVAR on 1/23/24. On low dose epinephrine for hemodynamic support. Up in chair, feeling well.     Objective:  Intake/Output Summary (Last 24 hours) at 1/25/2024 0846  Last data filed at 1/25/2024 0800  Gross per 24 hour   Intake 5300.97 ml   Output 1709 ml   Net 3591.97 ml     Labs:  ROUTINE IP LABS (Last four results)  BMP  Recent Labs   Lab 01/25/24  0757 01/25/24  0633 01/25/24  0343 01/25/24  0342 01/24/24  1550 01/24/24  1546 01/24/24  0403 01/24/24  0358 01/23/24 2008 01/23/24 1956   NA  --   --   --  131*  --  137  --  143  143  --  141   POTASSIUM  --   --   --  4.6  --  4.1  --  4.3  4.3  --  4.6   CHLORIDE  --   --   --  100  --  106  --  111*  111*  --  111*   YARON  --   --   --  8.2*  --  8.1*  --  8.0*  8.0*  --  8.0*   CO2  --   --   --  22  --  22  --  22  22  --  21*   BUN  --   --   --  33.2*  --  29.5*  --  27.6*  27.6*  --  26.1*   CR  --   --   --  1.75*  --  1.58*  --  1.71*  1.71*  --  1.77*   * 116* 103* 98   < > 139*   < > 137*  137*   < > 123*    < > = values in this interval not displayed.     CBC  Recent Labs   Lab 01/25/24  0342 01/24/24  1546 01/24/24  1138 01/24/24  0358 01/23/24 1956   WBC 13.7* 14.2*  --  18.8* 19.0*   RBC 3.66* 3.72*  --  4.01* 4.17*   HGB 7.2* 7.5* 7.5* 7.9* 8.1*   HCT 22.5* 23.1*  --  24.3* 24.9*   MCV 62* 62*  --  61* 60*   MCH 19.7* 20.2*  --  19.7* 19.4*   MCHC 32.0 32.5  --  32.5 32.5   RDW 16.1* 15.9*  --  16.2* 15.9*   * 119*  --  200 173     INR  Recent Labs   Lab 01/25/24  0342 01/24/24  0358 01/23/24  2154 01/23/24  1703   INR 1.18* 1.20* 1.24* 1.24*     PHYSICAL EXAM:  /58   Pulse 71   Temp 98.7  F (37.1  C) (Oral)   Resp 12   Ht 1.727 m (5' 8\")   Wt 97.8 kg (215 lb 9.8 oz)   SpO2 98%   BMI 32.78 kg/m    General: The patient is awake and alert, NAD  Psych: Pleasant affect, answers questions appropriately  Skin: Color appropriate for " race, warm, dry.  Neuro: Sensorimotor intact in BUE and BLE, CN II-XII grossly intact  Respiratory: The patient does require 3L NC supplemental oxygen. Breathing unlabored  GI:  Abdomen soft, non-distended.  Extremities: Bilateral femoral and DP pulses palpable, mild edema noted on ankles.      DATA:   XR Chest Port 1 View   Preliminary Result   RESIDENT PRELIMINARY INTERPRETATION   IMPRESSION:   Small left pleural effusion with slightly improved left retrocardiac   atelectasis. New right-sided opacities, likely atelectasis.      XR Chest Port 1 View   Final Result   IMPRESSION: New small left pleural effusion with likely associated   retrocardiac atelectasis. Recommend follow-up to clearing to exclude   superimposed infectious consolidation. Interval extubation. Continued   appearance of descending thoracic aortic stent graft and other chest   drains.      ADONAY HAUSER MD            SYSTEM ID:  [id number]      XR Abdomen Port 1 View   Final Result   IMPRESSION:    1. Gastric tube tip and sidehole project over the stomach.   2. Postsurgical changes of the chest with intact median sternotomy   wires.   3. Gaseous distention of the colon in the upper abdomen, may represent   postoperative ileus.      I have personally reviewed the examination and initial interpretation   and I agree with the findings.      EDILMA VALENZUELA DO            SYSTEM ID:  [id number]      XR Chest Port 1 View   Final Result   Impression:    1. Support devices as detailed above.   2. Streaky perihilar opacities, likely postoperative atelectasis in   the setting of low lung volumes.      EDILMA VALENZUELA DO            SYSTEM ID:  [id number]      XR Surgery HINA Fluoro Less Than 5 Min   Final Result      IR OR Angiogram   Final Result      US Renal Complete w Arterial Duplex   Final Result   Impression:   1. Normal grayscale and color Doppler evaluation of the kidneys.   2. Large gradient of the peak velocities between the suprarenal and    infrarenal abdominal aorta which is likely related to the aortic   dissection flap in the upper abdominal aorta.  After comparing with   the CT from 1/17/2024, it seems as though the scan was likely of the   true lumen in the upper abdomen.  This finding is of unknown clinical   significance.    3. Right pleural effusion.      I have personally reviewed the examination and initial interpretation   and I agree with the findings.      VANDANA HERRERA MD            SYSTEM ID:  [id number]      CTA CHEST ABDOMEN PELVIS WITH CONTRAST PANEL   Final Result   IMPRESSION:   1. Aortic dissection extends from the left subclavian origin to the   supraceliac aorta. Dissection extends through the visualized left   subclavian artery. Left vertebral artery is thought to originate from   the true lumen. Left internal thoracic artery originates from the true   lumen.      2. Non specific fluid along the ascending aorta to arch.      3. 7 mm right upper lobe nodule. Per Fleischner Society   Recommendations, if the patient is at low risk of lung cancer, further   evaluation with CT in 6-12 months is suggested with optional CT in   18-24 months. If the patient is at high risk for lung cancer, further   evaluation with CT in 6-12 months and CT at 18-24 months is suggested.      LIZBETH BEACH MD            SYSTEM ID:  [id number]      XR Chest Port 1 View   Final Result   IMPRESSION:   1.  Right upper extremity PICC tip projects over the superior cava   junction.   2.  Stable cardiomediastinal silhouette.   3.  Streaky bibasilar opacities, right more than left may represent   atelectasis versus pulmonary edema/atypical infection.      I have personally reviewed the examination and initial interpretation   and I agree with the findings.      DEANNE FOWLER MD            SYSTEM ID:  [id number]      XR Chest Port 1 View   Final Result   Impression:    1. Mild widening of the mediastinum compared to prior x-ray 6/20/2022,   consistent with  patient's history of known type B aortic dissection   seen on outside imaging (imaging unavailable).   2. No focal consolidations. There is bibasilar atelectasis.   3. No significant pleural effusion, no discernible pneumothorax.      I have personally reviewed the examination and initial interpretation   and I agree with the findings.      GHISLAINE BOWEN MD            SYSTEM ID:  [id number]      Echocardiogram Complete   Final Result          ASSESSMENT / PLAN:  Patient is a 48M who [place] [facility] [place] [place] with acute type B aortic dissection from the origin of the left subclavian to the diaphragm just superior to the celiac axis.Due to worsening back pain and increased variation in BP from arterial lines in his right and left hands, repeat CT scan was done which showed Type A dissection with a thrombosed retrograde false lumen, so he was transferred to [facility]. Now POD #2 from hemiarch repair and TEVAR on 1/23/24 with palpable pulses in femoral, and DP bilaterally. Progressing appropriately.      - Continue with close monitoring and neuro checks for spinal chord ischemia.   - Wean pressors as tolerated  - Low dose aspirin as you are  - Wean O2 as tolerated, chest physical therapy, I/S  - PT, OT   - SBP goal <120mmHg  - Multimodal pain management    Discussed patients history, exam, assessment and plan with  who will discus with staff.    Candie Parkinson CNP  Vascular Surgery  Pager: [phone]  [email or web address]  Send message or 10 digit call back number Securely via [employer] with the [employer] Web Console (learn more here)

## 2024-01-25 NOTE — OP NOTE
DATE OF SERVICE:  1/23/24      PREOPERATIVE DIAGNOSES:   1.   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   Essential hypertension.  3.   Childhood seizures    POSTOPERATIVE DIAGNOSES:   1.   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   Essential hypertension.  3.   Childhood seizures     PROCEDURE PERFORMED:   1.  Median sternotomy and cannulation of the right subclavian artery and bi-caval venous cannulation for cardiopulmonary bypass with deep hypothermic circulatory arrest and selective antegrade cerebral perfusion.   2.  Repair of Negrito Type A aortic dissection with a 30 mm Gelweave graft of the ascending aorta from the sinotubular junction to the distal ascending aorta/proximal hemiarch.   3.   Antegrade Thoracic Endovascular repair of acute Type B dissection using a 37 x 100 mm [place] cTAG stent graft (Vascular Surgery)    SURGEON:  KHALIF Shaw MD     CO-SURGEON: Artur Singh MD (Dr. Singh's assistance was necessary to assist me with the deployment of the TEVAR graft for coverage of the entry tear in the descending thoracic aorta)      RESIDENT/FELLOW SURGEON:  Jose Aviles MD       ANESTHESIA:  General endotracheal anesthesia with a single-lumen endotracheal tube.       ESTIMATED BLOOD LOSS:  >1 liter.       SPECIMENS:  Ascending aorta with dissection.       INDICATIONS FOR PROCEDURE:  Valdo Lyons is a 48-year-old male with hypertension who [place] [facility] [place] B aortic dissection, which was medically managed. A repeat CT scan was obtained that demonstrated a retrograde Type A dissection with a thrombosed false lumen without evidence of coronary malperfusion or aortic insufficiency. He was transferred to [facility] for further care and was without chest pain on initial presentation. His  hypertension was refractory to medical management. Following a multidisciplinary discussion, it was felt that the best way to manage his aortic disease with the least amount of risk, was for a ascending/hemiarch repair with concomitant antegrade TEVAR deployment while under circulatory arrest. The risks and benefits of this approach was discussed with him and he wished to undergo the operation.      OPERATIVE FINDINGS:  There was a dissection of the ascending aorta extending through the arch and into the descending thoracic aorta. The dissection flap did not involve the aortic root and terminated at the sinotubular junction. The intimal tear was not visualized from the chest; however on pre-operative imaging, was noted to originate distal to the left subclavian artery. The aortic valve was competent. The coronary ostia were in the usual anatomic position. After reperfusion and defibrillation, sinus rhythm resumed. Left ventricular function was normal preoperatively and preserved after bypass on low dose inotropic support. The patient was coagulopathic postoperatively but hemostasis was achieved.     OPERATIVE DESCRIPTION IN DETAIL:  After obtaining informed consent, the patient was brought to the operating room and placed in the supine position on the operating room table.  Appropriate lines and devices for monitoring were placed by the anesthesia team.  The patient underwent smooth induction of general anesthesia, and the trachea was intubated orally with a single-lumen endotracheal tube.  A timeout was performed to confirm the correct patient identity, as well as the procedure to be performed.     Through a right subclavicular incision, the right subclavian artery artery was exposed. Heparin was administered. Proximal and distal control of the artery was obtained. A limited arteriotomy was created and a 8 mm PTFE graft was sewn end-to-side to the artery. A 24 Fr EOPA cannula was secured to the graft, appropriately  de-aired and connected to the cardiopulmonary bypass circuit    A median sternotomy was performed. The heart and great vessels were exposed. Full dose heparin was given to achieve a target ACT greater than 480 seconds.     The superior and inferior vena cava were subsequently cannulated and connected to the bypass circuit. Cardiopulmonary bypass was commenced. A left ventricular vent was placed through the right superior pulmonary vein, and a retrograde cardioplegia cannula was placed. The patient was cooled to 24 degrees Celsius and the head was packed in ice.       The ascending aorta was dissected free from the pulmonary artery and the aortic arch was exposed allowing us good visualization of the arch vessels. On low flow cardiopulmonary bypass, the aortic cross clamp was gently applied across the mid ascending aorta and the heart was arrested with a liter of retrograde cold blood cardioplegia. Subsequent maintenance doses of  300 mL of retrograde cold blood cardioplegia were given approximately every 20 minutes. Topical cold saline slush was applied for additional protection.     The aortic root was inspected and noted to be normal in diameter and wall thickness. The dissection flap terminated at the sinotubular junction.      When the patient was cooled to 24 degrees Celsius, the entire blood volume was emptied into the venous reservoir and circulation was arrested. The patient was placed in Trendelenburg position, and the aortic cross clamp was removed. The innominate artery was clamped. Antegrade cerebral perfusion was commenced. The ascending aorta was resected sharply to the base of the innominate artery and extending into the lesser curve of the elizabeth-arch of the aorta. At this point we had Dr. Singh's team deliver a TEVAR graft antegrade, which was deployed under direct visualization. The proximal end of the graft was tacked to the aortic wall using interrupted pledgeted 4-0 Prolene suture. During  this period ACP was temporarily stopped to allow for adequate visualization of the proximal descending thoracic aorta.     Once the stent graft was deployed, we proceeded next to perform the distal anastomosis of the beveled 30 mm Gelweave graft in an end-to-end fashion with running 3-0 Prolene buttressed with felt strips along the adventitia. The clamp across the innominate artery was removed and cardiopulmonary bypass was resumed. The aortic cross clamp was applied across the Gelweave graft as it was de-aired. We began rewarming the patient.       The Gelweave aortic graft was cut to length and the proximal anastomosis to the aortic sewing cuff on the sinotubular junction was constructed in an end-to-end fashion using running 3-0 Prolene with a felt strip buttressing the adventitia. An ascending aortic root vent was placed in the Gelweave graft and the aortic cross clamp was released. The heart was resuscitated on cardiopulmonary bypass as rewarming was completed.    Ventilation was commenced. The patient weaned from cardiopulmonary bypass, was given protamine and decannulated.  All bleeding points were controlled.  Topical hemostatic agents were applied.  Two 32-[place] straight chest tubes were placed in the mediastinum and brought out through a separate stab incision.  Two 28-[place] right-angle chest tubes were placed in the mediastinum along the inferior wall and in the right pleural space and brought out through separate stab incisions. The sternotomy incision was closed with 3 interrupted #6 stainless steel sternal wires in the manubrium, 3 interrupted double wires in the body of the sternum, and one additional  #6 stainless steel sternal wire at the lower aspect of the sternum.  The muscle, fascia and skin were closed in layers with absorbable suture.  Dermabond was applied.     The graft to the right subclavian artery was transected close to the suture line and the cut end oversewn. The right subclavicular  wound was hemostatic. The incision was then closed in multiple layers with absorbable suture. All sponge, needle and instrument counts were correct at the end of the case.    Total cardiopulmonary bypass time was 221 minutes. Cross-clamp time was 86 minutes. Circulatory arrest time was 33 minutes, and antegrade cerebral perfusion was provided for 23 minutes of that time.     KHALIF Shaw MD  Cardiothoracic Surgery  Pager [phone]

## 2024-01-25 NOTE — PROGRESS NOTES
01/24/24 1300   Appointment Info   Signing Clinician's Name / Credentials (OT) Denis Oliver OTR/L   Rehab Comments (OT) Sternal precautions.   Living Environment   People in Home parent(s)   Current Living Arrangements house   Home Accessibility stairs to enter home;stairs within home   Number of Stairs, Main Entrance 3   Stair Railings, Main Entrance none   Number of Stairs, Within Home, Primary six   Stair Railings, Within Home, Primary railings safe and in good condition   Transportation Anticipated family or friend will provide   Living Environment Comments Pt reports living with mother in a split level home. Pt lives in basement with a tub/shower no DME.   Self-Care   Usual Activity Tolerance good   Current Activity Tolerance fair   Regular Exercise No   Equipment Currently Used at Home none   Fall history within last six months no   Activity/Exercise/Self-Care Comment Pt reports being independent prior.   General Information   Onset of Illness/Injury or Date of Surgery 01/16/24   Referring Physician Jose Aviles MD   Patient/Family Therapy Goal Statement (OT) Pt would like to return home independently.   Additional Occupational Profile Info/Pertinent History of Current Problem Valdo Lyons is a 48 year old male who was admitted on 1/16/2024  with a type B dissection , s/p hemiarch repair and TEVAR on 1/23/24   Existing Precautions/Restrictions fall;cardiac;sternal   Left Upper Extremity (Weight-bearing Status)   (10lbs)   Right Upper Extremity (Weight-bearing Status)   (10lbs)   Left Lower Extremity (Weight-bearing Status) full weight-bearing (FWB)   Right Lower Extremity (Weight-bearing Status) full weight-bearing (FWB)   Cognitive Status Examination   Orientation Status person   Visual Perception   Visual Impairment/Limitations blurry vision   Sensory   Sensory Quick Adds sensation intact   Pain Assessment   Patient Currently in Pain No   Range of Motion Comprehensive   Comment, General Range of Motion  BUE AROM WFL within precautions   Strength Comprehensive (MMT)   Comment, General Manual Muscle Testing (MMT) Assessment Pt presents with generalized weakness throughout.   Bed Mobility   Comment (Bed Mobility) Mod A x 2 and vc's.   Transfers   Transfer Comments Min A x 1   Activities of Daily Living   BADL Assessment/Intervention bathing;upper body dressing;lower body dressing;grooming;toileting   Bathing Assessment/Intervention   [place] Level (Bathing) set up;verbal cues;maximum assist (25% patient effort)   Comment, (Bathing) Per clinical judgement.   Upper Body Dressing Assessment/Training   Comment, (Upper Body Dressing) Per clinical judgement.   [place] Level (Upper Body Dressing) set up;verbal cues;moderate assist (50% patient effort)   Lower Body Dressing Assessment/Training   [place] Level (Lower Body Dressing) set up;verbal cues;maximum assist (25% patient effort)   Grooming Assessment/Training   [place] Level (Grooming) set up;verbal cues;minimum assist (75% patient effort)   Toileting   Comment, (Toileting) Per clinical judgement.   [place] Level (Toileting) set up;verbal cues;maximum assist (25% patient effort)   Clinical Impression   Criteria for Skilled Therapeutic Interventions Met (OT) Yes, treatment indicated   OT Diagnosis Decreased independence with functional transfers and ADLS.   OT Problem List-Impairments impacting ADL problems related to;activity tolerance impaired;balance;cognition;fear & anxiety;flexibility;mobility;range of motion (ROM);strength;pain;post-surgical precautions;postural control   Assessment of Occupational Performance 3-5 Performance Deficits   Identified Performance Deficits Decreased independence with functional transfers and ADLS/IADLS.   Planned Therapy Interventions (OT) ADL retraining;IADL retraining;bed mobility training;cognition;ROM;strengthening;stretching;transfer training;home program guidelines;progressive activity/exercise;risk factor  education   Clinical Decision Making Complexity (OT) problem focused assessment/low complexity   Risk & Benefits of therapy have been explained evaluation/treatment results reviewed;care plan/treatment goals reviewed;patient   OT Total Evaluation Time   OT Eval, Low Complexity Minutes (80413) 10   OT Goals   Therapy Frequency (OT) 6 times/week   OT Predicted Duration/Target Date for Goal Attainment 02/07/24   OT Goals Hygiene/Grooming   OT: Hygiene/Grooming independent;within precautions   OT: Upper Body Dressing Independent;within precautions   OT: Lower Body Dressing Modified independent;within precautions   OT: Toilet Transfer/Toileting toilet transfer;cleaning and garment management;Independent;within precautions   OT: Cognitive Patient/caregiver will verbalize understanding of cognitive assessment results/recommendations as needed for safe discharge planning   OT: Perform aerobic activity with stable cardiovascular response 20 minutes;ambulation;NuStep   OT Discharge Planning   OT Plan OT: Funcitonal transfers, ADLS, Strength/endurance, Precautions review.   OT Discharge Recommendation (DC Rec) home with outpatient cardiac rehab;home with assist   OT Rationale for DC Rec Pt is currently below baseline function. Pt will likely progress to discharge home with A and OP CR pending continued progression/recovery.   OT Brief overview of current status Mod A x 2 bed mob. Min A x 1 for mobility, extra assist for line management.   Total Session Time   Total Session Time (sum of timed and untimed services) 10

## 2024-01-25 NOTE — PROGRESS NOTES
CVICU Progress Note   01/25/2024       CO-MORBIDITIES:   Aortic Dissection  Hypertensive urgency     ASSESSMENT: Valdo Lyons is a 48 year old male with PMH of uncontrolled HTN, childhood seizures. 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 Repeat CTA 1/17 with decision for surgical management, medically managed with strict BP goals in interim. Now status post ascending aortic aneurysm hemiarch repair and TEVAR with CVTS and Vascular Surgery on 1/23/24. Patient extubated 1/24 and progressing appropriately post operatively with no acute complications or concerns. Floor status as of 1/25.      PLAN:  Neuro:  Vascular ok with q4h neuro checks to evaluate for spinal cord ischemia. No evidence of hip flexion weakness thus far.      # Baseline cognitive dysfunction  Patient's mother is his caregiver at home due to baseline cognitive dysfunction. The patient's overall understanding of the situation is inconsistent. He also developed some agitation/behavioral issues (spitting and kicking) 1/21 requiring sedation. Psychiatry saw patient earlier in his stay, see note for recs. Nothing actionable. SW on board per mother's request for additional caregiver resources.  - Monitor neurological status. Notify the MD for any acute changes in exam.  - Delirium prevention     # Acute post-operative pain  - Monica: tylenol, gabapentin   - PRN: tylenol, oxycodone, dilaudid, robaxin    # Recent fall, atraumatic  # Hx Childhood seizure disorder  Seizure hx noted, not on any antiepileptics at time of admission. Presented to [facility] s/p fall, CT head reported as negative for intracranial  abnormalities 1/12.     # Hx Mariajuana use  Noted, nothing to do. Typically smokes mariajuana every night. Denies tobacco, alcohol, or other illicit drugs    Pulmonary:   # mechanical ventilation - extubated 1/24  # respiratory insufficiency   # left pleural effusion, improving   CXR 1/24 showed small left sided pleural effusion.   - Titrate nasal cannula for SpO2 >92%  - Encourage IS q15-30 minutes when awake   - CXR today, showed improvement. No clinical symptoms of shortness of breath.   - pulmonary hygiene      Cardiovascular:   # Cardiogenic shock   # Type B dissection with c/f retrograde tear s/p hemiarch repair and TEVAR  # Hx of uncontrolled HTN   Pre-procedure: Patient was not taking any PTA medications for his high blood pressure. Bilateral arterial lines placed at [facility]. Will treat based on right arterial line. Ordered additional testing, as his BP medication requirements are exceeding expectations, rule out other/organic causes of elevated BP. TSH normal. Rads did not appreciate any renal mass other than a simple cyst - lower likelihood  pheochromocytoma. EF 55-60%. Currently off of vasopressors.   - goal MAP 65-85, SBP<120  - ASA 81  - PRN hydralazine if SBP >140.   - BB: Hold  - Statin: started rosuvastatin    GI:   # At risk for protein malnutrition   Some concern for ileus on post op abdominal XR. Abdomen soft and non-tender, BM x2 on 1/22, will continue to monitor clinically.  - Passed bedside swallow, advancing diet as tolerated.   - PPI prophylaxis  - Bowel regimen: Miralax BID, Senna 2 tab BID, suppository/milk of magnesia PRN      Renal/FEN:   # CKD vs MIKA  # Electrolyte Derangements, resolved  # Volume Status, monitor  Unknown baseline, was 1.6 on admission to [facility], last value in 2022 was 1.25. Cr continues to improve. Renal ultrasound showed expected changes from the dissection (increased velocities) and a right pleural effusion (NTD). Cr normalized then became elevated again post operatively,  now trending back down.   - Strict I/Os, daily weight  - Avoid nephrotoxic agents as able   - Replete electrolytes PRN per protocol   - BMP daily   - Lactate daily    - daily mag and phos   - Lasix 60 mg x1 today   - Check wt.     Endocrine:    # Stress hyperglycemia  Preop A1c 6.1. Has not had issues meeting blood glucose goals.   - MDSSI  - Goal BG<180 for optimal healing      ID:  # Stress induced leukocytosis  WBC 20.8 post-op, trending downward.   - No s/sx infection  - Complete perioperative antibiotic regimen of cefazolin and vancomycin  - Monitor fever curve, WBC, and inflammatory markers as appropriate     Heme:     # Acute blood loss anemia  # Acute blood loss thrombocytopenia  No s/sx active bleeding. Chest tubes still in place and   - Continue to monitor  - CBC q12h   - Hgb goal > 7.0, no indication for transfusion at this time  - CBC 2pm to monitor hgb     MSK:  # Healing sternotomy  # Surgical Incision  - sternal precautions   - post-operative incision care per protocol   - flat bedrest x6 hours post operatively due to groin access  - PT/OT     Prophylaxis:    - SCDs  - SQH  - PPI prophylaxis     LDA:  - PICC 1/16  - Chest tubes x4    Dispo:  Floor status     CVICU checklist run through and appropriately applied to the patient's care.      Patient seen, findings and plan discussed with CV ICU staff and cardiothoracic service attending and fellow.      Shannon Galicia, CA1   Anesthesia resident     ====================================    SUBJECTIVE:   Patient doing well. Sitting in chair, says he has some pain but is motivated to progress with therapies ect. Ordering breakfast. No other complaints.        PAST MEDICAL HISTORY:   Past Medical History:   Diagnosis Date    Dissecting aneurysm of thoracic aorta, Negrito type B (H)     HTN (hypertension)        PAST SURGICAL HISTORY:   Past Surgical History:   Procedure Laterality Date    ENDOVASCULAR REPAIR ANEURYSM THORACIC AORTIC N/A 1/23/2024    Procedure:  "Antegrade Thoracic Endovascular Aortic Repair (TEVAR) with [place] Conformable Thoracic Stent Graft 37mm x 10cm. Intravascular Ultrasound (IVUS), Aortography, Right common femoral access;  Surgeon: Artur Singh MD;  Location: [facility] OR    IR OR ANGIOGRAM  1/23/2024    PICC DOUBLE LUMEN PLACEMENT Right 01/16/2024    5FR DL PICC, basilic vein. L-43cm, 1cm out.    REPAIR ANEURYSM ASCENDING AORTA N/A 1/23/2024    Procedure: MEDIAN STERNOTOMY, RIGHT AXILLARY CUTDOWN, ON CARDIOPULMONARY BYPASS (CIRCULATORY ARREST), ASCENDING AORTA ANEURYSM REPAIR ([place] 30mm), INTRAOPERATIVE TRANSESOPHAGEAL ECHOCARDIOGRAM BY ANESTHESIA;  Surgeon: Addi Shaw MD;  Location: [facility] OR       FAMILY HISTORY: No family history on file.    SOCIAL HISTORY:   Social History     Tobacco Use    Smoking status: Not on file    Smokeless tobacco: Not on file   Substance Use Topics    Alcohol use: Not on file       OBJECTIVE:  VITAL SIGNS:   /57   Pulse 69   Temp 98.7  F (37.1  C) (Oral)   Resp 13   Ht 1.727 m (5' 8\")   Wt 97.8 kg (215 lb 9.8 oz)   SpO2 96%   BMI 32.78 kg/m      Vent Mode: (S) CPAP/PS  (Continuous positive airway pressure with Pressure Support)  FiO2 (%): 40 %  Resp Rate (Set): 14 breaths/min  Tidal Volume (Set, mL): 450 mL  PEEP (cm H2O): 5 cmH2O  Pressure Support (cm H2O): 8 cmH2O  Resp: 13      PHYSICAL EXAMINATION:   General: NAD sitting in bed.   Neuro: adequate neuro checks q4h. Hip flexion in tact.   Resp: NAD on RA.   CV: RRR, 4x chest tubes no air leak.  Abdomen: Soft, Non-distended  Incisions: c/d/i  Extremities: warm and well perfused, no edema.     INTAKE/ OUTPUT:   I/O last 3 completed shifts:  In: 5521.06 [P.O.:4920; I.V.:451.06; NG/GT:150]  Out: 1917 [Urine:1197; Chest Tube:720]    INVESTIGATIONS:   Recent Labs   Lab 01/24/24  1341 01/24/24  0841 01/24/24  0358 01/23/24  2355   PH 7.33* 7.38 7.38 7.36   PCO2 43 40 40 40   PO2 81 109* 103 91   HCO3 23 24 23 23     Complete Blood Count   Recent " Labs   Lab 01/25/24  0342 01/24/24  1546 01/24/24  1138 01/24/24 0358 01/23/24 1956   WBC 13.7* 14.2*  --  18.8* 19.0*   HGB 7.2* 7.5* 7.5* 7.9* 8.1*   * 119*  --  200 173     Basic Metabolic Panel  Recent Labs   Lab 01/25/24  0757 01/25/24  0633 01/25/24  0343 01/25/24  0342 01/24/24  1550 01/24/24  1546 01/24/24  0403 01/24/24  0358 01/23/24 2008 01/23/24 1956   NA  --   --   --  131*  --  137  --  143  143  --  141   POTASSIUM  --   --   --  4.6  --  4.1  --  4.3  4.3  --  4.6   CHLORIDE  --   --   --  100  --  106  --  111*  111*  --  111*   CO2  --   --   --  22  --  22  --  22  22  --  21*   BUN  --   --   --  33.2*  --  29.5*  --  27.6*  27.6*  --  26.1*   CR  --   --   --  1.75*  --  1.58*  --  1.71*  1.71*  --  1.77*   * 116* 103* 98   < > 139*   < > 137*  137*   < > 123*    < > = values in this interval not displayed.     Liver Function Tests  Recent Labs   Lab 01/25/24  0342 01/24/24  0358 01/23/24  2154 01/23/24  1703 01/23/24  1604 01/23/24  0354   AST 36 30  --  37  --  14   ALT 16 10  --  10  --  9   ALKPHOS 45 43  --  41  --  49   BILITOTAL 0.5 0.3  --  0.7  --  0.5   ALBUMIN 3.0* 2.8*  --  2.8*  --  2.9*   INR 1.18* 1.20* 1.24* 1.24*   < > 1.12    < > = values in this interval not displayed.     Pancreatic Enzymes  No lab results found in last 7 days.  Coagulation Profile  Recent Labs   Lab 01/25/24  0342 01/24/24  0358 01/23/24  2154 01/23/24  1703 01/23/24  1604   INR 1.18* 1.20* 1.24* 1.24* 1.62*   PTT  --   --  49* 38 36       RADIOLOGY:   Recent Results (from the past 24 hour(s))   XR Chest Port 1 View    Narrative    XR CHEST PORT 1 VIEW  1/25/2024 12:35 AM     HISTORY:  Post-op pneumonia       COMPARISON:  1/24/2024    TECHNIQUE: Portable, semiupright, frontal projection radiograph of the  chest.    FINDINGS:   Interval removal of [place]-Jasmin catheter. Right-sided PICC tip projects  over the mid right atrium. Right IJ sheath tip projects over the high  SVC. Stable right  basilar chest tube and mediastinal drains. Aortic  stents.    Trachea is midline. Stable enlarged cardiomediastinal silhouette.  Improved visualization of the left hemidiaphragm. No pneumothorax.  Right basilar streaky opacities.        Impression    IMPRESSION:  Small left pleural effusion with slightly improved left retrocardiac  atelectasis. New right-sided opacities, likely atelectasis.    I have personally reviewed the examination and initial interpretation  and I agree with the findings.    GHISLAINE BOWEN MD         SYSTEM ID:  [id number]       =========================================

## 2024-01-25 NOTE — PROGRESS NOTES
01/25/24 1430   Appointment Info   Signing Clinician's Name / Credentials (PT) Elan Healy DPT   Rehab Comments (PT) monitor BP, sternal prec, baseline cognitive impairment   Living Environment   People in Home parent(s)   Current Living Arrangements house   Home Accessibility stairs to enter home;stairs within home   Number of Stairs, Main Entrance 3   Stair Railings, Main Entrance none   Number of Stairs, Within Home, Primary none   Stair Railings, Within Home, Primary railings safe and in good condition   Transportation Anticipated family or friend will provide   Living Environment Comments Pt reports living with mother, two entrances to home. One with 2 ROSS no rails, one with 4 ROSS with railings. No further stairs needed to perform (contradicts OT eval information), but both pt and mother present to provide info.   Self-Care   Usual Activity Tolerance good   Current Activity Tolerance poor   Regular Exercise No   Equipment Currently Used at Home none   Fall history within last six months yes   Number of times patient has fallen within last six months 2   Activity/Exercise/Self-Care Comment Pt reports IND with all upright mobility, 2 falls related to syncope   General Information   Onset of Illness/Injury or Date of Surgery 01/16/24   Referring Physician Serena Goff, BRIANA CNP   Patient/Family Therapy Goals Statement (PT) To walk   Pertinent History of Current Problem (include personal factors and/or comorbidities that impact the POC) Valdo Lyons is a 48 year old male with PMH of uncontrolled HTN, childhood seizures. 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 Repeat CTA 1/17 with decision for surgical management, medically managed with strict BP goals in interim. Now status post ascending aortic aneurysm hemiarch repair and TEVAR with CVTS and Vascular Surgery on 1/23/24. Patient extubated 1/24 and progressing appropriately post operatively with no acute complications or concerns.   Cognition   Affect/Mental Status (Cognition) flat/blunted affect   Orientation Status (Cognition) oriented x 4   Follows Commands (Cognition) WFL   Pain Assessment   Patient Currently in Pain Yes, see Vital Sign flowsheet  (6/10 incisional pain)   Integumentary/Edema   Integumentary/Edema Comments incision dressed, dry and intact   Posture    Posture Forward head position   Range of Motion (ROM)   ROM Comment BLE WFL   Strength (Manual Muscle Testing)   Strength (Manual Muscle Testing) strength is WFL   Strength Comments BLE very strong 5/5 MMT, not assessed in standing d/t low BP   Bed Mobility   Comment, (Bed Mobility) SAMMIE, pt up in chair, unable to return to bed d/t low BP   Transfers   Comment, (Transfers) SAMMIE, per clinical judgment, CGA-Kris   Gait/Stairs (Locomotion)   Comment, (Gait/Stairs) Unable 2/2 safety concerns with hemodynamics   Balance   Balance other (describe)   Sitting Balance: Static good balance   Sitting Balance: Dynamic good balance   Balance Quick Add Sitting balance: Static;Sitting balance: dynamic   Sensory Examination   Sensory Perception patient reports no sensory changes   Coordination   Coordination no deficits were identified   Muscle Tone   Muscle Tone no deficits were identified   Clinical Impression   Criteria for Skilled Therapeutic Intervention Yes, treatment indicated   PT Diagnosis (PT) Impaired functional mobility   Influenced by the following impairments New sternal precautions, baseline cognitive impairment, reduced activity tolerance   Functional limitations due to impairments Decreased IND with bed mobility, transfers, gait,  stairs   Clinical Presentation (PT Evaluation Complexity) unstable   Clinical Presentation Rationale Clinical judgment, [facility]   Clinical Decision Making (Complexity) high complexity   Planned Therapy Interventions (PT) balance training;bed mobility training;gait training;home exercise program;patient/family education;ROM (range of motion);strengthening;transfer training;progressive activity/exercise   Risk & Benefits of therapy have been explained evaluation/treatment results reviewed;care plan/treatment goals reviewed;patient   PT Total Evaluation Time   PT Eval, High Complexity Minutes (93161) 9   Physical Therapy Goals   PT Frequency 6x/week   PT Predicted Duration/Target Date for Goal Attainment 02/23/24   PT Goals Bed Mobility;Transfers;Gait;Stairs   PT: Bed Mobility Independent;Supine to/from sit;Within precautions   PT: Transfers Independent;Sit to/from stand;Bed to/from chair;Within precautions   PT: Gait Independent;Greater than 200 feet   PT: Stairs 4 stairs;Rail on both sides;Modified independent   PT Discharge Planning   PT Plan STS as BP allows, gait   PT Discharge Recommendation (DC Rec) home with assist;home with outpatient cardiac rehab;Transitional Care Facility   PT Rationale for DC Rec Pt currently below functional baseline, this date impaired by poor blood pressure response with activity. Unable to obtain functional assessment of upright mobility as a result. Anticipate once medically stable, pt will be able to return to home with assist from mother and ongoing cardiac rehab. However, will depend on next session's further mobility assessment   PT Brief overview of current status OH lift d/t BP concerns   Total Session Time   Timed Code Treatment Minutes 11   Total Session Time (sum of timed and untimed services) 20

## 2024-01-26 ENCOUNTER — APPOINTMENT (OUTPATIENT)
Dept: PHYSICAL THERAPY | Facility: CLINIC | Age: 49
End: 2024-01-26
Attending: STUDENT IN AN ORGANIZED HEALTH CARE EDUCATION/TRAINING PROGRAM
Payer: MEDICAID

## 2024-01-26 ENCOUNTER — APPOINTMENT (OUTPATIENT)
Dept: GENERAL RADIOLOGY | Facility: CLINIC | Age: 49
End: 2024-01-26
Attending: SURGERY
Payer: MEDICAID

## 2024-01-26 ENCOUNTER — APPOINTMENT (OUTPATIENT)
Dept: OCCUPATIONAL THERAPY | Facility: CLINIC | Age: 49
End: 2024-01-26
Attending: STUDENT IN AN ORGANIZED HEALTH CARE EDUCATION/TRAINING PROGRAM
Payer: MEDICAID

## 2024-01-26 ENCOUNTER — APPOINTMENT (OUTPATIENT)
Dept: GENERAL RADIOLOGY | Facility: CLINIC | Age: 49
End: 2024-01-26
Attending: STUDENT IN AN ORGANIZED HEALTH CARE EDUCATION/TRAINING PROGRAM
Payer: MEDICAID

## 2024-01-26 LAB
ACANTHOCYTES BLD QL SMEAR: ABNORMAL
ALBUMIN SERPL BCG-MCNC: 3 G/DL (ref 3.5–5.2)
ALBUMIN UR-MCNC: NEGATIVE MG/DL
ALP SERPL-CCNC: 55 U/L (ref 40–150)
ALT SERPL W P-5'-P-CCNC: 33 U/L (ref 0–70)
ANION GAP SERPL CALCULATED.3IONS-SCNC: 11 MMOL/L (ref 7–15)
ANION GAP SERPL CALCULATED.3IONS-SCNC: 9 MMOL/L (ref 7–15)
APPEARANCE UR: CLEAR
AST SERPL W P-5'-P-CCNC: 53 U/L (ref 0–45)
ATRIAL RATE - MUSE: 73 BPM
AUER BODIES BLD QL SMEAR: ABNORMAL
BASO STIPL BLD QL SMEAR: ABNORMAL
BILIRUB SERPL-MCNC: 0.5 MG/DL
BILIRUB UR QL STRIP: NEGATIVE
BITE CELLS BLD QL SMEAR: ABNORMAL
BLD PROD TYP BPU: NORMAL
BLISTER CELLS BLD QL SMEAR: ABNORMAL
BLOOD COMPONENT TYPE: NORMAL
BUN SERPL-MCNC: 45.3 MG/DL (ref 6–20)
BUN SERPL-MCNC: 45.8 MG/DL (ref 6–20)
BURR CELLS BLD QL SMEAR: ABNORMAL
CA-I BLD-MCNC: 4.8 MG/DL (ref 4.4–5.2)
CALCIUM SERPL-MCNC: 8.4 MG/DL (ref 8.6–10)
CALCIUM SERPL-MCNC: 8.7 MG/DL (ref 8.6–10)
CHLORIDE SERPL-SCNC: 96 MMOL/L (ref 98–107)
CHLORIDE SERPL-SCNC: 97 MMOL/L (ref 98–107)
CODING SYSTEM: NORMAL
COLOR UR AUTO: ABNORMAL
CREAT SERPL-MCNC: 1.87 MG/DL (ref 0.67–1.17)
CREAT SERPL-MCNC: 2.17 MG/DL (ref 0.67–1.17)
CROSSMATCH: NORMAL
DACRYOCYTES BLD QL SMEAR: ABNORMAL
DEPRECATED HCO3 PLAS-SCNC: 24 MMOL/L (ref 22–29)
DEPRECATED HCO3 PLAS-SCNC: 25 MMOL/L (ref 22–29)
DIASTOLIC BLOOD PRESSURE - MUSE: NORMAL MMHG
EGFRCR SERPLBLD CKD-EPI 2021: 37 ML/MIN/1.73M2
EGFRCR SERPLBLD CKD-EPI 2021: 44 ML/MIN/1.73M2
ELLIPTOCYTES BLD QL SMEAR: SLIGHT
ERYTHROCYTE [DISTWIDTH] IN BLOOD BY AUTOMATED COUNT: 15.6 % (ref 10–15)
ERYTHROCYTE [DISTWIDTH] IN BLOOD BY AUTOMATED COUNT: 17.5 % (ref 10–15)
FRAGMENTS BLD QL SMEAR: ABNORMAL
GLUCOSE BLDC GLUCOMTR-MCNC: 105 MG/DL (ref 70–99)
GLUCOSE BLDC GLUCOMTR-MCNC: 108 MG/DL (ref 70–99)
GLUCOSE BLDC GLUCOMTR-MCNC: 109 MG/DL (ref 70–99)
GLUCOSE BLDC GLUCOMTR-MCNC: 111 MG/DL (ref 70–99)
GLUCOSE SERPL-MCNC: 100 MG/DL (ref 70–99)
GLUCOSE SERPL-MCNC: 100 MG/DL (ref 70–99)
GLUCOSE UR STRIP-MCNC: NEGATIVE MG/DL
HCT VFR BLD AUTO: 21.9 % (ref 40–53)
HCT VFR BLD AUTO: 25.4 % (ref 40–53)
HGB BLD-MCNC: 6.8 G/DL (ref 13.3–17.7)
HGB BLD-MCNC: 8.1 G/DL (ref 13.3–17.7)
HGB C CRYSTALS: ABNORMAL
HGB UR QL STRIP: NEGATIVE
HOWELL-JOLLY BOD BLD QL SMEAR: ABNORMAL
INR PPP: 1.23 (ref 0.85–1.15)
INTERPRETATION ECG - MUSE: NORMAL
ISSUE DATE AND TIME: NORMAL
KETONES UR STRIP-MCNC: NEGATIVE MG/DL
LACTATE SERPL-SCNC: 0.8 MMOL/L (ref 0.7–2)
LEUKOCYTE ESTERASE UR QL STRIP: NEGATIVE
MAGNESIUM SERPL-MCNC: 2.7 MG/DL (ref 1.7–2.3)
MCH RBC QN AUTO: 19.1 PG (ref 26.5–33)
MCH RBC QN AUTO: 19.8 PG (ref 26.5–33)
MCHC RBC AUTO-ENTMCNC: 31.1 G/DL (ref 31.5–36.5)
MCHC RBC AUTO-ENTMCNC: 31.9 G/DL (ref 31.5–36.5)
MCV RBC AUTO: 62 FL (ref 78–100)
MCV RBC AUTO: 62 FL (ref 78–100)
MUCOUS THREADS #/AREA URNS LPF: PRESENT /LPF
NEUTS HYPERSEG BLD QL SMEAR: ABNORMAL
NITRATE UR QL: NEGATIVE
P AXIS - MUSE: 46 DEGREES
PH UR STRIP: 5 [PH] (ref 5–7)
PHOSPHATE SERPL-MCNC: 4.2 MG/DL (ref 2.5–4.5)
PLAT MORPH BLD: ABNORMAL
PLATELET # BLD AUTO: 105 10E3/UL (ref 150–450)
PLATELET # BLD AUTO: 147 10E3/UL (ref 150–450)
POLYCHROMASIA BLD QL SMEAR: ABNORMAL
POTASSIUM SERPL-SCNC: 3.7 MMOL/L (ref 3.4–5.3)
POTASSIUM SERPL-SCNC: 4 MMOL/L (ref 3.4–5.3)
POTASSIUM SERPL-SCNC: 4.3 MMOL/L (ref 3.4–5.3)
POTASSIUM SERPL-SCNC: 4.3 MMOL/L (ref 3.4–5.3)
POTASSIUM SERPL-SCNC: 4.4 MMOL/L (ref 3.4–5.3)
PR INTERVAL - MUSE: 194 MS
PROT SERPL-MCNC: 5.4 G/DL (ref 6.4–8.3)
QRS DURATION - MUSE: 90 MS
QT - MUSE: 436 MS
QTC - MUSE: 480 MS
R AXIS - MUSE: 3 DEGREES
RBC # BLD AUTO: 3.56 10E6/UL (ref 4.4–5.9)
RBC # BLD AUTO: 4.09 10E6/UL (ref 4.4–5.9)
RBC AGGLUT BLD QL: ABNORMAL
RBC MORPH BLD: ABNORMAL
RBC URINE: 4 /HPF
ROULEAUX BLD QL SMEAR: ABNORMAL
SICKLE CELLS BLD QL SMEAR: ABNORMAL
SMUDGE CELLS BLD QL SMEAR: ABNORMAL
SODIUM SERPL-SCNC: 130 MMOL/L (ref 135–145)
SODIUM SERPL-SCNC: 132 MMOL/L (ref 135–145)
SP GR UR STRIP: 1.01 (ref 1–1.03)
SPHEROCYTES BLD QL SMEAR: ABNORMAL
STOMATOCYTES BLD QL SMEAR: ABNORMAL
SYSTOLIC BLOOD PRESSURE - MUSE: NORMAL MMHG
T AXIS - MUSE: 8 DEGREES
TARGETS BLD QL SMEAR: SLIGHT
TOXIC GRANULES BLD QL SMEAR: ABNORMAL
UNIT ABO/RH: NORMAL
UNIT NUMBER: NORMAL
UNIT STATUS: NORMAL
UNIT TYPE ISBT: 5100
UROBILINOGEN UR STRIP-MCNC: NORMAL MG/DL
VARIANT LYMPHS BLD QL SMEAR: ABNORMAL
VENTRICULAR RATE- MUSE: 73 BPM
WBC # BLD AUTO: 12.9 10E3/UL (ref 4–11)
WBC # BLD AUTO: 19.6 10E3/UL (ref 4–11)
WBC URINE: <1 /HPF

## 2024-01-26 PROCEDURE — 85027 COMPLETE CBC AUTOMATED: CPT

## 2024-01-26 PROCEDURE — 83735 ASSAY OF MAGNESIUM: CPT | Performed by: NURSE PRACTITIONER

## 2024-01-26 PROCEDURE — 250N000013 HC RX MED GY IP 250 OP 250 PS 637: Performed by: STUDENT IN AN ORGANIZED HEALTH CARE EDUCATION/TRAINING PROGRAM

## 2024-01-26 PROCEDURE — 71045 X-RAY EXAM CHEST 1 VIEW: CPT | Mod: 26 | Performed by: RADIOLOGY

## 2024-01-26 PROCEDURE — 85610 PROTHROMBIN TIME: CPT | Performed by: SURGERY

## 2024-01-26 PROCEDURE — 250N000013 HC RX MED GY IP 250 OP 250 PS 637: Performed by: SURGERY

## 2024-01-26 PROCEDURE — 83605 ASSAY OF LACTIC ACID: CPT

## 2024-01-26 PROCEDURE — 97530 THERAPEUTIC ACTIVITIES: CPT | Mod: GP

## 2024-01-26 PROCEDURE — 250N000011 HC RX IP 250 OP 636: Performed by: SURGERY

## 2024-01-26 PROCEDURE — 84100 ASSAY OF PHOSPHORUS: CPT | Performed by: SURGERY

## 2024-01-26 PROCEDURE — 71045 X-RAY EXAM CHEST 1 VIEW: CPT

## 2024-01-26 PROCEDURE — 97116 GAIT TRAINING THERAPY: CPT | Mod: GP

## 2024-01-26 PROCEDURE — 84132 ASSAY OF SERUM POTASSIUM: CPT

## 2024-01-26 PROCEDURE — 82330 ASSAY OF CALCIUM: CPT | Performed by: NURSE PRACTITIONER

## 2024-01-26 PROCEDURE — 84132 ASSAY OF SERUM POTASSIUM: CPT | Performed by: SURGERY

## 2024-01-26 PROCEDURE — 99232 SBSQ HOSP IP/OBS MODERATE 35: CPT | Mod: GC | Performed by: ANESTHESIOLOGY

## 2024-01-26 PROCEDURE — 74018 RADEX ABDOMEN 1 VIEW: CPT

## 2024-01-26 PROCEDURE — 250N000013 HC RX MED GY IP 250 OP 250 PS 637

## 2024-01-26 PROCEDURE — P9016 RBC LEUKOCYTES REDUCED: HCPCS | Performed by: STUDENT IN AN ORGANIZED HEALTH CARE EDUCATION/TRAINING PROGRAM

## 2024-01-26 PROCEDURE — 97530 THERAPEUTIC ACTIVITIES: CPT | Mod: GO

## 2024-01-26 PROCEDURE — 99024 POSTOP FOLLOW-UP VISIT: CPT | Performed by: NURSE PRACTITIONER

## 2024-01-26 PROCEDURE — 97535 SELF CARE MNGMENT TRAINING: CPT | Mod: GO

## 2024-01-26 PROCEDURE — 99223 1ST HOSP IP/OBS HIGH 75: CPT | Mod: 24 | Performed by: CLINICAL NURSE SPECIALIST

## 2024-01-26 PROCEDURE — 200N000002 HC R&B ICU UMMC

## 2024-01-26 PROCEDURE — 74018 RADEX ABDOMEN 1 VIEW: CPT | Mod: 26 | Performed by: RADIOLOGY

## 2024-01-26 PROCEDURE — 81001 URINALYSIS AUTO W/SCOPE: CPT | Performed by: CLINICAL NURSE SPECIALIST

## 2024-01-26 PROCEDURE — 250N000011 HC RX IP 250 OP 636

## 2024-01-26 PROCEDURE — 80053 COMPREHEN METABOLIC PANEL: CPT

## 2024-01-26 RX ORDER — METOLAZONE 5 MG/1
5 TABLET ORAL ONCE
Status: COMPLETED | OUTPATIENT
Start: 2024-01-26 | End: 2024-01-26

## 2024-01-26 RX ORDER — ACETAMINOPHEN 325 MG/1
975 TABLET ORAL EVERY 6 HOURS SCHEDULED
Status: DISCONTINUED | OUTPATIENT
Start: 2024-01-26 | End: 2024-02-04 | Stop reason: HOSPADM

## 2024-01-26 RX ORDER — POTASSIUM CHLORIDE 1.5 G/1.58G
20 POWDER, FOR SOLUTION ORAL ONCE
Status: COMPLETED | OUTPATIENT
Start: 2024-01-26 | End: 2024-01-26

## 2024-01-26 RX ORDER — FUROSEMIDE 10 MG/ML
120 INJECTION INTRAMUSCULAR; INTRAVENOUS ONCE
Qty: 12 ML | Refills: 0 | Status: COMPLETED | OUTPATIENT
Start: 2024-01-26 | End: 2024-01-26

## 2024-01-26 RX ADMIN — ASPIRIN 81 MG CHEWABLE TABLET 81 MG: 81 TABLET CHEWABLE at 07:53

## 2024-01-26 RX ADMIN — DOCUSATE SODIUM 50 MG AND SENNOSIDES 8.6 MG 2 TABLET: 8.6; 5 TABLET, FILM COATED ORAL at 19:44

## 2024-01-26 RX ADMIN — GABAPENTIN 100 MG: 100 CAPSULE ORAL at 19:44

## 2024-01-26 RX ADMIN — METHOCARBAMOL 500 MG: 500 TABLET ORAL at 16:07

## 2024-01-26 RX ADMIN — ROSUVASTATIN CALCIUM 20 MG: 20 TABLET, FILM COATED ORAL at 07:54

## 2024-01-26 RX ADMIN — ACETAMINOPHEN 975 MG: 325 TABLET, FILM COATED ORAL at 07:53

## 2024-01-26 RX ADMIN — ACETAMINOPHEN 975 MG: 325 TABLET, FILM COATED ORAL at 22:26

## 2024-01-26 RX ADMIN — POLYETHYLENE GLYCOL 3350 17 G: 17 POWDER, FOR SOLUTION ORAL at 07:54

## 2024-01-26 RX ADMIN — DOCUSATE SODIUM 50 MG AND SENNOSIDES 8.6 MG 2 TABLET: 8.6; 5 TABLET, FILM COATED ORAL at 07:53

## 2024-01-26 RX ADMIN — HEPARIN SODIUM 5000 UNITS: 5000 INJECTION, SOLUTION INTRAVENOUS; SUBCUTANEOUS at 22:26

## 2024-01-26 RX ADMIN — GABAPENTIN 100 MG: 100 CAPSULE ORAL at 13:04

## 2024-01-26 RX ADMIN — METOLAZONE 5 MG: 5 TABLET ORAL at 09:31

## 2024-01-26 RX ADMIN — MAGNESIUM HYDROXIDE 30 ML: 400 SUSPENSION ORAL at 13:06

## 2024-01-26 RX ADMIN — FUROSEMIDE 120 MG: 10 INJECTION, SOLUTION INTRAVENOUS at 09:32

## 2024-01-26 RX ADMIN — GABAPENTIN 100 MG: 100 CAPSULE ORAL at 07:53

## 2024-01-26 RX ADMIN — PANTOPRAZOLE SODIUM 40 MG: 40 TABLET, DELAYED RELEASE ORAL at 07:54

## 2024-01-26 RX ADMIN — HEPARIN SODIUM 5000 UNITS: 5000 INJECTION, SOLUTION INTRAVENOUS; SUBCUTANEOUS at 05:09

## 2024-01-26 RX ADMIN — HYDROMORPHONE HYDROCHLORIDE 0.4 MG: 0.2 INJECTION, SOLUTION INTRAMUSCULAR; INTRAVENOUS; SUBCUTANEOUS at 23:05

## 2024-01-26 RX ADMIN — HEPARIN SODIUM 5000 UNITS: 5000 INJECTION, SOLUTION INTRAVENOUS; SUBCUTANEOUS at 13:05

## 2024-01-26 RX ADMIN — METHOCARBAMOL 500 MG: 500 TABLET ORAL at 19:44

## 2024-01-26 RX ADMIN — ACETAMINOPHEN 975 MG: 325 TABLET, FILM COATED ORAL at 13:04

## 2024-01-26 RX ADMIN — POTASSIUM CHLORIDE 20 MEQ: 1.5 POWDER, FOR SOLUTION ORAL at 19:44

## 2024-01-26 RX ADMIN — METHOCARBAMOL 500 MG: 500 TABLET ORAL at 04:49

## 2024-01-26 RX ADMIN — OXYCODONE HYDROCHLORIDE 10 MG: 10 TABLET ORAL at 13:05

## 2024-01-26 RX ADMIN — POLYETHYLENE GLYCOL 3350 17 G: 17 POWDER, FOR SOLUTION ORAL at 19:44

## 2024-01-26 RX ADMIN — OXYCODONE HYDROCHLORIDE 10 MG: 10 TABLET ORAL at 09:31

## 2024-01-26 RX ADMIN — OXYCODONE HYDROCHLORIDE 10 MG: 10 TABLET ORAL at 19:43

## 2024-01-26 RX ADMIN — HYDROMORPHONE HYDROCHLORIDE 0.2 MG: 0.2 INJECTION, SOLUTION INTRAMUSCULAR; INTRAVENOUS; SUBCUTANEOUS at 00:49

## 2024-01-26 RX ADMIN — HYDROMORPHONE HYDROCHLORIDE 0.2 MG: 0.2 INJECTION, SOLUTION INTRAMUSCULAR; INTRAVENOUS; SUBCUTANEOUS at 05:12

## 2024-01-26 ASSESSMENT — ACTIVITIES OF DAILY LIVING (ADL)
ADLS_ACUITY_SCORE: 27

## 2024-01-26 NOTE — PROGRESS NOTES
"VASCULAR SURGERY PROGRESS NOTE    Subjective:  NAEO. POD #3 from hemiarch repair and TEVAR on 1/23/24. Feeling well this morning, would like to go home.     Objective:  Intake/Output Summary (Last 24 hours) at 1/26/2024 1113  Last data filed at 1/26/2024 1100  Gross per 24 hour   Intake 1000 ml   Output 2910 ml   Net -1910 ml     Labs:  ROUTINE IP LABS (Last four results)  BMP  Recent Labs   Lab 01/26/24  0801 01/26/24  0759 01/26/24  0341 01/25/24  2221 01/25/24  2219 01/25/24  1709 01/25/24  0343 01/25/24  0342 01/24/24  1550 01/24/24  1546   NA  --   --  130*  --   --  130*  --  131*  --  137   POTASSIUM  --  4.3 4.3  4.4  --  4.4 4.8  --  4.6  --  4.1   CHLORIDE  --   --  97*  --   --  99  --  100  --  106   YARON  --   --  8.4*  --   --  8.6  --  8.2*  --  8.1*   CO2  --   --  24  --   --  23  --  22  --  22   BUN  --   --  45.8*  --   --  43.1*  --  33.2*  --  29.5*   CR  --   --  2.17*  --   --  2.25*  --  1.75*  --  1.58*   *  --  100* 121*  --  116*   < > 98   < > 139*    < > = values in this interval not displayed.     CBC  Recent Labs   Lab 01/26/24  0341 01/25/24  1404 01/25/24  0342 01/24/24  1546   WBC 12.9* 16.9* 13.7* 14.2*   RBC 3.56* 3.73* 3.66* 3.72*   HGB 6.8* 7.4* 7.2* 7.5*   HCT 21.9* 23.7* 22.5* 23.1*   MCV 62* 64* 62* 62*   MCH 19.1* 19.8* 19.7* 20.2*   MCHC 31.1* 31.2* 32.0 32.5   RDW 15.6* 15.9* 16.1* 15.9*   * 111* 107* 119*     INR  Recent Labs   Lab 01/26/24  0341 01/25/24  0342 01/24/24  0358 01/23/24  2154   INR 1.23* 1.18* 1.20* 1.24*     PHYSICAL EXAM:  /73   Pulse 72   Temp 98.2  F (36.8  C) (Oral)   Resp 17   Ht 1.727 m (5' 8\")   Wt 100.2 kg (220 lb 14.4 oz)   SpO2 98%   BMI 33.59 kg/m    General: The patient is awake and alert, NAD  Psych: Pleasant affect, answers questions appropriately  Skin: Color appropriate for race, warm, dry.  Neuro: Sensorimotor intact in BUE and BLE, CN II-XII grossly intact  Respiratory: The patient does require 3L NC " supplemental oxygen. Breathing unlabored  GI:  Abdomen soft, non-distended.  Extremities: Bilateral femoral and DP pulses palpable, mild edema noted on ankles.      ASSESSMENT / PLAN:  Patient is a 48M who [place] [facility] [place] with acute type B aortic dissection from the origin of the left subclavian to the diaphragm just superior to the celiac axis. Due to worsening back pain and increased variation in BP from arterial lines in his right and left hands, repeat CT scan was done which showed Type A dissection with a thrombosed retrograde false lumen, so he was transferred to [facility]. Now POD #3 from hemiarch repair and TEVAR on 1/23/24 with palpable pulses in femoral, and DP bilaterally. Progressing appropriately.      - Continue with close monitoring and neuro checks for spinal chord ischemia.   - Wean pressors as tolerated  - Low dose aspirin as you are  - Wean O2 as tolerated, chest physical therapy, I/S  - PT, OT   - SBP goal <120mmHg  - Multimodal pain management  - Please repeat CTA chest abdomen, pelvis prior to discharge    Discussed patients history, exam, assessment and plan with Dr Francisco Parkinson, [facility]  Vascular Surgery  Pager: [phone]  [email or web address]  Send message or 10 digit call back number Securely via [employer] with the [employer] Web Console (learn more here)

## 2024-01-26 NOTE — PROGRESS NOTES
CVICU Progress Note   01/26/2024       CO-MORBIDITIES:   Aortic Dissection  Hypertensive urgency     ASSESSMENT: Valdo Lyons is a 48 year old male with PMH of uncontrolled HTN, childhood seizures. 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 Repeat CTA 1/17 with decision for surgical management, medically managed with strict BP goals in interim. Now status post ascending aortic aneurysm hemiarch repair and TEVAR with CVTS and Vascular Surgery on 1/23/24. Patient extubated 1/24 and progressing appropriately post operatively with no acute complications or concerns. Currently managing fluid status and MIKA.     PLAN:  Neuro:  Vascular ok with q4h neuro checks to evaluate for spinal cord ischemia. No evidence of hip flexion weakness thus far.      # Baseline cognitive dysfunction  Patient's mother is his caregiver at home due to baseline cognitive dysfunction. The patient's overall understanding of the situation is inconsistent. He also developed some agitation/behavioral issues (spitting and kicking) 1/21 requiring sedation. Psychiatry saw patient earlier in his stay, see note for recs. Nothing actionable. SW on board per mother's request for additional caregiver resources.  - Monitor neurological status. Notify the MD for any acute changes in exam.  - Delirium prevention     # Acute post-operative pain  Pain has been well controlled post operatively thus far.   - Monica: tylenol, gabapentin   - PRN: tylenol, oxycodone, dilaudid, robaxin    # Recent fall, atraumatic  # Hx Childhood seizure disorder  Seizure hx noted, not on any antiepileptics at time of  admission. Presented to [facility] s/p fall, CT head reported as negative for intracranial abnormalities 1/12.     # Hx Mariajuana use  Noted, nothing to do. Typically smokes mariajuana every night. Denies tobacco, alcohol, or other illicit drugs    Pulmonary:   # mechanical ventilation - extubated 1/24  # respiratory insufficiency   # left pleural effusion, improving   CXR 1/24 showed small left sided pleural effusion. CXR today appears to have slight increase in fluid - will diurese today.  - Titrate nasal cannula for SpO2 >92%  - Encourage IS q15-30 minutes when awake   - pulmonary hygiene      Cardiovascular:   # Cardiogenic shock   # Type B dissection with c/f retrograde tear s/p hemiarch repair and TEVAR  # Hx of uncontrolled HTN   Pre-procedure: Patient was not taking any PTA medications for his high blood pressure. Bilateral arterial lines placed at [facility]. Will treat based on right arterial line. Ordered additional testing, as his BP medication requirements are exceeding expectations, rule out other/organic causes of elevated BP. TSH normal. Rads did not appreciate any renal mass other than a simple cyst - lower likelihood  pheochromocytoma. EF 55-60%. Currently off of vasopressors.   - goal MAP 65-85, SBP<120  - ASA 81  - PRN hydralazine if SBP >140.   - BB: Hold  - Statin: started rosuvastatin    GI:   # At risk for protein malnutrition   # constipation   Some concern for ileus on post op abdominal XR. Abdomen soft and non-tender, BM x2 on 1/22, will continue to monitor clinically.  - Regular diet   - PPI prophylaxis  - Bowel regimen: Miralax BID, Senna 2 tab BID, suppository PRN   - Milk of mag today x1  - abdominal XR today      Renal/FEN:   # CKD vs MIKA  # Electrolyte Derangements, resolved  # Volume Status, monitor  Unknown baseline, was 1.6 on admission to [facility], last value in 2022 was 1.25. Cr continues to improve. Renal ultrasound showed expected changes from the dissection (increased velocities) and a right  pleural effusion (NTD). Cr normalized then became elevated again post operatively, now trending back down.   - Strict I/Os, daily weight  - Avoid nephrotoxic agents as able   - Replete electrolytes PRN per protocol   - BMP daily and recheck at 1pm   - Lactate daily    - daily mag and phos   - Lasix 120 x1 today  - metolazone 5mg one time today   - Wt is up 20 pounds since admission  - nephrology consult     Endocrine:    # Stress hyperglycemia  Preop A1c 6.1. Has not had issues meeting blood glucose goals.   - MDSSI  - Goal BG<180 for optimal healing      ID:  # Stress induced leukocytosis  WBC 20.8 post-op, trending downward.   - No s/sx infection  - Complete perioperative antibiotic regimen of cefazolin and vancomycin  - Monitor fever curve, WBC, and inflammatory markers as appropriate     Heme:     # Acute blood loss anemia  # Acute blood loss thrombocytopenia  No s/sx active bleeding. Chest tubes still in place and   - Continue to monitor  - CBC q12h   - Hgb goal > 7.0, transfuse x1 1/26 for hgb 6.8  - CBC 1pm to monitor hgb     MSK:  # Healing sternotomy  # Surgical Incision  - sternal precautions   - post-operative incision care per protocol   - flat bedrest x6 hours post operatively due to groin access  - PT/OT     Prophylaxis:    - SCDs  - SQH  - PPI prophylaxis     LDA:  - PICC 1/16 - talk with PICC nurse to pull it back a few cm.   - Chest tubes x4  - Bundy     Dispo:  Floor status     CVICU checklist run through and appropriately applied to the patient's care.      Patient seen, findings and plan discussed with CV ICU staff and cardiothoracic service attending and fellow.      Shannon Galicia, CA1   Anesthesia resident     ====================================    SUBJECTIVE:   Patient doing well. Sitting in chair, says he has some pain but is motivated to progress with therapies ect. No other complaints. Wondering when he can go home.        PAST MEDICAL HISTORY:   Past Medical History:   Diagnosis Date     "Dissecting aneurysm of thoracic aorta, [place] type B (H)     HTN (hypertension)        PAST SURGICAL HISTORY:   Past Surgical History:   Procedure Laterality Date    ENDOVASCULAR REPAIR ANEURYSM THORACIC AORTIC N/A 1/23/2024    Procedure: Antegrade Thoracic Endovascular Aortic Repair (TEVAR) with [place] Conformable Thoracic Stent Graft 37mm x 10cm. Intravascular Ultrasound (IVUS), Aortography, Right common femoral access;  Surgeon: Artur Singh MD;  Location: [facility] OR    IR OR ANGIOGRAM  1/23/2024    PICC DOUBLE LUMEN PLACEMENT Right 01/16/2024    5FR DL PICC, basilic vein. L-43cm, 1cm out.    REPAIR ANEURYSM ASCENDING AORTA N/A 1/23/2024    Procedure: MEDIAN STERNOTOMY, RIGHT AXILLARY CUTDOWN, ON CARDIOPULMONARY BYPASS (CIRCULATORY ARREST), ASCENDING AORTA ANEURYSM REPAIR ([place]ave 30mm), INTRAOPERATIVE TRANSESOPHAGEAL ECHOCARDIOGRAM BY ANESTHESIA;  Surgeon: Addi Shaw MD;  Location: [facility] OR       FAMILY HISTORY: No family history on file.    SOCIAL HISTORY:   Social History     Tobacco Use    Smoking status: Not on file    Smokeless tobacco: Not on file   Substance Use Topics    Alcohol use: Not on file       OBJECTIVE:  VITAL SIGNS:   /73   Pulse 72   Temp 98.2  F (36.8  C) (Oral)   Resp 17   Ht 1.727 m (5' 8\")   Wt 100.2 kg (220 lb 14.4 oz)   SpO2 98%   BMI 33.59 kg/m      Resp: 17      PHYSICAL EXAMINATION:   General: NAD sitting in bed.   Neuro: adequate neuro checks q4h. Hip flexion in tact.   Resp: NAD on RA.   CV: RRR, 4x chest tubes no air leak.  Abdomen: Soft, Non-distended. Passing gas.   Incisions: c/d/i  Extremities: warm and well perfused, no edema.     INTAKE/ OUTPUT:   I/O last 3 completed shifts:  In: 1518.52 [P.O.:1500; I.V.:18.52]  Out: 1607 [Urine:997; Chest Tube:610]    INVESTIGATIONS:   Recent Labs   Lab 01/24/24  1341 01/24/24  0841 01/24/24  0358 01/23/24  2355   PH 7.33* 7.38 7.38 7.36   PCO2 43 40 40 40   PO2 81 109* 103 91   HCO3 23 24 23 23     Complete " Blood Count   Recent Labs   Lab 01/26/24  0341 01/25/24  1404 01/25/24  0342 01/24/24  1546   WBC 12.9* 16.9* 13.7* 14.2*   HGB 6.8* 7.4* 7.2* 7.5*   * 111* 107* 119*     Basic Metabolic Panel  Recent Labs   Lab 01/26/24  0801 01/26/24  0759 01/26/24 0341 01/25/24 2221 01/25/24  2219 01/25/24  1709 01/25/24 0343 01/25/24 0342 01/24/24  1550 01/24/24  1546   NA  --   --  130*  --   --  130*  --  131*  --  137   POTASSIUM  --  4.3 4.3  4.4  --  4.4 4.8  --  4.6  --  4.1   CHLORIDE  --   --  97*  --   --  99  --  100  --  106   CO2  --   --  24  --   --  23  --  22  --  22   BUN  --   --  45.8*  --   --  43.1*  --  33.2*  --  29.5*   CR  --   --  2.17*  --   --  2.25*  --  1.75*  --  1.58*   *  --  100* 121*  --  116*   < > 98   < > 139*    < > = values in this interval not displayed.     Liver Function Tests  Recent Labs   Lab 01/26/24 0341 01/25/24 0342 01/24/24 0358 01/23/24 2154 01/23/24  1703   AST 53* 36 30  --  37   ALT 33 16 10  --  10   ALKPHOS 55 45 43  --  41   BILITOTAL 0.5 0.5 0.3  --  0.7   ALBUMIN 3.0* 3.0* 2.8*  --  2.8*   INR 1.23* 1.18* 1.20* 1.24* 1.24*     Pancreatic Enzymes  No lab results found in last 7 days.  Coagulation Profile  Recent Labs   Lab 01/26/24 0341 01/25/24 0342 01/24/24 0358 01/23/24 2154 01/23/24  1703 01/23/24  1604   INR 1.23* 1.18* 1.20* 1.24* 1.24* 1.62*   PTT  --   --   --  49* 38 36       RADIOLOGY:   Recent Results (from the past 24 hour(s))   XR Chest Port 1 View    Narrative    XR CHEST PORT 1 VIEW  1/26/2024 12:40 AM     HISTORY:  Post-op pneumonia       COMPARISON:  1/25/2024    TECHNIQUE: Portable, upright, frontal projection radiograph of the  chest.    FINDINGS:   Interval removal of right IJ sheath. Otherwise, stable support  devices.    Trachea is midline. Stable cardiomediastinal silhouette. Slight  blunting of the left costophrenic angle. Increased right basilar  streaky opacities. Unchanged silhouetting of the medial diaphragms.  No  appreciable pneumothorax.      Impression    IMPRESSION:  Increased right basilar opacities, likely atelectasis. Stable small  left pleural effusion with retrocardiac atelectasis.    I have personally reviewed the examination and initial interpretation  and I agree with the findings.    TIFFANIE CAROLINA MD         SYSTEM ID:  [id number]       =========================================

## 2024-01-26 NOTE — PROGRESS NOTES
CLINICAL NUTRITION SERVICES - BRIEF NOTE FOR LENGTH OF STAY     Reason for RD note: Pt admitted >7 days    New Findings/Chart Review:  -Pt with variable intake; % PO intake per RN documentation  -Ordering 2 meals/day per health touch with items such as pot roast, mashed potatoes, broccoli, rama food cake, hamburger, chips, fruit cup, pudding, broth, sherbet.     Wt Readings from Last 30 Encounters:   01/26/24 100.2 kg (220 lb 14.4 oz)   01/12/24 88 kg (per care everywhere)     No recent weight loss.     Interventions:  None    Nutrition will follow per LOS protocol or sooner if consulted.    Harriet Chacon, MS, RD, LD  4A (CVICU) RD pager: [phone]  Ascom: [phone]  Weekend/Holiday RD pager: [phone]

## 2024-01-26 NOTE — PLAN OF CARE
Major Shift Events:    Neuro: A&Ox4, BLACKBURN's. Up with standby assist, verbalizes needs appropriately  - Robaxin, oxy and dilaudid administered for chest incisional pain   CV: SR 70's, no ectopy noted. Hgb 6.8- will transfuse 1 UPRBC. Maps >65  Pulmonary: LS clear, needing 3-4 LNC to maintaine sats >92. Ctx4- 3meds, 1 pleural, serosang output    GI: 2gm Na diet, good PO intake  : adequate UOP via casanova  Skin: Chest CT sites, mid sternal, right groin, right upper chest area    Plan: Increase ambualtion, pain management, infuse   For vital signs and complete assessments, please see documentation flowsheets.            Goal Outcome Evaluation:      Plan of Care Reviewed With: patient    Overall Patient Progress: improvingOverall Patient Progress: improving    Outcome Evaluation: Up with one assist. Stable hemofynamically. Incisional pain needing frequent pain meds

## 2024-01-26 NOTE — CONSULTS
Nephrology Initial Consult  January 26, 2024      Valdo Lyons MRN:[id number] YOB: 1975  Date of Admission:1/16/2024  Primary care provider: No Ref-Primary, Physician  Requesting physician: Addi Shaw MD    ASSESSMENT AND RECOMMENDATIONS:   MIKA-Baseline Cr was 1.2 in 2022, 1.6 on admission but as good as 1.06 prior to surgery.  Now up after surgery, likely related to hemodynamics during OR and contrast needed for CT's.  Considered updating US w/doppler as last one was prior to surgery but is showing some recovery and making UOP so can hold on this unless his clincial picture changes.  Would continue supportive cares and watch for further renal recovery.     -MIKA, Cr a bit better the past 12h and is making UOP. May be in early recovery.     -Can use diuretic as long as BP's are stable (SBP's 130's on my visit) to be a bit net negative.     -Holding on repeat US w/doppler unless Cr rises again.      Volume-Has edema and robust BP's, can diurese to be gently net negative as pulm status is stable.      Electrolytes-Mild hyponatremia, would try to avoid excessive H2O intake, starting to take PO.  No K/pH issue.      BMD-No acute issues.     Anemia-Hgb 6.8, acute management per team.     Other comorbidities   Nutrition-No issues, taking PO, appetite reasonable.     AO dissection-Has known HTN and was not taking meds but is quite young for dissection. Does not have marfan's appearance but may be reasonable to eval for connective tissue disorder.       Time spent: 40 minutes on this date of encounter for chart review, physical exam, medical decision making and co-ordination of care.     Discussed with Dr Boyer.     Recommendations were communicated to primary team via verbal communication.    BRIANA Garcia CNS  Clinical Nurse Specialist  [phone]    REASON FOR CONSULT: Requested to evaluate 48 yom for management of MIKA s/p AO dissection repair.      HISTORY OF PRESENT  ILLNESS:  Valdo Lyons is a 48 yom with hx of HTN who presented to OSH with back pain and syncope, found to have type B AO dissection from L subclavian to just superior to the celiac axis.  Had variation in L vs R arm BP's, repeat CT showed Type A dissection so was tx to [facility] for repair which was done with TEVAR and vasc surgery on 1/23/2024.  Renal US w/doppler showed patent flow prior to surgery, baseline Cr ~1.1, up to 2.2 POD #3 prompting nephrology consult.      PAST MEDICAL HISTORY:  Past Medical History:   Diagnosis Date    Dissecting aneurysm of thoracic aorta, [place] type B (H)     HTN (hypertension)        Past Surgical History:   Procedure Laterality Date    ENDOVASCULAR REPAIR ANEURYSM THORACIC AORTIC N/A 1/23/2024    Procedure: Antegrade Thoracic Endovascular Aortic Repair (TEVAR) with [place] Conformable Thoracic Stent Graft 37mm x 10cm. Intravascular Ultrasound (IVUS), Aortography, Right common femoral access;  Surgeon: Artur Singh MD;  Location: [facility] OR    IR OR ANGIOGRAM  1/23/2024    PICC DOUBLE LUMEN PLACEMENT Right 01/16/2024    5FR DL PICC, basilic vein. L-43cm, 1cm out.    REPAIR ANEURYSM ASCENDING AORTA N/A 1/23/2024    Procedure: MEDIAN STERNOTOMY, RIGHT AXILLARY CUTDOWN, ON CARDIOPULMONARY BYPASS (CIRCULATORY ARREST), ASCENDING AORTA ANEURYSM REPAIR ([place]ave 30mm), INTRAOPERATIVE TRANSESOPHAGEAL ECHOCARDIOGRAM BY ANESTHESIA;  Surgeon: Addi Shaw MD;  Location: [facility] OR        MEDICATIONS:  PTA Meds  Prior to Admission medications    Not on File      Current Meds   acetaminophen  975 mg Oral Q6H ETIENNE    aspirin  81 mg Oral or NG Tube Daily    gabapentin  100 mg Oral TID    heparin ANTICOAGULANT  5,000 Units Subcutaneous Q8H    insulin aspart  1-7 Units Subcutaneous TID AC    insulin aspart  1-5 Units Subcutaneous At Bedtime    magnesium hydroxide  30 mL Oral Once    pantoprazole  40 mg Oral QAM AC    polyethylene glycol  17 g Oral BID    rosuvastatin  20 mg Oral Daily     "senna-docusate  2 tablet Oral BID     Infusion Meds   dextrose         ALLERGIES:    No Known Allergies    REVIEW OF SYSTEMS:  A 10 point review of systems was negative except as noted above.    SOCIAL HISTORY:   Social History     Socioeconomic History    Marital status:      Spouse name: Not on file    Number of children: Not on file    Years of education: Not on file    Highest education level: Not on file   Occupational History    Not on file   Tobacco Use    Smoking status: Not on file    Smokeless tobacco: Not on file   Substance and Sexual Activity    Alcohol use: Not on file    Drug use: Not on file    Sexual activity: Not on file   Other Topics Concern    Not on file   Social History Narrative    Not on file     Social Determinants of Health     Financial Resource Strain: Not on file   Food Insecurity: Not on file   Transportation Needs: Not on file   Physical Activity: Not on file   Stress: Not on file   Social Connections: Not on file   Interpersonal Safety: Not on file   Housing Stability: Not on file     Reviewed with patient, noncontributory.     FAMILY MEDICAL HISTORY:   Reviewed with pt, noncontributory family hx.     PHYSICAL EXAM:   Temp  Av  F (36.7  C)  Min: 96.7  F (35.9  C)  Max: 99.3  F (37.4  C)  Arterial Line BP  Min: 89/51  Max: 190/74  Arterial Line MAP (mmHg)  Av mmHg  Min: 52 mmHg  Max: 113 mmHg Arterial Line Location 2: Left radial    Pulse  Av.6  Min: 55  Max: 93 Resp  Av.9  Min: 4  Max: 29  FiO2 (%)  Av.9 %  Min: 40 %  Max: 80 %  SpO2  Av.9 %  Min: 83 %  Max: 100 %    CVP (mmHg):  (Up in chair)  /65   Pulse 79   Temp 98.5  F (36.9  C)   Resp 17   Ht 1.727 m (5' 8\")   Wt 100.2 kg (220 lb 14.4 oz)   SpO2 95%   BMI 33.59 kg/m     Date 24 0700 - 24 0659   Shift 4936-5630 2217-7783 6042-0913 24 Hour Total   INTAKE   P.O. 100   100   Blood Components 500   500   Shift Total(mL/kg) 600(5.99)   600(5.99)   OUTPUT   Urine 1400   " 1400   Chest Tube(mL/kg) 100(1)   100(1)   Shift Total(mL/kg) 1500(14.97)   1500(14.97)   Weight (kg) 100.2 100.2 100.2 100.2      Admit Weight: 88 kg (194 lb 0.1 oz)     GENERAL APPEARANCE: alert and no distress, sitting in chair.   EYES: No scleral icterus  Pulmonary: Breathing non-labored, 3L NC  CV: Regular rhythm, normal rate   - Edema +1-2 LE  GI: soft, non-tender  MS: no evidence of inflammation in joints, no muscle tenderness  :  +Bundy  SKIN: no rash, warm, dry  NEURO: mentation intact and speech normal    LABS:   CMP  Recent Labs   Lab 01/26/24  0801 01/26/24  0759 01/26/24  0341 01/25/24  2221 01/25/24  2219 01/25/24  1709 01/25/24  0343 01/25/24  0342 01/24/24  1550 01/24/24  1546 01/24/24  0403 01/24/24  0358 01/23/24  1758 01/23/24  1703   NA  --   --  130*  --   --  130*  --  131*  --  137  --  143  143   < > 141   POTASSIUM  --  4.3 4.3  4.4  --  4.4 4.8  --  4.6  --  4.1  --  4.3  4.3   < > 4.4   CHLORIDE  --   --  97*  --   --  99  --  100  --  106  --  111*  111*   < > 109*   CO2  --   --  24  --   --  23  --  22  --  22  --  22  22   < > 22   ANIONGAP  --   --  9  --   --  8  --  9  --  9  --  10  10   < > 10   *  --  100* 121*  --  116*   < > 98   < > 139*   < > 137*  137*   < > 137*   BUN  --   --  45.8*  --   --  43.1*  --  33.2*  --  29.5*  --  27.6*  27.6*   < > 23.0*   CR  --   --  2.17*  --   --  2.25*  --  1.75*  --  1.58*  --  1.71*  1.71*   < > 1.68*   GFRESTIMATED  --   --  37*  --   --  35*  --  48*  --  54*  --  49*  49*   < > 50*   YARON  --   --  8.4*  --   --  8.6  --  8.2*  --  8.1*  --  8.0*  8.0*   < > 8.3*   MAG  --   --  2.7*  --   --   --   --  2.5*  --  2.7*  --  2.5*   < > 2.8*   PHOS  --   --  4.2  --   --   --   --  4.3  --  4.3  --  4.9*  --  4.3   PROTTOTAL  --   --  5.4*  --   --   --   --  5.5*  --   --   --  4.7*  --  4.5*   ALBUMIN  --   --  3.0*  --   --   --   --  3.0*  --   --   --  2.8*  --  2.8*   BILITOTAL  --   --  0.5  --   --   --   --   0.5  --   --   --  0.3  --  0.7   ALKPHOS  --   --  55  --   --   --   --  45  --   --   --  43  --  41   AST  --   --  53*  --   --   --   --  36  --   --   --  30  --  37   ALT  --   --  33  --   --   --   --  16  --   --   --  10  --  10    < > = values in this interval not displayed.     CBC  Recent Labs   Lab 01/26/24  0341 01/25/24  1404 01/25/24  0342 01/24/24  1546   HGB 6.8* 7.4* 7.2* 7.5*   WBC 12.9* 16.9* 13.7* 14.2*   RBC 3.56* 3.73* 3.66* 3.72*   HCT 21.9* 23.7* 22.5* 23.1*   MCV 62* 64* 62* 62*   MCH 19.1* 19.8* 19.7* 20.2*   MCHC 31.1* 31.2* 32.0 32.5   RDW 15.6* 15.9* 16.1* 15.9*   * 111* 107* 119*     INR  Recent Labs   Lab 01/26/24  0341 01/25/24  0342 01/24/24  0358 01/23/24  2154 01/23/24  1703 01/23/24  1604   INR 1.23* 1.18* 1.20* 1.24* 1.24* 1.62*   PTT  --   --   --  49* 38 36     ABG  Recent Labs   Lab 01/24/24  1341 01/24/24  0841 01/24/24  0358 01/23/24  2355   PH 7.33* 7.38 7.38 7.36   PCO2 43 40 40 40   PO2 81 109* 103 91   HCO3 23 24 23 23   O2PER 2 40  40 40  40 40  40      URINE STUDIES  Recent Labs   Lab Test 01/19/24  1524 08/20/22  1929   COLOR Yellow Yellow   APPEARANCE Clear Clear   URINEGLC Negative Negative   URINEBILI Negative Negative   URINEKETONE Negative Trace*   SG 1.025 1.023   UBLD Negative Negative   URINEPH 5.5 5.5   PROTEIN 70* 50*   NITRITE Negative Negative   LEUKEST Negative Negative   RBCU 0 <1   WBCU <1 1     No lab results found.  PTH  No lab results found.  IRON STUDIES  No lab results found.

## 2024-01-27 ENCOUNTER — APPOINTMENT (OUTPATIENT)
Dept: OCCUPATIONAL THERAPY | Facility: CLINIC | Age: 49
End: 2024-01-27
Attending: STUDENT IN AN ORGANIZED HEALTH CARE EDUCATION/TRAINING PROGRAM
Payer: MEDICAID

## 2024-01-27 ENCOUNTER — APPOINTMENT (OUTPATIENT)
Dept: GENERAL RADIOLOGY | Facility: CLINIC | Age: 49
End: 2024-01-27
Attending: SURGERY
Payer: MEDICAID

## 2024-01-27 ENCOUNTER — APPOINTMENT (OUTPATIENT)
Dept: PHYSICAL THERAPY | Facility: CLINIC | Age: 49
End: 2024-01-27
Attending: STUDENT IN AN ORGANIZED HEALTH CARE EDUCATION/TRAINING PROGRAM
Payer: MEDICAID

## 2024-01-27 LAB
ALBUMIN SERPL BCG-MCNC: 3 G/DL (ref 3.5–5.2)
ALP SERPL-CCNC: 66 U/L (ref 40–150)
ALT SERPL W P-5'-P-CCNC: 27 U/L (ref 0–70)
ANION GAP SERPL CALCULATED.3IONS-SCNC: 6 MMOL/L (ref 7–15)
ANION GAP SERPL CALCULATED.3IONS-SCNC: 9 MMOL/L (ref 7–15)
AST SERPL W P-5'-P-CCNC: 39 U/L (ref 0–45)
BILIRUB SERPL-MCNC: 0.6 MG/DL
BUN SERPL-MCNC: 28.6 MG/DL (ref 6–20)
BUN SERPL-MCNC: 36.1 MG/DL (ref 6–20)
CA-I BLD-MCNC: 4.6 MG/DL (ref 4.4–5.2)
CALCIUM SERPL-MCNC: 8.3 MG/DL (ref 8.6–10)
CALCIUM SERPL-MCNC: 8.3 MG/DL (ref 8.6–10)
CHLORIDE SERPL-SCNC: 97 MMOL/L (ref 98–107)
CHLORIDE SERPL-SCNC: 99 MMOL/L (ref 98–107)
CREAT SERPL-MCNC: 1.14 MG/DL (ref 0.67–1.17)
CREAT SERPL-MCNC: 1.38 MG/DL (ref 0.67–1.17)
DEPRECATED HCO3 PLAS-SCNC: 29 MMOL/L (ref 22–29)
DEPRECATED HCO3 PLAS-SCNC: 31 MMOL/L (ref 22–29)
EGFRCR SERPLBLD CKD-EPI 2021: 63 ML/MIN/1.73M2
EGFRCR SERPLBLD CKD-EPI 2021: 80 ML/MIN/1.73M2
ERYTHROCYTE [DISTWIDTH] IN BLOOD BY AUTOMATED COUNT: 16.5 % (ref 10–15)
GLUCOSE BLDC GLUCOMTR-MCNC: 104 MG/DL (ref 70–99)
GLUCOSE BLDC GLUCOMTR-MCNC: 104 MG/DL (ref 70–99)
GLUCOSE BLDC GLUCOMTR-MCNC: 117 MG/DL (ref 70–99)
GLUCOSE BLDC GLUCOMTR-MCNC: 119 MG/DL (ref 70–99)
GLUCOSE SERPL-MCNC: 114 MG/DL (ref 70–99)
GLUCOSE SERPL-MCNC: 98 MG/DL (ref 70–99)
HCT VFR BLD AUTO: 22.1 % (ref 40–53)
HGB BLD-MCNC: 7.4 G/DL (ref 13.3–17.7)
HGB BLD-MCNC: 7.6 G/DL (ref 13.3–17.7)
INR PPP: 1.17 (ref 0.85–1.15)
LACTATE SERPL-SCNC: 0.9 MMOL/L (ref 0.7–2)
MAGNESIUM SERPL-MCNC: 2.2 MG/DL (ref 1.7–2.3)
MCH RBC QN AUTO: 20.5 PG (ref 26.5–33)
MCHC RBC AUTO-ENTMCNC: 33.5 G/DL (ref 31.5–36.5)
MCV RBC AUTO: 61 FL (ref 78–100)
PHOSPHATE SERPL-MCNC: 2.5 MG/DL (ref 2.5–4.5)
PLATELET # BLD AUTO: 133 10E3/UL (ref 150–450)
POTASSIUM SERPL-SCNC: 3.4 MMOL/L (ref 3.4–5.3)
POTASSIUM SERPL-SCNC: 3.7 MMOL/L (ref 3.4–5.3)
POTASSIUM SERPL-SCNC: 3.7 MMOL/L (ref 3.4–5.3)
POTASSIUM SERPL-SCNC: 3.8 MMOL/L (ref 3.4–5.3)
POTASSIUM SERPL-SCNC: 4 MMOL/L (ref 3.4–5.3)
POTASSIUM SERPL-SCNC: 4 MMOL/L (ref 3.4–5.3)
PROT SERPL-MCNC: 5.6 G/DL (ref 6.4–8.3)
RBC # BLD AUTO: 3.61 10E6/UL (ref 4.4–5.9)
SODIUM SERPL-SCNC: 135 MMOL/L (ref 135–145)
SODIUM SERPL-SCNC: 136 MMOL/L (ref 135–145)
WBC # BLD AUTO: 13.6 10E3/UL (ref 4–11)

## 2024-01-27 PROCEDURE — 250N000013 HC RX MED GY IP 250 OP 250 PS 637: Performed by: SURGERY

## 2024-01-27 PROCEDURE — 83605 ASSAY OF LACTIC ACID: CPT

## 2024-01-27 PROCEDURE — 99233 SBSQ HOSP IP/OBS HIGH 50: CPT | Mod: 24 | Performed by: INTERNAL MEDICINE

## 2024-01-27 PROCEDURE — 71045 X-RAY EXAM CHEST 1 VIEW: CPT | Mod: 26 | Performed by: STUDENT IN AN ORGANIZED HEALTH CARE EDUCATION/TRAINING PROGRAM

## 2024-01-27 PROCEDURE — 97535 SELF CARE MNGMENT TRAINING: CPT | Mod: GO

## 2024-01-27 PROCEDURE — 250N000013 HC RX MED GY IP 250 OP 250 PS 637

## 2024-01-27 PROCEDURE — 85027 COMPLETE CBC AUTOMATED: CPT

## 2024-01-27 PROCEDURE — 200N000002 HC R&B ICU UMMC

## 2024-01-27 PROCEDURE — 85018 HEMOGLOBIN: CPT

## 2024-01-27 PROCEDURE — 84100 ASSAY OF PHOSPHORUS: CPT | Performed by: SURGERY

## 2024-01-27 PROCEDURE — 84132 ASSAY OF SERUM POTASSIUM: CPT | Performed by: SURGERY

## 2024-01-27 PROCEDURE — 250N000013 HC RX MED GY IP 250 OP 250 PS 637: Performed by: STUDENT IN AN ORGANIZED HEALTH CARE EDUCATION/TRAINING PROGRAM

## 2024-01-27 PROCEDURE — 84132 ASSAY OF SERUM POTASSIUM: CPT

## 2024-01-27 PROCEDURE — 85610 PROTHROMBIN TIME: CPT | Performed by: SURGERY

## 2024-01-27 PROCEDURE — 71045 X-RAY EXAM CHEST 1 VIEW: CPT

## 2024-01-27 PROCEDURE — 250N000011 HC RX IP 250 OP 636

## 2024-01-27 PROCEDURE — 80053 COMPREHEN METABOLIC PANEL: CPT

## 2024-01-27 PROCEDURE — 82330 ASSAY OF CALCIUM: CPT | Performed by: NURSE PRACTITIONER

## 2024-01-27 PROCEDURE — 97116 GAIT TRAINING THERAPY: CPT | Mod: GP

## 2024-01-27 PROCEDURE — 97530 THERAPEUTIC ACTIVITIES: CPT | Mod: GP

## 2024-01-27 PROCEDURE — 83735 ASSAY OF MAGNESIUM: CPT | Performed by: NURSE PRACTITIONER

## 2024-01-27 PROCEDURE — 99232 SBSQ HOSP IP/OBS MODERATE 35: CPT | Mod: 24

## 2024-01-27 RX ORDER — POTASSIUM CHLORIDE 750 MG/1
40 TABLET, EXTENDED RELEASE ORAL ONCE
Status: COMPLETED | OUTPATIENT
Start: 2024-01-27 | End: 2024-01-27

## 2024-01-27 RX ORDER — HYDRALAZINE HYDROCHLORIDE 20 MG/ML
10 INJECTION INTRAMUSCULAR; INTRAVENOUS EVERY 30 MIN PRN
Status: DISCONTINUED | OUTPATIENT
Start: 2024-01-27 | End: 2024-02-03

## 2024-01-27 RX ORDER — POTASSIUM CHLORIDE 1.5 G/1.58G
20 POWDER, FOR SOLUTION ORAL ONCE
Status: COMPLETED | OUTPATIENT
Start: 2024-01-27 | End: 2024-01-27

## 2024-01-27 RX ORDER — FUROSEMIDE 10 MG/ML
60 INJECTION INTRAMUSCULAR; INTRAVENOUS ONCE
Status: DISCONTINUED | OUTPATIENT
Start: 2024-01-27 | End: 2024-01-27

## 2024-01-27 RX ORDER — POTASSIUM CHLORIDE 750 MG/1
20 TABLET, EXTENDED RELEASE ORAL ONCE
Status: COMPLETED | OUTPATIENT
Start: 2024-01-27 | End: 2024-01-27

## 2024-01-27 RX ORDER — CARVEDILOL 3.12 MG/1
12.5 TABLET ORAL 2 TIMES DAILY
Status: DISCONTINUED | OUTPATIENT
Start: 2024-01-27 | End: 2024-01-31

## 2024-01-27 RX ADMIN — MAGNESIUM HYDROXIDE 30 ML: 400 SUSPENSION ORAL at 07:39

## 2024-01-27 RX ADMIN — PANTOPRAZOLE SODIUM 40 MG: 40 TABLET, DELAYED RELEASE ORAL at 07:39

## 2024-01-27 RX ADMIN — METHOCARBAMOL 500 MG: 500 TABLET ORAL at 19:58

## 2024-01-27 RX ADMIN — ACETAMINOPHEN 975 MG: 325 TABLET, FILM COATED ORAL at 16:01

## 2024-01-27 RX ADMIN — POLYETHYLENE GLYCOL 3350 17 G: 17 POWDER, FOR SOLUTION ORAL at 07:39

## 2024-01-27 RX ADMIN — HYDRALAZINE HYDROCHLORIDE 10 MG: 20 INJECTION INTRAMUSCULAR; INTRAVENOUS at 16:01

## 2024-01-27 RX ADMIN — ACETAMINOPHEN 975 MG: 325 TABLET, FILM COATED ORAL at 09:32

## 2024-01-27 RX ADMIN — POTASSIUM CHLORIDE 20 MEQ: 750 TABLET, EXTENDED RELEASE ORAL at 22:43

## 2024-01-27 RX ADMIN — OXYCODONE HYDROCHLORIDE 10 MG: 10 TABLET ORAL at 04:10

## 2024-01-27 RX ADMIN — POTASSIUM CHLORIDE 40 MEQ: 750 TABLET, EXTENDED RELEASE ORAL at 04:11

## 2024-01-27 RX ADMIN — POTASSIUM CHLORIDE 20 MEQ: 1.5 POWDER, FOR SOLUTION ORAL at 09:32

## 2024-01-27 RX ADMIN — POTASSIUM & SODIUM PHOSPHATES POWDER PACK 280-160-250 MG 1 PACKET: 280-160-250 PACK at 06:14

## 2024-01-27 RX ADMIN — DOCUSATE SODIUM 50 MG AND SENNOSIDES 8.6 MG 2 TABLET: 8.6; 5 TABLET, FILM COATED ORAL at 07:39

## 2024-01-27 RX ADMIN — GABAPENTIN 100 MG: 100 CAPSULE ORAL at 19:58

## 2024-01-27 RX ADMIN — OXYCODONE HYDROCHLORIDE 10 MG: 10 TABLET ORAL at 12:44

## 2024-01-27 RX ADMIN — HEPARIN SODIUM 5000 UNITS: 5000 INJECTION, SOLUTION INTRAVENOUS; SUBCUTANEOUS at 06:14

## 2024-01-27 RX ADMIN — ROSUVASTATIN CALCIUM 20 MG: 20 TABLET, FILM COATED ORAL at 07:39

## 2024-01-27 RX ADMIN — OXYCODONE HYDROCHLORIDE 10 MG: 10 TABLET ORAL at 16:01

## 2024-01-27 RX ADMIN — ACETAMINOPHEN 975 MG: 325 TABLET, FILM COATED ORAL at 21:52

## 2024-01-27 RX ADMIN — HEPARIN SODIUM 5000 UNITS: 5000 INJECTION, SOLUTION INTRAVENOUS; SUBCUTANEOUS at 14:40

## 2024-01-27 RX ADMIN — CARVEDILOL 12.5 MG: 3.12 TABLET, FILM COATED ORAL at 19:58

## 2024-01-27 RX ADMIN — HYDRALAZINE HYDROCHLORIDE 10 MG: 20 INJECTION INTRAMUSCULAR; INTRAVENOUS at 21:52

## 2024-01-27 RX ADMIN — ASPIRIN 81 MG CHEWABLE TABLET 81 MG: 81 TABLET CHEWABLE at 07:39

## 2024-01-27 RX ADMIN — POTASSIUM CHLORIDE 20 MEQ: 1.5 POWDER, FOR SOLUTION ORAL at 14:40

## 2024-01-27 RX ADMIN — GABAPENTIN 100 MG: 100 CAPSULE ORAL at 07:39

## 2024-01-27 RX ADMIN — POTASSIUM & SODIUM PHOSPHATES POWDER PACK 280-160-250 MG 1 PACKET: 280-160-250 PACK at 09:32

## 2024-01-27 RX ADMIN — OXYCODONE HYDROCHLORIDE 10 MG: 10 TABLET ORAL at 07:39

## 2024-01-27 RX ADMIN — HEPARIN SODIUM 5000 UNITS: 5000 INJECTION, SOLUTION INTRAVENOUS; SUBCUTANEOUS at 21:53

## 2024-01-27 RX ADMIN — ACETAMINOPHEN 975 MG: 325 TABLET, FILM COATED ORAL at 04:10

## 2024-01-27 RX ADMIN — OXYCODONE HYDROCHLORIDE 10 MG: 10 TABLET ORAL at 19:58

## 2024-01-27 RX ADMIN — GABAPENTIN 100 MG: 100 CAPSULE ORAL at 14:40

## 2024-01-27 RX ADMIN — HYDRALAZINE HYDROCHLORIDE 10 MG: 20 INJECTION INTRAMUSCULAR; INTRAVENOUS at 19:58

## 2024-01-27 RX ADMIN — CARVEDILOL 12.5 MG: 3.12 TABLET, FILM COATED ORAL at 12:44

## 2024-01-27 ASSESSMENT — ACTIVITIES OF DAILY LIVING (ADL)
ADLS_ACUITY_SCORE: 27

## 2024-01-27 NOTE — PLAN OF CARE
Shift Events: A&Ox4, impulsive when trying to get up with staff, calls appropriately - able to make needs known, PRN oxy and robaxin given for incisional & back pain. SR 70s, MAP > 65 & SYS < 120 without  intervention. NC 3L, LS clear/dim. 2g Na diet - ok appetite, no BM this shift - Milk of Mag given (abd xray obtained), casanova in place with adequate uop - lasix given x1. CT x4 with adequate output. PICC retracted 2cm by PICC nurse - awaiting chest xray for confirmation. 1 Unit PRBCs given for low hgb (now hbg 8.1).     Plan: Continue to work with PT/OT. Increase bowel regimen to achieve stooling.    For complete assessments and vital signs, please refer to flowsheets.       Goal Outcome Evaluation:    Plan of Care Reviewed With: patient    Overall Patient Progress: improving

## 2024-01-27 NOTE — PROGRESS NOTES
CV ICU PROGRESS NOTE  January 27, 2024      CO-MORBIDITIES:   Dissection of aorta, unspecified portion of aorta (H)  (primary encounter diagnosis)    ASSESSMENT: Valdo Lyons is a 48 year old male with PMH of uncontrolled HTN, childhood seizures. 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 Repeat CTA 1/17 with decision for surgical management, medically managed with strict BP goals in interim. Now status post ascending aortic aneurysm hemiarch repair and TEVAR with CVTS and Vascular Surgery on 1/23/24. Patient extubated 1/24 and progressing appropriately post operatively with no acute complications or concerns. Currently remains in ICU for fluid status management and MIKA.      TODAY'S PROGRESS:   -Recheck hemoglobin this afternoon  -Initiate Carvedilol 12.5 mg twice daily  -Total fluid goal of net negative -1 to -1.5 L for the day    PLAN:  Neuro/Pain/Sedation:  Vascular ok with q4h neuro checks to evaluate for spinal cord ischemia. No evidence of hip flexion weakness thus far.       # Baseline cognitive dysfunction  Patient's mother is his caregiver at home due to baseline cognitive dysfunction. The patient's overall understanding of the situation is inconsistent. He also developed some agitation/behavioral issues (spitting and kicking) 1/21 requiring sedation. Psychiatry saw patient earlier in his stay, see note for recs. SW on board per mother's request for additional caregiver resources.  - Monitor neurological status. Notify the MD for any acute changes in exam.  - Delirium prevention      # Acute post-operative pain  - Scheduled: tylenol, gabapentin   -  PRN: tylenol, oxycodone, dilaudid, robaxin     # Recent fall, atraumatic  # Hx Childhood seizure disorder  Seizure hx noted, not on any antiepileptics at time of admission. Presented to [facility] s/p fall, CT head reported as negative for intracranial abnormalities 1/12     # Hx Mariajuana use   Reports typically smokes mariajuana nightly. Denies tobacco, alcohol, or other illicit drugs     Pulmonary:   # Acute post-operative respiratory insufficiency, improving   # Left pleural effusion  # Right apical pneumothorax  CXR 1/27 am with small right apical pneumothorax, Cts in place to suction, saturating 90-99%, intermittently on room air to 3 LPM NC 1/27  - Titrate nasal cannula for SpO2 >92%  - Encourage IS q15-30 minutes when awake   - Continue to encourage pulmonary hygiene      Cardiovascular:   # Cardiogenic shock, resolved   # Type B dissection with c/f retrograde tear s/p hemiarch repair and TEVAR  # Hx of uncontrolled HTN   Pre-procedure: Patient was not taking any PTA medications for his high blood pressure. Bilateral arterial lines placed at [facility]. Ordered additional testing, as his BP medication requirements are exceeding expectations, rule out other/organic causes of elevated BP. TSH normal. Rads did not appreciate any renal mass other than a simple cyst - lower likelihood  pheochromocytoma. EF 55-60%.  - goal MAP 65-85, SBP<120  - ASA 81  - PRN hydralazine if SBP >120, parameters in place   - Statin: Rosuvastatin daily  - Initiate Carvedilol 12.5 mg twice daily  - Vascular consulted, appreciate recommendations   -Will need repeat CTA Chest/Abdomen/Pelvis before discharge 1/27     GI:   # At risk for protein malnutrition   # Ileus, concern for, resolved  - Abd XR 1/26 afternoon with gaseous distention of bowel, patient able to have BM 1/27  - Tolerating regular diet   - PPI prophylaxis  - Bowel regimen: Miralax BID, Senna 2 tab BID, suppository PRN      Renal/FEN:   # CKD vs MIKA, improving  # Hypervolemia  #  Hyponatremia, resolved  Unknown baseline creatinine, was 1.6 on admission to [facility], last value in 2022 was 1.25. Cr continues to improve. Renal ultrasound showed expected changes from the dissection (increased velocities) and a right pleural effusion (NTD). Cr normalized then became elevated again post operatively, now trending back down 1/27  - Strict I/Os, daily weight  - Avoid nephrotoxic agents as able   - Replete electrolytes PRN per protocol   - Continue to monitor with CMP daily  - Lactate daily    - Daily mag and phos   - Nephrology consulted, appreciated recommendations   - Plan for auto-diuresis 1/27 with total fluid goal of net -1 to -1.5L for the day     Endocrine:    # Stress hyperglycemia  Preop A1c 6.1% on 1/16/24  - Medium intensity sliding scale insulin  - Goal BG<180 for optimal healing      ID:  # Stress induced leukocytosis  WBC 20.8 post-op, trending downward 1/27  - Completed perioperative antibiotic regimen of cefazolin and vancomycin  - Monitor fever curve, WBC, and inflammatory markers as appropriate     Heme:     # Acute blood loss anemia  # Acute blood loss thrombocytopenia  - Continue to monitor with CBC daily  - Hgb goal > 7.0, received 1 unit PRBC 1/26 for Hgb 6.8, Hgb downtrending to 7.4 on 1/27 am, re check this afternoon  - SQH 5000 units q 8 hours     MSK:  # Healing sternotomy  # Surgical Incision  - sternal precautions   - post-operative incision care per protocol   - PT/OT     Prophylaxis:    - SCDs  - SQH  - PPI prophylaxis     LDA:  - PICC 1/16 - respositioned 1/27  - Chest tubes x4  - Bundy     Disposition:  - Transfer to floor when bed available.    Patient seen, findings and plan discussed with CV ICU staff, Dr. Plaza.    Total Critical Care time spent by me, excluding procedures, was 32 minutes.    Luana Miller PA-C    Clinically Significant Risk Factors              # Hypoalbuminemia: Lowest albumin = 2.8 g/dL at 1/24/2024  3:58 AM, will monitor as appropriate  #  "Coagulation Defect: INR = 1.17 (Ref range: 0.85 - 1.15) and/or PTT = 49 Seconds (Ref range: 22 - 38 Seconds), will monitor for bleeding  # Thrombocytopenia: Lowest platelets = 105 in last 2 days, will monitor for bleeding   # Hypertension: Noted on problem list        # Obesity: Estimated body mass index is 31.84 kg/m  as calculated from the following:    Height as of this encounter: 1.727 m (5' 8\").    Weight as of this encounter: 95 kg (209 lb 7 oz).                ====================================    SUBJECTIVE: Patient seated upright in bed, awake and alert. Responding appropriately to questions, denies abdominal pain, nausea, or vomiting. Endorses passing gas. Endorses pain at 8/10, improved with current pain regimen.    OBJECTIVE:   1. VITAL SIGNS:   Temp:  [97.6  F (36.4  C)-99.9  F (37.7  C)] 99.6  F (37.6  C)  Pulse:  [70-80] 75  Resp:  [10-22] 14  BP: ()/(57-83) 139/83  SpO2:  [90 %-98 %] 95 %  Resp: 14      2. INTAKE/ OUTPUT:   I/O last 3 completed shifts:  In: 2090 [P.O.:1590]  Out: 6960 [Urine:6550; Chest Tube:410]    3. PHYSICAL EXAMINATION:   General: Seated upright in bed, awake and alert.  Neuro: Alert, responding appropriately to questions, moving all extremities.  Resp: Lungs clear to auscultation. CT reservoir L M and P to suction with CT x2 y-d, tidaling, thin serosanguinous output. CT reservoir M and P to suction without air leak, thin serosanguinous output.  CV: Regular rate and rhythm.  Abdomen: Soft, Non-distended, Non-tender on palpation.  : Green urine in casanova reservoir.  Incisions: Mid sternotomy incision open to air, appears well approximated, sealed with Dermabond. Right upper chest incision open to air, sealed with Dermabond.  Extremities: warm and well perfused, LUE inner bicep with superficial ecchymosis, soft on palpation. DP and radial pulses palpable bilaterally.    4. INVESTIGATIONS:   Arterial Blood Gases   Recent Labs   Lab 01/24/24  1341 01/24/24  0841 " 01/24/24  0358 01/23/24  2355   PH 7.33* 7.38 7.38 7.36   PCO2 43 40 40 40   PO2 81 109* 103 91   HCO3 23 24 23 23     Complete Blood Count   Recent Labs   Lab 01/27/24 0420 01/26/24  1302 01/26/24  0341 01/25/24  1404   WBC 13.6* 19.6* 12.9* 16.9*   HGB 7.4* 8.1* 6.8* 7.4*   * 147* 105* 111*     Basic Metabolic Panel  Recent Labs   Lab 01/27/24  0420 01/27/24  0108 01/26/24 2226 01/26/24  1613 01/26/24  1609 01/26/24  1302 01/26/24  0759 01/26/24 0341 01/25/24 2219 01/25/24  1709     --   --   --   --  132*  --  130*  --  130*   POTASSIUM 3.4  3.4 3.4  --   --  3.7 4.0   < > 4.3  4.4   < > 4.8   CHLORIDE 97*  --   --   --   --  96*  --  97*  --  99   CO2 29  --   --   --   --  25  --  24  --  23   BUN 36.1*  --   --   --   --  45.3*  --  45.8*  --  43.1*   CR 1.38*  --   --   --   --  1.87*  --  2.17*  --  2.25*   GLC 98  --  111* 109*  --  100*   < > 100*   < > 116*    < > = values in this interval not displayed.     Liver Function Tests  Recent Labs   Lab 01/27/24 0420 01/26/24 0341 01/25/24 0342 01/24/24  0358   AST 39 53* 36 30   ALT 27 33 16 10   ALKPHOS 66 55 45 43   BILITOTAL 0.6 0.5 0.5 0.3   ALBUMIN 3.0* 3.0* 3.0* 2.8*   INR 1.17* 1.23* 1.18* 1.20*     Pancreatic Enzymes  No lab results found in last 7 days.  Coagulation Profile  Recent Labs   Lab 01/27/24 0420 01/26/24 0341 01/25/24  0342 01/24/24  0358 01/23/24  2154 01/23/24  1703 01/23/24  1604   INR 1.17* 1.23* 1.18* 1.20* 1.24* 1.24* 1.62*   PTT  --   --   --   --  49* 38 36         5. RADIOLOGY:   Recent Results (from the past 24 hour(s))   XR Abdomen Port 1 View    Narrative    ABDOMEN ONE VIEW PORTABLE  1/26/2024 4:19 PM     HISTORY: ileus    COMPARISON: 1/23/2024    FINDINGS: Partially visualized mediastinal drains. Persistent gaseous  distention of large and small bowel without obstructive dilatation. No  pneumatosis.      Impression    IMPRESSION: Unchanged gaseous distention of bowel compared to  1/23/2024 suggesting  ongoing ileus.    I have personally reviewed the examination and initial interpretation  and I agree with the findings.    EDILMA VALENZUELA,          SYSTEM ID:  [id number]   XR Chest Port 1 View    Impression    RESIDENT PRELIMINARY INTERPRETATION  IMPRESSION:  Tiny right apical pneumothorax with right basilar chest tube in place.  Improved aeration and decreased atelectasis throughout. No appreciable  pleural effusion.       =========================================

## 2024-01-27 NOTE — PROGRESS NOTES
"    Nephrology Inpatient Visit Note (01/27/2024)     Reason for Visit  Acute kidney injury in the setting of type B aortic dissection    Summary  Per consults note: \"Valdo Lyons is a 48 yom with hx of HTN who presented to OSH with back pain and syncope, found to have type B AO dissection from L subclavian to just superior to the celiac axis.  Had variation in L vs R arm BP's, repeat CT showed Type A dissection so was tx to [facility] for repair which was done with TEVAR and vasc surgery on 1/23/2024.  Renal US w/doppler showed patent flow prior to surgery, baseline Cr ~1.1, up to 2.2 POD #3 prompting nephrology consult. \"    Subjective  1/27 - the patient was seen and examined this morning.  He is comfortable.  No shortness of breath.  He made 5.9 L of urine output yesterday.  His creatinine went down from 1.8-1.38 over the past 24 hours.    Objective   Vital Signs  Blood pressure 139/80, pulse 73, temperature 99  F (37.2  C), temperature source Oral, resp. rate 21, height 1.727 m (5' 8\"), weight 95 kg (209 lb 7 oz), SpO2 93%.  I/O last 3 completed shifts:  In: 2090 [P.O.:1590]  Out: 6960 [Urine:6550; Chest Tube:410]    Physical Exam   General:  Comfortable. No acute distress  Neck:  neck supple, no masses  Heart:  RRR, no murmur   Lungs:  CTA bilaterally, no wheezes, rhonchi, rales.  Breathing unlabored.   Abdomen:  Soft, NT/ND, no HSM, no masses.   Extremities: No deformities, clubbing, cyanosis, or edema.   Neurologic: A/O x 3. No focal deficits.      Most Recent Serum Chemistries  Lab Results   Component Value Date    WBC 13.6 (H) 01/27/2024    HGB 7.4 (L) 01/27/2024    HCT 22.1 (L) 01/27/2024     (L) 01/27/2024     01/27/2024    POTASSIUM 3.7 01/27/2024    CHLORIDE 97 (L) 01/27/2024    CO2 29 01/27/2024    BUN 36.1 (H) 01/27/2024    CR 1.38 (H) 01/27/2024     (H) 01/27/2024    NTBNPI 1,050 (H) 08/20/2022    AST 39 01/27/2024    ALT 27 01/27/2024    ALKPHOS 66 01/27/2024    INR 1.17 (H) " 01/27/2024     Most Recent Urinalysis  Lab Results   Component Value Date    COLOR Straw 01/26/2024    APPEARANCE Clear 01/26/2024    URINEGLC Negative 01/26/2024    URINEBILI Negative 01/26/2024    URINEKETONE Negative 01/26/2024    SG 1.007 01/26/2024    URINEPH 5.0 01/26/2024    PROTEIN Negative 01/26/2024    NITRITE Negative 01/26/2024    LEUKEST Negative 01/26/2024     Infusion Medications   dextrose       Scheduled Medications   acetaminophen  975 mg Oral Q6H ETIENNE    aspirin  81 mg Oral or NG Tube Daily    gabapentin  100 mg Oral TID    heparin ANTICOAGULANT  5,000 Units Subcutaneous Q8H    insulin aspart  1-7 Units Subcutaneous TID AC    insulin aspart  1-5 Units Subcutaneous At Bedtime    pantoprazole  40 mg Oral QAM AC    polyethylene glycol  17 g Oral BID    rosuvastatin  20 mg Oral Daily    senna-docusate  2 tablet Oral BID      Renal Ultrasound 1/19/24  1. Normal grayscale and color Doppler evaluation of the kidneys.  2. Large gradient of the peak velocities between the suprarenal and infrarenal abdominal aorta which is likely related to the aortic dissection flap in the upper abdominal aorta.  After comparing with the CT from 1/17/2024, it seems as though the scan was likely of the true lumen in the upper abdomen.  This finding is of unknown clinical significance.   3. Right pleural effusion.    Assessment & Plan   # Acute kidney injury, improving  # Type B dissection with c/f retrograde tear s/p hemiarch repair and TEVAR  # Hypertension    Baseline Cr was 1.2 in 2022, 1.6 on admission but as good as 1.06 prior to surgery.  Now up after surgery, likely related to hemodynamics during OR and contrast needed for CT's.      Serum creatinine is trending down.  The patient is making robust urine output.  At this point, I recommend to allow the patient to auto diurese.  Avoid additional diuretics given his robust urine output.  Reasonable to recheck his electrolytes this afternoon.  Continue supportive care.   The plan of care was explained to the patient.    Total time is 35 minutes. More than 50 percent of my time was spent on counseling or coordination of care.    David Alcala MD  Division of Nephrology and Hypertension  Pager [phone]  [employer] ([employer])   Vocera Web Console ([employer])

## 2024-01-27 NOTE — PLAN OF CARE
Major Shift Events:  pt reporting severe back pain, managed with PRN Robaxin, Oxycodone, Dilaudid, scheduled Tylenol. T max 99.9 F. No BM, intermittent nausea.   Plan: notify team with changes, tx to 6C as able   For vital signs and complete assessments, please see documentation flowsheets.

## 2024-01-27 NOTE — PLAN OF CARE
Shift Events: A&Ox4, impulsive when trying to get up with staff, calls appropriately - able to make needs known, PRN oxy given for incisional & back pain. SR 70s, MAP > 65 & SYS < 120 hydralazine given once. RA, LS clear/dim. 2g Na diet, multiple loose Bms this shift, casanova in place with adequate uop. CT x4 with output slowing.     Plan:  Active 6C transfer Order - awaiting open bed. Continue to work with PT/OT.    For complete assessments and vital signs, please refer to flowsheets.       Goal Outcome Evaluation:    Plan of Care Reviewed With: patient, parent    Overall Patient Progress: improving

## 2024-01-28 ENCOUNTER — APPOINTMENT (OUTPATIENT)
Dept: OCCUPATIONAL THERAPY | Facility: CLINIC | Age: 49
End: 2024-01-28
Attending: STUDENT IN AN ORGANIZED HEALTH CARE EDUCATION/TRAINING PROGRAM
Payer: MEDICAID

## 2024-01-28 ENCOUNTER — APPOINTMENT (OUTPATIENT)
Dept: GENERAL RADIOLOGY | Facility: CLINIC | Age: 49
End: 2024-01-28
Attending: SURGERY
Payer: MEDICAID

## 2024-01-28 ENCOUNTER — APPOINTMENT (OUTPATIENT)
Dept: GENERAL RADIOLOGY | Facility: CLINIC | Age: 49
End: 2024-01-28
Payer: MEDICAID

## 2024-01-28 LAB
ABO/RH(D): NORMAL
ALBUMIN SERPL BCG-MCNC: 2.8 G/DL (ref 3.5–5.2)
ALP SERPL-CCNC: 62 U/L (ref 40–150)
ALT SERPL W P-5'-P-CCNC: 28 U/L (ref 0–70)
ANION GAP SERPL CALCULATED.3IONS-SCNC: 6 MMOL/L (ref 7–15)
ANION GAP SERPL CALCULATED.3IONS-SCNC: 8 MMOL/L (ref 7–15)
ANTIBODY SCREEN: NEGATIVE
AST SERPL W P-5'-P-CCNC: 31 U/L (ref 0–45)
BILIRUB SERPL-MCNC: 0.5 MG/DL
BLD PROD TYP BPU: NORMAL
BLOOD COMPONENT TYPE: NORMAL
BUN SERPL-MCNC: 17.2 MG/DL (ref 6–20)
BUN SERPL-MCNC: 20.1 MG/DL (ref 6–20)
CA-I BLD-MCNC: 4.5 MG/DL (ref 4.4–5.2)
CALCIUM SERPL-MCNC: 8.1 MG/DL (ref 8.6–10)
CALCIUM SERPL-MCNC: 8.3 MG/DL (ref 8.6–10)
CHLORIDE SERPL-SCNC: 97 MMOL/L (ref 98–107)
CHLORIDE SERPL-SCNC: 97 MMOL/L (ref 98–107)
CODING SYSTEM: NORMAL
CREAT SERPL-MCNC: 0.96 MG/DL (ref 0.67–1.17)
CREAT SERPL-MCNC: 1.01 MG/DL (ref 0.67–1.17)
CROSSMATCH: NORMAL
DEPRECATED HCO3 PLAS-SCNC: 29 MMOL/L (ref 22–29)
DEPRECATED HCO3 PLAS-SCNC: 31 MMOL/L (ref 22–29)
EGFRCR SERPLBLD CKD-EPI 2021: >90 ML/MIN/1.73M2
EGFRCR SERPLBLD CKD-EPI 2021: >90 ML/MIN/1.73M2
ERYTHROCYTE [DISTWIDTH] IN BLOOD BY AUTOMATED COUNT: 16.4 % (ref 10–15)
ERYTHROCYTE [DISTWIDTH] IN BLOOD BY AUTOMATED COUNT: 18.9 % (ref 10–15)
GLUCOSE BLDC GLUCOMTR-MCNC: 109 MG/DL (ref 70–99)
GLUCOSE BLDC GLUCOMTR-MCNC: 115 MG/DL (ref 70–99)
GLUCOSE BLDC GLUCOMTR-MCNC: 143 MG/DL (ref 70–99)
GLUCOSE BLDC GLUCOMTR-MCNC: 161 MG/DL (ref 70–99)
GLUCOSE BLDC GLUCOMTR-MCNC: 97 MG/DL (ref 70–99)
GLUCOSE SERPL-MCNC: 145 MG/DL (ref 70–99)
GLUCOSE SERPL-MCNC: 97 MG/DL (ref 70–99)
HCT VFR BLD AUTO: 21.9 % (ref 40–53)
HCT VFR BLD AUTO: 24.6 % (ref 40–53)
HGB BLD-MCNC: 7.1 G/DL (ref 13.3–17.7)
HGB BLD-MCNC: 8.1 G/DL (ref 13.3–17.7)
INR PPP: 1.17 (ref 0.85–1.15)
ISSUE DATE AND TIME: NORMAL
LACTATE SERPL-SCNC: 0.7 MMOL/L (ref 0.7–2)
MAGNESIUM SERPL-MCNC: 2 MG/DL (ref 1.7–2.3)
MCH RBC QN AUTO: 20.1 PG (ref 26.5–33)
MCH RBC QN AUTO: 21.2 PG (ref 26.5–33)
MCHC RBC AUTO-ENTMCNC: 32.4 G/DL (ref 31.5–36.5)
MCHC RBC AUTO-ENTMCNC: 32.9 G/DL (ref 31.5–36.5)
MCV RBC AUTO: 62 FL (ref 78–100)
MCV RBC AUTO: 64 FL (ref 78–100)
PHOSPHATE SERPL-MCNC: 2 MG/DL (ref 2.5–4.5)
PLATELET # BLD AUTO: 170 10E3/UL (ref 150–450)
PLATELET # BLD AUTO: 175 10E3/UL (ref 150–450)
POTASSIUM SERPL-SCNC: 3.7 MMOL/L (ref 3.4–5.3)
POTASSIUM SERPL-SCNC: 4.1 MMOL/L (ref 3.4–5.3)
POTASSIUM SERPL-SCNC: 4.5 MMOL/L (ref 3.4–5.3)
PROT SERPL-MCNC: 5.4 G/DL (ref 6.4–8.3)
RBC # BLD AUTO: 3.53 10E6/UL (ref 4.4–5.9)
RBC # BLD AUTO: 3.82 10E6/UL (ref 4.4–5.9)
SODIUM SERPL-SCNC: 134 MMOL/L (ref 135–145)
SODIUM SERPL-SCNC: 134 MMOL/L (ref 135–145)
SPECIMEN EXPIRATION DATE: NORMAL
UNIT ABO/RH: NORMAL
UNIT NUMBER: NORMAL
UNIT STATUS: NORMAL
UNIT TYPE ISBT: 5100
WBC # BLD AUTO: 12.5 10E3/UL (ref 4–11)
WBC # BLD AUTO: 12.7 10E3/UL (ref 4–11)

## 2024-01-28 PROCEDURE — 97535 SELF CARE MNGMENT TRAINING: CPT | Mod: GO

## 2024-01-28 PROCEDURE — 82330 ASSAY OF CALCIUM: CPT | Performed by: NURSE PRACTITIONER

## 2024-01-28 PROCEDURE — 99233 SBSQ HOSP IP/OBS HIGH 50: CPT | Mod: 24 | Performed by: INTERNAL MEDICINE

## 2024-01-28 PROCEDURE — 80053 COMPREHEN METABOLIC PANEL: CPT

## 2024-01-28 PROCEDURE — 71045 X-RAY EXAM CHEST 1 VIEW: CPT

## 2024-01-28 PROCEDURE — 97110 THERAPEUTIC EXERCISES: CPT | Mod: GO

## 2024-01-28 PROCEDURE — 84100 ASSAY OF PHOSPHORUS: CPT | Performed by: SURGERY

## 2024-01-28 PROCEDURE — 250N000013 HC RX MED GY IP 250 OP 250 PS 637: Performed by: SURGERY

## 2024-01-28 PROCEDURE — 71045 X-RAY EXAM CHEST 1 VIEW: CPT | Mod: 77

## 2024-01-28 PROCEDURE — 250N000011 HC RX IP 250 OP 636: Performed by: SURGERY

## 2024-01-28 PROCEDURE — 84132 ASSAY OF SERUM POTASSIUM: CPT | Performed by: SURGERY

## 2024-01-28 PROCEDURE — 84132 ASSAY OF SERUM POTASSIUM: CPT

## 2024-01-28 PROCEDURE — 250N000013 HC RX MED GY IP 250 OP 250 PS 637: Performed by: STUDENT IN AN ORGANIZED HEALTH CARE EDUCATION/TRAINING PROGRAM

## 2024-01-28 PROCEDURE — 99232 SBSQ HOSP IP/OBS MODERATE 35: CPT | Mod: 24

## 2024-01-28 PROCEDURE — 71045 X-RAY EXAM CHEST 1 VIEW: CPT | Mod: 26 | Performed by: RADIOLOGY

## 2024-01-28 PROCEDURE — 250N000013 HC RX MED GY IP 250 OP 250 PS 637

## 2024-01-28 PROCEDURE — P9016 RBC LEUKOCYTES REDUCED: HCPCS

## 2024-01-28 PROCEDURE — 85027 COMPLETE CBC AUTOMATED: CPT

## 2024-01-28 PROCEDURE — 83735 ASSAY OF MAGNESIUM: CPT | Performed by: NURSE PRACTITIONER

## 2024-01-28 PROCEDURE — 85610 PROTHROMBIN TIME: CPT | Performed by: SURGERY

## 2024-01-28 PROCEDURE — 86923 COMPATIBILITY TEST ELECTRIC: CPT

## 2024-01-28 PROCEDURE — 200N000002 HC R&B ICU UMMC

## 2024-01-28 PROCEDURE — 97530 THERAPEUTIC ACTIVITIES: CPT | Mod: GO

## 2024-01-28 PROCEDURE — 83605 ASSAY OF LACTIC ACID: CPT

## 2024-01-28 PROCEDURE — 85027 COMPLETE CBC AUTOMATED: CPT | Performed by: SURGERY

## 2024-01-28 PROCEDURE — 86900 BLOOD TYPING SEROLOGIC ABO: CPT | Performed by: STUDENT IN AN ORGANIZED HEALTH CARE EDUCATION/TRAINING PROGRAM

## 2024-01-28 PROCEDURE — 250N000011 HC RX IP 250 OP 636

## 2024-01-28 RX ORDER — POTASSIUM CHLORIDE 750 MG/1
20 TABLET, EXTENDED RELEASE ORAL ONCE
Status: COMPLETED | OUTPATIENT
Start: 2024-01-28 | End: 2024-01-28

## 2024-01-28 RX ORDER — POLYETHYLENE GLYCOL 3350 17 G/17G
17 POWDER, FOR SOLUTION ORAL DAILY
Status: DISCONTINUED | OUTPATIENT
Start: 2024-01-29 | End: 2024-02-04 | Stop reason: HOSPADM

## 2024-01-28 RX ORDER — POTASSIUM CHLORIDE 750 MG/1
20 TABLET, EXTENDED RELEASE ORAL ONCE
Qty: 2 TABLET | Refills: 0 | Status: COMPLETED | OUTPATIENT
Start: 2024-01-28 | End: 2024-01-28

## 2024-01-28 RX ORDER — MAGNESIUM SULFATE HEPTAHYDRATE 40 MG/ML
2 INJECTION, SOLUTION INTRAVENOUS ONCE
Status: COMPLETED | OUTPATIENT
Start: 2024-01-28 | End: 2024-01-28

## 2024-01-28 RX ORDER — AMOXICILLIN 250 MG
1 CAPSULE ORAL 2 TIMES DAILY
Status: DISCONTINUED | OUTPATIENT
Start: 2024-01-28 | End: 2024-02-02

## 2024-01-28 RX ADMIN — HEPARIN SODIUM 5000 UNITS: 5000 INJECTION, SOLUTION INTRAVENOUS; SUBCUTANEOUS at 21:18

## 2024-01-28 RX ADMIN — OXYCODONE HYDROCHLORIDE 10 MG: 10 TABLET ORAL at 21:17

## 2024-01-28 RX ADMIN — GABAPENTIN 100 MG: 100 CAPSULE ORAL at 14:04

## 2024-01-28 RX ADMIN — GABAPENTIN 100 MG: 100 CAPSULE ORAL at 07:27

## 2024-01-28 RX ADMIN — ACETAMINOPHEN 975 MG: 325 TABLET, FILM COATED ORAL at 15:48

## 2024-01-28 RX ADMIN — POTASSIUM & SODIUM PHOSPHATES POWDER PACK 280-160-250 MG 1 PACKET: 280-160-250 PACK at 11:46

## 2024-01-28 RX ADMIN — HYDRALAZINE HYDROCHLORIDE 10 MG: 20 INJECTION INTRAMUSCULAR; INTRAVENOUS at 08:05

## 2024-01-28 RX ADMIN — PANTOPRAZOLE SODIUM 40 MG: 40 TABLET, DELAYED RELEASE ORAL at 07:27

## 2024-01-28 RX ADMIN — POTASSIUM & SODIUM PHOSPHATES POWDER PACK 280-160-250 MG 1 PACKET: 280-160-250 PACK at 04:35

## 2024-01-28 RX ADMIN — GABAPENTIN 100 MG: 100 CAPSULE ORAL at 19:43

## 2024-01-28 RX ADMIN — DOCUSATE SODIUM 50 MG AND SENNOSIDES 8.6 MG 2 TABLET: 8.6; 5 TABLET, FILM COATED ORAL at 07:27

## 2024-01-28 RX ADMIN — MAGNESIUM SULFATE HEPTAHYDRATE 2 G: 40 INJECTION, SOLUTION INTRAVENOUS at 04:35

## 2024-01-28 RX ADMIN — ASPIRIN 81 MG CHEWABLE TABLET 81 MG: 81 TABLET CHEWABLE at 07:27

## 2024-01-28 RX ADMIN — CARVEDILOL 12.5 MG: 3.12 TABLET, FILM COATED ORAL at 19:43

## 2024-01-28 RX ADMIN — OXYCODONE HYDROCHLORIDE 5 MG: 5 TABLET ORAL at 11:46

## 2024-01-28 RX ADMIN — ACETAMINOPHEN 975 MG: 325 TABLET, FILM COATED ORAL at 03:56

## 2024-01-28 RX ADMIN — OXYCODONE HYDROCHLORIDE 10 MG: 10 TABLET ORAL at 07:27

## 2024-01-28 RX ADMIN — HEPARIN SODIUM 5000 UNITS: 5000 INJECTION, SOLUTION INTRAVENOUS; SUBCUTANEOUS at 14:04

## 2024-01-28 RX ADMIN — HYDRALAZINE HYDROCHLORIDE 10 MG: 20 INJECTION INTRAMUSCULAR; INTRAVENOUS at 17:05

## 2024-01-28 RX ADMIN — ROSUVASTATIN CALCIUM 20 MG: 20 TABLET, FILM COATED ORAL at 07:27

## 2024-01-28 RX ADMIN — OXYCODONE HYDROCHLORIDE 10 MG: 10 TABLET ORAL at 00:14

## 2024-01-28 RX ADMIN — POTASSIUM CHLORIDE 20 MEQ: 750 TABLET, EXTENDED RELEASE ORAL at 04:35

## 2024-01-28 RX ADMIN — OXYCODONE HYDROCHLORIDE 10 MG: 10 TABLET ORAL at 03:53

## 2024-01-28 RX ADMIN — CARVEDILOL 12.5 MG: 3.12 TABLET, FILM COATED ORAL at 07:27

## 2024-01-28 RX ADMIN — ACETAMINOPHEN 975 MG: 325 TABLET, FILM COATED ORAL at 11:46

## 2024-01-28 RX ADMIN — POTASSIUM CHLORIDE 20 MEQ: 750 TABLET, EXTENDED RELEASE ORAL at 03:30

## 2024-01-28 RX ADMIN — ONDANSETRON 4 MG: 4 TABLET, ORALLY DISINTEGRATING ORAL at 08:05

## 2024-01-28 RX ADMIN — HEPARIN SODIUM 5000 UNITS: 5000 INJECTION, SOLUTION INTRAVENOUS; SUBCUTANEOUS at 06:25

## 2024-01-28 RX ADMIN — ACETAMINOPHEN 975 MG: 325 TABLET, FILM COATED ORAL at 21:17

## 2024-01-28 RX ADMIN — METHOCARBAMOL 500 MG: 500 TABLET ORAL at 19:43

## 2024-01-28 RX ADMIN — POTASSIUM & SODIUM PHOSPHATES POWDER PACK 280-160-250 MG 1 PACKET: 280-160-250 PACK at 07:34

## 2024-01-28 RX ADMIN — HYDRALAZINE HYDROCHLORIDE 10 MG: 20 INJECTION INTRAMUSCULAR; INTRAVENOUS at 12:06

## 2024-01-28 RX ADMIN — OXYCODONE HYDROCHLORIDE 5 MG: 5 TABLET ORAL at 17:01

## 2024-01-28 RX ADMIN — HYDRALAZINE HYDROCHLORIDE 10 MG: 20 INJECTION INTRAMUSCULAR; INTRAVENOUS at 03:58

## 2024-01-28 ASSESSMENT — ACTIVITIES OF DAILY LIVING (ADL)
ADLS_ACUITY_SCORE: 27

## 2024-01-28 NOTE — PROGRESS NOTES
CV ICU PROGRESS NOTE  January 28, 2024      CO-MORBIDITIES:   Dissection of aorta, unspecified portion of aorta (H)  (primary encounter diagnosis)    ASSESSMENT: Valdo Lyons is a 48 year old male with PMH of uncontrolled HTN, childhood seizures. 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 Repeat CTA 1/17 with decision for surgical management, medically managed with strict BP goals in interim. Now status post ascending aortic aneurysm hemiarch repair and TEVAR with CVTS and Vascular Surgery on 1/23/24. Patient extubated 1/24 and progressing appropriately post operatively with no acute complications or concerns. Currently remains in ICU for fluid status management and MIKA.       TODAY'S PROGRESS:   -Decrease bowel regimen to Miralax once daily, Senna 1 tablet twice daily  -1 unit PRBC  -Initiate Melatonin 10 mg PRN at bedtime  -Repeat CXR to monitor pneumothorax  -Discontinue Bundy    PLAN:  Neuro/Pain/Sedation:  Vascular ok with q4h neuro checks to evaluate for spinal cord ischemia.    # Baseline cognitive dysfunction  Patient's mother is his caregiver at home due to baseline cognitive dysfunction. The patient's overall understanding of the situation is inconsistent. He also developed some agitation/behavioral issues (spitting and kicking) 1/21 requiring sedation. Psychiatry saw patient earlier in his stay, see note for recs. SW on board per mother's request for additional caregiver resources.  - Monitor neurological status. Notify the MD for any acute changes in exam.  - Delirium prevention      # Acute post-operative pain  - Scheduled: tylenol, gabapentin    - PRN: tylenol, oxycodone, dilaudid, robaxin  - Initiate Melatonin 10 mg at bedtime PRN 1/28     # Recent fall, atraumatic  # Hx Childhood seizure disorder  Seizure hx noted, not on any antiepileptics at time of admission. Presented to [facility] s/p fall, CT head reported as negative for intracranial abnormalities 1/12     # Hx Mariajuana use   Reports typically smokes mariajuana nightly. Denies tobacco, alcohol, or other illicit drugs     Pulmonary:   # Acute post-operative respiratory insufficiency, improving   # Left pleural effusion  # Right apical pneumothorax  CXR 1/27 am with small right apical pneumothorax, stable on 1/28 CXR, Cts in place to suction, saturating 90-99%, intermittently on room air to 2 LPM NC 1/28, repeat CXR 1/28 afternoon to monitor pneumothorax  - Titrate nasal cannula for SpO2 >92%  - Encourage IS q15-30 minutes when awake   - Continue to encourage pulmonary hygiene      Cardiovascular:   # Cardiogenic shock, resolved   # Type B dissection with c/f retrograde tear s/p hemiarch repair and TEVAR  # Hx of uncontrolled HTN   Pre-procedure: Patient was not taking any PTA medications for his high blood pressure. Bilateral arterial lines placed at [facility]. Ordered additional testing, as his BP medication requirements are exceeding expectations, rule out other/organic causes of elevated BP. TSH normal. Rads did not appreciate any renal mass other than a simple cyst - lower likelihood  pheochromocytoma. EF 55-60%.  - goal MAP 65-85, SBP<120  - ASA 81  - PRN hydralazine if SBP >120, parameters in place   - Statin: Rosuvastatin 20 mg daily  - Initiated Carvedilol 12.5 mg twice daily 1/27  - Vascular consulted, appreciate recommendations               -Will need repeat CTA Chest/Abdomen/Pelvis before discharge     GI:   # At risk for protein malnutrition   # Ileus, concern for, resolved  # Hypoalbuminemia  - Abd XR 1/26 afternoon with gaseous distention of bowel, patient with multiple stools 1/27  -  Tolerating regular diet , 2 gm NA  - PPI prophylaxis  - Bowel regimen: Decrease Miralax to once daily, Senna 2 tabs once daily on 1/28, suppository PRN      Renal/FEN:   # CKD vs MIKA, improving  # Hypervolemia  # Hyponatremia  # Hypophosphatemia  Unknown baseline creatinine, was 1.6 on admission to [facility], last value in 2022 was 1.25.  - Strict I/Os, daily weight  - Avoid nephrotoxic agents as able   - Replete electrolytes PRN per protocol   - Continue to monitor with CMP daily  - Lactate daily    - Daily mag and phos   - Nephrology consulted, appreciate recommendations  - Plan for auto-diuresis 1/28, weight remains 5.4 kg up from admission  - Creatinine improving, downtrended to 0.96 on 1/28 am     Endocrine:    # Stress hyperglycemia  Preop A1c 6.1% on 1/16/24  - Medium intensity sliding scale insulin  - Goal BG<180 for optimal healing      ID:  # Stress induced leukocytosis, improving  WBC 20.8 post-op, trending downward 1/28  - Completed perioperative antibiotic regimen of cefazolin and vancomycin  - Monitor fever curve, WBC, and inflammatory markers as appropriate     Heme:     # Acute blood loss anemia  # Acute blood loss thrombocytopenia, improving  # Beta thalassemia trait  - Continue to monitor with CBC daily  - Hgb goal > 7.0, received 1 unit PRBC 1/26 for Hgb 6.8, Hgb downtrending to 7.1 on 1/28 am, transfuse 1 unit PRBC, consent obtained from patient and mother  - Patient and mother report history of Beta thalassemia trait, noted on transfusion order  - Platelets uptrended to 170 on 1/28 am  - SQH 5000 units q 8 hours     MSK:  # Healing sternotomy  # Surgical Incision  - Sternal precautions   - Post-operative incision care per protocol   - PT/OT     Prophylaxis:    - SCDs  - SQH  - PPI prophylaxis  - Bowel Regimen     LDA:  - PICC 1/16 - respositioned 1/27  - Chest tubes x4  - Bundy (discontinue today 1/28)    Disposition:  - Transfer to floor when bed available.    Patient seen, findings and plan  "discussed with CV ICU staff, Dr. Plaza.    Total time spent in the care of this patient by me, excluding procedures, was 42 minutes.    Luana Miller PA-C    Clinically Significant Risk Factors              # Hypoalbuminemia: Lowest albumin = 2.8 g/dL at 1/28/2024  3:22 AM, will monitor as appropriate  # Coagulation Defect: INR = 1.17 (Ref range: 0.85 - 1.15) and/or PTT = 49 Seconds (Ref range: 22 - 38 Seconds), will monitor for bleeding    # Hypertension: Noted on problem list        # Obesity: Estimated body mass index is 31.31 kg/m  as calculated from the following:    Height as of this encounter: 1.727 m (5' 8\").    Weight as of this encounter: 93.4 kg (205 lb 14.6 oz).                  ====================================    SUBJECTIVE: Patient laying in bed, awake and alert. Endorses difficulty sleeping, which he states occurs at home, for which he normally utilizes marijuana. Endorsing positional back pain at 3/10, present since admission. Denies nausea or abdominal pain.    OBJECTIVE:   1. VITAL SIGNS:   Temp:  [98.6  F (37  C)-100  F (37.8  C)] 99.4  F (37.4  C)  Pulse:  [70-78] 72  Resp:  [10-21] 10  BP: ()/(60-80) 108/63  SpO2:  [90 %-98 %] 98 %  Resp: 10      2. INTAKE/ OUTPUT:   I/O last 3 completed shifts:  In: 1390 [P.O.:1370; I.V.:20]  Out: 3755 [Urine:3325; Chest Tube:430]    3. PHYSICAL EXAMINATION:   General: Patient laying in bed, awake and alert.   Neuro: Alert, responding appropriately to questions, moving all extremities. Lifts bilateral lower extremities and resists gravity.  Resp: Lungs clear to auscultation bilaterally, Cts in place to suction, R M and P reservoir with CT x 2 tidaling with thin serosanguinous output. L M and P reservoir with CT x 2 without airleak, thin serosanguinous output.  CV: Regular rate and rhythm.  Abdomen: Soft, Non-distended, Non-tender on palpation.  : Bundy with green urine in reservoir.  Incisions: Superficial ecchymosis of LUE, soft on " palpation, stable from yesterday. Midline sternotomy incision open to air, sealed with Dermabond, appears well approximated. Right upper chest incision open to air, appears well approximated, sealed with Dermabond.  Extremities: warm and well perfused, DP and radial pulses palpable bilaterally.    4. INVESTIGATIONS:   Arterial Blood Gases   Recent Labs   Lab 01/24/24  1341 01/24/24  0841 01/24/24  0358 01/23/24  2355   PH 7.33* 7.38 7.38 7.36   PCO2 43 40 40 40   PO2 81 109* 103 91   HCO3 23 24 23 23     Complete Blood Count   Recent Labs   Lab 01/28/24  0322 01/27/24  1250 01/27/24  0420 01/26/24  1302 01/26/24  0341   WBC 12.5*  --  13.6* 19.6* 12.9*   HGB 7.1* 7.6* 7.4* 8.1* 6.8*     --  133* 147* 105*     Basic Metabolic Panel  Recent Labs   Lab 01/28/24 0322 01/28/24  0019 01/27/24  2156 01/27/24 2004 01/27/24  1607 01/27/24  1603 01/27/24  0747 01/27/24  0420 01/26/24  1609 01/26/24  1302   *  --   --   --   --  136  --  135  --  132*   POTASSIUM 3.7  3.7 3.7  --  3.7  --  4.0  4.0   < > 3.4  3.4   < > 4.0   CHLORIDE 97*  --   --   --   --  99  --  97*  --  96*   CO2 31*  --   --   --   --  31*  --  29  --  25   BUN 20.1*  --   --   --   --  28.6*  --  36.1*  --  45.3*   CR 0.96  --   --   --   --  1.14  --  1.38*  --  1.87*   GLC 97  --  119*  --  117* 114*   < > 98   < > 100*    < > = values in this interval not displayed.     Liver Function Tests  Recent Labs   Lab 01/28/24  0322 01/27/24  0420 01/26/24  0341 01/25/24  0342   AST 31 39 53* 36   ALT 28 27 33 16   ALKPHOS 62 66 55 45   BILITOTAL 0.5 0.6 0.5 0.5   ALBUMIN 2.8* 3.0* 3.0* 3.0*   INR 1.17* 1.17* 1.23* 1.18*     Pancreatic Enzymes  No lab results found in last 7 days.  Coagulation Profile  Recent Labs   Lab 01/28/24  0322 01/27/24  0420 01/26/24  0341 01/25/24  0342 01/24/24  0358 01/23/24  2154 01/23/24  1703 01/23/24  1604   INR 1.17* 1.17* 1.23* 1.18*   < > 1.24* 1.24* 1.62*   PTT  --   --   --   --   --  49* 38 36    < > =  values in this interval not displayed.         5. RADIOLOGY:   Recent Results (from the past 24 hour(s))   XR Chest Port 1 View    Impression    RESIDENT PRELIMINARY INTERPRETATION  IMPRESSION:  Stable support devices. Stable tiny right apical pneumothorax. Mild  bibasilar atelectasis.       =========================================

## 2024-01-28 NOTE — PLAN OF CARE
[id number]    Major Shift Events:  Patient reported new onset blurry vision that lasted for < 5 minutes, per patient. No other acute events. Hydralazine given x1, all vital signs stable. Patient able to make needs known, is call light appropriate.     Plan: Plan to transfuse 1 unit RBCs for hemoglobin of 7.1 once consent obtained. Patient will transfer to the floor when bed available.    For vital signs and complete assessments, please see documentation flowsheets.

## 2024-01-28 NOTE — CONSULTS
OPHTHALMOLOGY CONSULT NOTE  01/28/24    Patient: Valdo Lyons      ASSESSMENT/PLAN:     #Retinal Lesion, OS   Valdo Lyons is a 48 year old male who presented with transient blurry vision of both eyes that have since resolved. Patient denies any signs concerning for amaurosis fugax. Exam notable for a cotton wool spot of the left eye. Possible etiologies of cotton wool spot include hypertension, anemia. Diabetes less likely. Given multiple indwelling catheters and recent surgeries, less likely but cannot rule out ocular candidiasis or viral retinitis.    Recommendations:   - Recommend blood culture x2   - Ophthalmology will repeat dilated exam 1/30 to examine for any progression indicative of infectious    It is our pleasure to participate in this patient's care and treatment. Please contact us with any further questions or concerns.    Discussed with retina fellow Dr. Danilo Chin, PGY-6 who agreed with this assessment and plan.     Ophthalmology will continue to follow while inpatient. Please contact ophthalmologist on call with any questions or concerns. We appreciate your care of this patient.    Thank you for entrusting us with your care  Helena Beyer, PGY2  Ophthalmology Resident  [facility]    HISTORY OF PRESENTING ILLNESS:     Valdo Lyons is a 48 year old male with PMH of uncontrolled HTN and childhood seizures who presented to [facility] [place] on 1/12 with acute aortic dissection. Transferred to [facility] and now s/p ascending aortic aneurysm hemiarch repair and TEVAR on 1/23. Ophthalmology was consulted due to < 5 minutes of blurry vision of both eyes. States that the blurry vision resolved after his blood transfusion. Denies any ongoing vision concerns.     Denies any flashes or floaters. Denies any eye pain. Denies any double vision. Denies any black out of vision.       10+ review of systems were otherwise negative except for that which has been stated above.      OCULAR/MEDICAL/SURGICAL  HISTORIES:     Past Ocular History:   Last eye exam: > 25 years ago    Previously diagnosed ocular conditions: None  Prior eye surgery/laser: None  Contact lens wear: Denies  Glasses: Denies  Eyedrops: Denies    Pertinent Systemic Medications:     Carvedilol, crestor     Past Medical History:  Past Medical History:   Diagnosis Date    Dissecting aneurysm of thoracic aorta, [place] type B (H)     HTN (hypertension)        Past Surgical History:   Past Surgical History:   Procedure Laterality Date    ENDOVASCULAR REPAIR ANEURYSM THORACIC AORTIC N/A 1/23/2024    Procedure: Antegrade Thoracic Endovascular Aortic Repair (TEVAR) with [place] Conformable Thoracic Stent Graft 37mm x 10cm. Intravascular Ultrasound (IVUS), Aortography, Right common femoral access;  Surgeon: Artur Singh MD;  Location: [facility] OR    IR OR ANGIOGRAM  1/23/2024    PICC DOUBLE LUMEN PLACEMENT Right 01/16/2024    5FR DL PICC, basilic vein. L-43cm, 1cm out.    REPAIR ANEURYSM ASCENDING AORTA N/A 1/23/2024    Procedure: MEDIAN STERNOTOMY, RIGHT AXILLARY CUTDOWN, ON CARDIOPULMONARY BYPASS (CIRCULATORY ARREST), ASCENDING AORTA ANEURYSM REPAIR ([place] 30mm), INTRAOPERATIVE TRANSESOPHAGEAL ECHOCARDIOGRAM BY ANESTHESIA;  Surgeon: Addi Shaw MD;  Location: [facility] OR       Family History:   No history of macular degeneration or glaucoma    Social History:   No tobacco use      EXAMINATION:     Base Eye Exam       Visual Acuity (Snellen - Linear)         Right Left    Dist sc 20/60 20/40 -2    Dist ph sc 20/30 20/40              Tonometry (Tonopen, 10:17 AM)         Right Left    Pressure 15 19              Pupils         Dark Light Shape React APD    Right 3 2 Round Brisk None    Left 3 2 Round Brisk None              Visual Fields         Left Right     Full Full              Extraocular Movement         Right Left     Full, Ortho, Nystagmus Full, Ortho, Nystagmus   End gaze nystagmus on right gaze, low-amplitude, resolves after 3-4 beats               Dilation       Both eyes: 2.5% Quinn Synephrine, 1.0% Mydriacyl @ 10:16 AM                  Slit Lamp and Fundus Exam       External Exam         Right Left    External Normal Normal              Slit Lamp Exam         Right Left    Lids/Lashes Normal Normal    Conjunctiva/Sclera White and quiet White and quiet    Cornea Clear Clear    Anterior Chamber Deep and quiet Deep and quiet    Iris Round and reactive -> Dilated Round and reactive -> Dilated    Lens Clear Clear    Anterior Vitreous Normal Normal              Fundus Exam         Right Left    Disc Normal Normal    C/D Ratio 0.2 0.2    Macula Normal Normal    Vessels Normal CWS by the superior arcade ~ 1 DD from the disc. 2-3 venule diameter in size    Periphery Normal Normal                    Labs/Studies/Imaging Performed  None     Helena Beyer MD  Resident Physician, PGY2  Department of Ophthalmology

## 2024-01-28 NOTE — PLAN OF CARE
Major Shift Events:  No acute events overnight. Pain well managed on oxycodone and robaxin. PRN Hydralazine administered x3. Electrolytes replaced per protocol.   Plan: Continue POC. Transfer pt when bed becomes available.   For vital signs and complete assessments, please see documentation flowsheets.

## 2024-01-28 NOTE — PROVIDER NOTIFICATION
Patient informed this RN that his vision became blurry within the past few minutes. He is able to track writer, is following commands, pupils are equal and reactive. Notified CVTS team.

## 2024-01-29 ENCOUNTER — APPOINTMENT (OUTPATIENT)
Dept: GENERAL RADIOLOGY | Facility: CLINIC | Age: 49
End: 2024-01-29
Attending: SURGERY
Payer: MEDICAID

## 2024-01-29 ENCOUNTER — APPOINTMENT (OUTPATIENT)
Dept: GENERAL RADIOLOGY | Facility: CLINIC | Age: 49
End: 2024-01-29
Payer: MEDICAID

## 2024-01-29 LAB
ALBUMIN SERPL BCG-MCNC: 2.9 G/DL (ref 3.5–5.2)
ALP SERPL-CCNC: 64 U/L (ref 40–150)
ALT SERPL W P-5'-P-CCNC: 31 U/L (ref 0–70)
ANION GAP SERPL CALCULATED.3IONS-SCNC: 7 MMOL/L (ref 7–15)
AST SERPL W P-5'-P-CCNC: 31 U/L (ref 0–45)
BILIRUB SERPL-MCNC: 0.6 MG/DL
BUN SERPL-MCNC: 15.3 MG/DL (ref 6–20)
CA-I BLD-MCNC: 4.4 MG/DL (ref 4.4–5.2)
CALCIUM SERPL-MCNC: 8.2 MG/DL (ref 8.6–10)
CHLORIDE SERPL-SCNC: 97 MMOL/L (ref 98–107)
CREAT SERPL-MCNC: 0.97 MG/DL (ref 0.67–1.17)
DEPRECATED HCO3 PLAS-SCNC: 30 MMOL/L (ref 22–29)
EGFRCR SERPLBLD CKD-EPI 2021: >90 ML/MIN/1.73M2
ERYTHROCYTE [DISTWIDTH] IN BLOOD BY AUTOMATED COUNT: 19.3 % (ref 10–15)
GLUCOSE BLDC GLUCOMTR-MCNC: 101 MG/DL (ref 70–99)
GLUCOSE BLDC GLUCOMTR-MCNC: 105 MG/DL (ref 70–99)
GLUCOSE BLDC GLUCOMTR-MCNC: 128 MG/DL (ref 70–99)
GLUCOSE BLDC GLUCOMTR-MCNC: 175 MG/DL (ref 70–99)
GLUCOSE SERPL-MCNC: 97 MG/DL (ref 70–99)
HCT VFR BLD AUTO: 24.4 % (ref 40–53)
HGB BLD-MCNC: 8 G/DL (ref 13.3–17.7)
INR PPP: 1.17 (ref 0.85–1.15)
LACTATE SERPL-SCNC: 0.9 MMOL/L (ref 0.7–2)
MAGNESIUM SERPL-MCNC: 2 MG/DL (ref 1.7–2.3)
MCH RBC QN AUTO: 21.4 PG (ref 26.5–33)
MCHC RBC AUTO-ENTMCNC: 32.8 G/DL (ref 31.5–36.5)
MCV RBC AUTO: 65 FL (ref 78–100)
PHOSPHATE SERPL-MCNC: 2.2 MG/DL (ref 2.5–4.5)
PLATELET # BLD AUTO: 207 10E3/UL (ref 150–450)
POTASSIUM SERPL-SCNC: 4.2 MMOL/L (ref 3.4–5.3)
PROT SERPL-MCNC: 5.5 G/DL (ref 6.4–8.3)
RBC # BLD AUTO: 3.74 10E6/UL (ref 4.4–5.9)
SODIUM SERPL-SCNC: 134 MMOL/L (ref 135–145)
WBC # BLD AUTO: 13.8 10E3/UL (ref 4–11)

## 2024-01-29 PROCEDURE — 84100 ASSAY OF PHOSPHORUS: CPT | Performed by: SURGERY

## 2024-01-29 PROCEDURE — 71045 X-RAY EXAM CHEST 1 VIEW: CPT

## 2024-01-29 PROCEDURE — 250N000013 HC RX MED GY IP 250 OP 250 PS 637: Performed by: SURGERY

## 2024-01-29 PROCEDURE — 71045 X-RAY EXAM CHEST 1 VIEW: CPT | Mod: 77

## 2024-01-29 PROCEDURE — 84132 ASSAY OF SERUM POTASSIUM: CPT

## 2024-01-29 PROCEDURE — 250N000013 HC RX MED GY IP 250 OP 250 PS 637: Performed by: STUDENT IN AN ORGANIZED HEALTH CARE EDUCATION/TRAINING PROGRAM

## 2024-01-29 PROCEDURE — 85610 PROTHROMBIN TIME: CPT | Performed by: SURGERY

## 2024-01-29 PROCEDURE — 250N000011 HC RX IP 250 OP 636: Performed by: SURGERY

## 2024-01-29 PROCEDURE — 250N000013 HC RX MED GY IP 250 OP 250 PS 637

## 2024-01-29 PROCEDURE — 120N000003 HC R&B IMCU UMMC

## 2024-01-29 PROCEDURE — 71045 X-RAY EXAM CHEST 1 VIEW: CPT | Mod: 26 | Performed by: RADIOLOGY

## 2024-01-29 PROCEDURE — 250N000011 HC RX IP 250 OP 636

## 2024-01-29 PROCEDURE — 83605 ASSAY OF LACTIC ACID: CPT

## 2024-01-29 PROCEDURE — 84132 ASSAY OF SERUM POTASSIUM: CPT | Performed by: SURGERY

## 2024-01-29 PROCEDURE — 85027 COMPLETE CBC AUTOMATED: CPT

## 2024-01-29 PROCEDURE — 83735 ASSAY OF MAGNESIUM: CPT | Performed by: NURSE PRACTITIONER

## 2024-01-29 PROCEDURE — 82330 ASSAY OF CALCIUM: CPT | Performed by: NURSE PRACTITIONER

## 2024-01-29 PROCEDURE — 86140 C-REACTIVE PROTEIN: CPT | Performed by: INTERNAL MEDICINE

## 2024-01-29 PROCEDURE — 71045 X-RAY EXAM CHEST 1 VIEW: CPT | Mod: 26 | Performed by: STUDENT IN AN ORGANIZED HEALTH CARE EDUCATION/TRAINING PROGRAM

## 2024-01-29 PROCEDURE — 80053 COMPREHEN METABOLIC PANEL: CPT

## 2024-01-29 RX ORDER — MAGNESIUM OXIDE 400 MG/1
400 TABLET ORAL EVERY 4 HOURS
Status: COMPLETED | OUTPATIENT
Start: 2024-01-29 | End: 2024-01-29

## 2024-01-29 RX ORDER — LIDOCAINE 40 MG/G
CREAM TOPICAL
Status: DISCONTINUED | OUTPATIENT
Start: 2024-01-29 | End: 2024-02-04 | Stop reason: HOSPADM

## 2024-01-29 RX ADMIN — PANTOPRAZOLE SODIUM 40 MG: 40 TABLET, DELAYED RELEASE ORAL at 07:54

## 2024-01-29 RX ADMIN — GABAPENTIN 100 MG: 100 CAPSULE ORAL at 07:54

## 2024-01-29 RX ADMIN — HYDRALAZINE HYDROCHLORIDE 10 MG: 20 INJECTION INTRAMUSCULAR; INTRAVENOUS at 03:01

## 2024-01-29 RX ADMIN — ACETAMINOPHEN 975 MG: 325 TABLET, FILM COATED ORAL at 21:51

## 2024-01-29 RX ADMIN — OXYCODONE HYDROCHLORIDE 10 MG: 10 TABLET ORAL at 20:31

## 2024-01-29 RX ADMIN — GABAPENTIN 100 MG: 100 CAPSULE ORAL at 13:40

## 2024-01-29 RX ADMIN — METHOCARBAMOL 500 MG: 500 TABLET ORAL at 12:51

## 2024-01-29 RX ADMIN — OXYCODONE HYDROCHLORIDE 10 MG: 10 TABLET ORAL at 09:23

## 2024-01-29 RX ADMIN — POTASSIUM & SODIUM PHOSPHATES POWDER PACK 280-160-250 MG 1 PACKET: 280-160-250 PACK at 05:31

## 2024-01-29 RX ADMIN — METHOCARBAMOL 500 MG: 500 TABLET ORAL at 03:01

## 2024-01-29 RX ADMIN — HYDRALAZINE HYDROCHLORIDE 10 MG: 20 INJECTION INTRAMUSCULAR; INTRAVENOUS at 20:31

## 2024-01-29 RX ADMIN — ASPIRIN 81 MG CHEWABLE TABLET 81 MG: 81 TABLET CHEWABLE at 07:54

## 2024-01-29 RX ADMIN — ACETAMINOPHEN 975 MG: 325 TABLET, FILM COATED ORAL at 15:59

## 2024-01-29 RX ADMIN — MAGNESIUM OXIDE TAB 400 MG (241.3 MG ELEMENTAL MG) 400 MG: 400 (241.3 MG) TAB at 09:23

## 2024-01-29 RX ADMIN — ACETAMINOPHEN 975 MG: 325 TABLET, FILM COATED ORAL at 09:23

## 2024-01-29 RX ADMIN — POTASSIUM & SODIUM PHOSPHATES POWDER PACK 280-160-250 MG 1 PACKET: 280-160-250 PACK at 09:23

## 2024-01-29 RX ADMIN — CARVEDILOL 12.5 MG: 3.12 TABLET, FILM COATED ORAL at 07:55

## 2024-01-29 RX ADMIN — OXYCODONE HYDROCHLORIDE 10 MG: 10 TABLET ORAL at 13:40

## 2024-01-29 RX ADMIN — CALCIUM GLUCONATE 1 G: 20 INJECTION, SOLUTION INTRAVENOUS at 10:32

## 2024-01-29 RX ADMIN — HYDRALAZINE HYDROCHLORIDE 10 MG: 20 INJECTION INTRAMUSCULAR; INTRAVENOUS at 17:40

## 2024-01-29 RX ADMIN — HYDRALAZINE HYDROCHLORIDE 10 MG: 20 INJECTION INTRAMUSCULAR; INTRAVENOUS at 11:00

## 2024-01-29 RX ADMIN — GABAPENTIN 100 MG: 100 CAPSULE ORAL at 19:59

## 2024-01-29 RX ADMIN — POTASSIUM & SODIUM PHOSPHATES POWDER PACK 280-160-250 MG 1 PACKET: 280-160-250 PACK at 13:40

## 2024-01-29 RX ADMIN — HEPARIN SODIUM 5000 UNITS: 5000 INJECTION, SOLUTION INTRAVENOUS; SUBCUTANEOUS at 21:52

## 2024-01-29 RX ADMIN — CARVEDILOL 12.5 MG: 3.12 TABLET, FILM COATED ORAL at 19:59

## 2024-01-29 RX ADMIN — DOCUSATE SODIUM 50 MG AND SENNOSIDES 8.6 MG 1 TABLET: 8.6; 5 TABLET, FILM COATED ORAL at 19:59

## 2024-01-29 RX ADMIN — HEPARIN SODIUM 5000 UNITS: 5000 INJECTION, SOLUTION INTRAVENOUS; SUBCUTANEOUS at 13:40

## 2024-01-29 RX ADMIN — MAGNESIUM OXIDE TAB 400 MG (241.3 MG ELEMENTAL MG) 400 MG: 400 (241.3 MG) TAB at 05:31

## 2024-01-29 RX ADMIN — HYDROMORPHONE HYDROCHLORIDE 0.4 MG: 0.2 INJECTION, SOLUTION INTRAMUSCULAR; INTRAVENOUS; SUBCUTANEOUS at 23:58

## 2024-01-29 RX ADMIN — ACETAMINOPHEN 975 MG: 325 TABLET, FILM COATED ORAL at 03:01

## 2024-01-29 RX ADMIN — HYDROMORPHONE HYDROCHLORIDE 0.2 MG: 0.2 INJECTION, SOLUTION INTRAMUSCULAR; INTRAVENOUS; SUBCUTANEOUS at 03:02

## 2024-01-29 RX ADMIN — OXYCODONE HYDROCHLORIDE 10 MG: 10 TABLET ORAL at 01:26

## 2024-01-29 RX ADMIN — ROSUVASTATIN CALCIUM 20 MG: 20 TABLET, FILM COATED ORAL at 07:54

## 2024-01-29 RX ADMIN — HYDROMORPHONE HYDROCHLORIDE 0.4 MG: 0.2 INJECTION, SOLUTION INTRAMUSCULAR; INTRAVENOUS; SUBCUTANEOUS at 16:32

## 2024-01-29 RX ADMIN — OXYCODONE HYDROCHLORIDE 10 MG: 10 TABLET ORAL at 05:32

## 2024-01-29 RX ADMIN — METHOCARBAMOL 500 MG: 500 TABLET ORAL at 20:07

## 2024-01-29 RX ADMIN — HEPARIN SODIUM 5000 UNITS: 5000 INJECTION, SOLUTION INTRAVENOUS; SUBCUTANEOUS at 05:32

## 2024-01-29 ASSESSMENT — ACTIVITIES OF DAILY LIVING (ADL)
ADLS_ACUITY_SCORE: 24
ADLS_ACUITY_SCORE: 27
ADLS_ACUITY_SCORE: 24
ADLS_ACUITY_SCORE: 27
ADLS_ACUITY_SCORE: 23
ADLS_ACUITY_SCORE: 24
ADLS_ACUITY_SCORE: 27
ADLS_ACUITY_SCORE: 27

## 2024-01-29 NOTE — PLAN OF CARE
Major Shift Events:  No acute changes this shift. Back pain poorly managed despite PRN robaxin, oxycodone, tylenol, dilaudid and ice packs. Pt feels he was unable to get sufficient sleep d/t pain. Mag and phos replacements started.  Plan: pain management, increase ambulation, transfer to floor pending bed availability.   For vital signs and complete assessments, please see documentation flowsheets.

## 2024-01-29 NOTE — PLAN OF CARE
Received patient from CVICU via wheelchair accompanied by patient transporter at 11 am today; status post ascending aortic aneurysm hemiarch repair and TEVAR with CVTS and Vascular Surgery on 1/23/24    Neuro: A&Ox4, able to make needs known and calls appropriately  Cardiac: Afebrile, VSS. SR (70's)  w/o c/o chest pain/palpitations         Respiratory: sating >90% at room air  GI/: Voiding spontaneously. w/ large BM x1 this shift  Diet/appetite: Tolerating 2 gm Na diet w/ good appetite Denies nausea.  Activity: Up ad maninder w/ stand-by assistance   Pain:  w/ intermittent episodes of moderate to severe lower back and midsternal incision pain relieved by PRN current pain regimen (PRN Oxy, Robaxin and Dilaudid)   Skin: No new deficits noted. Midsternal and R upper chest post-op sites WDL open to air, approximated  Lines: L PIV; saline locked; patent and intact  Drains: w/ 1 pleural CT w/ serosanguineous output, no air leak noted this shift connected to suction at -20 mmHg, may place to water seal for ambulation  Plan: Continue POC and notify the provider of any changes

## 2024-01-29 NOTE — PROGRESS NOTES
"VASCULAR SURGERY PROGRESS NOTE    Subjective:  MORGAN. POD #6 from hemiarch repair and TEVAR on 1/23/24. Feeling well but states that he is having back pain this morning. Up to chair. Planning to walk several times today.    Objective:  Intake/Output Summary (Last 24 hours) at 1/25/2024 0846  Last data filed at 1/25/2024 0800  Gross per 24 hour   Intake 5300.97 ml   Output 1709 ml   Net 3591.97 ml     Labs:  ROUTINE IP LABS (Last four results)  BMP  Recent Labs   Lab 01/29/24  0309 01/29/24  0006 01/28/24  2120 01/28/24 2006 01/28/24  1816 01/28/24  1810 01/28/24  0736 01/28/24  0322 01/27/24  1607 01/27/24  1603   *  --   --   --  134*  --   --  134*  --  136   POTASSIUM 4.2 4.2  --  4.1 4.1  4.1  --    < > 3.7  3.7   < > 4.0  4.0   CHLORIDE 97*  --   --   --  97*  --   --  97*  --  99   YARON 8.2*  --   --   --  8.1*  --   --  8.3*  --  8.3*   CO2 30*  --   --   --  29  --   --  31*  --  31*   BUN 15.3  --   --   --  17.2  --   --  20.1*  --  28.6*   CR 0.97  --   --   --  1.01  --   --  0.96  --  1.14   GLC 97  --  115*  --  145* 161*   < > 97   < > 114*    < > = values in this interval not displayed.     CBC  Recent Labs   Lab 01/29/24  0309 01/28/24 2006 01/28/24  0322 01/27/24  1250 01/27/24  0420   WBC 13.8* 12.7* 12.5*  --  13.6*   RBC 3.74* 3.82* 3.53*  --  3.61*   HGB 8.0* 8.1* 7.1* 7.6* 7.4*   HCT 24.4* 24.6* 21.9*  --  22.1*   MCV 65* 64* 62*  --  61*   MCH 21.4* 21.2* 20.1*  --  20.5*   MCHC 32.8 32.9 32.4  --  33.5   RDW 19.3* 18.9* 16.4*  --  16.5*    175 170  --  133*     INR  Recent Labs   Lab 01/29/24  0309 01/28/24  0322 01/27/24  0420 01/26/24  0341   INR 1.17* 1.17* 1.17* 1.23*     PHYSICAL EXAM:  /68 (BP Location: Left arm)   Pulse 72   Temp 98.8  F (37.1  C) (Oral)   Resp 14   Ht 1.727 m (5' 8\")   Wt 94.2 kg (207 lb 10.8 oz)   SpO2 94%   BMI 31.58 kg/m    General: The patient is awake and alert, NAD  Psych: Pleasant affect, answers questions appropriately  Skin: " Color appropriate for race, warm, dry.  Neuro: Sensorimotor intact in BUE and BLE, CN II-XII grossly intact  Respiratory: Breathing unlabored on RA  GI:  Abdomen soft, non-distended.  Extremities: Bilateral femoral and DP pulses palpable, mild edema noted on ankles.      DATA:   XR Chest Port 1 View   Preliminary Result   RESIDENT PRELIMINARY INTERPRETATION   IMPRESSION:    1. No focal airspace opacity.   2. Stable trace right apical pneumothorax with chest tube in place.   3. Supportive devices in stable positioning.      XR Chest Port 1 View   Final Result   Impression:    1. Stable trace right apical pneumothorax.    2. Stable position of support devices.    3. Unchanged streaky bibasilar opacities.      I have personally reviewed the examination and initial interpretation   and I agree with the findings.      EDILMA VALENZUELA DO            SYSTEM ID:  [id number]      XR Chest Port 1 View   Final Result   IMPRESSION:   Stable support devices. Stable tiny right apical pneumothorax. Mild   bibasilar atelectasis.      I have personally reviewed the examination and initial interpretation   and I agree with the findings.      ILEANA YOO MD            SYSTEM ID:  [id number]      XR Chest Port 1 View   Final Result   IMPRESSION:      Tiny right apical pneumothorax with right basilar chest tube in place.   Improved aeration and decreased atelectasis throughout.       Finding of right apical pneumothorax discussed with ICU provider by   Grace at 8:36 AM 1/27/2024      I have personally reviewed the examination and initial interpretation   and I agree with the findings.      DEANNE FOWLER MD            SYSTEM ID:  [id number]      XR Abdomen Port 1 View   Final Result   IMPRESSION: Unchanged gaseous distention of bowel compared to   1/23/2024 suggesting ongoing ileus.      I have personally reviewed the examination and initial interpretation   and I agree with the findings.      EDILMA VALENZUELA DO            SYSTEM ID:   [id number]      XR Chest Port 1 View   Final Result   IMPRESSION:   Increased right basilar opacities, likely atelectasis. Stable small   left pleural effusion with retrocardiac atelectasis.      I have personally reviewed the examination and initial interpretation   and I agree with the findings.      TIFFANIE CAROLINA MD            SYSTEM ID:  [id number]      XR Chest Port 1 View   Final Result   IMPRESSION:   Small left pleural effusion with slightly improved left retrocardiac   atelectasis. New right-sided opacities, likely atelectasis.      I have personally reviewed the examination and initial interpretation   and I agree with the findings.      GHISLAINE BOWEN MD            SYSTEM ID:  [id number]      XR Chest Port 1 View   Final Result   IMPRESSION: New small left pleural effusion with likely associated   retrocardiac atelectasis. Recommend follow-up to clearing to exclude   superimposed infectious consolidation. Interval extubation. Continued   appearance of descending thoracic aortic stent graft and other chest   drains.      ADONAY HAUSER MD            SYSTEM ID:  [id number]      XR Abdomen Port 1 View   Final Result   IMPRESSION:    1. Gastric tube tip and sidehole project over the stomach.   2. Postsurgical changes of the chest with intact median sternotomy   wires.   3. Gaseous distention of the colon in the upper abdomen, may represent   postoperative ileus.      I have personally reviewed the examination and initial interpretation   and I agree with the findings.      EDILMA VALENZUELA DO            SYSTEM ID:  [id number]      XR Chest Port 1 View   Final Result   Impression:    1. Support devices as detailed above.   2. Streaky perihilar opacities, likely postoperative atelectasis in   the setting of low lung volumes.      EDILMA VALENZUELA DO            SYSTEM ID:  [id number]      XR Surgery HINA Fluoro Less Than 5 Min   Final Result      IR OR Angiogram   Final Result      US Renal Complete w Arterial  Duplex   Final Result   Impression:   1. Normal grayscale and color Doppler evaluation of the kidneys.   2. Large gradient of the peak velocities between the suprarenal and   infrarenal abdominal aorta which is likely related to the aortic   dissection flap in the upper abdominal aorta.  After comparing with   the CT from 1/17/2024, it seems as though the scan was likely of the   true lumen in the upper abdomen.  This finding is of unknown clinical   significance.    3. Right pleural effusion.      I have personally reviewed the examination and initial interpretation   and I agree with the findings.      VANDANA HERRERA MD            SYSTEM ID:  [id number]      CTA CHEST ABDOMEN PELVIS WITH CONTRAST PANEL   Final Result   IMPRESSION:   1. Aortic dissection extends from the left subclavian origin to the   supraceliac aorta. Dissection extends through the visualized left   subclavian artery. Left vertebral artery is thought to originate from   the true lumen. Left internal thoracic artery originates from the true   lumen.      2. Non specific fluid along the ascending aorta to arch.      3. 7 mm right upper lobe nodule. Per Fleischner Society   Recommendations, if the patient is at low risk of lung cancer, further   evaluation with CT in 6-12 months is suggested with optional CT in   18-24 months. If the patient is at high risk for lung cancer, further   evaluation with CT in 6-12 months and CT at 18-24 months is suggested.      LIZBETH BEACH MD            SYSTEM ID:  [id number]      XR Chest Port 1 View   Final Result   IMPRESSION:   1.  Right upper extremity PICC tip projects over the superior cava   junction.   2.  Stable cardiomediastinal silhouette.   3.  Streaky bibasilar opacities, right more than left may represent   atelectasis versus pulmonary edema/atypical infection.      I have personally reviewed the examination and initial interpretation   and I agree with the findings.      DEANNE FOWLER MD             SYSTEM ID:  [id number]      XR Chest Port 1 View   Final Result   Impression:    1. Mild widening of the mediastinum compared to prior x-ray 6/20/2022,   consistent with patient's history of known type B aortic dissection   seen on outside imaging (imaging unavailable).   2. No focal consolidations. There is bibasilar atelectasis.   3. No significant pleural effusion, no discernible pneumothorax.      I have personally reviewed the examination and initial interpretation   and I agree with the findings.      GHISLAINE BOWEN MD            SYSTEM ID:  [id number]      Echocardiogram Complete   Final Result          ASSESSMENT / PLAN:  Patient is a 48M who presented to [facility] on 1/12 with acute type B aortic dissection from the origin of the left subclavian to the diaphragm just superior to the celiac axis.Due to worsening back pain and increased variation in BP from arterial lines in his right and left hands, repeat CT scan was done which showed Type A dissection with a thrombosed retrograde false lumen, so he was transferred to [facility]. Now POD #6 from hemiarch repair and TEVAR on 1/23/24 with palpable pulses in femoral, and DP bilaterally. Progressing appropriately.      - Continue to encourage ambulation at least 4x daily  - Aspirin 81  - Will order CT to be done prior to discharge    Discussed patients history, exam, assessment and plan with Dr. Singh who agrees with plan.    Jorge Dodge MD  Vascular Surgery Resident

## 2024-01-29 NOTE — PROGRESS NOTES
Transferred to: Unit 6C  at 1040    Belongings: x2 belonging bags, clothes ; pt personally verified all belongings & sent with pt at transfer   Bundy removed? Yes  Central line removed? No, needed  Chart and medications sent with patient Yes  Family notified: Yes

## 2024-01-29 NOTE — PROGRESS NOTES
CV ICU PROGRESS NOTE        CO-MORBIDITIES:   Dissection of aorta, unspecified portion of aorta (H)  (primary encounter diagnosis)    ASSESSMENT: Valdo Lyons is a 48 year old male with PMH of uncontrolled HTN, childhood seizures. Patient presented to [facility] on 1/12 with acute type B aortic dissection from the origin of the left subclavian to the diaphragm just superior to the celiac axis, later having worsening back pain and increased variation in BP from arterial lines in his right and left hands, so repeat CT scan was done which showed Type A dissection with a thrombosed retrograde false lumen, so he was transferred to [facility]. Repeat CTA 1/17 with decision for surgical management, medically managed with strict BP goals in interim. Now status post ascending aortic aneurysm hemiarch repair and TEVAR with CVTS and Vascular Surgery on 1/23/24. Patient extubated 1/24 and progressing appropriately post operatively with no acute complications or concerns. MIKA has resolved, patient is floor status for multiple days.      TODAY'S PROGRESS:   -1 unit PRBC yesterday  -Complains of back pain, muskuloskeletal, blames the beds   -CT x1 out today  -Vision back to normal from yesterday, optho consulted, will appreciate recs      PLAN:  Neuro/Pain/Sedation:  Vascular ok with q4h neuro checks to evaluate for spinal cord ischemia.    # Baseline cognitive dysfunction  Patient's mother is his caregiver at home due to baseline cognitive dysfunction. The patient's overall understanding of the situation is inconsistent. He also developed some agitation/behavioral issues (spitting and kicking) 1/21 requiring sedation. Psychiatry saw patient earlier in his stay, see note for recs. SW on board per mother's request for additional caregiver resources.  - Monitor neurological status. Notify the MD for any acute changes in exam.  - Delirium prevention      # Acute post-operative pain  - Scheduled: tylenol, gabapentin   - PRN: tylenol,  oxycodone, dilaudid, robaxin  - Melatonin 10 mg at bedtime PRN 1/28     # Recent fall, atraumatic  # Hx Childhood seizure disorder  Seizure hx noted, not on any antiepileptics at time of admission. Presented to [facility] s/p fall, CT head reported as negative for intracranial abnormalities 1/12     # Hx Mariajuana use   Reports typically smokes mariajuana nightly. Denies tobacco, alcohol, or other illicit drugs     Pulmonary:   # Acute post-operative respiratory insufficiency, improving   # Left pleural effusion  # Right apical pneumothorax  CXR 1/27 am with small right apical pneumothorax, stable on 1/28 CXR, Cts in place to suction, saturating 90-99%, intermittently on room air to 2 LPM NC 1/28, repeat CXR 1/28 afternoon to monitor pneumothorax  - Titrate nasal cannula for SpO2 >92%  - Encourage IS q15-30 minutes when awake   - Continue to encourage pulmonary hygiene      Cardiovascular:   # Cardiogenic shock, resolved   # Type B dissection with c/f retrograde tear s/p hemiarch repair and TEVAR  # Hx of uncontrolled HTN   Pre-procedure: Patient was not taking any PTA medications for his high blood pressure. Bilateral arterial lines placed at [facility]. Ordered additional testing, as his BP medication requirements are exceeding expectations, rule out other/organic causes of elevated BP. TSH normal. Rads did not appreciate any renal mass other than a simple cyst - lower likelihood  pheochromocytoma. EF 55-60%.  - goal MAP 65-85, SBP<120  - ASA 81  - PRN hydralazine if SBP >120, parameters in place   - Statin: Rosuvastatin 20 mg daily  - Carvedilol 12.5 mg BID   - Vascular consulted, appreciate recommendations               -Will need repeat CTA Chest/Abdomen/Pelvis before discharge     GI:   # At risk for protein malnutrition   # Ileus, concern for, resolved  # Hypoalbuminemia  - Abd XR 1/26 afternoon with gaseous distention of bowel, patient with multiple stools 1/27  - Tolerating regular diet , 2 gm NA  - PPI prophylaxis  -  Bowel regimen: Miralax daily, Senna 2 tabs once daily, suppository PRN      Renal/FEN:   # CKD vs MIKA, resolved   # Hypervolemia  # Hyponatremia  # Hypophosphatemia  Unknown baseline creatinine, was 1.6 on admission to [facility], last value in 2022 was 1.25. Cr now normalized with adequate UOP. Nephro comsulted and now signed off.   - Strict I/Os, daily weight  - Avoid nephrotoxic agents as able   - Replete electrolytes PRN per protocol   - Continue to monitor with CMP, lactate, mag and phos daily     Endocrine:    # Stress hyperglycemia  Preop A1c 6.1% on 1/16/24  - Medium intensity sliding scale insulin  - Goal BG<180 for optimal healing      ID:  # Stress induced leukocytosis, improving  WBC 20.8 post-op, trending downward 1/28  - Completed perioperative antibiotic regimen of cefazolin and vancomycin  - Monitor fever curve, WBC, and inflammatory markers as appropriate     Heme:     # Acute blood loss anemia  # Acute blood loss thrombocytopenia, improving  # Beta thalassemia trait  - Continue to monitor with CBC daily  - Hgb goal > 7.0, received 1 unit PRBC 1/26 for Hgb 6.8, Hgb downtrending to 7.1 on 1/28 am, transfuse 1 unit PRBC, consent obtained from patient and mother  - Patient and mother report history of Beta thalassemia trait, noted on transfusion order  - Platelets uptrended to 170 on 1/28 am  - SQH 5000 units q 8 hours     MSK:  # Healing sternotomy  # Surgical Incision  - Sternal precautions   - Post-operative incision care per protocol   - PT/OT     Prophylaxis:    - SCDs  - SQH  - PPI prophylaxis  - Bowel Regimen     LDA:  - PICC 1/16 - respositioned 1/27  - Chest tubes x4  - Bundy (discontinue today 1/28)    Disposition:  - Transfer to floor when bed available.    Patient seen, findings and plan discussed with CV ICU staff.       Shannon Galicia, CA1   Anesthesia Resident        ====================================    SUBJECTIVE: Patient up in chair, states he is doing better but feels like the beds here are  giving him back pain. Describes it as muscular, not shearing/tearing or radiating anywhere. Wondering when he can get a new room upstairs,  states he is hopeful that he will go home in a few days. Otherwise nothing new, just wants breakfast.     OBJECTIVE:   1. VITAL SIGNS:   Temp:  [98.2  F (36.8  C)-99.7  F (37.6  C)] 98.2  F (36.8  C)  Pulse:  [68-79] 68  Resp:  [11-19] 11  BP: (108-143)/(55-84) 143/79  SpO2:  [94 %-100 %] 98 %  Resp: 11      2. INTAKE/ OUTPUT:   I/O last 3 completed shifts:  In: 1900 [P.O.:1600]  Out: 2980 [Urine:2510; Chest Tube:470]    3. PHYSICAL EXAMINATION:   General: Patient sitting in chair, awake and alert.   Neuro: Alert, responding appropriately to questions, moving all extremities. Lifts bilateral lower extremities and resists gravity.  Resp: Lungs clear to auscultation bilaterally, Cts in place to suction.  CV: Regular rate and rhythm.  Abdomen: Soft, Non-distended, Non-tender on palpation.  Incisions: Superficial ecchymosis of LUE, soft on palpation, stable from yesterday. Midline sternotomy incision open to air, sealed with Dermabond, appears well approximated. Right upper chest incision open to air, appears well approximated, sealed with Dermabond.  Extremities: warm and well perfused, DP and radial pulses palpable bilaterally.    4. INVESTIGATIONS:   Arterial Blood Gases   Recent Labs   Lab 01/24/24  1341 01/24/24  0841 01/24/24  0358 01/23/24  2355   PH 7.33* 7.38 7.38 7.36   PCO2 43 40 40 40   PO2 81 109* 103 91   HCO3 23 24 23 23     Complete Blood Count   Recent Labs   Lab 01/29/24  0309 01/28/24 2006 01/28/24  0322 01/27/24  1250 01/27/24  0420   WBC 13.8* 12.7* 12.5*  --  13.6*   HGB 8.0* 8.1* 7.1* 7.6* 7.4*    175 170  --  133*     Basic Metabolic Panel  Recent Labs   Lab 01/29/24  0804 01/29/24  0757 01/29/24  0309 01/29/24  0006 01/28/24  2120 01/28/24  2006 01/28/24  1816 01/28/24  0736 01/28/24  0322 01/27/24  1607 01/27/24  1603   NA  --   --  134*  --   --    --  134*  --  134*  --  136   POTASSIUM  --  4.2 4.2 4.2  --  4.1 4.1  4.1   < > 3.7  3.7   < > 4.0  4.0   CHLORIDE  --   --  97*  --   --   --  97*  --  97*  --  99   CO2  --   --  30*  --   --   --  29  --  31*  --  31*   BUN  --   --  15.3  --   --   --  17.2  --  20.1*  --  28.6*   CR  --   --  0.97  --   --   --  1.01  --  0.96  --  1.14   *  --  97  --  115*  --  145*   < > 97   < > 114*    < > = values in this interval not displayed.     Liver Function Tests  Recent Labs   Lab 01/29/24 0309 01/28/24 0322 01/27/24  0420 01/26/24  0341   AST 31 31 39 53*   ALT 31 28 27 33   ALKPHOS 64 62 66 55   BILITOTAL 0.6 0.5 0.6 0.5   ALBUMIN 2.9* 2.8* 3.0* 3.0*   INR 1.17* 1.17* 1.17* 1.23*     Pancreatic Enzymes  No lab results found in last 7 days.  Coagulation Profile  Recent Labs   Lab 01/29/24 0309 01/28/24 0322 01/27/24  0420 01/26/24  0341 01/24/24  0358 01/23/24  2154 01/23/24  1703 01/23/24  1604   INR 1.17* 1.17* 1.17* 1.23*   < > 1.24* 1.24* 1.62*   PTT  --   --   --   --   --  49* 38 36    < > = values in this interval not displayed.         5. RADIOLOGY:   Recent Results (from the past 24 hour(s))   XR Chest Port 1 View    Narrative    Exam: XR CHEST PORT 1 VIEW, 1/28/2024 12:50 PM    Comparison: Chest radiograph 1/28/2024    History: Assess pneumothorax, chest tubes in place    Findings:  Portable AP view of the chest. Postsurgical changes of the chest with  intact median sternotomy wires and mediastinal surgical clips.  Descending thoracic aortic stent graft. Stable positioning of  right-sided PICC terminating in the mid SVC. Stable mediastinal drains  and right chest tube. Unchanged, trace right apical pneumothorax.  Stable streaky bibasilar opacities. No acute osseous abnormality.      Impression    Impression:   1. Stable trace right apical pneumothorax.   2. Stable position of support devices.   3. Unchanged streaky bibasilar opacities.    I have personally reviewed the examination and  initial interpretation  and I agree with the findings.    EDILMA VALENZUELA DO         SYSTEM ID:  [id number]   XR Chest Port 1 View    Narrative    EXAM: XR CHEST PORT 1 VIEW  1/29/2024 1:03 AM     HISTORY:  Post-op pneumonia       COMPARISON:  Radiograph 1/28/2024. CT 1/17/2024.    TECHNIQUE: AP view of the chest at 30 degrees    FINDINGS:   Devices, lines, tubes: Right upper extremity PICC tip projecting over  the mid SVC. Mediastinal drains are in stable positioning. Stable  right basilar chest tube. Thoracic aortic endograft in stable  position. Surgical clips project over the right axilla and  mediastinum. Median sternotomy wires are intact.    The trachea is midline. The cardiomediastinal silhouette is stable.  The pulmonary vasculature is distinct. Stable trace right apical  pneumothorax. No appreciable pleural effusion or focal consolidative  opacity. No acute osseous abnormality.        Impression    IMPRESSION:   1. No focal airspace opacity.  2. Stable trace right apical pneumothorax with chest tube in place.  3. Supportive devices in stable positioning.    I have personally reviewed the examination and initial interpretation  and I agree with the findings.    CHRISTY KEEN DO         SYSTEM ID:  [id number]       =========================================

## 2024-01-29 NOTE — PROGRESS NOTES
Brief CV Surgery Note    Patient's 3 mediastinal chest tubes were removed at bedside. Tolerated well. Post pull CXR pending.    La Johnson MD  General Surgery Resident

## 2024-01-30 ENCOUNTER — APPOINTMENT (OUTPATIENT)
Dept: CT IMAGING | Facility: CLINIC | Age: 49
End: 2024-01-30
Attending: PHYSICIAN ASSISTANT
Payer: MEDICAID

## 2024-01-30 ENCOUNTER — APPOINTMENT (OUTPATIENT)
Dept: PHYSICAL THERAPY | Facility: CLINIC | Age: 49
End: 2024-01-30
Attending: STUDENT IN AN ORGANIZED HEALTH CARE EDUCATION/TRAINING PROGRAM
Payer: MEDICAID

## 2024-01-30 ENCOUNTER — APPOINTMENT (OUTPATIENT)
Dept: GENERAL RADIOLOGY | Facility: CLINIC | Age: 49
End: 2024-01-30
Attending: SURGERY
Payer: MEDICAID

## 2024-01-30 ENCOUNTER — APPOINTMENT (OUTPATIENT)
Dept: OCCUPATIONAL THERAPY | Facility: CLINIC | Age: 49
End: 2024-01-30
Attending: STUDENT IN AN ORGANIZED HEALTH CARE EDUCATION/TRAINING PROGRAM
Payer: MEDICAID

## 2024-01-30 DIAGNOSIS — I71.00 AORTIC DISSECTION (H): Primary | ICD-10-CM

## 2024-01-30 DIAGNOSIS — Z98.890 STATUS POST ENDOVASCULAR ANEURYSM REPAIR (EVAR): ICD-10-CM

## 2024-01-30 DIAGNOSIS — Z86.79 STATUS POST ENDOVASCULAR ANEURYSM REPAIR (EVAR): ICD-10-CM

## 2024-01-30 LAB
ALBUMIN SERPL BCG-MCNC: 2.8 G/DL (ref 3.5–5.2)
ALBUMIN UR-MCNC: 10 MG/DL
ALP SERPL-CCNC: 65 U/L (ref 40–150)
ALT SERPL W P-5'-P-CCNC: 29 U/L (ref 0–70)
ANION GAP SERPL CALCULATED.3IONS-SCNC: 9 MMOL/L (ref 7–15)
APPEARANCE UR: CLEAR
AST SERPL W P-5'-P-CCNC: 27 U/L (ref 0–45)
BILIRUB SERPL-MCNC: 0.6 MG/DL
BILIRUB UR QL STRIP: NEGATIVE
BUN SERPL-MCNC: 12.6 MG/DL (ref 6–20)
CA-I BLD-MCNC: 4.3 MG/DL (ref 4.4–5.2)
CALCIUM SERPL-MCNC: 8.4 MG/DL (ref 8.6–10)
CHLORIDE SERPL-SCNC: 100 MMOL/L (ref 98–107)
COLOR UR AUTO: ABNORMAL
CREAT SERPL-MCNC: 0.9 MG/DL (ref 0.67–1.17)
CRP SERPL-MCNC: 136 MG/L
DEPRECATED HCO3 PLAS-SCNC: 25 MMOL/L (ref 22–29)
EGFRCR SERPLBLD CKD-EPI 2021: >90 ML/MIN/1.73M2
ERYTHROCYTE [DISTWIDTH] IN BLOOD BY AUTOMATED COUNT: 19.9 % (ref 10–15)
GLUCOSE BLDC GLUCOMTR-MCNC: 108 MG/DL (ref 70–99)
GLUCOSE BLDC GLUCOMTR-MCNC: 110 MG/DL (ref 70–99)
GLUCOSE BLDC GLUCOMTR-MCNC: 129 MG/DL (ref 70–99)
GLUCOSE BLDC GLUCOMTR-MCNC: 142 MG/DL (ref 70–99)
GLUCOSE SERPL-MCNC: 93 MG/DL (ref 70–99)
GLUCOSE UR STRIP-MCNC: NEGATIVE MG/DL
HCT VFR BLD AUTO: 25.8 % (ref 40–53)
HGB BLD-MCNC: 8.3 G/DL (ref 13.3–17.7)
HGB UR QL STRIP: ABNORMAL
INR PPP: 1.26 (ref 0.85–1.15)
KETONES UR STRIP-MCNC: NEGATIVE MG/DL
LEUKOCYTE ESTERASE UR QL STRIP: NEGATIVE
MAGNESIUM SERPL-MCNC: 1.8 MG/DL (ref 1.7–2.3)
MCH RBC QN AUTO: 21.2 PG (ref 26.5–33)
MCHC RBC AUTO-ENTMCNC: 32.2 G/DL (ref 31.5–36.5)
MCV RBC AUTO: 66 FL (ref 78–100)
NITRATE UR QL: NEGATIVE
PH UR STRIP: 8 [PH] (ref 5–7)
PHOSPHATE SERPL-MCNC: 2.7 MG/DL (ref 2.5–4.5)
PLATELET # BLD AUTO: 246 10E3/UL (ref 150–450)
POTASSIUM SERPL-SCNC: 4.5 MMOL/L (ref 3.4–5.3)
PROCALCITONIN SERPL IA-MCNC: 0.55 NG/ML
PROT SERPL-MCNC: 5.6 G/DL (ref 6.4–8.3)
RBC # BLD AUTO: 3.92 10E6/UL (ref 4.4–5.9)
RBC URINE: 16 /HPF
SODIUM SERPL-SCNC: 134 MMOL/L (ref 135–145)
SP GR UR STRIP: 1 (ref 1–1.03)
UROBILINOGEN UR STRIP-MCNC: 6 MG/DL
WBC # BLD AUTO: 16.4 10E3/UL (ref 4–11)
WBC URINE: 0 /HPF

## 2024-01-30 PROCEDURE — 74174 CTA ABD&PLVS W/CONTRAST: CPT | Mod: 26 | Performed by: RADIOLOGY

## 2024-01-30 PROCEDURE — 36415 COLL VENOUS BLD VENIPUNCTURE: CPT | Performed by: NURSE PRACTITIONER

## 2024-01-30 PROCEDURE — 250N000013 HC RX MED GY IP 250 OP 250 PS 637

## 2024-01-30 PROCEDURE — 80053 COMPREHEN METABOLIC PANEL: CPT | Performed by: SURGERY

## 2024-01-30 PROCEDURE — 250N000013 HC RX MED GY IP 250 OP 250 PS 637: Performed by: SURGERY

## 2024-01-30 PROCEDURE — 71045 X-RAY EXAM CHEST 1 VIEW: CPT

## 2024-01-30 PROCEDURE — 97535 SELF CARE MNGMENT TRAINING: CPT | Mod: GO

## 2024-01-30 PROCEDURE — 81001 URINALYSIS AUTO W/SCOPE: CPT | Performed by: PHYSICIAN ASSISTANT

## 2024-01-30 PROCEDURE — 85027 COMPLETE CBC AUTOMATED: CPT

## 2024-01-30 PROCEDURE — 97116 GAIT TRAINING THERAPY: CPT | Mod: GP

## 2024-01-30 PROCEDURE — 250N000012 HC RX MED GY IP 250 OP 636 PS 637

## 2024-01-30 PROCEDURE — 71275 CT ANGIOGRAPHY CHEST: CPT | Mod: 26 | Performed by: RADIOLOGY

## 2024-01-30 PROCEDURE — 82330 ASSAY OF CALCIUM: CPT | Performed by: NURSE PRACTITIONER

## 2024-01-30 PROCEDURE — 250N000011 HC RX IP 250 OP 636: Performed by: STUDENT IN AN ORGANIZED HEALTH CARE EDUCATION/TRAINING PROGRAM

## 2024-01-30 PROCEDURE — 71275 CT ANGIOGRAPHY CHEST: CPT

## 2024-01-30 PROCEDURE — 97110 THERAPEUTIC EXERCISES: CPT | Mod: GP

## 2024-01-30 PROCEDURE — 250N000011 HC RX IP 250 OP 636

## 2024-01-30 PROCEDURE — 120N000003 HC R&B IMCU UMMC

## 2024-01-30 PROCEDURE — 36415 COLL VENOUS BLD VENIPUNCTURE: CPT | Performed by: PHYSICIAN ASSISTANT

## 2024-01-30 PROCEDURE — 97530 THERAPEUTIC ACTIVITIES: CPT | Mod: GO

## 2024-01-30 PROCEDURE — 71045 X-RAY EXAM CHEST 1 VIEW: CPT | Mod: 26 | Performed by: RADIOLOGY

## 2024-01-30 PROCEDURE — 250N000013 HC RX MED GY IP 250 OP 250 PS 637: Performed by: STUDENT IN AN ORGANIZED HEALTH CARE EDUCATION/TRAINING PROGRAM

## 2024-01-30 PROCEDURE — 99024 POSTOP FOLLOW-UP VISIT: CPT | Performed by: NURSE PRACTITIONER

## 2024-01-30 PROCEDURE — 84145 PROCALCITONIN (PCT): CPT | Performed by: PHYSICIAN ASSISTANT

## 2024-01-30 PROCEDURE — 86140 C-REACTIVE PROTEIN: CPT | Performed by: PHYSICIAN ASSISTANT

## 2024-01-30 PROCEDURE — 87149 DNA/RNA DIRECT PROBE: CPT | Performed by: PHYSICIAN ASSISTANT

## 2024-01-30 PROCEDURE — 250N000013 HC RX MED GY IP 250 OP 250 PS 637: Performed by: PHYSICIAN ASSISTANT

## 2024-01-30 PROCEDURE — 250N000011 HC RX IP 250 OP 636: Performed by: SURGERY

## 2024-01-30 PROCEDURE — 85610 PROTHROMBIN TIME: CPT | Performed by: SURGERY

## 2024-01-30 PROCEDURE — 87186 SC STD MICRODIL/AGAR DIL: CPT | Performed by: PHYSICIAN ASSISTANT

## 2024-01-30 PROCEDURE — 84100 ASSAY OF PHOSPHORUS: CPT

## 2024-01-30 PROCEDURE — 83735 ASSAY OF MAGNESIUM: CPT | Performed by: NURSE PRACTITIONER

## 2024-01-30 RX ORDER — IOPAMIDOL 755 MG/ML
90 INJECTION, SOLUTION INTRAVASCULAR ONCE
Status: COMPLETED | OUTPATIENT
Start: 2024-01-30 | End: 2024-01-30

## 2024-01-30 RX ORDER — HYDROMORPHONE HCL IN WATER/PF 6 MG/30 ML
0.2 PATIENT CONTROLLED ANALGESIA SYRINGE INTRAVENOUS EVERY 4 HOURS PRN
Status: DISCONTINUED | OUTPATIENT
Start: 2024-01-30 | End: 2024-01-31

## 2024-01-30 RX ORDER — METHOCARBAMOL 500 MG/1
500 TABLET, FILM COATED ORAL 4 TIMES DAILY
Status: DISCONTINUED | OUTPATIENT
Start: 2024-01-30 | End: 2024-01-31

## 2024-01-30 RX ORDER — MAGNESIUM OXIDE 400 MG/1
400 TABLET ORAL EVERY 4 HOURS
Qty: 2 TABLET | Refills: 0 | Status: COMPLETED | OUTPATIENT
Start: 2024-01-30 | End: 2024-01-30

## 2024-01-30 RX ORDER — AMLODIPINE BESYLATE 5 MG/1
5 TABLET ORAL DAILY
Status: DISCONTINUED | OUTPATIENT
Start: 2024-01-30 | End: 2024-02-02

## 2024-01-30 RX ADMIN — METHOCARBAMOL 500 MG: 500 TABLET ORAL at 19:39

## 2024-01-30 RX ADMIN — HYDRALAZINE HYDROCHLORIDE 10 MG: 20 INJECTION INTRAMUSCULAR; INTRAVENOUS at 09:05

## 2024-01-30 RX ADMIN — OXYCODONE HYDROCHLORIDE 10 MG: 10 TABLET ORAL at 19:42

## 2024-01-30 RX ADMIN — GABAPENTIN 100 MG: 100 CAPSULE ORAL at 13:50

## 2024-01-30 RX ADMIN — MAGNESIUM OXIDE TAB 400 MG (241.3 MG ELEMENTAL MG) 400 MG: 400 (241.3 MG) TAB at 09:05

## 2024-01-30 RX ADMIN — HYDRALAZINE HYDROCHLORIDE 10 MG: 20 INJECTION INTRAMUSCULAR; INTRAVENOUS at 19:43

## 2024-01-30 RX ADMIN — ONDANSETRON 4 MG: 2 INJECTION INTRAMUSCULAR; INTRAVENOUS at 10:05

## 2024-01-30 RX ADMIN — HYDROMORPHONE HYDROCHLORIDE 0.4 MG: 0.2 INJECTION, SOLUTION INTRAMUSCULAR; INTRAVENOUS; SUBCUTANEOUS at 03:39

## 2024-01-30 RX ADMIN — POLYETHYLENE GLYCOL 3350 17 G: 17 POWDER, FOR SOLUTION ORAL at 09:05

## 2024-01-30 RX ADMIN — IOPAMIDOL 90 ML: 755 INJECTION, SOLUTION INTRAVENOUS at 17:44

## 2024-01-30 RX ADMIN — ASPIRIN 81 MG CHEWABLE TABLET 81 MG: 81 TABLET CHEWABLE at 07:26

## 2024-01-30 RX ADMIN — ACETAMINOPHEN 975 MG: 325 TABLET, FILM COATED ORAL at 10:04

## 2024-01-30 RX ADMIN — HEPARIN SODIUM 5000 UNITS: 5000 INJECTION, SOLUTION INTRAVENOUS; SUBCUTANEOUS at 06:15

## 2024-01-30 RX ADMIN — PANTOPRAZOLE SODIUM 40 MG: 40 TABLET, DELAYED RELEASE ORAL at 07:26

## 2024-01-30 RX ADMIN — MAGNESIUM OXIDE TAB 400 MG (241.3 MG ELEMENTAL MG) 400 MG: 400 (241.3 MG) TAB at 15:45

## 2024-01-30 RX ADMIN — DOCUSATE SODIUM 50 MG AND SENNOSIDES 8.6 MG 1 TABLET: 8.6; 5 TABLET, FILM COATED ORAL at 19:38

## 2024-01-30 RX ADMIN — POTASSIUM & SODIUM PHOSPHATES POWDER PACK 280-160-250 MG 1 PACKET: 280-160-250 PACK at 12:33

## 2024-01-30 RX ADMIN — HEPARIN SODIUM 5000 UNITS: 5000 INJECTION, SOLUTION INTRAVENOUS; SUBCUTANEOUS at 13:50

## 2024-01-30 RX ADMIN — OXYCODONE HYDROCHLORIDE 10 MG: 10 TABLET ORAL at 15:45

## 2024-01-30 RX ADMIN — HYDROMORPHONE HYDROCHLORIDE 0.4 MG: 0.2 INJECTION, SOLUTION INTRAMUSCULAR; INTRAVENOUS; SUBCUTANEOUS at 13:49

## 2024-01-30 RX ADMIN — POTASSIUM & SODIUM PHOSPHATES POWDER PACK 280-160-250 MG 1 PACKET: 280-160-250 PACK at 15:45

## 2024-01-30 RX ADMIN — ACETAMINOPHEN 975 MG: 325 TABLET, FILM COATED ORAL at 15:45

## 2024-01-30 RX ADMIN — Medication 10 MG: at 21:25

## 2024-01-30 RX ADMIN — AMLODIPINE BESYLATE 5 MG: 5 TABLET ORAL at 12:33

## 2024-01-30 RX ADMIN — ROSUVASTATIN CALCIUM 20 MG: 20 TABLET, FILM COATED ORAL at 07:26

## 2024-01-30 RX ADMIN — CARVEDILOL 12.5 MG: 3.12 TABLET, FILM COATED ORAL at 19:39

## 2024-01-30 RX ADMIN — CARVEDILOL 12.5 MG: 3.12 TABLET, FILM COATED ORAL at 07:34

## 2024-01-30 RX ADMIN — ACETAMINOPHEN 975 MG: 325 TABLET, FILM COATED ORAL at 03:39

## 2024-01-30 RX ADMIN — GABAPENTIN 100 MG: 100 CAPSULE ORAL at 07:26

## 2024-01-30 RX ADMIN — INSULIN ASPART 1 UNITS: 100 INJECTION, SOLUTION INTRAVENOUS; SUBCUTANEOUS at 13:55

## 2024-01-30 RX ADMIN — DOCUSATE SODIUM 50 MG AND SENNOSIDES 8.6 MG 1 TABLET: 8.6; 5 TABLET, FILM COATED ORAL at 07:26

## 2024-01-30 RX ADMIN — GABAPENTIN 100 MG: 100 CAPSULE ORAL at 19:38

## 2024-01-30 RX ADMIN — HYDRALAZINE HYDROCHLORIDE 10 MG: 20 INJECTION INTRAMUSCULAR; INTRAVENOUS at 04:30

## 2024-01-30 RX ADMIN — ACETAMINOPHEN 975 MG: 325 TABLET, FILM COATED ORAL at 21:25

## 2024-01-30 RX ADMIN — HYDRALAZINE HYDROCHLORIDE 10 MG: 20 INJECTION INTRAMUSCULAR; INTRAVENOUS at 06:15

## 2024-01-30 RX ADMIN — OXYCODONE HYDROCHLORIDE 10 MG: 10 TABLET ORAL at 10:05

## 2024-01-30 RX ADMIN — HEPARIN SODIUM 5000 UNITS: 5000 INJECTION, SOLUTION INTRAVENOUS; SUBCUTANEOUS at 21:26

## 2024-01-30 RX ADMIN — HYDRALAZINE HYDROCHLORIDE 10 MG: 20 INJECTION INTRAMUSCULAR; INTRAVENOUS at 10:19

## 2024-01-30 RX ADMIN — HYDRALAZINE HYDROCHLORIDE 10 MG: 20 INJECTION INTRAMUSCULAR; INTRAVENOUS at 03:46

## 2024-01-30 ASSESSMENT — ACTIVITIES OF DAILY LIVING (ADL)
ADLS_ACUITY_SCORE: 23

## 2024-01-30 NOTE — PLAN OF CARE
Neuro: A&Ox4, able to make needs known and calls appropriately  Cardiac: Afebrile, VSS. SR (70's)  w/o c/o chest pain/palpitations; goal of SBP <120 mmHg; PRN Hydralazine 10 mg IV x2 given this shift  Respiratory: sating >90% at room air  GI/: Voiding spontaneously. w/ large BM x1 this shift  Diet/appetite: Tolerating 2 gm Na diet w/ good appetite Denies nausea.  Activity: Up ad maninder w/ stand-by assistance   Pain:  w/ intermittent episodes of moderate to severe lower back and midsternal incision pain relieved by PRN current pain regimen (PRN Oxy, Robaxin and Dilaudid)   Skin: No new deficits noted. Midsternal and R upper chest post-op sites WDL open to air, approximated  Lines: L PIV; saline locked; patent and intact  Drains: w/ 1 pleural CT w/ serosanguineous output, no air leak noted this shift connected to suction at -20 mmHg, may place to water seal for ambulation  Plan: Continue POC and notify the provider of any changes

## 2024-01-30 NOTE — PLAN OF CARE
Goal Outcome Evaluation:      Plan of Care Reviewed With: patient    Overall Patient Progress: improvingOverall Patient Progress: improving     .    Changes: Multiple episodes of high SBP.      History: Valdo Lyons is a 48 year old male with PMH of uncontrolled HTN, childhood seizures. Patient [place] [facility] [place]12 with acute type B aortic dissection from the origin of the left subclavian to the diaphragm just superior to the celiac axis, later having worsening back pain and increased variation in BP from arterial lines in his right and left hands, so repeat CT scan was done which showed Type A dissection with a thrombosed retrograde false lumen, so he was transferred to [facility]. Repeat CTA 1/17 with decision for surgical management, medically managed with strict BP goals in interim. Now status post ascending aortic aneurysm hemiarch repair and TEVAR with CVTS and Vascular Surgery on 1/23/24. Patient extubated 1/24 and progressing appropriately post operatively with no acute complications or concerns. MIKA has resolved, patient is floor status for multiple days.        Neuro: A/Ox4. Calls appropriately. Pleasant and cooperative.   Cardiac/Tele:  SR and HR 70's. Goal to keep SBP <120, DBP<90, PRN hydralazine given X4 this shift for three occasions of high blood pressure, one episdoe of high SBP was resolved with IV dilaudid. One episode of High SBP needed two doses of hydralazine and reached a SBP of 150. Afebrile. Denies chest pain.   Respiratory: Room air. Tolerating well  GI/: Continent. Urinal at bedside.  Diet/Appetite: 2 gram Na+ diet  Skin: No new deficits noted.   LDAs: L PIV- SL  Activity: SBA, walker and GB in the halls  Pain: Midsternal pain and chronic lower back pain, PRN oxycodone, dilaudid and robaxin given.      Plan: Continue to monitor and notify team with changes.

## 2024-01-30 NOTE — CONSULTS
OPHTHALMOLOGY CONSULT NOTE  01/28/24    Patient: Valdo Lyons      ASSESSMENT/PLAN:     #Retinal Lesion OS  Valdo Lyons is a 48 year old male who presented with transient blurry vision of both eyes that have since resolved. Patient denies any signs concerning for amaurosis fugax. Exam notable for a cotton wool spot of the left eye. Possible etiology of cotton wool spot include hypertension vs anemia vs diabetes though patient does not have diabetes. Given multiple indwelling catheters and recent surgeries, less likely but cannot rule out endophthalmitis. It is unlikely that this exam finding is related to the patient's blurry vision, however.     1/30: Vision is 20/20 and stable appearing fundus exam with no signs of intraocular inflammation or worsening of the lesion, making ocular candidiasis or viral retinitis less likely etiology of lesion. Discussed precautions with pt (worsening vision, eye pain, floaters) and he knows to alert his team if he experiences any so they can reach out to ophthalmology. Will repeat DFE in 7-10 days. Blood culture results pending.     Recommendations:   - Ophthalmology will follow up results of blood culture   - Ophthalmology will repeat dilated exam in 7-10 days     It is our pleasure to participate in this patient's care and treatment. Please contact us with any further questions or concerns.    Discussed with retina fellow Dr. Danilo Chin, PGY-6 who agreed with this assessment and plan.     Ophthalmology will continue to follow while inpatient. Please contact ophthalmologist on call with any questions or concerns. We appreciate your care of this patient.    Thank you for entrusting us with your care  Helena Beyer, PGY2  Ophthalmology Resident  [facility]    HISTORY OF PRESENTING ILLNESS:     Valdo Lyons is a 48 year old male with PMH of uncontrolled HTN and childhood seizures who presented to [facility] on 1/12 with acute aortic dissection. Transferred to [facility]  and now s/p ascending aortic aneurysm hemiarch repair and TEVAR on 1/23. Ophthalmology was consulted due to < 5 minutes of blurry vision of both eyes. States that the blurry vision resolved after his blood transfusion. Denies any ongoing vision concerns.     Denies any flashes or floaters. Denies any eye pain. Denies any double vision. Denies any black out of vision.       10+ review of systems were otherwise negative except for that which has been stated above.      OCULAR/MEDICAL/SURGICAL HISTORIES:     Past Ocular History:   Last eye exam: > 25 years ago    Previously diagnosed ocular conditions: None  Prior eye surgery/laser: None  Contact lens wear: Denies  Glasses: Denies  Eyedrops: Denies    Pertinent Systemic Medications:     Carvedilol, crestor     Past Medical History:  Past Medical History:   Diagnosis Date    Dissecting aneurysm of thoracic aorta, [place] type B (H)     HTN (hypertension)        Past Surgical History:   Past Surgical History:   Procedure Laterality Date    ENDOVASCULAR REPAIR ANEURYSM THORACIC AORTIC N/A 1/23/2024    Procedure: Antegrade Thoracic Endovascular Aortic Repair (TEVAR) with [place] Conformable Thoracic Stent Graft 37mm x 10cm. Intravascular Ultrasound (IVUS), Aortography, Right common femoral access;  Surgeon: Artur Singh MD;  Location: [facility] OR    IR OR ANGIOGRAM  1/23/2024    PICC DOUBLE LUMEN PLACEMENT Right 01/16/2024    5FR DL PICC, basilic vein. L-43cm, 1cm out.    REPAIR ANEURYSM ASCENDING AORTA N/A 1/23/2024    Procedure: MEDIAN STERNOTOMY, RIGHT AXILLARY CUTDOWN, ON CARDIOPULMONARY BYPASS (CIRCULATORY ARREST), ASCENDING AORTA ANEURYSM REPAIR ([place] 30mm), INTRAOPERATIVE TRANSESOPHAGEAL ECHOCARDIOGRAM BY ANESTHESIA;  Surgeon: Addi Shaw MD;  Location: [facility] OR       Family History:   No history of macular degeneration or glaucoma    Social History:   No tobacco use      EXAMINATION:     Base Eye Exam       Visual Acuity (Snellen - Linear)          Right Left    Dist sc  20/20 -1              Tonometry (Tonopen, 12:32 PM)         Right Left    Pressure STP STP   No functioning tonopen             Dilation       Left eye: 2.5% Quinn Synephrine, 1.0% Mydriacyl @ 12:32 PM                  Slit Lamp and Fundus Exam       External Exam         Right Left    External Normal Normal              Slit Lamp Exam         Right Left    Lids/Lashes Normal Normal    Conjunctiva/Sclera White and quiet White and quiet    Cornea Clear Clear    Anterior Chamber Deep and quiet Deep and quiet    Iris Round and reactive Round and reactive -> Dilated    Lens Clear Clear    Anterior Vitreous Normal Normal              Fundus Exam         Right Left    Disc  Normal    C/D Ratio  0.2    Macula  Normal    Vessels  CWS by the superior arcade ~ 1 DD from the disc. 2-3 venule diameter in size    Periphery  Normal                    Labs/Studies/Imaging Performed  Blood culture (01/30/24)    Pending      Helena Beyer MD  Resident Physician, PGY2  Department of Ophthalmology

## 2024-01-30 NOTE — PROGRESS NOTES
"VASCULAR SURGERY PROGRESS NOTE    Subjective:  NAEO. POD #7 from hemiarch repair and TEVAR on 1/23/24. Feeling well. WBC up to 16, TMax 99.6F. CTs x3 removed yesterday. Planning to walk several times today.    Objective:  Intake/Output Summary (Last 24 hours) at 1/30/2024 0936  Last data filed at 1/30/2024 0923  Gross per 24 hour   Intake 700 ml   Output 2225 ml   Net -1525 ml     Labs:  ROUTINE IP LABS (Last four results)  BMP  Recent Labs   Lab 01/30/24  0731 01/30/24  0603 01/29/24  2157 01/29/24  1705 01/29/24  0804 01/29/24  0757 01/29/24  0309 01/29/24  0006 01/28/24 2006 01/28/24  1816 01/28/24 0736 01/28/24 0322   NA  --  134*  --   --   --   --  134*  --   --  134*  --  134*   POTASSIUM  --  4.5  --   --   --  4.2 4.2 4.2   < > 4.1  4.1   < > 3.7  3.7   CHLORIDE  --  100  --   --   --   --  97*  --   --  97*  --  97*   YARON  --  8.4*  --   --   --   --  8.2*  --   --  8.1*  --  8.3*   CO2  --  25  --   --   --   --  30*  --   --  29  --  31*   BUN  --  12.6  --   --   --   --  15.3  --   --  17.2  --  20.1*   CR  --  0.90  --   --   --   --  0.97  --   --  1.01  --  0.96   * 93 101* 128*   < >  --  97  --    < > 145*   < > 97    < > = values in this interval not displayed.     CBC  Recent Labs   Lab 01/30/24  0603 01/29/24  0309 01/28/24 2006 01/28/24 0322   WBC 16.4* 13.8* 12.7* 12.5*   RBC 3.92* 3.74* 3.82* 3.53*   HGB 8.3* 8.0* 8.1* 7.1*   HCT 25.8* 24.4* 24.6* 21.9*   MCV 66* 65* 64* 62*   MCH 21.2* 21.4* 21.2* 20.1*   MCHC 32.2 32.8 32.9 32.4   RDW 19.9* 19.3* 18.9* 16.4*    207 175 170     INR  Recent Labs   Lab 01/30/24  0603 01/29/24  0309 01/28/24  0322 01/27/24  0420   INR 1.26* 1.17* 1.17* 1.17*     PHYSICAL EXAM:  /73   Pulse 84   Temp 99.6  F (37.6  C) (Axillary)   Resp 18   Ht 1.727 m (5' 8\")   Wt 86.3 kg (190 lb 3.2 oz)   SpO2 94%   BMI 28.92 kg/m    General: The patient is awake and alert, NAD  Psych: Pleasant affect, answers questions appropriately  Skin: " Color appropriate for race, warm, dry.  Neuro: Sensorimotor intact in BUE and BLE, CN II-XII grossly intact  Respiratory: Breathing unlabored on RA  GI:  Abdomen soft, non-distended.  Extremities: Bilateral femoral and DP pulses palpable, mild edema noted on ankles.    ASSESSMENT / PLAN:  Patient is a 48M who [place] [facility] [place] with acute type B aortic dissection from the origin of the left subclavian to the diaphragm just superior to the celiac axis. Due to worsening back pain and increased variation in BP from arterial lines in his right and left hands, repeat CT scan was done which showed Type A dissection with a thrombosed retrograde false lumen, so he was transferred to [facility]. Now POD #7 from hemiarch repair and TEVAR on 1/23/24 with palpable pulses in femoral, and DP bilaterally. Progressing appropriately.      - Continue to encourage ambulation at least 4x daily  - Aspirin 81  - Will order CT to be done prior to discharge    Discussed patients history, exam, assessment and plan with Dr. Rawls who will discuss with staff.     Candie Parkinson CNP  Vascular Surgery  Pager: [phone]  [email or web address]  Send message or 10 digit call back number Securely via [employer] with the [employer] Web Console (learn more here)

## 2024-01-30 NOTE — PROGRESS NOTES
Cardiovascular Surgery Progress Note  01/30/2024         Assessment and Plan:     Valdo Lyons is a 48 year old male with PMH of uncontrolled HTN and childhood seizures. [place] [facility] with acute type B aortic dissection from the origin of the left subclavian to the diaphragm just superior to the celiac axis, later having worsening back pain and increased variation in BP from arterial lines in his right and left hands, so repeat CT scan was done which showed Type A dissection with a thrombosed retrograde false lumen, so he was transferred to [facility]. Repeat CTA 1/17 with decision for surgical management, medically managed with strict BP goals in interim. Now status post ascending aortic aneurysm hemiarch repair and TEVAR with CVTS and Vascular Surgery on 1/23/24.     Cardiovascular:   Type B dissection with c/f retrograde tear s/p hemiarch repair and TEVAR on 1/23/24  Hx of uncontrolled HTN   Pre-procedure: Patient was not taking any PTA medications for his high blood pressure. Bilateral arterial lines placed at [facility]. Ordered additional testing, as his BP medication requirements are exceeding expectations, rule out other/organic causes of elevated BP. TSH normal. Rads did not appreciate any renal mass other than a simple cyst - lower likelihood  pheochromocytoma. EF 55-60%.   No arrhythmias overnight, HD stable, in NSR.  BP 140s overnight, IV hydralazine given  Intra-op WILLIAM shows EF 55%  - ASA 81 mg daily  - rosuvastatin 20 mg daily  - BB: Coreg 12.5 mg BID  - start amlodipine 5 mg daily on 1/30  - Diuresis as below  - SBP goal <120, IV hydralazine PRN  - vascular surgery following, will need CTA prior to discharge - ordered 1/30  - will need post-op echo once tubes/wires out    Chest tubes: 3 mediastinals removed 1/29; R pleural remains, draining too much to remove   TPW: removed    Pulmonary:  Right apical pneumothorax  Left pleural effusion, improved  Extubated POD 1. Now saturating well on  ASSESSMENT:  1. Dysuria    .    PLAN:  Chino Meza gave urinalysis today.  COLOR, URINALYSIS  Yellow    APPEARANCE, URINALYSIS  Cloudy    GLUCOSE, URINALYSIS Negative mg/dL Negative    BILIRUBIN, URINALYSIS Negative Negative    KETONES, URINALYSIS Negative mg/dL Negative    SPECIFIC GRAVITY, URINALYSIS 1.005 - 1.030 >1.030High     OCCULT BLOOD, URINALYSIS Negative TraceAbnormal     PH, URINALYSIS 5.0 - 7.0 6.0    PROTEIN, URINALYSIS Negative mg/dL 30 Abnormal     UROBILINOGEN, URINALYSIS 0.2, 1.0 mg/dL 1.0    NITRITE, URINALYSIS Negative PositiveAbnormal     LEUKOCYTE ESTERASE, URINALYSIS Negative TraceAbnormal     SQUAMOUS EPITHELIAL, URINALYSIS None Seen, 1 to 5 /hpf 1 to 5    ERYTHROCYTES, URINALYSIS None Seen, 1 to 2 /hpf 26 to 100Abnormal     LEUKOCYTES, URINALYSIS None Seen, 1 to 5 /hpf 26 to 100Abnormal     BACTERIA, URINALYSIS None Seen /hpf LargeAbnormal     HYALINE CASTS, URINALYSIS None Seen, 1 to 5 /lpf None Seen      Child will begin Septra 15 mL p.o. twice daily times 10 days.  Discussed with mother we will phone her with final urine culture results to make sure child is on correct antibiotic.  Within 1 to have child return for repeat UA/UC 3-4 days after done with course of antibiotic therapy.    Chino has longstanding history of chronic constipation.  Discussed she will need to start with clean out again.  This could be either done with MiraLax 1 cap full mixed in 8 oz fluid twice daily for 3 days or magnesium citrate 10 oz taken daily for 2 days.  Then she should begin the maintenance MiraLax 1 cap full mixed in 8 oz of fluid.  Reviewed potty technique and potty timing.  She needs to sit on the toilet 20 minutes after supper she has not stool that day.  I did refer family to the [place] rating scale.  And did encourage child to track stool pattern to give her more responsibility and managing her gut health.      HISTORY OF PRESENT ILLNESS:  Chino Meza is a 11 year old female who  RA.   - Supplemental O2 PRN to keep sats > 92%. Wean off as tolerated.  - Pulm hygiene, IS, activity and deep breathing  - CXR 1/27am with small right apical pneumothorax, stable on subsequent CXRs - will plan for clamping trial prior to chest tube removal     Neurology / MSK:  - Vascular ok with q4h neuro checks to evaluate for spinal cord ischemia.  Acute post-operative pain   Pain well controlled with current regimen:  - scheduled acetaminophen, scheduled gabapentin, PO oxycodone PRN, IV dilaudid PRN, methocarbamol PRN, heat/ice  Baseline cognitive dysfunction  Patient's mother is his caregiver at home due to baseline cognitive dysfunction. The patient's overall understanding of the situation is inconsistent. He also developed some agitation/behavioral issues (spitting and kicking) 1/21 requiring sedation. Psychiatry saw patient earlier in his stay, see note for recs. SW on board per mother's request for additional caregiver resources.  - Monitor neurological status. Notify the MD for any acute changes in exam.  - Delirium prevention   - Melatonin 10 mg at bedtime   Recent fall, atraumatic  Hx Childhood seizure disorder  Seizure hx noted, not on any antiepileptics at time of admission. Presented to [facility] s/p fall, CT head reported as negative for intracranial abnormalities 1/12  Hx Mariajuana use   Reports typically smokes mariajuana nightly. Denies tobacco, alcohol, or other illicit drugs     / Renal / Fluid / Electrolytes:  Hypervolemia  Hyponatremia  Hypophosphatemia  Unknown baseline creatinine, was 1.6 on admission to [facility], last value in 2022 was 1.25. Cr now normalized with adequate UOP. Nephro comsulted and now signed off.    -adequate UOP.   FLUID STATUS: Pre-op weight 194, weight today 190; I/O past 24 hours: -1.3 L.   - Diuresis: last diuresed 1/26, hold diuresis as patient appears euvolemic   - Replete lytes per protocol  - Strict I/O, daily weights  - Avoid/limit nephrotoxins as able    GI / Nutrition:  presents today for evaluation of dysuria/bladder infection.  Mother states child started complaining of dysuria last night.  She is not running fever.  She denies headache, congestion, earache, sore throat, cough.  She is complaining of some tummy ache as well.  She has history of constipation.      PHYSICAL EXAMINATION:  On exam Chino Meza is an alert, hydrated nontoxic child.  Weight:   Wt Readings from Last 1 Encounters:   06/18/21 29.4 kg (22 %, Z= -0.76)*     * Growth percentiles are based on CDC (Girls, 2-20 Years) data.           On head, eyes, ears, nose, throat exam, conjunctivae and lids are normal. Nares not congested.  Septum midline.  Turbinates not inflamed.  Ears normal set. Tympanic membranes normal bilaterally.  Lips, tongue, mouth without lesions.  Posterior pharynx is not acutely inflamed.  Neck supple without cervical adenopathy. Chino has no significant supraclavicular, axillary or inguinal nodes noted. Respirations unlabored.  Chest clear to auscultation.  Cardiac exam reveals regular rate and rhythm, without murmurs.  Abdomen soft, not distended, with moderate stool palpable in left lower quadrant       Ileus, concern for, resolved  Hypoalbuminemia  - Tolerating regular diet  - Continue bowel regimen, last BM 1/30    Endocrine:  Stress induced hyperglycemia   Hgb A1c 6.1  - Initially managed on insulin drip postop, transitioned to sliding scale; goal BG <180 for optimal healing    Infectious Disease:  Stress induced leukocytosis  WBC 16.4 (up from 13.8 yesterday), remains afebrile, no signs or symptoms of infection  - Completed perioperative antibiotics  - UA, blood cultures, sputum cultures ordered 1/30 - will follow for results and continue to monitor off antibiotics per discussion with surgeon   - Continue to monitor fever curve, CBC    Hematology:   Acute blood loss anemia and thrombocytopenia  Beta thalassemia trait   Hgb 8.3; Plt WNL, no signs or symptoms of active bleeding  - Patient and mother report history of Beta thalassemia trait, noted on transfusion order   - s/p 1 unit pRBCs 1/28  - CBC daily    Anticoagulation:   - ASA 81 mg only    MSK/Skin:  Sternotomy  Surgical incision  - Sternal precautions  - Incisional cares per protocol    HEENT:   Transient bilateral blurry vision, resolved  - patient reports blurry vision in the ICU  - ophthalmology consulted 1/28 - exam notable for cotton wool spot of the left eye. Possible etiology of cotton wool spot include hypertension vs anemia vs diabetes though patient does not have diabetes. Given multiple indwelling catheters and recent surgeries, less likely but cannot rule out endophthalmitis.   - blood cultures x2 recommended, follow for results  - Ophthalmology to repeat dilated exam 1/30      Prophylaxis:   - Stress ulcer prophylaxis: Pantoprazole 40 mg daily for 30 days  - DVT prophylaxis: Subcutaneous heparin, SCD    Disposition:   - Transferred to  on 1/29  - Therapies recommending discharge to home when medically ready    Discussed with Dr. Shaw through written communication.    Skip Carlin PA-C  Cardiothoracic Surgery    1:46 PM  January 30,  2024  pager [phone]          Interval History:     High BP overnight, given multiple doses of IV hydralazine.  States pain is well managed on current regimen. Not sleeping well overnight, asking for scheduled melatonin.  Tolerating diet but low appetite, is passing flatus, + BM. No nausea or vomiting. No abdominal pain.   Breathing well without complaints.   Working with therapies and ambulating in halls without assistance.   Denies chest pain, palpitations, dizziness, syncopal symptoms, fevers, chills, myalgias, or sternal popping/clicking, dysuria, diarrhea, vision complaints. Mild ongoing cough since surgery.          Physical Exam:     Gen: A&Ox4, NAD  Neuro: no focal deficits   CV: RRR, normal S1 S2, no murmurs, rubs or gallops. No JVD  Pulm: CTA, no wheezing or rhonchi, normal breathing on RA   Abd: nondistended, normal BS, soft, nontender  Ext: no peripheral edema  Incision: clean, dry, intact, no erythema, sternum stable  Tubes/drain sites: dressing clean and dry, serosanguinous output, no air leak. 24 hr output 476 mL.          Data:    Imaging:  reviewed recent imaging, no acute concerns    Recent Results (from the past 24 hour(s))   XR Chest Port 1 View    Narrative    Portable chest    INDICATION: Postoperative pneumonia    COMPARISON: Yesterday    FINDINGS: Distal arch and proximal descending thoracic aortic stat  unchanged. Trace right apical pneumothorax grossly unchanged. Heart  size upper normal. Median sternotomy again noted. Right thoracostomy  tube again present. This does not appear significantly changed in  physician. Previous right upper extremity PICC line has been removed.  No new opacities.      Impression    IMPRESSION: Unchanged small right apical pneumothorax. Aortic stenting  unchanged.    ADONAY HAUSER MD         SYSTEM ID:  [id number]        Labs:  Recent Labs   Lab 01/30/24  1232 01/30/24  0731 01/30/24  0603 01/29/24  0804 01/29/24  0757 01/29/24  0309 01/28/24  2120  01/28/24 2006 01/28/24  1816 01/28/24  0736 01/28/24  0322   WBC  --   --  16.4*  --   --  13.8*  --  12.7*  --   --  12.5*   HGB  --   --  8.3*  --   --  8.0*  --  8.1*  --   --  7.1*   MCV  --   --  66*  --   --  65*  --  64*  --   --  62*   PLT  --   --  246  --   --  207  --  175  --   --  170   INR  --   --  1.26*  --   --  1.17*  --   --   --   --  1.17*   NA  --   --  134*  --   --  134*  --   --  134*  --  134*   POTASSIUM  --   --  4.5  --  4.2 4.2   < > 4.1 4.1  4.1   < > 3.7  3.7   CHLORIDE  --   --  100  --   --  97*  --   --  97*  --  97*   CO2  --   --  25  --   --  30*  --   --  29  --  31*   BUN  --   --  12.6  --   --  15.3  --   --  17.2  --  20.1*   CR  --   --  0.90  --   --  0.97  --   --  1.01  --  0.96   ANIONGAP  --   --  9  --   --  7  --   --  8  --  6*   YARON  --   --  8.4*  --   --  8.2*  --   --  8.1*  --  8.3*   * 108* 93   < >  --  97   < >  --  145*   < > 97   ALBUMIN  --   --  2.8*  --   --  2.9*  --   --   --   --  2.8*   PROTTOTAL  --   --  5.6*  --   --  5.5*  --   --   --   --  5.4*   BILITOTAL  --   --  0.6  --   --  0.6  --   --   --   --  0.5   ALKPHOS  --   --  65  --   --  64  --   --   --   --  62   ALT  --   --  29  --   --  31  --   --   --   --  28   AST  --   --  27  --   --  31  --   --   --   --  31    < > = values in this interval not displayed.      GLUCOSE:   Recent Labs   Lab 01/30/24  1232 01/30/24  0731 01/30/24  0603 01/29/24  2157 01/29/24  1705 01/29/24  1248   * 108* 93 101* 128* 105*

## 2024-01-31 ENCOUNTER — APPOINTMENT (OUTPATIENT)
Dept: OCCUPATIONAL THERAPY | Facility: CLINIC | Age: 49
End: 2024-01-31
Attending: STUDENT IN AN ORGANIZED HEALTH CARE EDUCATION/TRAINING PROGRAM
Payer: MEDICAID

## 2024-01-31 ENCOUNTER — APPOINTMENT (OUTPATIENT)
Dept: GENERAL RADIOLOGY | Facility: CLINIC | Age: 49
End: 2024-01-31
Attending: PHYSICIAN ASSISTANT
Payer: MEDICAID

## 2024-01-31 LAB
ALBUMIN SERPL BCG-MCNC: 2.9 G/DL (ref 3.5–5.2)
ALP SERPL-CCNC: 64 U/L (ref 40–150)
ALT SERPL W P-5'-P-CCNC: 34 U/L (ref 0–70)
ANION GAP SERPL CALCULATED.3IONS-SCNC: 7 MMOL/L (ref 7–15)
AST SERPL W P-5'-P-CCNC: 27 U/L (ref 0–45)
BACTERIA SPT CULT: NORMAL
BILIRUB SERPL-MCNC: 0.5 MG/DL
BUN SERPL-MCNC: 11.9 MG/DL (ref 6–20)
CA-I BLD-MCNC: 4.5 MG/DL (ref 4.4–5.2)
CALCIUM SERPL-MCNC: 8.4 MG/DL (ref 8.6–10)
CHLORIDE SERPL-SCNC: 101 MMOL/L (ref 98–107)
CREAT SERPL-MCNC: 0.91 MG/DL (ref 0.67–1.17)
CRP SERPL-MCNC: 141 MG/L
CRP SERPL-MCNC: 150 MG/L
DEPRECATED HCO3 PLAS-SCNC: 25 MMOL/L (ref 22–29)
EGFRCR SERPLBLD CKD-EPI 2021: >90 ML/MIN/1.73M2
ENTEROCOCCUS FAECALIS: NOT DETECTED
ENTEROCOCCUS FAECIUM: NOT DETECTED
ERYTHROCYTE [DISTWIDTH] IN BLOOD BY AUTOMATED COUNT: 20.2 % (ref 10–15)
GLUCOSE BLDC GLUCOMTR-MCNC: 129 MG/DL (ref 70–99)
GLUCOSE BLDC GLUCOMTR-MCNC: 148 MG/DL (ref 70–99)
GLUCOSE BLDC GLUCOMTR-MCNC: 158 MG/DL (ref 70–99)
GLUCOSE BLDC GLUCOMTR-MCNC: 99 MG/DL (ref 70–99)
GLUCOSE SERPL-MCNC: 112 MG/DL (ref 70–99)
GRAM STAIN RESULT: NORMAL
HCT VFR BLD AUTO: 24.8 % (ref 40–53)
HGB BLD-MCNC: 7.8 G/DL (ref 13.3–17.7)
INR PPP: 1.29 (ref 0.85–1.15)
LISTERIA SPECIES (DETECTED/NOT DETECTED): NOT DETECTED
MAGNESIUM SERPL-MCNC: 1.9 MG/DL (ref 1.7–2.3)
MCH RBC QN AUTO: 20.8 PG (ref 26.5–33)
MCHC RBC AUTO-ENTMCNC: 31.5 G/DL (ref 31.5–36.5)
MCV RBC AUTO: 66 FL (ref 78–100)
PHOSPHATE SERPL-MCNC: 2.9 MG/DL (ref 2.5–4.5)
PLATELET # BLD AUTO: 260 10E3/UL (ref 150–450)
POTASSIUM SERPL-SCNC: 4.7 MMOL/L (ref 3.4–5.3)
PROCALCITONIN SERPL IA-MCNC: 0.41 NG/ML
PROT SERPL-MCNC: 5.6 G/DL (ref 6.4–8.3)
RBC # BLD AUTO: 3.75 10E6/UL (ref 4.4–5.9)
SODIUM SERPL-SCNC: 133 MMOL/L (ref 135–145)
STAPHYLOCOCCUS AUREUS: NOT DETECTED
STAPHYLOCOCCUS EPIDERMIDIS: DETECTED
STAPHYLOCOCCUS LUGDUNENSIS: NOT DETECTED
STREPTOCOCCUS AGALACTIAE: NOT DETECTED
STREPTOCOCCUS ANGINOSUS GROUP: NOT DETECTED
STREPTOCOCCUS PNEUMONIAE: NOT DETECTED
STREPTOCOCCUS PYOGENES: NOT DETECTED
STREPTOCOCCUS SPECIES: NOT DETECTED
WBC # BLD AUTO: 13.3 10E3/UL (ref 4–11)

## 2024-01-31 PROCEDURE — 258N000003 HC RX IP 258 OP 636: Performed by: STUDENT IN AN ORGANIZED HEALTH CARE EDUCATION/TRAINING PROGRAM

## 2024-01-31 PROCEDURE — 71046 X-RAY EXAM CHEST 2 VIEWS: CPT

## 2024-01-31 PROCEDURE — 83735 ASSAY OF MAGNESIUM: CPT | Performed by: NURSE PRACTITIONER

## 2024-01-31 PROCEDURE — 85610 PROTHROMBIN TIME: CPT | Performed by: SURGERY

## 2024-01-31 PROCEDURE — 250N000011 HC RX IP 250 OP 636

## 2024-01-31 PROCEDURE — 250N000013 HC RX MED GY IP 250 OP 250 PS 637: Performed by: STUDENT IN AN ORGANIZED HEALTH CARE EDUCATION/TRAINING PROGRAM

## 2024-01-31 PROCEDURE — 87040 BLOOD CULTURE FOR BACTERIA: CPT | Performed by: PHYSICIAN ASSISTANT

## 2024-01-31 PROCEDURE — 90791 PSYCH DIAGNOSTIC EVALUATION: CPT | Mod: HN

## 2024-01-31 PROCEDURE — 86140 C-REACTIVE PROTEIN: CPT | Performed by: PHYSICIAN ASSISTANT

## 2024-01-31 PROCEDURE — 84145 PROCALCITONIN (PCT): CPT | Performed by: PHYSICIAN ASSISTANT

## 2024-01-31 PROCEDURE — 80053 COMPREHEN METABOLIC PANEL: CPT | Performed by: SURGERY

## 2024-01-31 PROCEDURE — 71046 X-RAY EXAM CHEST 2 VIEWS: CPT | Mod: 26 | Performed by: STUDENT IN AN ORGANIZED HEALTH CARE EDUCATION/TRAINING PROGRAM

## 2024-01-31 PROCEDURE — 84100 ASSAY OF PHOSPHORUS: CPT

## 2024-01-31 PROCEDURE — 250N000013 HC RX MED GY IP 250 OP 250 PS 637: Performed by: PHYSICIAN ASSISTANT

## 2024-01-31 PROCEDURE — 36415 COLL VENOUS BLD VENIPUNCTURE: CPT | Performed by: SURGERY

## 2024-01-31 PROCEDURE — 120N000003 HC R&B IMCU UMMC

## 2024-01-31 PROCEDURE — 250N000013 HC RX MED GY IP 250 OP 250 PS 637

## 2024-01-31 PROCEDURE — 85027 COMPLETE CBC AUTOMATED: CPT

## 2024-01-31 PROCEDURE — 36415 COLL VENOUS BLD VENIPUNCTURE: CPT | Performed by: PHYSICIAN ASSISTANT

## 2024-01-31 PROCEDURE — 97535 SELF CARE MNGMENT TRAINING: CPT | Mod: GO

## 2024-01-31 PROCEDURE — 250N000013 HC RX MED GY IP 250 OP 250 PS 637: Performed by: SURGERY

## 2024-01-31 PROCEDURE — 82330 ASSAY OF CALCIUM: CPT | Performed by: NURSE PRACTITIONER

## 2024-01-31 PROCEDURE — 250N000011 HC RX IP 250 OP 636: Performed by: STUDENT IN AN ORGANIZED HEALTH CARE EDUCATION/TRAINING PROGRAM

## 2024-01-31 PROCEDURE — 97110 THERAPEUTIC EXERCISES: CPT | Mod: GO

## 2024-01-31 PROCEDURE — 87205 SMEAR GRAM STAIN: CPT | Performed by: PHYSICIAN ASSISTANT

## 2024-01-31 RX ORDER — CARVEDILOL 25 MG/1
25 TABLET ORAL 2 TIMES DAILY
Status: DISCONTINUED | OUTPATIENT
Start: 2024-01-31 | End: 2024-02-04 | Stop reason: HOSPADM

## 2024-01-31 RX ORDER — MAGNESIUM OXIDE 400 MG/1
400 TABLET ORAL EVERY 4 HOURS
Status: COMPLETED | OUTPATIENT
Start: 2024-01-31 | End: 2024-01-31

## 2024-01-31 RX ORDER — METHOCARBAMOL 750 MG/1
750 TABLET, FILM COATED ORAL 4 TIMES DAILY
Status: DISCONTINUED | OUTPATIENT
Start: 2024-01-31 | End: 2024-02-04 | Stop reason: HOSPADM

## 2024-01-31 RX ADMIN — HYDRALAZINE HYDROCHLORIDE 10 MG: 20 INJECTION INTRAMUSCULAR; INTRAVENOUS at 05:04

## 2024-01-31 RX ADMIN — HYDRALAZINE HYDROCHLORIDE 10 MG: 20 INJECTION INTRAMUSCULAR; INTRAVENOUS at 09:50

## 2024-01-31 RX ADMIN — VANCOMYCIN HYDROCHLORIDE 1750 MG: 1 INJECTION, POWDER, LYOPHILIZED, FOR SOLUTION INTRAVENOUS at 16:26

## 2024-01-31 RX ADMIN — GABAPENTIN 100 MG: 100 CAPSULE ORAL at 20:07

## 2024-01-31 RX ADMIN — DOCUSATE SODIUM 50 MG AND SENNOSIDES 8.6 MG 1 TABLET: 8.6; 5 TABLET, FILM COATED ORAL at 20:07

## 2024-01-31 RX ADMIN — HYDRALAZINE HYDROCHLORIDE 10 MG: 20 INJECTION INTRAMUSCULAR; INTRAVENOUS at 20:07

## 2024-01-31 RX ADMIN — CARVEDILOL 12.5 MG: 3.12 TABLET, FILM COATED ORAL at 08:31

## 2024-01-31 RX ADMIN — METHOCARBAMOL 500 MG: 500 TABLET ORAL at 08:33

## 2024-01-31 RX ADMIN — HYDRALAZINE HYDROCHLORIDE 10 MG: 20 INJECTION INTRAMUSCULAR; INTRAVENOUS at 06:01

## 2024-01-31 RX ADMIN — GABAPENTIN 100 MG: 100 CAPSULE ORAL at 08:33

## 2024-01-31 RX ADMIN — OXYCODONE HYDROCHLORIDE 10 MG: 10 TABLET ORAL at 06:01

## 2024-01-31 RX ADMIN — MAGNESIUM OXIDE TAB 400 MG (241.3 MG ELEMENTAL MG) 400 MG: 400 (241.3 MG) TAB at 06:01

## 2024-01-31 RX ADMIN — AMLODIPINE BESYLATE 5 MG: 5 TABLET ORAL at 08:32

## 2024-01-31 RX ADMIN — GABAPENTIN 100 MG: 100 CAPSULE ORAL at 13:49

## 2024-01-31 RX ADMIN — ROSUVASTATIN CALCIUM 20 MG: 20 TABLET, FILM COATED ORAL at 08:31

## 2024-01-31 RX ADMIN — MAGNESIUM OXIDE TAB 400 MG (241.3 MG ELEMENTAL MG) 400 MG: 400 (241.3 MG) TAB at 09:50

## 2024-01-31 RX ADMIN — HYDRALAZINE HYDROCHLORIDE 10 MG: 20 INJECTION INTRAMUSCULAR; INTRAVENOUS at 11:30

## 2024-01-31 RX ADMIN — HEPARIN SODIUM 5000 UNITS: 5000 INJECTION, SOLUTION INTRAVENOUS; SUBCUTANEOUS at 13:49

## 2024-01-31 RX ADMIN — ASPIRIN 81 MG CHEWABLE TABLET 81 MG: 81 TABLET CHEWABLE at 08:31

## 2024-01-31 RX ADMIN — ACETAMINOPHEN 975 MG: 325 TABLET, FILM COATED ORAL at 05:01

## 2024-01-31 RX ADMIN — ACETAMINOPHEN 975 MG: 325 TABLET, FILM COATED ORAL at 09:50

## 2024-01-31 RX ADMIN — HEPARIN SODIUM 5000 UNITS: 5000 INJECTION, SOLUTION INTRAVENOUS; SUBCUTANEOUS at 05:30

## 2024-01-31 RX ADMIN — ACETAMINOPHEN 975 MG: 325 TABLET, FILM COATED ORAL at 21:43

## 2024-01-31 RX ADMIN — PANTOPRAZOLE SODIUM 40 MG: 40 TABLET, DELAYED RELEASE ORAL at 08:31

## 2024-01-31 RX ADMIN — ACETAMINOPHEN 975 MG: 325 TABLET, FILM COATED ORAL at 16:25

## 2024-01-31 RX ADMIN — INSULIN ASPART 1 UNITS: 100 INJECTION, SOLUTION INTRAVENOUS; SUBCUTANEOUS at 18:53

## 2024-01-31 RX ADMIN — HEPARIN SODIUM 5000 UNITS: 5000 INJECTION, SOLUTION INTRAVENOUS; SUBCUTANEOUS at 21:44

## 2024-01-31 RX ADMIN — METHOCARBAMOL 750 MG: 750 TABLET ORAL at 11:29

## 2024-01-31 RX ADMIN — Medication 10 MG: at 21:43

## 2024-01-31 RX ADMIN — METHOCARBAMOL 750 MG: 750 TABLET ORAL at 20:07

## 2024-01-31 RX ADMIN — HYDRALAZINE HYDROCHLORIDE 10 MG: 20 INJECTION INTRAMUSCULAR; INTRAVENOUS at 02:39

## 2024-01-31 RX ADMIN — OXYCODONE HYDROCHLORIDE 10 MG: 10 TABLET ORAL at 02:39

## 2024-01-31 RX ADMIN — HYDRALAZINE HYDROCHLORIDE 10 MG: 20 INJECTION INTRAMUSCULAR; INTRAVENOUS at 16:23

## 2024-01-31 RX ADMIN — CARVEDILOL 25 MG: 25 TABLET, FILM COATED ORAL at 20:07

## 2024-01-31 RX ADMIN — OXYCODONE HYDROCHLORIDE 10 MG: 10 TABLET ORAL at 20:07

## 2024-01-31 RX ADMIN — METHOCARBAMOL 750 MG: 750 TABLET ORAL at 16:25

## 2024-01-31 ASSESSMENT — ACTIVITIES OF DAILY LIVING (ADL)
ADLS_ACUITY_SCORE: 23

## 2024-01-31 NOTE — CONSULTS
[facility]SULT[facility]     Patient:  Valdo Lyons   Date of birth 1975, Medical record number [id number]  Date of Visit:  01/31/2024  Date of Admission: 1/16/2024  Consult Requester:Addi Shaw MD          Assessment and Recommendations:   ID Problem List   1. MRSE + BCX (1/2) from 1/30, repeats pending   2. AAA repair and TEVAR 1/23   3. Leukocytosis  4. R apical pneumothorax, post op chest tube in place     RECOMMENDATION:  Blood culture from 1/30 (1/2) positive from peripheral draw for MRSE- suspect contaminant at this time. Repeat cultures 1/31 pending. If these remain negative, supports suspicion of contaminant culture 1/30.   We would hold off on initiation of antibiotics given likely contaminant unless patient becomes unstable, febrile, or decompensates. Currently he is well without any symptoms.   If patient becomes unstable or febrile, would start Vancomycin at that time.     Thank you for the consult. Infectious disease will sign off at this time. Do not hesitate to contact us with additional questions or change in patient's clinical status.      ASSESSMENT:  Valdo Lyons is a 48 year old male with recent surgical repair of a type B aortic dissection and TEVAR on 1/23. Now with 1/2 BCx + with MRSE on 1/30.     Patient has been afebrile throughout admission. Had initial leukocytosis to 15K which improved independently 1/21-22. He had surgery 1/23 with suspected post op rise in WBC, however leukocytosis has remained since surgery, initially downtredned from 22K to 12 (1/28) with rise to 16K (1/30)- bumping around in mid teens now. CRP elevated on check 1/31. PCT 0.41. Not on antibiotics (received periop vanc+Cefazolin 1/23-24). CTA CAP w/ contrast 1/30 with post op collection of air bubbles around graft upto 19mm thickness, sm apical pneumothorax persists despite R chest tube in place.     BCx collected 1/30 positive 1/2 from peripheral draw for MRSE which  seems likely to be a skin contaminant. Repeats drawn 1/31 pending (no antibiotics prior). If these remain negative, likely that 1/30 culture is a contaminant. If 1/31 cultures return positive for MRSE, this is more supportive of true bacteremia. Given low virulence of MRSE, patient being well appearing, and lower suspicion of true infection- reasonable to hold on antibiotic initiation while watching pending cultures. If patient declines, or becomes febrile, would initiate Vancomycin at that time.     Thank you for this consult. ID will sign off.    Patient was discussed with Dr. Harmon.     Beal Azul PA-C  Infectious Diseases  Pager #[phone]          History of Present Illness:     Valdo Lyons is a 48 year old male with PMHx significant for uncontrolled HTN and childhood seizures who presented to [facility] 1/12 with an acute type B dissection with concern for retrograde tear prompting transfer to [facility]. He is s/p ascending aortic aneurysm hemiarch repair and TEVAR 1/23. Course c/b R apical pneumothorax, CT tube in place already post surgery, ileus now resolved, leukocytosis.    He is feeling well overall. Notes some increased fatigue today due to not sleeping well overnight. Otherwise denies symptom complaints- no headaches, cough, SOB, abdominal pain, n/v/d, drainage from surgical sites, difficulty urinating. No fevers. Denies chills.          Past Medical History:     Past Medical History:   Diagnosis Date    Dissecting aneurysm of thoracic aorta, [place] type B (H)     HTN (hypertension)             Past Surgical History:     Past Surgical History:   Procedure Laterality Date    ENDOVASCULAR REPAIR ANEURYSM THORACIC AORTIC N/A 1/23/2024    Procedure: Antegrade Thoracic Endovascular Aortic Repair (TEVAR) with [place] Conformable Thoracic Stent Graft 37mm x 10cm. Intravascular Ultrasound (IVUS), Aortography, Right common femoral access;  Surgeon: Artur Singh MD;  Location: [facility] OR    IR OR ANGIOGRAM   1/23/2024    PICC DOUBLE LUMEN PLACEMENT Right 01/16/2024    5FR DL PICC, basilic vein. L-43cm, 1cm out.    REPAIR ANEURYSM ASCENDING AORTA N/A 1/23/2024    Procedure: MEDIAN STERNOTOMY, RIGHT AXILLARY CUTDOWN, ON CARDIOPULMONARY BYPASS (CIRCULATORY ARREST), ASCENDING AORTA ANEURYSM REPAIR ([place] 30mm), INTRAOPERATIVE TRANSESOPHAGEAL ECHOCARDIOGRAM BY ANESTHESIA;  Surgeon: Addi Shaw MD;  Location:  OR            Family History:     No family history on file.         Social History:     Social History     Tobacco Use    Smoking status: Not on file    Smokeless tobacco: Not on file   Substance Use Topics    Alcohol use: Not on file     History   Sexual Activity    Sexual activity: Not on file            Current Medications:      acetaminophen  975 mg Oral Q6H ETIENNE    amLODIPine  5 mg Oral Daily    aspirin  81 mg Oral or NG Tube Daily    carvedilol  12.5 mg Oral BID    gabapentin  100 mg Oral TID    heparin ANTICOAGULANT  5,000 Units Subcutaneous Q8H    insulin aspart  1-7 Units Subcutaneous TID AC    insulin aspart  1-5 Units Subcutaneous At Bedtime    melatonin  10 mg Oral At Bedtime    methocarbamol  750 mg Oral 4x Daily    pantoprazole  40 mg Oral QAM AC    polyethylene glycol  17 g Oral Daily    rosuvastatin  20 mg Oral Daily    senna-docusate  1 tablet Oral BID    sodium chloride (PF)  3 mL Intracatheter Q8H            Allergies:   No Known Allergies         Physical Exam:   Vitals were reviewed  Patient Vitals for the past 24 hrs:   BP Temp Temp src Pulse Resp SpO2 Weight   01/31/24 0950 125/63 -- -- -- -- -- --   01/31/24 0902 135/59 97.9  F (36.6  C) Oral -- 18 98 % --   01/31/24 0848 -- -- -- -- -- -- 83.8 kg (184 lb 12.8 oz)   01/31/24 0832 (!) 144/77 -- -- -- -- -- --   01/31/24 0825 (!) 144/77 98  F (36.7  C) Oral -- 16 -- --   01/31/24 0622 114/67 -- -- 77 -- -- --   01/31/24 0601 135/77 -- -- -- -- -- --   01/31/24 0538 135/68 -- -- 79 -- -- --   01/31/24 0500 129/67 98.2  F (36.8  C)  Oral 80 -- 95 % --   01/31/24 0239 (!) 142/78 -- -- 80 -- -- --   01/30/24 2353 113/76 -- -- 81 20 96 % --   01/30/24 2053 114/69 -- -- 75 -- -- --   01/30/24 2031 121/64 -- -- 78 -- -- --   01/30/24 2014 127/71 -- -- 79 -- -- --   01/30/24 1956 127/75 -- -- 77 -- -- --   01/30/24 1943 129/75 98.6  F (37  C) Axillary 76 -- 98 % --   01/30/24 1939 129/75 -- -- 80 -- -- --   01/30/24 1545 120/60 -- -- -- -- 96 % --   01/30/24 1542 120/60 99.7  F (37.6  C) Oral 80 19 96 % --   01/30/24 1200 120/81 98.3  F (36.8  C) Oral 84 18 95 % --   01/30/24 1100 102/64 -- -- 78 -- -- --       Physical Examination:  Constitutional: Pleasant male seen sitting up in bed, in NAD. Alert and interactive.   HEENT: NCAT, anicteric sclerae, conjunctiva clear. Moist mucous membranes.  Respiratory: Non-labored breathing on RA. Lungs CTAB.  Cardiovascular: Regular rate and rhythm. Sternal incision well approximated and without surrounding erythema or drainage. R groin access site healing well, no e/o infection.  GI: Normoactive BS. Abdomen is soft, non-distended.  Skin: Warm and dry. No rashes.  Musculoskeletal: Extremities grossly normal. No tenderness or edema present.   Neurologic: A &O x3, speech normal, answering questions appropriately. Moves all extremities spontaneously. Grossly non-focal.  Neuropsychiatric: Mentation and affect normal/appropriate.  VAD: PIVs         Laboratory Data:     Inflammatory Markers  No lab results found.    Hematology Studies    Recent Labs   Lab Test 01/31/24  0437 01/30/24  0603 01/29/24  0309 01/28/24 2006 01/28/24  0322 01/27/24  1250 01/27/24  0420   WBC 13.3* 16.4* 13.8* 12.7* 12.5*  --  13.6*   HGB 7.8* 8.3* 8.0* 8.1* 7.1* 7.6* 7.4*   MCV 66* 66* 65* 64* 62*  --  61*    246 207 175 170  --  133*       Metabolic Studies     Recent Labs   Lab Test 01/31/24  0437 01/30/24  0603 01/29/24  0757 01/29/24  0309 01/29/24  0006 01/28/24  2006 01/28/24  1816 01/28/24  0736 01/28/24  0322   * 134*   "--  134*  --   --  134*  --  134*   POTASSIUM 4.7 4.5 4.2 4.2 4.2   < > 4.1  4.1   < > 3.7  3.7   CHLORIDE 101 100  --  97*  --   --  97*  --  97*   CO2 25 25  --  30*  --   --  29  --  31*   BUN 11.9 12.6  --  15.3  --   --  17.2  --  20.1*   CR 0.91 0.90  --  0.97  --   --  1.01  --  0.96   GFRESTIMATED >90 >90  --  >90  --   --  >90  --  >90    < > = values in this interval not displayed.       Hepatic Studies    Recent Labs   Lab Test 01/31/24  0437 01/30/24  0603 01/29/24  0309 01/28/24  0322 01/27/24  0420 01/26/24  0341   BILITOTAL 0.5 0.6 0.6 0.5 0.6 0.5   ALKPHOS 64 65 64 62 66 55   ALBUMIN 2.9* 2.8* 2.9* 2.8* 3.0* 3.0*   AST 27 27 31 31 39 53*   ALT 34 29 31 28 27 33       Microbiology:  Culture   Date Value Ref Range Status   01/30/2024 Positive on the 1st day of incubation (A)  Preliminary   01/30/2024 Gram positive cocci in clusters (AA)  Preliminary     Comment:     2 of 2 bottles   01/30/2024 No growth after 12 hours  Preliminary       Urine Studies    Recent Labs   Lab Test 01/30/24  2118 01/26/24  1257 01/19/24  1524 08/20/22  1929   LEUKEST Negative Negative Negative Negative   WBCU 0 <1 <1 1       Vancomycin Levels  No lab results found.    Invalid input(s): \"VANCO\"    Hepatitis B Testing No lab results found.  Hepatitis C Testing   No results found for: \"HCVAB\", \"HQTG\", \"HCGENO\", \"HCPCR\", \"HQTRNA\", \"HEPRNA\"  Respiratory Virus Testing    No results found for: \"RS\", \"FLUAG\"    IMAGING    CTA CAP w/ contrast 1/30/24  IMPRESSION:  1. Patent ascending aortic graft. No leak. Postoperative collection  with air bubbles around the graft measures up to 19 mm in thickness.  2. Patent thoracic aortic endograft in the true lumen from the left  subclavian artery origin to T7. Dissection extends through the left  subclavian artery. Its terminus in the left subclavian artery is not  well visualized. Left vertebral artery is thought to originate from  the true lumen. Dissection ends in the supraceliac " aorta.  3. Right chest tube in place. Small apical right pneumothorax.

## 2024-01-31 NOTE — DISCHARGE SUMMARY
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  Surgeon: Dr. Shaw  1.  Median sternotomy and cannulation of the right subclavian artery and bi-caval venous cannulation for cardiopulmonary bypass with deep hypothermic circulatory arrest and selective antegrade cerebral perfusion.   2.  Repair of Louise Type A aortic dissection with a 30 mm Gelweave graft of the ascending aorta from the sinotubular junction to the distal ascending aorta/proximal hemiarch.   3.   Antegrade Thoracic Endovascular repair of acute Type B dissection using a 37 x 100 mm [place] cTAG stent graft (Vascular Surgery)  Intra-operative Findings   There was a dissection of the ascending aorta extending through the arch and into the descending thoracic aorta. The dissection flap did not involve the aortic root and terminated at the sinotubular junction. The intimal tear was not visualized from the chest;  however on pre-operative imaging, was noted to originate distal to the left subclavian artery. The aortic valve was competent. The coronary ostia were in the usual anatomic position. After reperfusion and defibrillation, sinus rhythm resumed. Left ventricular function was normal preoperatively and preserved after bypass on low dose inotropic support. The patient was coagulopathic postoperatively but hemostasis was achieved.     Date: 1/23/24.  Surgeon: Dr. Singh  Antegrade Thoracic Endovascular repair of acute Type B dissection using a 37x100 mm [place] cTAG stent-graft deployed in Zone  3-4  Stented Aggressive Hemiarch under deep hypothermic circulatory arrest with through and through right common femoral to ascending aortic access.  Thoracic and Abdominal Aortogram and radiologic interpretation.  Intravascular ultrasound of the thoracoabdominal aorta and aortic arch.  Total percutaneous left femoral approach with Perclose device, 8-Fr sheath on the right.  Intra-operative Findings   True lumen confirmed with IVUS. Antegrade TEVAR on circulatory arrest. Hemostatic groin post procedure with palpable pedal pulses post operatively stable from pre.       PATHOLOGY RESULTS:    Final Diagnosis   A. ASCENDING AORTA:  - Sections of elastic artery with medial necrosis and organized thrombus  - Adventitial hemorrhage consistent with clinical diagnosis of dissection     CULTURE RESULTS:    All cultures:  Recent Labs   Lab 01/31/24  2041 01/31/24  0814 01/31/24  0809 01/30/24  0912 01/30/24  0904   CULTURE >10 Squamous epithelial cells/low power field indicates oral contamination. Please recollect. No growth after 3 days No growth after 3 days Positive on the 1st day of incubation*  Staphylococcus epidermidis* No growth after 4 days       CONSULTS:    PT/OT  Intensivist  Vascular Surgery  Psychiatry  Healthy Psychology  Nephrology  Ophthalmology   Infectious Disease     BRIEF HISTORY OF ILLNESS:  Valdo Lyons is a 48  year old male with PMH of uncontrolled HTN and childhood seizures. Patient [place] to [facility] [place]12 with acute type B aortic dissection from the origin of the left subclavian to the diaphragm just superior to the celiac axis, later having worsening back pain and increased variation in BP from arterial lines in his right and left hands, so repeat CT scan was done which showed Type A dissection with a thrombosed retrograde false lumen, so he was transferred to [facility]. Repeat CTA 1/17 with decision for surgical management, medically managed with strict BP goals in interim. Prior to surgery, he had agitation and encephalopathy for which Psychiatry was consulted and sedation was administered; this may have been exacerbated by his history of cognitive dysfunction at baseline.  Nevertheless, it resolved such that he was able to be weaned from sedation in order to participate in the consent process and was agreeable to proceeding with surgical repair of his dissection.      HOSPITAL COURSE: Valdo Lyons is a 48 year old male who on 1/23/2024 underwent the above-named procedures and tolerated the operation well.     Postoperatively was admitted to the CVICU.  Patient was extubated within protocol on POD #1.  Blood pressure and cardiac index were managed with vasopressors and inotropic agents which were continuously weaned until no longer needed.  He developed an MIKA for which Nephrology was consulted;  SCr peaked at 2.25 and then progressively recovered to WNL with conservative management.  Patient was subsequently  transferred to the surgical telemetry floor.    While on the surgical unit, the patient continued to progress well. Chest tubes and temporary pacemaker wires were removed when deemed appropriate.     Patient was fluid overloaded and treated with diuretics. He will discharge below his admission weight without further diuretic therapy.     Patient was transiently hyperglycemic and treated with insulin infusion  then transitioned to sliding scale insulin per protocol. Blood sugars remained stable. No further glycemic control agents needed at this time.    He was started on an aggressive oral antihypertensive regimen to maintain SBP<120 given his aortic dissection.  Investigation was undertaken to rule out organic causes for his marked hypertension; nothing was found other than a simple renal cyst.     He complained of blurry vision and Ophthalmology was consulted.  Visual acuity was found to be 20/20 without obvious explanation for the transient change in vision.  Ophtho has recommended OP follow up for a dilated exam within the next week.    Infectious work-up on 1/30/2024 returned with 1 of 2 blood culture bottles positive for staph epi.  Infectious Diseases was consulted and felt this was likely contaminant; they recommended monitoring without antibiosis and repeating cultures.  Dr. Shaw, however, felt it prudent to start empiric vancomycin while repeat cultures were pending.  WBC normalized and repeat cultures preliminarily (final results pending) showed no growth and so antibiotics were stopped after 48 hours.    Given his prolonged hospitalization and associated anxiety, Health Psychology was consulted to assist with coping skills.     Prior to discharge, his pain was controlled well, he was working well with therapies, able to perform most ADLs, ambulate without difficulty, and had full return of bowel and bladder function.  On 2/4/24, he was discharged home in good condition. Follow up with Cardiology and Cardiac Surgery have been arranged. Pt encouraged to follow up with PCP and cardiac rehab upon discharge.    Patient discharged on aspirin:  Yes - 81 mg  Patient discharged on beta blocker: yes - Coreg BID   Patient discharged on ACE Inhibitor/ARB: yes - losartan    Patient discharged on statin: yes - atorvastatin         Discharge Disposition:     Discharged home            Condition on Discharge:     Discharge  "condition: Good   Discharge vitals: Blood pressure 118/75, pulse 81, temperature 98.2  F (36.8  C), temperature source Oral, resp. rate 18, height 1.727 m (5' 8\"), weight 79.7 kg (175 lb 11.2 oz), SpO2 98%.   Code status on discharge: Full Code     Vitals:    02/02/24 0500 02/03/24 0300 02/04/24 0300   Weight: 81 kg (178 lb 9.6 oz) 80.9 kg (178 lb 4.8 oz) 79.7 kg (175 lb 11.2 oz)       DAY OF DISCHARGE PHYSICAL EXAM:    Gen: A&Ox4, NAD, up at maninder in room, pleasant, calm, cooperative on exam  Neuro: no focal deficits, face symmetric, speech clear, BLACKBURN, gait stable  CV: RRR on monitor, WWP, no peripheral edema  Pulm: unlabored breathing on RA  Abd: SNTND  Ext: no peripheral edema  Incision: clean, dry, intact, no erythema or induration, sternum stable  Tubes/drain sites: scabbed over, LISA, no erythema or induration    LABS  Most Recent 3 CBCs:  Recent Labs   Lab Test 02/04/24  0602 02/03/24  0625 02/02/24  0418   WBC 10.0 9.5 10.7   HGB 8.3* 8.5* 8.1*   MCV 67* 66* 65*    326 331      Most Recent 3 BMPs:  Recent Labs   Lab Test 02/04/24  0602 02/03/24  0751 02/03/24  0625 02/02/24  1032 02/02/24  0418   *  --  137  --  137   POTASSIUM 4.8  --  4.5  --  4.4   CHLORIDE 101  --  103  --  106   CO2 24  --  24  --  23   BUN 14.5  --  11.7  --  11.6   CR 0.94  --  0.90  --  0.91   ANIONGAP 9  --  10  --  8   YARON 8.8  --  8.7  --  8.5*   GLC 97 104* 103*   < > 109*    < > = values in this interval not displayed.     Most Recent 2 LFTs:  Recent Labs   Lab Test 02/01/24  0628 01/31/24  0437   AST 29 27   ALT 37 34   ALKPHOS 69 64   BILITOTAL 0.5 0.5     Most Recent 3 Troponins:No lab results found.  Most Recent Cholesterol Panel:  Recent Labs   Lab Test 01/25/24  0342   CHOL 89   LDL 41   HDL 27*   TRIG 105     Most Recent 6 Bacteria Isolates From Any Culture (See EPIC Reports for Culture Details):No lab results found.  Most Recent TSH, T4 and A1c Labs:  Recent Labs   Lab Test 01/18/24  0349 01/16/24  0551   TSH " 0.57  --    A1C  --  6.1*      Recent Labs   Lab 02/04/24  0602 02/03/24  0751 02/03/24  0625 02/02/24  1444 02/02/24  1032 02/02/24  0418   GLC 97 104* 103* 130* 114* 109*       Imaging:  XR Chest 2 Views   Final Result   Impression: Small right pneumothorax.      I have personally reviewed the examination and initial interpretation   and I agree with the findings.      MARIA E CONLEY MD            SYSTEM ID:  [id number]      XR Chest 2 Views   Final Result   IMPRESSION:    1.  Interval removal of right chest tube with persistent trace right   apical pneumothorax.   2.  Small bilateral pleural effusions.   3.  Stable post surgical changes in the chest. Sternotomy wires are   intact.      I have personally reviewed the examination and initial interpretation   and I agree with the findings.      ILEANA YOO MD            SYSTEM ID:  [id number]      Echocardiogram Limited   Final Result      XR Chest 2 Views   Final Result   Impression: Trace right pneumothorax. Stable chest tube position.      I have personally reviewed the examination and initial interpretation   and I agree with the findings.      MARIA E CONLEY MD            SYSTEM ID:  [id number]      XR Chest 2 Views   Final Result   IMPRESSION: Trace right apical pneumothorax,  decreased compared to   radiograph 1/30/2024. Stable postsurgical changes and right chest   tube.      I have personally reviewed the examination and initial interpretation   and I agree with the findings.      DEANNE OFWLER MD            SYSTEM ID:  [id number]      CTA CHEST ABDOMEN PELVIS WITH CONTRAST PANEL   Final Result   IMPRESSION:   1. Patent ascending aortic graft. No leak. Postoperative collection   with air bubbles around the graft measures up to 19 mm in thickness.      2. Patent thoracic aortic endograft in the true lumen from the left   subclavian artery origin to T7. Dissection extends through the left   subclavian artery. Its terminus in the left subclavian artery is  not   well visualized. Left vertebral artery is thought to originate from   the true lumen. Dissection ends in the supraceliac aorta.      3. Right chest tube in place. Small apical right pneumothorax.      I have personally reviewed the examination and initial interpretation   and I agree with the findings.      LIZBETH BEACH MD            SYSTEM ID:  [id number]      XR Chest Port 1 View   Final Result   IMPRESSION: Unchanged small right apical pneumothorax. Aortic stenting   unchanged.      ADONAY HAUSER MD            SYSTEM ID:  [id number]      XR Chest Port 1 View   Final Result   Impression: Bilateral apical predominant pleural effusions, similar to   prior. Suggestion of small right apical pneumothorax.      I have personally reviewed the examination and initial interpretation   and I agree with the findings.      ADONAY HAUSER MD            SYSTEM ID:  [id number]      XR Chest Port 1 View   Final Result   IMPRESSION:    1. No focal airspace opacity.   2. Stable trace right apical pneumothorax with chest tube in place.   3. Supportive devices in stable positioning.      I have personally reviewed the examination and initial interpretation   and I agree with the findings.      CHRISTY KEEN DO            SYSTEM ID:  [id number]      XR Chest Port 1 View   Final Result   Impression:    1. Stable trace right apical pneumothorax.    2. Stable position of support devices.    3. Unchanged streaky bibasilar opacities.      I have personally reviewed the examination and initial interpretation   and I agree with the findings.      EDILMA VALENZUELA DO            SYSTEM ID:  [id number]      XR Chest Port 1 View   Final Result   IMPRESSION:   Stable support devices. Stable tiny right apical pneumothorax. Mild   bibasilar atelectasis.      I have personally reviewed the examination and initial interpretation   and I agree with the findings.      ILEANA YOO MD            SYSTEM ID:  [id number]      XR Chest Port 1 View    Final Result   IMPRESSION:      Tiny right apical pneumothorax with right basilar chest tube in place.   Improved aeration and decreased atelectasis throughout.       Finding of right apical pneumothorax discussed with ICU provider by   Grace at 8:36 AM 1/27/2024      I have personally reviewed the examination and initial interpretation   and I agree with the findings.      DEANNE FOWLER MD            SYSTEM ID:  [id number]      XR Abdomen Port 1 View   Final Result   IMPRESSION: Unchanged gaseous distention of bowel compared to   1/23/2024 suggesting ongoing ileus.      I have personally reviewed the examination and initial interpretation   and I agree with the findings.      EDILMA VALENZUELA DO            SYSTEM ID:  [id number]      XR Chest Port 1 View   Final Result   IMPRESSION:   Increased right basilar opacities, likely atelectasis. Stable small   left pleural effusion with retrocardiac atelectasis.      I have personally reviewed the examination and initial interpretation   and I agree with the findings.      TIFFANIE CAROLINA MD            SYSTEM ID:  [id number]      XR Chest Port 1 View   Final Result   IMPRESSION:   Small left pleural effusion with slightly improved left retrocardiac   atelectasis. New right-sided opacities, likely atelectasis.      I have personally reviewed the examination and initial interpretation   and I agree with the findings.      GHISLAINE BOWEN MD            SYSTEM ID:  [id number]      XR Chest Port 1 View   Final Result   IMPRESSION: New small left pleural effusion with likely associated   retrocardiac atelectasis. Recommend follow-up to clearing to exclude   superimposed infectious consolidation. Interval extubation. Continued   appearance of descending thoracic aortic stent graft and other chest   drains.      ADONAY HAUSER MD            SYSTEM ID:  [id number]      XR Abdomen Port 1 View   Final Result   IMPRESSION:    1. Gastric tube tip and sidehole project over the  stomach.   2. Postsurgical changes of the chest with intact median sternotomy   wires.   3. Gaseous distention of the colon in the upper abdomen, may represent   postoperative ileus.      I have personally reviewed the examination and initial interpretation   and I agree with the findings.      EDILMA VALENZUELA DO            SYSTEM ID:  [id number]      XR Chest Port 1 View   Final Result   Impression:    1. Support devices as detailed above.   2. Streaky perihilar opacities, likely postoperative atelectasis in   the setting of low lung volumes.      EDILMA VALENZUELA DO            SYSTEM ID:  [id number]      XR Surgery HINA Fluoro Less Than 5 Min   Final Result      IR OR Angiogram   Final Result      US Renal Complete w Arterial Duplex   Final Result   Impression:   1. Normal grayscale and color Doppler evaluation of the kidneys.   2. Large gradient of the peak velocities between the suprarenal and   infrarenal abdominal aorta which is likely related to the aortic   dissection flap in the upper abdominal aorta.  After comparing with   the CT from 1/17/2024, it seems as though the scan was likely of the   true lumen in the upper abdomen.  This finding is of unknown clinical   significance.    3. Right pleural effusion.      I have personally reviewed the examination and initial interpretation   and I agree with the findings.      VANDANA HERRERA MD            SYSTEM ID:  [id number]      CTA CHEST ABDOMEN PELVIS WITH CONTRAST PANEL   Final Result   IMPRESSION:   1. Aortic dissection extends from the left subclavian origin to the   supraceliac aorta. Dissection extends through the visualized left   subclavian artery. Left vertebral artery is thought to originate from   the true lumen. Left internal thoracic artery originates from the true   lumen.      2. Non specific fluid along the ascending aorta to arch.      3. 7 mm right upper lobe nodule. Per Fleischner Society   Recommendations, if the patient is at low risk of  lung cancer, further   evaluation with CT in 6-12 months is suggested with optional CT in   18-24 months. If the patient is at high risk for lung cancer, further   evaluation with CT in 6-12 months and CT at 18-24 months is suggested.      LIZBETH BEACH MD            SYSTEM ID:  [id number]      XR Chest Port 1 View   Final Result   IMPRESSION:   1.  Right upper extremity PICC tip projects over the superior cava   junction.   2.  Stable cardiomediastinal silhouette.   3.  Streaky bibasilar opacities, right more than left may represent   atelectasis versus pulmonary edema/atypical infection.      I have personally reviewed the examination and initial interpretation   and I agree with the findings.      DEANNE FOWLER MD            SYSTEM ID:  [id number]      XR Chest Port 1 View   Final Result   Impression:    1. Mild widening of the mediastinum compared to prior x-ray 2022,   consistent with patient's history of known type B aortic dissection   seen on outside imaging (imaging unavailable).   2. No focal consolidations. There is bibasilar atelectasis.   3. No significant pleural effusion, no discernible pneumothorax.      I have personally reviewed the examination and initial interpretation   and I agree with the findings.      GHISLAINE BOWEN MD            SYSTEM ID:  [id number]      Echocardiogram Complete   Final Result        Echocardiogram:  Recent Results (from the past 4320 hour(s))   Echocardiogram Limited   Result Value    LVEF  60-65%    Narrative    [id number]  [id number]  [id number]  [id number]^VAL^LADARIUS^BROWN     [facility]  Echocardiography Laboratory  [address] [place] [id number]     Name: AME CHOW  MRN: [id number]  : 1975  Study Date: 2024 01:02 PM  Age: 48 yrs  Gender: Male  [place] [facility] [place] Study: Aortic Dissection  Ordering Physician: LADARIUS NEAL  Referring Physician: LESLY SILVEIRA  Performed By: Le  Joshua     BSA: 2.0 m2  Height: 68 in  Weight: 182 lb  HR: 78  BP: 120/68 mmHg  ______________________________________________________________________________  Procedure  Limited Portable Echo Adult.  ______________________________________________________________________________  Interpretation Summary  Known Type B aortic dissection     Global and regional left ventricular function is normal with an EF of 60-65%.  Moderate concentric wall thickening consistent with left ventricular  hypertrophy is present.  Global right ventricular function is normal.  Mild tricuspid insufficiency is present.  Pulmonary artery systolic pressure is normal.  Estimated mean right atrial pressure is normal.  No pericardial effusion is present.     ______________________________________________________________________________  Left Ventricle  Left ventricular size is normal. Global and regional left ventricular function  is normal with an EF of 60-65%. Moderate concentric wall thickening consistent  with left ventricular hypertrophy is present.     Right Ventricle  The right ventricle is normal size. Global right ventricular function is  normal.     Atria  Both atria appear normal.     Mitral Valve  The mitral valve is normal.     Aortic Valve  Aortic valve is normal in structure and function.     Tricuspid Valve  Mild tricuspid insufficiency is present. The right ventricular systolic  pressure is approximated at 22.7 mmHg plus the right atrial pressure.  Pulmonary artery systolic pressure is normal.     Vessels  There is a dissection flap seen in the abdominal aorta. Estimated mean right  atrial pressure is normal.     Pericardium  No pericardial effusion is present.     Compared to Previous Study  This study was compared with the study from 1.16.24 . No significant changes  noted.     ______________________________________________________________________________  MMode/2D Measurements & Calculations  IVSd: 1.7 cm  LVIDd: 4.5  cm  LVIDs: 3.3 cm  LVPWd: 1.9 cm  FS: 27.7 %  LV mass(C)d: 371.0 grams  LV mass(C)dI: 188.9 grams/m2     asc Aorta Diam: 3.4 cm  Asc Ao diam index BSA (cm/m2): 1.7  Asc Ao diam index Ht(cm/m): 1.9  RWT: 0.83  TAPSE: 1.6 cm     Doppler Measurements & Calculations  Ao V2 max: 130.0 cm/sec  Ao max P.8 mmHg  Ao V2 mean: 88.2 cm/sec  Ao mean P.0 mmHg  Ao V2 VTI: 24.9 cm  LV V1 max P.3 mmHg  LV V1 max: 104.0 cm/sec  LV V1 VTI: 17.4 cm  TR max sal: 238.1 cm/sec  TR max P.7 mmHg  AV Sal Ratio (DI): 0.80  RV S Sal: 9.0 cm/sec     ______________________________________________________________________________  Report approved by: Steve Hernandez 2024 03:40 PM         Echocardiogram Complete   Result Value    LVEF  55-60%    Narrative    [id number]  [id number]  [id number]  [id number]^IGNACIO^BLAYNE     [facility]  Echocardiography Laboratory  [address] [place] [id number]     Name: AME CHOW  MRN: [id number]  : 1975  Study Date: 2024 03:39 AM  Age: 48 yrs  Gender: Male  Patient [facility]: [facility]  Reason For Study: Aortic Dissection  Ordering Physician: BLAYNE PIERRE  Referring Physician: LESLY SILVEIRA  Performed By: Komal Solorio RDCS     BSA: 2.0 m2  Height: 68 in  Weight: 194 lb  BP: 173/64 mmHg  ______________________________________________________________________________  Procedure  Echocardiogram with two-dimensional, color and spectral Doppler performed.  ______________________________________________________________________________  Interpretation Summary  Left ventricular size, wall motion and function are normal. The ejection  fraction is 55-60%.  Moderate concentric wall thickening consistent with left ventricular  hypertrophy is present.  Right ventricular function, chamber size, wall motion, and thickness are  normal.     Dissection flap seen in aortic arch, descending and abdominal aorta. Aortic  root appear  normal. Likely type B aortic dissection. Recommend dedicated CTA.     No pericardial effusion is present.     Previous study not available for comparison.  ______________________________________________________________________________  Left Ventricle  Left ventricular size, wall motion and function are normal. The ejection  fraction is 55-60%. Moderate concentric wall thickening consistent with left  ventricular hypertrophy is present. Left ventricular diastolic function is  normal.     Right Ventricle  Right ventricular function, chamber size, wall motion, and thickness are  normal.     Atria  Both atria appear normal.     Mitral Valve  The mitral valve is normal.     Aortic Valve  The aortic valve is tricuspid. Trace aortic insufficiency is present.     Tricuspid Valve  The tricuspid valve is normal.     Pulmonic Valve  The pulmonic valve is normal.     Vessels  Dissection flap seen in aortic arch, descending and abdominal aorta. Aortic  root appear normal. Likley type B aoartic dissection.Recommend dedicated CTA.  The aorta root is normal. Dilation of the inferior vena cava is present with  abnormal respiratory variation in diameter.     Pericardium  No pericardial effusion is present.     Miscellaneous  No significant valvular abnormalities present.     Compared to Previous Study  Previous study not available for comparison.  ______________________________________________________________________________  MMode/2D Measurements & Calculations  asc Aorta Diam: 3.7 cm  LVOT diam: 2.3 cm  LVOT area: 4.3 cm2  Asc Ao diam index BSA (cm/m2): 1.8  Asc Ao diam index Ht(cm/m): 2.1     ______________________________________________________________________________  Report approved by: Cristhian VAUGHN 01/16/2024 06:34 AM             PRE-ADMISSION MEDICATIONS:  No medications prior to admission.       DISCHARGE MEDICATIONS:   Current Discharge Medication List        START taking these medications    Details   acetaminophen  (TYLENOL) 325 MG tablet Take 3 tablets (975 mg) by mouth every 6 hours as needed for mild pain  Qty: 100 tablet, Refills: 0    Associated Diagnoses: Dissection of aorta, unspecified portion of aorta (H)      amLODIPine (NORVASC) 5 MG tablet Take 1 tablet (5 mg) by mouth 2 times daily  Qty: 60 tablet, Refills: 1    Associated Diagnoses: Hypertensive urgency      aspirin (ASA) 81 MG chewable tablet 1 tablet (81 mg) by Oral or NG Tube route daily  Qty: 90 tablet, Refills: 0    Associated Diagnoses: Dissection of aorta, unspecified portion of aorta (H)      carvedilol (COREG) 25 MG tablet Take 1 tablet (25 mg) by mouth 2 times daily  Qty: 60 tablet, Refills: 1    Associated Diagnoses: Hypertensive urgency      Lidocaine (LIDOCARE) 4 % Patch Place 1-2 patches onto the skin daily as needed for moderate pain To prevent lidocaine toxicity, patient should be patch free for 12 hrs daily.  Qty: 24 patch, Refills: 0    Comments: No need to send prior authorization if Rx not covered or poorly covered by insurance.  If pt desires to continue therapy, please recommend OTC 4% lidocaine patches.  Associated Diagnoses: Dissection of aorta, unspecified portion of aorta (H)      losartan (COZAAR) 25 MG tablet Take 2.5 tablets (62.5 mg) by mouth daily  Qty: 75 tablet, Refills: 1    Associated Diagnoses: Hypertensive urgency      methocarbamol (ROBAXIN) 750 MG tablet Take 1 tablet (750 mg) by mouth every 6 hours as needed for muscle spasms  Qty: 28 tablet, Refills: 1    Associated Diagnoses: Dissection of aorta, unspecified portion of aorta (H)      oxyCODONE (ROXICODONE) 5 MG tablet Take 1 tablet (5 mg) by mouth every 8 hours as needed for moderate to severe pain  Qty: 10 tablet, Refills: 0    Associated Diagnoses: Dissection of aorta, unspecified portion of aorta (H)      pantoprazole (PROTONIX) 40 MG EC tablet Take 1 tablet (40 mg) by mouth every morning (before breakfast)  Qty: 14 tablet, Refills: 0    Associated Diagnoses:  Dissection of aorta, unspecified portion of aorta (H)      polyethylene glycol (MIRALAX) 17 GM/Dose powder Take 17 g by mouth daily  Qty: 510 g, Refills: 0    Associated Diagnoses: Dissection of aorta, unspecified portion of aorta (H)      rosuvastatin (CRESTOR) 20 MG tablet Take 1 tablet (20 mg) by mouth daily  Qty: 30 tablet, Refills: 1    Associated Diagnoses: Dissection of aorta, unspecified portion of aorta (H)              CC:  No Ref-Primary, Physician    Derick Read CNP, ACNPC[facility]   Cardiothoracic Surgery  Office phone: [phone]  Office fax: [phone]

## 2024-01-31 NOTE — PROGRESS NOTES
"VASCULAR SURGERY PROGRESS NOTE    Subjective:  POD #8 from hemiarch repair and TEVAR on 1/23/24. Feeling well this morning, just woke up. Noted he walked several times yesterday in the [place], feeling very warm this morning.    Objective:  Intake/Output Summary (Last 24 hours) at 1/31/2024 1156  Last data filed at 1/31/2024 1137  Gross per 24 hour   Intake 622 ml   Output 1920 ml   Net -1298 ml     Labs:  ROUTINE IP LABS (Last four results)  BMP  Recent Labs   Lab 01/31/24  1128 01/31/24  0827 01/31/24  0437 01/30/24 2128 01/30/24  0731 01/30/24  0603 01/29/24  0804 01/29/24  0757 01/29/24  0309 01/28/24 2006 01/28/24  1816   NA  --   --  133*  --   --  134*  --   --  134*  --  134*   POTASSIUM  --   --  4.7  --   --  4.5  --  4.2 4.2   < > 4.1  4.1   CHLORIDE  --   --  101  --   --  100  --   --  97*  --  97*   YARON  --   --  8.4*  --   --  8.4*  --   --  8.2*  --  8.1*   CO2  --   --  25  --   --  25  --   --  30*  --  29   BUN  --   --  11.9  --   --  12.6  --   --  15.3  --  17.2   CR  --   --  0.91  --   --  0.90  --   --  0.97  --  1.01   * 99 112* 110*   < > 93   < >  --  97   < > 145*    < > = values in this interval not displayed.     CBC  Recent Labs   Lab 01/31/24  0437 01/30/24  0603 01/29/24  0309 01/28/24 2006   WBC 13.3* 16.4* 13.8* 12.7*   RBC 3.75* 3.92* 3.74* 3.82*   HGB 7.8* 8.3* 8.0* 8.1*   HCT 24.8* 25.8* 24.4* 24.6*   MCV 66* 66* 65* 64*   MCH 20.8* 21.2* 21.4* 21.2*   MCHC 31.5 32.2 32.8 32.9   RDW 20.2* 19.9* 19.3* 18.9*    246 207 175     INR  Recent Labs   Lab 01/31/24  0437 01/30/24  0603 01/29/24  0309 01/28/24  0322   INR 1.29* 1.26* 1.17* 1.17*     PHYSICAL EXAM:  /59   Pulse 77   Temp 98.6  F (37  C) (Oral)   Resp 18   Ht 1.727 m (5' 8\")   Wt 83.8 kg (184 lb 12.8 oz)   SpO2 98%   BMI 28.10 kg/m    General: The patient is awake and alert, NAD  Psych: Pleasant affect, answers questions appropriately  Skin: Color appropriate for race, warm, dry.  Neuro: " Sensorimotor intact in BUE and BLE, CN II-XII grossly intact  Respiratory: Breathing unlabored on RA  GI:  Abdomen soft, non-distended.  Extremities: Bilateral femoral and DP pulses palpable, mild edema noted on ankles.    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 Due to worsening back pain and increased variation in BP from arterial lines in his right and left hands, repeat CT scan was done which showed Type A dissection with a thrombosed retrograde false lumen, so he was transferred to [facility]. Now POD #8 from hemiarch repair and TEVAR on 1/23/24 with palpable pulses in femoral, and DP bilaterally. Leukocytosis, blood cultures growing GPCs in clusters from 1/2 cultures, suspect contaminant, re-collect blood cultures 1/31.      - Continue to encourage ambulation at least 4x daily  - Aspirin 81mg daily  - CTA from last night with patent ascending aortic graft. No leak. Postoperative collection with air bubbles around the graft measures up to 19 mm in thickness. Patent thoracic aortic endograft in the true lumen from the left subclavian artery origin to T7. Dissection ends at the diaphragm above the celiac origin.    Discussed patients history, exam, assessment and plan with Dr. Dodge who will discuss with staff.     Candie Parkinson CNP  Vascular Surgery  Pager: [phone]  [email or web address]  Send message or 10 digit call back number Securely via [email or web address] with the [email or web address] Web Console (learn more here)

## 2024-01-31 NOTE — PHARMACY-VANCOMYCIN DOSING SERVICE
Pharmacy Vancomycin Initial Note  Date of Service 2024  Patient's  1975  48 year old, male    Indication: Bacteremia    Current estimated CrCl = Estimated Creatinine Clearance: 105.9 mL/min (based on SCr of 0.91 mg/dL).    Creatinine for last 3 days  2024:  6:16 PM Creatinine 1.01 mg/dL  2024:  3:09 AM Creatinine 0.97 mg/dL  2024:  6:03 AM Creatinine 0.90 mg/dL  2024:  4:37 AM Creatinine 0.91 mg/dL    Recent Vancomycin Level(s) for last 3 days  No results found for requested labs within last 3 days.      Vancomycin IV Administrations (past 72 hours)        No vancomycin orders with administrations in past 72 hours.                    Nephrotoxins and other renal medications (From now, onward)      Start     Dose/Rate Route Frequency Ordered Stop    24 0300  vancomycin (VANCOCIN) 1,250 mg in 0.9% NaCl 250 mL intermittent infusion         1,250 mg  over 90 Minutes Intravenous EVERY 12 HOURS 24 1418      24 1500  vancomycin (VANCOCIN) 1,750 mg in sodium chloride 0.9 % 500 mL intermittent infusion         1,750 mg  over 120 Minutes Intravenous ONCE 24 1413              Contrast Orders - past 72 hours (72h ago, onward)      Start     Dose/Rate Route Frequency Stop    24 1730  iopamidol (ISOVUE-370) solution 90 mL         90 mL Intravenous ONCE 24 1744            InsightRX Prediction of Planned Initial Vancomycin Regimen  Loading dose: 1750 mg IV  x 1  Regimen: 1250 mg IV every 12 hours.  Start time: 16:39 on 2024  Exposure target: AUC24 (range)400-600 mg/L.hr   AUC24,ss: 549 mg/L.hr  Probability of AUC24 > 400: 81 %  Ctrough,ss: 17 mg/L  Probability of Ctrough,ss > 20: 36 %  Probability of nephrotoxicity (Lodise LUISA ): 13 %          Plan:  Start vancomycin  1750 mg IV x 1 LD then 1250  mg IV q12h.   Vancomycin monitoring method: AUC  Vancomycin therapeutic monitoring goal: 400-600 mg*h/L  Pharmacy will check vancomycin levels as  appropriate in 1-3 Days.    Serum creatinine levels will be ordered daily for the first week of therapy and at least twice weekly for subsequent weeks.      Rolando Skelton RPH

## 2024-01-31 NOTE — PROGRESS NOTES
Neuro: A&Ox4, expresses needs and calls appropriately.  Cardiac: VS stable. Patient verbalized no chest pain present. Goal of SBP < 120. PRN hydralazine 10 mg IV x3 given this shift.  Respiratory: Lung sounds clear bilaterally, pattern and depth regular. Patient is sating > 90% on RA.  GI/: Voiding spontaneously without issue. Patient reports having no BM this shift.  Diet/appetite: Patient has appropriate appetite and is tolerating 2 g Na diet. Denies nausea.  Activity: SBA. Patient encouraged to call for help during activity. Patient has history of falls; fall precaution band in place. Safety management completed; activity supervised and medications reviewed. Patient is oriented to own ability.  Pain: Intermittent episodes of moderate to severe back pain. Pain relieved with current pain relief regimen (tylenol, robaxin, and gabapentin).  Skin: No new deficits noted. Midsternal, right upper chest, and right groin post-op sites WDL open to air. Chest tube site cleaned with wound cleanser and dressing changed. Dressing is clean, dry, and intact.  Lines: Left PIV. Saline locked, patent, and intact.  Drains: 1 pleural CT with serosanguineous output. No air leak noted during this shift. Suction at -20 cm H2O.   Plan: Continue with POC and notify the provider of any changes that occur.

## 2024-01-31 NOTE — CONSULTS
"    Health Psychology Inpatient Consult Psychodiagnostic Assessment     Health Psychology Office   Health Psychology Clinic   Department of Medicine   [address] [facility]  3rd Floor  [address] [place] [address]     Health Psychology Team:  Roula Gustafson, Ph.D., L.P [phone]  Jeanne Dunham, Ph.D., L.P. [phone]  Nickolas Ignacio, Ph.D. [phone]  Carlotta Bravo, Ph.D., A.B.P.P., L.P. [phone]  Agus Klein, Ph.D., A.B.P.P., L.P. [phone]         Bernie Patel, Ph.D., L.P. [phone]   Viviana Taylor, Ph.D., A.B.P.P., L.P. [phone]  Danilo Chang, Ph.D. [phone]    Confidential Summary of Inpatient Health Psychology Consultation*    Sources of Information:  Information was obtained from a clinical interview with the patient and review of medical record.    Referral Source/Reason:  Mr. Lyons was referred to Health Psychology by Romy Carlin PA-C for ongoing anxiety sxs.     History of Presenting Concerns:  Mr. Lyons is a 48 year old y/o male currently inpatient for 15 days, starting on 1/16/2024 for Aortic dissection (H) [I71.00]. Pt reported that his mood has been \"good\" today, but noted that he had a rough night of sleep and has only just recently awoken. Pt reported high pain sxs at current and reported ongoing anxiety regarding his health and stressors outside of the hospital including financial stress and new functional limitations following surgery. Pt reported a desire to abstain from using pain medication \"as much as possible\" leading him to denied pain medication from nursing during the current encounter. Pt stated that he has had close friends and family members including his cousin who have become addicted to pain medication and he worries about his future dependence. Regarding anxiety sxs, pt denied any current coping strategies but stated that his " "brother, whom pt is close with, telling him to \"try to take some deep breathes.\" Writer echoed the usefulness of deep breathing exercises for anxiety sxs but acknowledged the potential difficulties pt may currently have getting full deep breathes given his ongoing pain sxs in his chest. Writer engaged pt in supportive listening and cognitive processing of current sxs and engaged pt in psychoeducation and skills training related to stress management. Skills discussed included adapted breathing (slow, controlled exhales), touch sensory grounding, and utilizing coloring books or mandalas, all of which pt was receptive to.     Patient Demographics (per chart):   Age: 48 year old  Biological Sex: male  Race: White  Ethnicity: Not  or     Hospital Admission (per chart):  Admission Date: 1/16/2024  Admission Diagnosis: Aortic dissection (H) [I71.00]  Length of Stay: 15    Medical History:  See below lists for past medical history, past surgical history, and current medications  Patient Active Problem List   Diagnosis    Hypertensive urgency    Aortic dissection (H)    Acute kidney failure, unspecified (H)      Past Medical History:   Diagnosis Date    Dissecting aneurysm of thoracic aorta, [place] type B (H)     HTN (hypertension)      Past Surgical History:   Procedure Laterality Date    ENDOVASCULAR REPAIR ANEURYSM THORACIC AORTIC N/A 1/23/2024    IR OR ANGIOGRAM  1/23/2024    PICC DOUBLE LUMEN PLACEMENT Right 01/16/2024    REPAIR ANEURYSM ASCENDING AORTA N/A 1/23/2024     Current Facility-Administered Medications   Medication    acetaminophen (TYLENOL) tablet 975 mg    amLODIPine (NORVASC) tablet 5 mg    aspirin (ASA) chewable tablet 81 mg    bisacodyl (DULCOLAX) suppository 10 mg    carvedilol (COREG) tablet 12.5 mg    dextrose 10% infusion    glucose gel 15-30 g    Or    dextrose 50 % injection 25-50 mL    Or    glucagon injection 1 mg    gabapentin (NEURONTIN) capsule 100 mg    heparin ANTICOAGULANT " "injection 5,000 Units    hydrALAZINE (APRESOLINE) injection 10 mg    insulin aspart (NovoLOG) injection (RAPID ACTING)    insulin aspart (NovoLOG) injection (RAPID ACTING)    lidocaine (LMX4) cream    lidocaine 1 % 0.1-1 mL    magnesium hydroxide (MILK OF MAGNESIA) suspension 30 mL    melatonin tablet 10 mg    methocarbamol (ROBAXIN) tablet 750 mg    naloxone (NARCAN) injection 0.2 mg    Or    naloxone (NARCAN) injection 0.4 mg    Or    naloxone (NARCAN) injection 0.2 mg    Or    naloxone (NARCAN) injection 0.4 mg    ondansetron (ZOFRAN ODT) ODT tab 4 mg    Or    ondansetron (ZOFRAN) injection 4 mg    oxyCODONE (ROXICODONE) tablet 5 mg    Or    oxyCODONE IR (ROXICODONE) tablet 10 mg    pantoprazole (PROTONIX) EC tablet 40 mg    polyethylene glycol (MIRALAX) Packet 17 g    prochlorperazine (COMPAZINE) injection 10 mg    Or    prochlorperazine (COMPAZINE) tablet 10 mg    rosuvastatin (CRESTOR) tablet 20 mg    senna-docusate (SENOKOT-S/PERICOLACE) 8.6-50 MG per tablet 1 tablet    sodium chloride (PF) 0.9% PF flush 3 mL    sodium chloride (PF) 0.9% PF flush 3 mL       Social History:  Current living arrangements: Patient reported currently living in a home with his mother   Relationship status/family: Patient reported being  and denied any current romantic relationships. Patient reported being close with his mother, who is his primary caregiver, but noted that sometimes she \"can stress [him] out.\" Pt additionally reported being close with his brother.   Social support network: Pt reported a strong social support network including his mother and brother   Occupational history: Pt denied current employment     Psychiatric History:  Depression: Pt denied depression sxs   Anxiety: Pt reported ongoing anxiety sxs including worry that is difficult to control, irritability, and restlessness.   Attention: Patient denied hx/current attention-related symptoms.   Psychosis: Patient denied hx/current psychotic symptoms. "   Yessica: Patient denied hx/current manic symptoms  Disordered eating: Patient denied hx/current symptoms of disordered eating.   Suicide/NSSI: Patient denied hx/current suicidal ideation/plan/intent. Patient denied hx/current NSSI.   Mental health treatment: Patient denied current mental health treatment   Psychiatric hospitalization: Patient denied a hx of psychiatric hospitalization     Mental Status/Interview:  Appearance:   Appropriate   Eye Contact:   Good   Psychomotor Behavior:  Normal   Attitude:   Cooperative  Friendly  Orientation:   All  Speech   Rate / Production: Normal/ Responsive   Volume:  Normal   Mood:    Anxious   Affect:    Appropriate   Thought Content:   Clear  Thought Form:   Coherent  Logical     Insight:    Did not formally assess at this time.     Suicidal ideation: Pt denied active suicidal ideation.   Homicidal ideation: Pt denied active homicidal ideation.     [place] Suicide Severity Rating Scale Screener (C-SSRS) Past Month  Always ask questions 1 and 2 Past month   1. Wish to be dead (Have you wished you were dead or wished you could go to sleep and not wake up?) No   2. Non-specific active suicidal thoughts (Have you actually had any thoughts of killing yourself?) No   If YES to 2, ask questions 3, 4, 5 and 6.  If NO to 2, skip to question 6.    3. Active suicidal ideation with any methods without intent to act (Have you been thinking about how you might do this?)    4. Active Suicidal Ideation with Some Intent to Act, without Specific Plan (Have you had these thoughts and had some intention of acting on them?)    5. Active Suicidal Ideation with Specific Plan and Intent (Have you started to work out or worked out the details of how to kill yourself? Did you intend to carry out this plan?)    Always ask question 6    6a. (Have you done anything, started to do anything, or prepared to do anything to end your life?) No   6b. (Was this within the past 3 months?)    ANTONI Pompa. et al.,  (2008). [place]-suicide severity rating scale (C-SSRS). [place], [place]: [facility], 10, 2008.    Impression:  Mr. Lyons is a 48 year old y/o male currently inpatient for 15 days, starting on 1/16/2024 for Aortic dissection (H) [I71.00] who presented today in hospital room. Patient was engaged in the visit and expressed understanding of information provided. Pt presented with symptoms of anxiety. Pt symptoms are likely maintained by current hospitalization, uncontrolled pain sxs, and current medical status. Symptom reduction would likely be facilitated by engagement in stress and pain management. Pt was agreeable to follow up with Health Psychology Team.     Plan:  Plan for health psychology to follow this patient approximately once per week for the duration of their hospitalization. Please feel free to call in urgent concerns arise prior to the next follow-up session.    Recommendations for Care Team:  When possible, please encourage/commend efforts in practicing skills taught by Health Psychology Team. It is recommended that skills are practiced at least 3X daily.     Continue to monitor changes in patient's mood. While not an exhaustive list, examples of symptoms of note include increases in: sadness/hopelessness, time spent worrying, physiological responses to stress, and decreases in: sleep, appetite, socialization, engagement in care.     Skills taught today:   - adapted deep breathing   - touch oriented grounding   - relaxing coloring     Diagnosis:  Adjustment disorder with anxious mood (ICD-10: F43.20)    CPT Code:  [place] PSYCHIATRIC DIAGNOSTIC EVALUATION [8260445]    Session Length:  Start Time: 10:00am  End Time: 10:30am    Date of Service:  01/31/24    Danilo Chang, PhD  Clinical Health Psychology Fellow  Phone: [phone]  Pager: [phone]    This case is being supervised by Sandee Olmos, PhD, LP.    This note was completed using Dragon voice recognition software. Although  reviewed after completion, some word and grammatical errors may occur.    *In accordance with the Rules of the [place] Board of Psychology, it is noted that psychological descriptions and scientific procedures underlying psychological evaluations have limitations.  Absolute predictions cannot be made based on information in this report.

## 2024-01-31 NOTE — PLAN OF CARE
Goal Outcome Evaluation:                  .    Changes: Multiple episodes of high SBP.      History: Valdo Lyons is a 48 year old male with PMH of uncontrolled HTN, childhood seizures. Patient presented to [facility] on 1/12 with acute type B aortic dissection from the origin of the left subclavian to the diaphragm just superior to the celiac axis, later having worsening back pain and increased variation in BP from arterial lines in his right and left hands, so repeat CT scan was done which showed Type A dissection with a thrombosed retrograde false lumen, so he was transferred to [facility]. Repeat CTA 1/17 with decision for surgical management, medically managed with strict BP goals in interim. Now status post ascending aortic aneurysm hemiarch repair and TEVAR with CVTS and Vascular Surgery on 1/23/24. Patient extubated 1/24 and progressing appropriately post operatively with no acute complications or concerns. MIKA has resolved, patient is floor status for multiple days.        Neuro: A/Ox4. Calls appropriately. Pleasant and cooperative.   Cardiac/Tele:  SR and HR 70's. Goal to keep SBP <120, DBP<90, PRN hydralazine given multiple occasions of high blood pressure. Afebrile. Denies chest pain.   Respiratory: Room air. Tolerating well  GI/: Continent. Urinal at bedside.  Diet/Appetite: 2 gram Na+ diet  Skin: No new deficits noted.   LDAs: L PIV- SL  Activity: SBA, walker and GB in the halls  Pain: Midsternal pain and chronic lower back pain, PRNs given.      Plan: Continue to monitor and notify team with changes.

## 2024-01-31 NOTE — PLAN OF CARE
Problem: Cardiovascular Surgery  Goal: Improved Activity Tolerance  Intervention: Optimize Tolerance for Activity  Recent Flowsheet Documentation  Taken 1/31/2024 0928 by Ten Park RN  Environmental Support: calm environment promoted     Problem: Cardiovascular Surgery  Goal: Absence of Bleeding  Intervention: Monitor and Manage Bleeding  Recent Flowsheet Documentation  Taken 1/31/2024 0928 by Ten Park, RN  Bleeding Management: dressing monitored   Goal Outcome Evaluation:      Plan of Care Reviewed With: patient               Neuro: A&Ox4. Cooperative to cares and pleasant. No neuro deficits.   Cardiac: Sinus Rhythm. Good radial pulses and denies chest pain.   Respiratory: On RA and denies shortness of breath. BLS are clear.   GI/: Adequate urine output. Soft, non-distended abdomen. No bowel movement this shift.   Diet/appetite: Consumed share with good appetite  Activity:  Stand-by assist.   Pain: At acceptable level on current regimen and are effective.   Skin: No new deficits noted. CT tube draining. No air leaks. Dressing changed.   LDA's: PIV RFA - saline locked.   Plan: Started Vancomycin this afternoon. Clamp the tube at 4:00 AM. Unclamp if with dyspnea or hypoxia. Continue POC.

## 2024-01-31 NOTE — PROGRESS NOTES
Cardiovascular Surgery Progress Note  01/31/2024         Assessment and Plan:     Valdo Lyons is a 48 year old male with PMH of uncontrolled HTN and childhood seizures. 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 Repeat CTA 1/17 with decision for surgical management, medically managed with strict BP goals in interim. Now status post ascending aortic aneurysm hemiarch repair and TEVAR with CVTS and Vascular Surgery on 1/23/24.     Cardiovascular:   Type B dissection with c/f retrograde tear s/p hemiarch repair and TEVAR on 1/23/24  Hx of uncontrolled HTN   Pre-procedure: Patient was not taking any PTA medications for his high blood pressure. Bilateral arterial lines placed at [facility]. Ordered additional testing, as his BP medication requirements are exceeding expectations, rule out other/organic causes of elevated BP. TSH normal. Rads did not appreciate any renal mass other than a simple cyst - lower likelihood  pheochromocytoma. EF 55-60%.   No arrhythmias overnight, HD stable, in NSR.  BP 140s overnight, continues to require multiple doses of IV hydralazine   Intra-op WILLIAM shows EF 55%  - ASA 81 mg daily  - rosuvastatin 20 mg daily  - BB: Coreg 12.5 mg BID, increase to 25 mg BID for evening dose 1/31  - start amlodipine 5 mg daily on 1/30 (takes ~24-48 hours to take effect)  - Diuresis as below  - SBP goal <120, IV hydralazine PRN  - baseline post-op CTA 1/31 prelim read: Dissection flap extends into the visualized left subclavian and left vertebral arteries as well as inferiorly into the level of the  supraceliac abdominal aorta, unchanged from prior  -  vascular surgery following, notified post-op CTA complete - pending review   - CT also shows a small, nonspecific gas containing fluid collection about the ascending thoracic aorta - surgeon notified, pending review   - will need post-op echo once chest tube out    Chest tubes: 3 mediastinals removed 1/29; R pleural remains, keep another day given output and PTX - hopeful to remove 2/1 after clamp trial  TPW: removed    Pulmonary:  Right apical pneumothorax  Extubated POD 1. Now saturating well on RA.   - Supplemental O2 PRN to keep sats > 92%. Wean off as tolerated.  - Pulm hygiene, IS, activity and deep breathing  - CXR 1/27am with small right apical pneumothorax, stable on subsequent CXRs (today's CXR pending)- will plan for clamping trial prior to chest tube removal     Neurology / MSK:  - Vascular ok with q4h neuro checks to evaluate for spinal cord ischemia.  Acute post-operative pain   Pain well controlled with current regimen:  - scheduled acetaminophen, scheduled gabapentin, PO oxycodone PRN, IV dilaudid PRN, methocarbamol PRN, heat/ice  - discussed limiting opioids - IV dilaudid discontinued 1/31, methocarbamol increased to 750 mg QID  Baseline cognitive dysfunction  Patient's mother is his caregiver at home due to baseline cognitive dysfunction. The patient's overall understanding of the situation is inconsistent. He also developed some agitation/behavioral issues (spitting and kicking) 1/21 requiring sedation. Psychiatry saw patient earlier in his stay, see note for recs. SW on board per mother's request for additional caregiver resources.  - Monitor neurological status. Notify the MD for any acute changes in exam.  - Delirium prevention   - Melatonin 10 mg at bedtime   Recent fall, atraumatic  Hx Childhood seizure disorder  Seizure hx noted, not on any antiepileptics at time of admission. [facility]ed to [facility] s/p [facility], CT head reported as negative for intracranial abnormalities 1/12  Hx Mariajuana use    Reports typically smokes Holganixjuana nightly. Denies tobacco, alcohol, or other illicit drugs  Anxiety  - patient reporting anxiety with recent life stressors, offered health psychiatry which patient amendable to - appreciate      / Renal / Fluid / Electrolytes:  Hypervolemia  Hyponatremia  Hypophosphatemia, replaced   Hematuria  Unknown baseline creatinine, was 1.6 on admission to [facility], last value in 2022 was 1.25. Cr now normalized with adequate UOP. Nephro comsulted and now signed off.    -adequate UOP.   FLUID STATUS: Pre-op weight 194, weight today 184; I/O past 24 hours: -1.2 L.   - Diuresis: held since 1/26 as patient appears euvolemic   - Replete lytes per protocol  - Strict I/O, daily weights  - Avoid/limit nephrotoxins as able  - hematuria likely casanova related, recommended repeat UA on an outpatient basis to monitor for resolution     GI / Nutrition:   Ileus, concern for, resolved  Hypoalbuminemia  - Tolerating regular diet  - Continue bowel regimen, last BM 1/30    Endocrine:  Stress induced hyperglycemia   Hgb A1c 6.1  - Initially managed on insulin drip postop, transitioned to sliding scale; goal BG <180 for optimal healing    Infectious Disease:  Stress induced leukocytosis  WBC 13.3 (trending down from 16.4 on 1/30), remains afebrile, no signs or symptoms of infection  - Completed perioperative antibiotics  - UA, blood cultures, sputum cultures ordered 1/30   - UA without evidence of infection  - unable to collect sputum as of 1/31 - patient without significant cough  - Blood culture growing gram positive cocci in clusters from 1/2 cultures, suspect contaminant. Will re-collect blood cultures 1/31 - follow for results. ID consulted 1/31, appreciate recs    - will follow for results and continue to monitor off antibiotics per discussion with surgeon   - Continue to monitor fever curve, CBC    Hematology:   Acute blood loss anemia and thrombocytopenia  Beta thalassemia trait   Hgb 7.8 (stable); Plt WNL,  no signs or symptoms of active bleeding  - Patient and mother report history of Beta thalassemia trait, noted on transfusion order   - s/p 1 unit pRBCs 1/28  - CBC daily    Anticoagulation:   - ASA 81 mg only    MSK/Skin:  Sternotomy  Surgical incision  - Sternal precautions  - Incisional cares per protocol  Back pain  - patient reports chronic diffuse back pain, worse since fall prior to admission  - review of records from OSH show CT thoracic and lumbar spine without acute fracture/abnormality    - monitor, pain control as above     HEENT:   Transient bilateral blurry vision, resolved  - patient reports blurry vision in the ICU  - ophthalmology consulted 1/28 - exam notable for cotton wool spot of the left eye. Possible etiology of cotton wool spot include hypertension vs anemia vs diabetes though patient does not have diabetes. Given multiple indwelling catheters and recent surgeries, less likely but cannot rule out endophthalmitis.   - blood cultures x2 recommended, follow for results  - Ophthalmology repeated dilated exam 1/30 - will follow blood cultures and repeat dilated exam in 7-10 days    Prophylaxis:   - Stress ulcer prophylaxis: Pantoprazole 40 mg daily for 30 days  - DVT prophylaxis: Subcutaneous heparin, SCD    Disposition:   - Transferred to  on 1/29  - Therapies recommending discharge to home when medically ready. Barriers to discharge include BP control, pain control, chest tubes    Discussed with Dr. Shaw through written communication.    Skip Carlin PA-C  Cardiothoracic Surgery    10:06 AM  January 31, 2024  pager [phone]          Interval History:     High BP overnight, given multiple doses of IV hydralazine.  States pain is well managed on current regimen. Slept better last night.  Discussed weaning opioids and trying alternate pain control options such as heat/ice, APAP, Robaxin. Patient decline lidocaine patches, saying they don't work well for him. He agrees to weaning narcotics and  discontinuing IV dilaudid.   Patient reports the pain he is experiencing is incisional pain and chronic upper back pain. Reports the pain is worse after he fell prior to admission.   Tolerating diet but low appetite, is passing flatus, + BM. No nausea or vomiting. No abdominal pain.   Breathing well without complaints.   Working with therapies and ambulating in halls without assistance.   Denies chest pain, palpitations, dizziness, syncopal symptoms, fevers, chills, myalgias, or sternal popping/clicking, dysuria, diarrhea, vision complaints. Mild ongoing cough since surgery, unchanged.          Physical Exam:     Gen: A&Ox4, NAD  Neuro: no focal deficits   CV: RRR, normal S1 S2, no murmurs, rubs or gallops. No JVD  Pulm: CTA, no wheezing or rhonchi, normal breathing on RA   Abd: nondistended, normal BS, soft, nontender  Ext: no peripheral edema, moving all extremities  MSK: diffuse tenderness to palpation of the upper back musculature, no midline tenderness to palpation of the C/T/L spines. Full ROM of the neck  Incision: clean, dry, intact, no erythema, sternum stable  Tubes/drain sites: dressing clean and dry, serosanguinous output, no air leak. 24 hr output 224 mL.          Data:    Imaging:  reviewed recent imaging, no acute concerns    Recent Results (from the past 24 hour(s))   CTA CHEST ABDOMEN PELVIS WITH CONTRAST PANEL    Narrative    CTA CHEST, ABDOMEN, AND PELVIS WITH 3D AND MULTIPLANAR RECONSTRUCTIONS   1/30/2024 6:27 PM    CLINICAL HISTORY: baseline post-op CTA s/p aortic dissection repair.  Ascending aorta repair with 30 mm Gelweave graft and thoracic  endograft aortic repair 1/23/2024.    COMPARISONS: CTA chest, abdomen, and pelvis 1/17/2024.    REFERRING PROVIDER: LADARIUS NEAL    TECHNIQUE: Unenhanced CT performed through the chest, abdomen, and  pelvis. After the uneventful administration of intravenous contrast,  EKG gated CTA performed through the chest, abdomen, and pelvis. After  a 3 minute  delay, CT repeated through the chest. After another  administration of intravenous contrast, CTA was performed through the  chest, abdomen, and pelvis. Coronal and sagittal reconstructions were  produced. Lung MIP produced.    3D and multiplanar reconstructions were produced and reviewed.    CONTRAST: More than 90 mL Isovue 370 - only 90 mL documented.    DOSE (DLP): 3250 mGy*cm    FINDINGS: CTA: First CTA was in more of a portal venous than arterial  phase and the false lumen was better opacified than the true. Better  arterial phase opacification in the repeated CTA.    Patent ascending aortic graft. No leak. Postoperative collection with  air around the ascending graft measures up to 19 mm in thickness.    Thoracic endograft extends from the left subclavian artery origin to  T7 in the true lumen.    Dissection extends through the left subclavian artery. Its terminus is  not well visualized. The left vertebral artery is thought to originate  from the true lumen.    Dissection ends at the diaphragm above the celiac origin.    Aortic measurements:       Sinuses of Valsalva: 34 mm       Inferior graft anastomosis: 29 mm       Mid ascending graft: 32 mm       Superior graft anastomosis: 31 mm       Arch: 22 mm       Proximal descending (including true and false lumens): 36 mm       Mid descending (including true and false lumens): 37 mm       Diaphragm: 31 mm       Infrarenal: 21 mm       Above the bifurcation: 20 mm.    Celiac, superior mesenteric, renal, and inferior mesenteric arteries  patent. Bilateral common, internal, and external iliac arteries  patent. Bilateral corona mortis. Bilateral common femoral arteries  patent.    CT: Right chest tube in place. Small apical right pneumothorax.  Sternal wires intact.    9 mm left lower lobe calcified granuloma. Dependent atelectasis.    Gallstones.    Renal cysts.    Diverticulosis.    Air bubble in the bladder.      Impression    IMPRESSION:  1. Patent ascending  aortic graft. No leak. Postoperative collection  with air bubbles around the graft measures up to 19 mm in thickness.    2. Patent thoracic aortic endograft in the true lumen from the left  subclavian artery origin to T7. Dissection extends through the left  subclavian artery. Its terminus in the left subclavian artery is not  well visualized. Left vertebral artery is thought to originate from  the true lumen. Dissection ends in the supraceliac aorta.    3. Right chest tube in place. Small apical right pneumothorax.    I have personally reviewed the examination and initial interpretation  and I agree with the findings.    LIZBETH BEACH MD         SYSTEM ID:  [id number]        Labs:  Recent Labs   Lab 01/31/24  0827 01/31/24  0437 01/30/24  2128 01/30/24  0731 01/30/24  0603 01/29/24  0804 01/29/24  0757 01/29/24  0309   WBC  --  13.3*  --   --  16.4*  --   --  13.8*   HGB  --  7.8*  --   --  8.3*  --   --  8.0*   MCV  --  66*  --   --  66*  --   --  65*   PLT  --  260  --   --  246  --   --  207   INR  --  1.29*  --   --  1.26*  --   --  1.17*   NA  --  133*  --   --  134*  --   --  134*   POTASSIUM  --  4.7  --   --  4.5  --  4.2 4.2   CHLORIDE  --  101  --   --  100  --   --  97*   CO2  --  25  --   --  25  --   --  30*   BUN  --  11.9  --   --  12.6  --   --  15.3   CR  --  0.91  --   --  0.90  --   --  0.97   ANIONGAP  --  7  --   --  9  --   --  7   YARON  --  8.4*  --   --  8.4*  --   --  8.2*   GLC 99 112* 110*   < > 93   < >  --  97   ALBUMIN  --  2.9*  --   --  2.8*  --   --  2.9*   PROTTOTAL  --  5.6*  --   --  5.6*  --   --  5.5*   BILITOTAL  --  0.5  --   --  0.6  --   --  0.6   ALKPHOS  --  64  --   --  65  --   --  64   ALT  --  34  --   --  29  --   --  31   AST  --  27  --   --  27  --   --  31    < > = values in this interval not displayed.      GLUCOSE:   Recent Labs   Lab 01/31/24  0827 01/31/24  0437 01/30/24  2128 01/30/24  1720 01/30/24  1232 01/30/24  0731   GLC 99 112* 110* 129* 142* 108*

## 2024-02-01 ENCOUNTER — APPOINTMENT (OUTPATIENT)
Dept: PHYSICAL THERAPY | Facility: CLINIC | Age: 49
End: 2024-02-01
Attending: STUDENT IN AN ORGANIZED HEALTH CARE EDUCATION/TRAINING PROGRAM
Payer: MEDICAID

## 2024-02-01 ENCOUNTER — APPOINTMENT (OUTPATIENT)
Dept: GENERAL RADIOLOGY | Facility: CLINIC | Age: 49
End: 2024-02-01
Attending: PHYSICIAN ASSISTANT
Payer: MEDICAID

## 2024-02-01 ENCOUNTER — APPOINTMENT (OUTPATIENT)
Dept: CARDIOLOGY | Facility: CLINIC | Age: 49
End: 2024-02-01
Attending: PHYSICIAN ASSISTANT
Payer: MEDICAID

## 2024-02-01 LAB
ALBUMIN SERPL BCG-MCNC: 2.9 G/DL (ref 3.5–5.2)
ALP SERPL-CCNC: 69 U/L (ref 40–150)
ALT SERPL W P-5'-P-CCNC: 37 U/L (ref 0–70)
ANION GAP SERPL CALCULATED.3IONS-SCNC: 7 MMOL/L (ref 7–15)
AST SERPL W P-5'-P-CCNC: 29 U/L (ref 0–45)
BILIRUB SERPL-MCNC: 0.5 MG/DL
BUN SERPL-MCNC: 12.2 MG/DL (ref 6–20)
CA-I BLD-MCNC: 4.5 MG/DL (ref 4.4–5.2)
CALCIUM SERPL-MCNC: 8.4 MG/DL (ref 8.6–10)
CHLORIDE SERPL-SCNC: 102 MMOL/L (ref 98–107)
CREAT SERPL-MCNC: 0.89 MG/DL (ref 0.67–1.17)
DEPRECATED HCO3 PLAS-SCNC: 25 MMOL/L (ref 22–29)
EGFRCR SERPLBLD CKD-EPI 2021: >90 ML/MIN/1.73M2
ERYTHROCYTE [DISTWIDTH] IN BLOOD BY AUTOMATED COUNT: 20.3 % (ref 10–15)
GLUCOSE BLDC GLUCOMTR-MCNC: 108 MG/DL (ref 70–99)
GLUCOSE BLDC GLUCOMTR-MCNC: 109 MG/DL (ref 70–99)
GLUCOSE BLDC GLUCOMTR-MCNC: 111 MG/DL (ref 70–99)
GLUCOSE BLDC GLUCOMTR-MCNC: 115 MG/DL (ref 70–99)
GLUCOSE BLDC GLUCOMTR-MCNC: 126 MG/DL (ref 70–99)
GLUCOSE SERPL-MCNC: 108 MG/DL (ref 70–99)
HCT VFR BLD AUTO: 26.2 % (ref 40–53)
HGB BLD-MCNC: 8.3 G/DL (ref 13.3–17.7)
INR PPP: 1.29 (ref 0.85–1.15)
LVEF ECHO: NORMAL
MAGNESIUM SERPL-MCNC: 1.9 MG/DL (ref 1.7–2.3)
MCH RBC QN AUTO: 20.9 PG (ref 26.5–33)
MCHC RBC AUTO-ENTMCNC: 31.7 G/DL (ref 31.5–36.5)
MCV RBC AUTO: 66 FL (ref 78–100)
PHOSPHATE SERPL-MCNC: 2.8 MG/DL (ref 2.5–4.5)
PLATELET # BLD AUTO: 303 10E3/UL (ref 150–450)
POTASSIUM SERPL-SCNC: 4.5 MMOL/L (ref 3.4–5.3)
PROT SERPL-MCNC: 5.9 G/DL (ref 6.4–8.3)
RBC # BLD AUTO: 3.97 10E6/UL (ref 4.4–5.9)
SODIUM SERPL-SCNC: 134 MMOL/L (ref 135–145)
WBC # BLD AUTO: 12.8 10E3/UL (ref 4–11)

## 2024-02-01 PROCEDURE — 97116 GAIT TRAINING THERAPY: CPT | Mod: GP

## 2024-02-01 PROCEDURE — 250N000013 HC RX MED GY IP 250 OP 250 PS 637: Performed by: PHYSICIAN ASSISTANT

## 2024-02-01 PROCEDURE — 85027 COMPLETE CBC AUTOMATED: CPT

## 2024-02-01 PROCEDURE — 250N000013 HC RX MED GY IP 250 OP 250 PS 637: Performed by: STUDENT IN AN ORGANIZED HEALTH CARE EDUCATION/TRAINING PROGRAM

## 2024-02-01 PROCEDURE — 93321 DOPPLER ECHO F-UP/LMTD STD: CPT | Mod: 26 | Performed by: INTERNAL MEDICINE

## 2024-02-01 PROCEDURE — 250N000011 HC RX IP 250 OP 636: Performed by: STUDENT IN AN ORGANIZED HEALTH CARE EDUCATION/TRAINING PROGRAM

## 2024-02-01 PROCEDURE — 93325 DOPPLER ECHO COLOR FLOW MAPG: CPT | Mod: 26 | Performed by: INTERNAL MEDICINE

## 2024-02-01 PROCEDURE — 99024 POSTOP FOLLOW-UP VISIT: CPT | Performed by: NURSE PRACTITIONER

## 2024-02-01 PROCEDURE — 93325 DOPPLER ECHO COLOR FLOW MAPG: CPT

## 2024-02-01 PROCEDURE — 258N000003 HC RX IP 258 OP 636: Performed by: STUDENT IN AN ORGANIZED HEALTH CARE EDUCATION/TRAINING PROGRAM

## 2024-02-01 PROCEDURE — 80053 COMPREHEN METABOLIC PANEL: CPT | Performed by: SURGERY

## 2024-02-01 PROCEDURE — 84100 ASSAY OF PHOSPHORUS: CPT | Performed by: STUDENT IN AN ORGANIZED HEALTH CARE EDUCATION/TRAINING PROGRAM

## 2024-02-01 PROCEDURE — 36415 COLL VENOUS BLD VENIPUNCTURE: CPT | Performed by: NURSE PRACTITIONER

## 2024-02-01 PROCEDURE — 71046 X-RAY EXAM CHEST 2 VIEWS: CPT

## 2024-02-01 PROCEDURE — 93308 TTE F-UP OR LMTD: CPT | Mod: 26 | Performed by: INTERNAL MEDICINE

## 2024-02-01 PROCEDURE — 250N000011 HC RX IP 250 OP 636

## 2024-02-01 PROCEDURE — 999N000128 HC STATISTIC PERIPHERAL IV START W/O US GUIDANCE

## 2024-02-01 PROCEDURE — 71046 X-RAY EXAM CHEST 2 VIEWS: CPT | Mod: 76

## 2024-02-01 PROCEDURE — 250N000013 HC RX MED GY IP 250 OP 250 PS 637

## 2024-02-01 PROCEDURE — 71046 X-RAY EXAM CHEST 2 VIEWS: CPT | Mod: 26 | Performed by: RADIOLOGY

## 2024-02-01 PROCEDURE — 120N000003 HC R&B IMCU UMMC

## 2024-02-01 PROCEDURE — 82330 ASSAY OF CALCIUM: CPT | Performed by: NURSE PRACTITIONER

## 2024-02-01 PROCEDURE — 250N000013 HC RX MED GY IP 250 OP 250 PS 637: Performed by: SURGERY

## 2024-02-01 PROCEDURE — 85610 PROTHROMBIN TIME: CPT | Performed by: SURGERY

## 2024-02-01 PROCEDURE — 83735 ASSAY OF MAGNESIUM: CPT | Performed by: NURSE PRACTITIONER

## 2024-02-01 RX ORDER — LOSARTAN POTASSIUM 25 MG/1
25 TABLET ORAL DAILY
Status: DISCONTINUED | OUTPATIENT
Start: 2024-02-01 | End: 2024-02-02

## 2024-02-01 RX ORDER — MAGNESIUM OXIDE 400 MG/1
400 TABLET ORAL EVERY 4 HOURS
Qty: 2 TABLET | Refills: 0 | Status: COMPLETED | OUTPATIENT
Start: 2024-02-01 | End: 2024-02-01

## 2024-02-01 RX ADMIN — ACETAMINOPHEN 975 MG: 325 TABLET, FILM COATED ORAL at 09:43

## 2024-02-01 RX ADMIN — HEPARIN SODIUM 5000 UNITS: 5000 INJECTION, SOLUTION INTRAVENOUS; SUBCUTANEOUS at 21:48

## 2024-02-01 RX ADMIN — OXYCODONE HYDROCHLORIDE 10 MG: 10 TABLET ORAL at 03:41

## 2024-02-01 RX ADMIN — HYDRALAZINE HYDROCHLORIDE 10 MG: 20 INJECTION INTRAMUSCULAR; INTRAVENOUS at 17:35

## 2024-02-01 RX ADMIN — METHOCARBAMOL 750 MG: 750 TABLET ORAL at 09:43

## 2024-02-01 RX ADMIN — METHOCARBAMOL 750 MG: 750 TABLET ORAL at 16:24

## 2024-02-01 RX ADMIN — METHOCARBAMOL 750 MG: 750 TABLET ORAL at 21:11

## 2024-02-01 RX ADMIN — ACETAMINOPHEN 975 MG: 325 TABLET, FILM COATED ORAL at 21:48

## 2024-02-01 RX ADMIN — HEPARIN SODIUM 5000 UNITS: 5000 INJECTION, SOLUTION INTRAVENOUS; SUBCUTANEOUS at 06:47

## 2024-02-01 RX ADMIN — HYDRALAZINE HYDROCHLORIDE 10 MG: 20 INJECTION INTRAMUSCULAR; INTRAVENOUS at 21:49

## 2024-02-01 RX ADMIN — VANCOMYCIN HYDROCHLORIDE 1250 MG: 10 INJECTION, POWDER, LYOPHILIZED, FOR SOLUTION INTRAVENOUS at 04:04

## 2024-02-01 RX ADMIN — HYDRALAZINE HYDROCHLORIDE 10 MG: 20 INJECTION INTRAMUSCULAR; INTRAVENOUS at 03:41

## 2024-02-01 RX ADMIN — CARVEDILOL 25 MG: 25 TABLET, FILM COATED ORAL at 21:11

## 2024-02-01 RX ADMIN — METHOCARBAMOL 750 MG: 750 TABLET ORAL at 12:20

## 2024-02-01 RX ADMIN — MAGNESIUM OXIDE TAB 400 MG (241.3 MG ELEMENTAL MG) 400 MG: 400 (241.3 MG) TAB at 09:43

## 2024-02-01 RX ADMIN — Medication 10 MG: at 21:48

## 2024-02-01 RX ADMIN — HEPARIN SODIUM 5000 UNITS: 5000 INJECTION, SOLUTION INTRAVENOUS; SUBCUTANEOUS at 14:20

## 2024-02-01 RX ADMIN — LOSARTAN POTASSIUM 25 MG: 25 TABLET, FILM COATED ORAL at 12:06

## 2024-02-01 RX ADMIN — MAGNESIUM OXIDE TAB 400 MG (241.3 MG ELEMENTAL MG) 400 MG: 400 (241.3 MG) TAB at 12:20

## 2024-02-01 RX ADMIN — HYDRALAZINE HYDROCHLORIDE 10 MG: 20 INJECTION INTRAMUSCULAR; INTRAVENOUS at 04:09

## 2024-02-01 RX ADMIN — ASPIRIN 81 MG CHEWABLE TABLET 81 MG: 81 TABLET CHEWABLE at 09:43

## 2024-02-01 RX ADMIN — GABAPENTIN 100 MG: 100 CAPSULE ORAL at 09:57

## 2024-02-01 RX ADMIN — ACETAMINOPHEN 975 MG: 325 TABLET, FILM COATED ORAL at 16:24

## 2024-02-01 RX ADMIN — VANCOMYCIN HYDROCHLORIDE 1250 MG: 10 INJECTION, POWDER, LYOPHILIZED, FOR SOLUTION INTRAVENOUS at 16:48

## 2024-02-01 RX ADMIN — PANTOPRAZOLE SODIUM 40 MG: 40 TABLET, DELAYED RELEASE ORAL at 09:43

## 2024-02-01 RX ADMIN — OXYCODONE HYDROCHLORIDE 5 MG: 5 TABLET ORAL at 10:06

## 2024-02-01 RX ADMIN — ROSUVASTATIN CALCIUM 20 MG: 20 TABLET, FILM COATED ORAL at 09:44

## 2024-02-01 RX ADMIN — CARVEDILOL 25 MG: 25 TABLET, FILM COATED ORAL at 09:43

## 2024-02-01 RX ADMIN — AMLODIPINE BESYLATE 5 MG: 5 TABLET ORAL at 09:43

## 2024-02-01 ASSESSMENT — ACTIVITIES OF DAILY LIVING (ADL)
ADLS_ACUITY_SCORE: 23
ADLS_ACUITY_SCORE: 20
ADLS_ACUITY_SCORE: 23
ADLS_ACUITY_SCORE: 20
ADLS_ACUITY_SCORE: 23
ADLS_ACUITY_SCORE: 20
ADLS_ACUITY_SCORE: 23

## 2024-02-01 NOTE — PROGRESS NOTES
"VASCULAR SURGERY PROGRESS NOTE    Subjective:  POD #9 from hemiarch repair and TEVAR on 1/23/24. MORGAN. Was started on vancomycin yesterday afternoon given positive blood cultures although thought to be contaminated.  Repeat blood cultures negative so far. Afebrile, hemodynamically stable, normal renal function, leukocytosis improving down to 12.8 from 13.3.    Objective:  Intake/Output Summary (Last 24 hours) at 2/1/2024 1407  Last data filed at 2/1/2024 0900  Gross per 24 hour   Intake 740 ml   Output 1280 ml   Net -540 ml     Labs:  ROUTINE IP LABS (Last four results)  BMP  Recent Labs   Lab 02/01/24  1219 02/01/24  0822 02/01/24  0628 02/01/24  0341 01/31/24  0827 01/31/24  0437 01/30/24  0731 01/30/24  0603 01/29/24  0804 01/29/24  0757 01/29/24  0309   NA  --   --  134*  --   --  133*  --  134*  --   --  134*   POTASSIUM  --   --  4.5  --   --  4.7  --  4.5  --  4.2 4.2   CHLORIDE  --   --  102  --   --  101  --  100  --   --  97*   YARON  --   --  8.4*  --   --  8.4*  --  8.4*  --   --  8.2*   CO2  --   --  25  --   --  25  --  25  --   --  30*   BUN  --   --  12.2  --   --  11.9  --  12.6  --   --  15.3   CR  --   --  0.89  --   --  0.91  --  0.90  --   --  0.97   * 115* 108* 109*   < > 112*   < > 93   < >  --  97    < > = values in this interval not displayed.     CBC  Recent Labs   Lab 02/01/24  0628 01/31/24  0437 01/30/24  0603 01/29/24  0309   WBC 12.8* 13.3* 16.4* 13.8*   RBC 3.97* 3.75* 3.92* 3.74*   HGB 8.3* 7.8* 8.3* 8.0*   HCT 26.2* 24.8* 25.8* 24.4*   MCV 66* 66* 66* 65*   MCH 20.9* 20.8* 21.2* 21.4*   MCHC 31.7 31.5 32.2 32.8   RDW 20.3* 20.2* 19.9* 19.3*    260 246 207     INR  Recent Labs   Lab 02/01/24  0628 01/31/24  0437 01/30/24  0603 01/29/24  0309   INR 1.29* 1.29* 1.26* 1.17*     PHYSICAL EXAM:  /68 (BP Location: Right arm)   Pulse 77   Temp 98.5  F (36.9  C) (Oral)   Resp 18   Ht 1.727 m (5' 8\")   Wt 82.6 kg (182 lb)   SpO2 95%   BMI 27.67 kg/m    General: The " patient is awake and alert, NAD  Psych: Pleasant affect, answers questions appropriately  Skin: Color appropriate for race, warm, dry.  Neuro: Sensorimotor intact in BUE and BLE, CN II-XII grossly intact  Respiratory: Breathing unlabored on RA  GI:  Abdomen soft, non-distended.  Extremities: Bilateral femoral and DP pulses palpable, mild edema noted on ankles.      ASSESSMENT / PLAN:  Patient is a 48M who presented to [facility] on 1/12 with acute type B aortic dissection from the origin of the left subclavian to the diaphragm just superior to the celiac axis. Due to worsening back pain and increased variation in BP from arterial lines in his right and left hands, repeat CT scan was done which showed Type A dissection with a thrombosed retrograde false lumen, so he was transferred to [facility]. Now POD #9 from hemiarch repair and TEVAR on 1/23/24 with palpable pulses in femoral, and DP bilaterally. Leukocytosis continues to improve, afebrile, blood cultures from 1/30/2024 positive for Staphylococcus epidermidis, repeat blood cultures negative to date.    - From vascular surgery perspective we continue to recommend ambulation at least 4x daily for optimal hand rehabilitation  - Aspirin 81mg daily  - CTA 1/30/24 with patent ascending aortic graft. No leak. Postoperative collection with air bubbles around the graft measures up to 19 mm in thickness. Patent thoracic aortic endograft in the true lumen from the left subclavian artery origin to T7. Dissection ends at the diaphragm above the celiac origin.  - Patient has follow-up scheduled with Dr. Edyta Byrd and repeat CTA on 3/6/24    Discussed patients history, exam, assessment and plan with Dr. Edyta Byrd.     Candie Parkinson CNP  Vascular Surgery  Pager: [phone]  [email or web address]  Send message or 10 digit call back number Securely via My-[employer] with the [employer] Web Console (learn more here)

## 2024-02-01 NOTE — PLAN OF CARE
Goal Outcome Evaluation:      Plan of Care Reviewed With: patient    Overall Patient Progress: improvingOverall Patient Progress: improving     .    Changes: Multiple episodes of high SBP. CT clamped at 0400, no symptoms during shift.      History: Valdo Lyons is a 48 year old male with PMH of uncontrolled HTN, childhood seizures. Patient presented to [facility] [place] on 1/12 with acute type B aortic dissection from the origin of the left subclavian to the diaphragm just superior to the celiac axis, later having worsening back pain and increased variation in BP from arterial lines in his right and left hands, so repeat CT scan was done which showed Type A dissection with a thrombosed retrograde false lumen, so he was transferred to [facility]. Repeat CTA 1/17 with decision for surgical management, medically managed with strict BP goals in interim. Now status post ascending aortic aneurysm hemiarch repair and TEVAR with CVTS and Vascular Surgery on 1/23/24. Patient extubated 1/24 and progressing appropriately post operatively with no acute complications or concerns. MIKA has resolved, patient is floor status for multiple days.        Neuro: A/Ox4. Calls appropriately. Pleasant and cooperative.   Cardiac/Tele:  SR and HR 70's. Goal to keep SBP <120, DBP<90, PRN hydralazine given multiple times for high blood pressure. Afebrile. Denies chest pain.   Respiratory: Room air. Tolerating well  GI/: Continent. Urinal at bedside.    Diet/Appetite: 2 gram Na+ diet  Skin: No new deficits noted.   LDAs: L PIV- SL, intermittent vancomycin  Activity: SBA, walker and GB in the halls  Pain: Midsternal pain and chronic lower back pain, PRNs given.      Plan: Continue to monitor and notify team with changes.

## 2024-02-01 NOTE — PLAN OF CARE
Goal Outcome Evaluation:  [id number]:  HX: 48 year old male with PMH of uncontrolled HTN and childhood seizures. Patient presented to [facility] [place] on 1/12 with acute type B aortic dissection. Later had worsening back pain and increased variation in BP from arterial lines in his right and left hands. Repeat CT scan was done which showed Type A dissection with a thrombosed retrograde false lumen. Transferred to [facility] on 1/16. Repeat CTA 1/17 with decision for surgical management, medically managed with strict BP goals in interim. Now status post ascending aortic aneurysm hemiarch repair and TEVAR with CVTS and Vascular Surgery on 1/23/24.      Cardiac:SR 80s.  VS:SBP 160s in am. IV hydralazine-partial dose givwen d/t loss of IV access. Also given PO amlodipine with recheck SBP of 120. Also started on losartan this afternoon.   IV:PIV-SL  Tubes:1 Final pleural CT removed.   Neuro:A/Ox4  Resp:Denies shortness of breath, on RA.   GI/:No BM this shift, declines stool softeners, states he had BM yesterday. Good UO.   Nutrition:2 gm Na diet with good PO intake.   Labs:Mag 1.9-received 400 mg PO mag x2.   Endo:No sliding scale required for FSG.   Skin:intact other than surgical incisions and bruises on abdomen and forearms.   Activity:Up independently in room. In halls with therapy. Encouraged to increase activity outside of room.   Pain:C/O LBP (chronic), treated with scheduled tylenol, robaxin and prn oxycodone.   Social:Mother present after noon. Has cell phone at bedside.   Plan:Discharge to home when BP is controlled. Encourage increased activity and PO intake. Continue current cares and notify providers with questions and concerns.
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
## 2024-02-02 ENCOUNTER — APPOINTMENT (OUTPATIENT)
Dept: GENERAL RADIOLOGY | Facility: CLINIC | Age: 49
End: 2024-02-02
Attending: PHYSICIAN ASSISTANT
Payer: MEDICAID

## 2024-02-02 ENCOUNTER — HOME INFUSION (PRE-WILLOW HOME INFUSION) (OUTPATIENT)
Dept: PHARMACY | Facility: CLINIC | Age: 49
End: 2024-02-02
Payer: COMMERCIAL

## 2024-02-02 LAB
ANION GAP SERPL CALCULATED.3IONS-SCNC: 8 MMOL/L (ref 7–15)
BACTERIA BLD CULT: ABNORMAL
BACTERIA BLD CULT: ABNORMAL
BUN SERPL-MCNC: 11.6 MG/DL (ref 6–20)
CA-I BLD-MCNC: 4.5 MG/DL (ref 4.4–5.2)
CALCIUM SERPL-MCNC: 8.5 MG/DL (ref 8.6–10)
CHLORIDE SERPL-SCNC: 106 MMOL/L (ref 98–107)
CREAT SERPL-MCNC: 0.91 MG/DL (ref 0.67–1.17)
DEPRECATED HCO3 PLAS-SCNC: 23 MMOL/L (ref 22–29)
EGFRCR SERPLBLD CKD-EPI 2021: >90 ML/MIN/1.73M2
ERYTHROCYTE [DISTWIDTH] IN BLOOD BY AUTOMATED COUNT: 20.6 % (ref 10–15)
GLUCOSE BLDC GLUCOMTR-MCNC: 114 MG/DL (ref 70–99)
GLUCOSE BLDC GLUCOMTR-MCNC: 130 MG/DL (ref 70–99)
GLUCOSE SERPL-MCNC: 109 MG/DL (ref 70–99)
HCT VFR BLD AUTO: 25.2 % (ref 40–53)
HGB BLD-MCNC: 8.1 G/DL (ref 13.3–17.7)
MAGNESIUM SERPL-MCNC: 2 MG/DL (ref 1.7–2.3)
MCH RBC QN AUTO: 20.7 PG (ref 26.5–33)
MCHC RBC AUTO-ENTMCNC: 32.1 G/DL (ref 31.5–36.5)
MCV RBC AUTO: 65 FL (ref 78–100)
PHOSPHATE SERPL-MCNC: 3.3 MG/DL (ref 2.5–4.5)
PLATELET # BLD AUTO: 331 10E3/UL (ref 150–450)
POTASSIUM SERPL-SCNC: 4.4 MMOL/L (ref 3.4–5.3)
RBC # BLD AUTO: 3.91 10E6/UL (ref 4.4–5.9)
SODIUM SERPL-SCNC: 137 MMOL/L (ref 135–145)
WBC # BLD AUTO: 10.7 10E3/UL (ref 4–11)

## 2024-02-02 PROCEDURE — 250N000013 HC RX MED GY IP 250 OP 250 PS 637: Performed by: PHYSICIAN ASSISTANT

## 2024-02-02 PROCEDURE — 36415 COLL VENOUS BLD VENIPUNCTURE: CPT | Performed by: NURSE PRACTITIONER

## 2024-02-02 PROCEDURE — 120N000003 HC R&B IMCU UMMC

## 2024-02-02 PROCEDURE — 250N000013 HC RX MED GY IP 250 OP 250 PS 637

## 2024-02-02 PROCEDURE — 258N000003 HC RX IP 258 OP 636: Performed by: STUDENT IN AN ORGANIZED HEALTH CARE EDUCATION/TRAINING PROGRAM

## 2024-02-02 PROCEDURE — 250N000011 HC RX IP 250 OP 636

## 2024-02-02 PROCEDURE — 85027 COMPLETE CBC AUTOMATED: CPT

## 2024-02-02 PROCEDURE — 84100 ASSAY OF PHOSPHORUS: CPT | Performed by: STUDENT IN AN ORGANIZED HEALTH CARE EDUCATION/TRAINING PROGRAM

## 2024-02-02 PROCEDURE — 99024 POSTOP FOLLOW-UP VISIT: CPT | Performed by: NURSE PRACTITIONER

## 2024-02-02 PROCEDURE — 83735 ASSAY OF MAGNESIUM: CPT | Performed by: NURSE PRACTITIONER

## 2024-02-02 PROCEDURE — 250N000013 HC RX MED GY IP 250 OP 250 PS 637: Performed by: SURGERY

## 2024-02-02 PROCEDURE — 250N000011 HC RX IP 250 OP 636: Performed by: STUDENT IN AN ORGANIZED HEALTH CARE EDUCATION/TRAINING PROGRAM

## 2024-02-02 PROCEDURE — 71046 X-RAY EXAM CHEST 2 VIEWS: CPT

## 2024-02-02 PROCEDURE — 250N000013 HC RX MED GY IP 250 OP 250 PS 637: Performed by: STUDENT IN AN ORGANIZED HEALTH CARE EDUCATION/TRAINING PROGRAM

## 2024-02-02 PROCEDURE — 71046 X-RAY EXAM CHEST 2 VIEWS: CPT | Mod: 26 | Performed by: RADIOLOGY

## 2024-02-02 PROCEDURE — 82330 ASSAY OF CALCIUM: CPT | Performed by: NURSE PRACTITIONER

## 2024-02-02 PROCEDURE — 80048 BASIC METABOLIC PNL TOTAL CA: CPT | Performed by: PHYSICIAN ASSISTANT

## 2024-02-02 PROCEDURE — 250N000011 HC RX IP 250 OP 636: Performed by: SURGERY

## 2024-02-02 RX ORDER — LACTOBACILLUS RHAMNOSUS GG 10B CELL
1 CAPSULE ORAL 2 TIMES DAILY
Status: DISCONTINUED | OUTPATIENT
Start: 2024-02-02 | End: 2024-02-04 | Stop reason: HOSPADM

## 2024-02-02 RX ORDER — LOSARTAN POTASSIUM 50 MG/1
50 TABLET ORAL DAILY
Status: DISCONTINUED | OUTPATIENT
Start: 2024-02-03 | End: 2024-02-03

## 2024-02-02 RX ORDER — LIDOCAINE 4 G/G
2 PATCH TOPICAL
Status: DISCONTINUED | OUTPATIENT
Start: 2024-02-02 | End: 2024-02-04 | Stop reason: HOSPADM

## 2024-02-02 RX ORDER — LOSARTAN POTASSIUM 25 MG/1
25 TABLET ORAL ONCE
Status: COMPLETED | OUTPATIENT
Start: 2024-02-02 | End: 2024-02-02

## 2024-02-02 RX ORDER — AMLODIPINE BESYLATE 5 MG/1
5 TABLET ORAL 2 TIMES DAILY
Status: DISCONTINUED | OUTPATIENT
Start: 2024-02-02 | End: 2024-02-04 | Stop reason: HOSPADM

## 2024-02-02 RX ORDER — MAGNESIUM OXIDE 400 MG/1
400 TABLET ORAL EVERY 4 HOURS
Status: COMPLETED | OUTPATIENT
Start: 2024-02-02 | End: 2024-02-02

## 2024-02-02 RX ORDER — AMOXICILLIN 250 MG
1 CAPSULE ORAL
Status: DISCONTINUED | OUTPATIENT
Start: 2024-02-02 | End: 2024-02-04 | Stop reason: HOSPADM

## 2024-02-02 RX ADMIN — LOSARTAN POTASSIUM 25 MG: 25 TABLET, FILM COATED ORAL at 08:03

## 2024-02-02 RX ADMIN — HYDRALAZINE HYDROCHLORIDE 10 MG: 20 INJECTION INTRAMUSCULAR; INTRAVENOUS at 14:02

## 2024-02-02 RX ADMIN — OXYCODONE HYDROCHLORIDE 5 MG: 5 TABLET ORAL at 20:16

## 2024-02-02 RX ADMIN — Medication 10 MG: at 21:45

## 2024-02-02 RX ADMIN — ONDANSETRON 4 MG: 2 INJECTION INTRAMUSCULAR; INTRAVENOUS at 08:50

## 2024-02-02 RX ADMIN — PANTOPRAZOLE SODIUM 40 MG: 40 TABLET, DELAYED RELEASE ORAL at 08:03

## 2024-02-02 RX ADMIN — HEPARIN SODIUM 5000 UNITS: 5000 INJECTION, SOLUTION INTRAVENOUS; SUBCUTANEOUS at 16:07

## 2024-02-02 RX ADMIN — ONDANSETRON 4 MG: 4 TABLET, ORALLY DISINTEGRATING ORAL at 18:09

## 2024-02-02 RX ADMIN — LOSARTAN POTASSIUM 25 MG: 25 TABLET, FILM COATED ORAL at 17:58

## 2024-02-02 RX ADMIN — HEPARIN SODIUM 5000 UNITS: 5000 INJECTION, SOLUTION INTRAVENOUS; SUBCUTANEOUS at 05:25

## 2024-02-02 RX ADMIN — HYDRALAZINE HYDROCHLORIDE 10 MG: 20 INJECTION INTRAMUSCULAR; INTRAVENOUS at 16:05

## 2024-02-02 RX ADMIN — OXYCODONE HYDROCHLORIDE 5 MG: 5 TABLET ORAL at 05:24

## 2024-02-02 RX ADMIN — HYDRALAZINE HYDROCHLORIDE 10 MG: 20 INJECTION INTRAMUSCULAR; INTRAVENOUS at 00:05

## 2024-02-02 RX ADMIN — HYDRALAZINE HYDROCHLORIDE 10 MG: 20 INJECTION INTRAMUSCULAR; INTRAVENOUS at 05:12

## 2024-02-02 RX ADMIN — GABAPENTIN 100 MG: 100 CAPSULE ORAL at 08:02

## 2024-02-02 RX ADMIN — HYDRALAZINE HYDROCHLORIDE 10 MG: 20 INJECTION INTRAMUSCULAR; INTRAVENOUS at 21:56

## 2024-02-02 RX ADMIN — MAGNESIUM OXIDE TAB 400 MG (241.3 MG ELEMENTAL MG) 400 MG: 400 (241.3 MG) TAB at 08:03

## 2024-02-02 RX ADMIN — CARVEDILOL 25 MG: 25 TABLET, FILM COATED ORAL at 08:02

## 2024-02-02 RX ADMIN — OXYCODONE HYDROCHLORIDE 5 MG: 5 TABLET ORAL at 21:56

## 2024-02-02 RX ADMIN — METHOCARBAMOL 750 MG: 750 TABLET ORAL at 08:03

## 2024-02-02 RX ADMIN — ACETAMINOPHEN 975 MG: 325 TABLET, FILM COATED ORAL at 21:45

## 2024-02-02 RX ADMIN — ACETAMINOPHEN 975 MG: 325 TABLET, FILM COATED ORAL at 16:04

## 2024-02-02 RX ADMIN — ACETAMINOPHEN 975 MG: 325 TABLET, FILM COATED ORAL at 05:11

## 2024-02-02 RX ADMIN — HYDRALAZINE HYDROCHLORIDE 10 MG: 20 INJECTION INTRAMUSCULAR; INTRAVENOUS at 12:41

## 2024-02-02 RX ADMIN — CARVEDILOL 25 MG: 25 TABLET, FILM COATED ORAL at 20:16

## 2024-02-02 RX ADMIN — ASPIRIN 81 MG CHEWABLE TABLET 81 MG: 81 TABLET CHEWABLE at 08:03

## 2024-02-02 RX ADMIN — ROSUVASTATIN CALCIUM 20 MG: 20 TABLET, FILM COATED ORAL at 08:02

## 2024-02-02 RX ADMIN — AMLODIPINE BESYLATE 5 MG: 5 TABLET ORAL at 20:16

## 2024-02-02 RX ADMIN — METHOCARBAMOL 750 MG: 750 TABLET ORAL at 20:16

## 2024-02-02 RX ADMIN — VANCOMYCIN HYDROCHLORIDE 1250 MG: 10 INJECTION, POWDER, LYOPHILIZED, FOR SOLUTION INTRAVENOUS at 05:14

## 2024-02-02 RX ADMIN — Medication 1 CAPSULE: at 21:45

## 2024-02-02 RX ADMIN — Medication 1 CAPSULE: at 12:41

## 2024-02-02 RX ADMIN — AMLODIPINE BESYLATE 5 MG: 5 TABLET ORAL at 08:03

## 2024-02-02 RX ADMIN — HEPARIN SODIUM 5000 UNITS: 5000 INJECTION, SOLUTION INTRAVENOUS; SUBCUTANEOUS at 21:45

## 2024-02-02 ASSESSMENT — ACTIVITIES OF DAILY LIVING (ADL)
ADLS_ACUITY_SCORE: 20

## 2024-02-02 NOTE — PROGRESS NOTES
Cardiovascular Surgery Progress Note  02/02/2024         Assessment and Plan:     Valdo Lyons is a 48 year old male with PMH of uncontrolled HTN and childhood seizures. 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 Repeat CTA 1/17 with decision for surgical management, medically managed with strict BP goals in interim. Now status post ascending aortic aneurysm hemiarch repair and TEVAR with CVTS and Vascular Surgery on 1/23/24.     Cardiovascular:   Type B dissection with c/f retrograde tear s/p hemiarch repair and TEVAR on 1/23/24  Hx of uncontrolled HTN   Pre-procedure: Patient was not taking any PTA medications for his high blood pressure. Bilateral [facility] placed at [facility]. Ordered additional testing, as his BP medication requirements are exceeding expectations, rule out other/organic causes of elevated BP. TSH normal. Rads did not appreciate any renal mass other than a simple cyst - lower likelihood  pheochromocytoma. EF 55-60%.   Continues to require multiple doses of IV hydralazine   Intra-op WILLIAM showed EF 55%  Post-op echo 2/1: EF 60-65%, mild TV insufficiency, mod concentric LV hypertrophy, no pericardial effusion  - ASA 81 mg daily  - rosuvastatin 20 mg daily  - Coreg 25 mg BID, last increased 1/31  - Increase amlodipine to 5 mg BID 2/2   - Increase losartan to 50 mg daily starting 2/3  - Diuresis as below  - SBP goal <120, IV hydralazine PRN  - baseline post-op CTA 1/31 complete  - Vascular Surgery following, reviewed post-op CTA - appreciate recommendations for follow up   - CTA 1/31 also shows a small, nonspecific gas containing fluid  collection about the ascending thoracic aorta - Dr. Shaw reviewed, likely expected post-op changes    Chest tubes: 3 mediastinals removed 1/29; R pleural removed 2/1 after clamp trial   TPW: removed    Pulmonary:  Right apical pneumothorax, stable   Extubated POD 1. Now saturating well on RA.   - Supplemental O2 PRN to keep sats > 92%  - Pulm hygiene, IS, activity and deep breathing  - CXR 1/27 am with small right apical pneumothorax, stable on subsequent CXRs - clamping trial prior to chest tube removal 2/1    Neurology/Psych:  - Vascular ok with q4h neuro checks to evaluate for spinal cord ischemia.  Acute post-operative pain   Pain well controlled with current regimen:  - scheduled acetaminophen, PO oxycodone PRN, methocarbamol QID, lidocaine patches, heat/ice; discontinue gabapentin  - discussed limiting opioids - IV dilaudid discontinued 1/31, methocarbamol increased to 750 mg QID 1/31  - patient having chronic back pain (see MSK below)  Baseline cognitive dysfunction  Patient's mother is his caregiver at home due to baseline cognitive dysfunction. The patient's overall understanding of the situation is inconsistent. He also developed some agitation/behavioral issues (spitting and kicking) 1/21 requiring sedation. Psychiatry saw patient earlier in his stay, see note for recs. SW on board per mother's request for additional caregiver resources.  - Monitor neurological status. Notify the MD for any acute changes in exam.  - Delirium precautions  - Melatonin 10 mg at bedtime   Recent fall, atraumatic  Hx childhood seizure disorder  Seizure hx noted, not on any antiepileptics at time of admission. [facility]ed to [facility] s/p [facility], CT head reported as negative for intracranial abnormalities 1/12  Hx Mariajuana use   Reports typically smokes mariajuana nightly. Denies tobacco, alcohol, or other illicit drugs  Anxiety  - patient reporting anxiety with recent life stressors, offered Health Psychiatry which patient amendable  to, saw 1/31 - will plan to follow weekly during hospitalization      / Renal / Fluid / Electrolytes:  Hypervolemia, resolved  Hyponatremia, resolved  Hypophosphatemia, replaced   Hematuria  Unknown baseline creatinine, was 1.6 on admission to [facility], last value in 2022 was 1.25. Cr now normalized with adequate UOP. Nephro comsulted and now signed off.    -adequate UOP.   FLUID STATUS: Pre-op weight 194, weight today 182; I/O past 24 hours: -1.6 L.   - Diuresis: held since 1/26 as patient appears euvolemic   - Replete lytes per protocol  - Strict I/O, daily weights  - Avoid/limit nephrotoxins as able  - hematuria likely casanova related, recommended repeat UA on an outpatient basis to monitor for resolution     GI / Nutrition:   Ileus, concern for, resolved  Hypoalbuminemia  Diarrhea  - Tolerating regular diet  - Bowel regimen transitioned to prn given diarrhea, likely 2/2 antibiotics  - Probiotic ordered  - Defer c. Diff testing for now    Endocrine:  Stress induced hyperglycemia, resolved   Prediabetes - Hgb A1c 6.1  - Initially managed on insulin drip postop, transitioned to sliding scale - will discontinue given rare need for supplemental insulin to maintain euglycemia; goal BG <180 for optimal healing    Infectious Disease:  Stress induced leukocytosis. resolved  WBC WNL, afebrile, no signs or symptoms of infection  - Completed perioperative antibiotics  - UA, blood cultures, sputum cultures 1/30   - UA negative  - sputum contaminated - patient without s/sx of pneumonia, repeat if clinically indicated   - Blood culture growing staph epi in 1 of 2 cultures, suspect contaminant. Blood cultures re-collected 1/31 - NGTD, follow for results.   - ID consulted 1/31, recommended monitoring off antibiotics however surgeon preferred to treat with IV vancomycin while awaiting repeat cultures, ok to stop 2/2 if cultures remain negative per Dr. Shaw   - Vanc discontinued 2/2  - Monitor fever curve, CBC    Hematology:   Acute  blood loss anemia  Thrombocytopenia, resolved  Beta thalassemia trait   Hgb stable; Plt WNL, no signs or symptoms of active bleeding  - Patient and mother report history of Beta thalassemia trait, noted on transfusion order   - s/p 1 unit pRBCs 1/28    Anticoagulation:   - ASA 81 mg    MSK/Skin:  Sternotomy  Surgical incision  - Sternal precautions  - Incisional cares per protocol  - PT/OT/CR consults  Back pain, chronic  - patient reports chronic diffuse back pain, worse since fall prior to admission  - review of records from [facility] show CT thoracic and lumbar spine without acute fracture/abnormality   - pain control & therapies as above     HEENT:   Transient bilateral blurry vision, resolved  - patient reports blurry vision in the ICU  - Ophthalmology consulted 1/28 - exam notable for cotton wool spot of the left eye. Possible etiology of cotton wool spot include hypertension vs anemia vs diabetes though patient does not have diabetes. Given multiple indwelling catheters and recent surgeries, less likely but cannot rule out endophthalmitis.   - blood cultures x2, follow for results  - Ophthalmology repeated dilated exam 1/30 - will follow blood cultures and repeat dilated exam in 7-10 days    Prophylaxis:   - Stress ulcer prophylaxis: Pantoprazole 40 mg daily for 30 days  - DVT prophylaxis: Subcutaneous heparin, SCD    Disposition:   - Transferred to  on 1/29  - Therapies recommending discharge home when medically ready. Barriers to discharge include BP control    Dr. Shaw updated via written communication.    Derick Read CNP, ACN-  Cardiothoracic Surgery  [employer] Pager [phone]          Interval History:     Continues to have hypertension requiring prn IV hydralazine.   C/o nausea this morning and associated diarrhea and thus reports feeling unwell today with poor appetite.  Discouraged as he knows that he is otherwise approaching hospital discharge.  Otherwise denies complaints.  Denies dyspnea,  chest pain, palpitations, dizziness, syncopal symptoms, fevers, chills, myalgias, or sternal popping/clicking/snapping.         Physical Exam:     Gen: A&Ox4, NAD, resting in bed in darkened room  Neuro: no focal deficits, speech clear, face symmetric, BLACKBURN  CV: RRR, S1 S2, no murmurs, rubs or gallops  Pulm: CTA, no wheezing or rhonchi, unlabored breathing on RA   Abd: SNTND, no guarding or peritoneal signs  Ext: negligible peripheral edema, moving all extremities  MSK: BLACKBURN, no gross deformities  Incision: clean, dry, intact, no erythema or induration, sternum stable  Tubes/drain sites: dressing with old sanguinous drainage         Data:    Imaging:  reviewed recent imaging, no acute concerns    Recent Results (from the past 24 hour(s))   XR Chest 2 Views    Narrative    EXAM: Chest radiograph 2/1/2024 4:37 PM    HISTORY: 48 years Male s/p chest tube removal.     COMPARISON: Chest x-ray 2/1/2024.    TECHNIQUE: Frontal and lateral views of the chest.    FINDINGS:   Postsurgical changes of the chest. Aortic stent projects similarly.  Median sternotomy wires are intact. Interval removal of right chest  tube. Trachea is midline. The cardiac silhouette size is stable. Trace  right apical pneumothorax. Small bilateral pleural effusions. No focal  pulmonary consolidation. The visualized upper abdomen is unremarkable.  No acute or suspicious osseous or soft tissue abnormality.      Impression    IMPRESSION:   1.  Interval removal of right chest tube with persistent trace right  apical pneumothorax.  2.  Small bilateral pleural effusions.  3.  Stable post surgical changes in the chest. Sternotomy wires are  intact.    I have personally reviewed the examination and initial interpretation  and I agree with the findings.    ILEANA YOO MD         SYSTEM ID:  [id number]   XR Chest 2 Views    Narrative    Exam: XR CHEST 2 VIEWS, 2/2/2024 9:40 AM    Comparison: 2/1/2024    History: s/p chest tube removal    Findings:  PA and  lateral views of the upright chest. Post surgical changes with  aortic stent in stable position and intact sternotomy wires.. Trachea  is midline. Cardiomediastinal silhouette is within normal limits. No  focal airspace opacity. Small right pneumothorax. No pleural effusion.  The visualized upper abdomen is unremarkable. No acute osseous  abnormalities.      Impression    Impression: Small right pneumothorax.    I have personally reviewed the examination and initial interpretation  and I agree with the findings.    MARIA E CONLEY MD         SYSTEM ID:  [id number]          Labs:  Recent Labs   Lab 02/02/24  1444 02/02/24  1032 02/02/24  0418 02/01/24  0822 02/01/24  0628 01/31/24  0827 01/31/24  0437 01/30/24  0731 01/30/24  0603   WBC  --   --  10.7  --  12.8*  --  13.3*  --  16.4*   HGB  --   --  8.1*  --  8.3*  --  7.8*  --  8.3*   MCV  --   --  65*  --  66*  --  66*  --  66*   PLT  --   --  331  --  303  --  260  --  246   INR  --   --   --   --  1.29*  --  1.29*  --  1.26*   NA  --   --  137  --  134*  --  133*  --  134*   POTASSIUM  --   --  4.4  --  4.5  --  4.7  --  4.5   CHLORIDE  --   --  106  --  102  --  101  --  100   CO2  --   --  23  --  25  --  25  --  25   BUN  --   --  11.6  --  12.2  --  11.9  --  12.6   CR  --   --  0.91  --  0.89  --  0.91  --  0.90   ANIONGAP  --   --  8  --  7  --  7  --  9   YARON  --   --  8.5*  --  8.4*  --  8.4*  --  8.4*   * 114* 109*   < > 108*   < > 112*   < > 93   ALBUMIN  --   --   --   --  2.9*  --  2.9*  --  2.8*   PROTTOTAL  --   --   --   --  5.9*  --  5.6*  --  5.6*   BILITOTAL  --   --   --   --  0.5  --  0.5  --  0.6   ALKPHOS  --   --   --   --  69  --  64  --  65   ALT  --   --   --   --  37  --  34  --  29   AST  --   --   --   --  29  --  27  --  27    < > = values in this interval not displayed.      GLUCOSE:   Recent Labs   Lab 02/02/24  1444 02/02/24  1032 02/02/24  0418 02/01/24  2210 02/01/24  1726 02/01/24  1219   * 114* 109* 126* 111* 108*

## 2024-02-02 NOTE — PROGRESS NOTES
Care Management Follow Up    Length of Stay (days): 17    Expected Discharge Date: 02/03/2024     Concerns to be Addressed: financial/insurance     Patient plan of care discussed at interdisciplinary rounds: Yes    Anticipated Discharge Disposition: Other (Comments) (TBD)     Anticipated Discharge Services: None  Anticipated Discharge DME: None    Patient/family educated on Medicare website which has current facility and service quality ratings: other (see comments)  Education Provided on the Discharge Plan: Other (see comments)  Patient/Family in Agreement with the Plan: unable to assess    Referrals Placed by CM/SW:  (TBD)  Private pay costs discussed: Not applicable    Additional Information:  RNCC discussed discharge plan with [facility], as patient is going off of IV antibiotics, [facility] referral was cancelled. RNCC continues to follow.      Update: RNCC determined pt PCP follow up appointment had already come and gone while pt in hospital, so RNCC rescheduled with [facility], 2/15/24, 8:40 a.m., Dr. Sow.       Irineo Coates BSN, BA, RN, CMSRN, RNCC  [facility]  Covering Units 6C Beds 1021-3598/OBS  Phone: [phone]  Pager: [phone]  After Hours [phone]    6C Beds 0245-6928  Ph: [phone]  6C Beds 2431-9050  pager: [phone]    6B/C/D RNCC Weekend/holiday pager: [phone]    6A/ICU RNCC Weekend/holiday pager: [phone]    7A/7B/7C/7D RNCC Weekend/holiday pager: [phone]    5A/B/C RNCC Weekend/holiday pager: [phone]    [facility] RNCC ED/5 Ortho/5 Med/Surg [phone]    [facility] RNCC 6 Med/Surg 8A, [phone]    Observation SW and weekend/after hours phone: [phone]

## 2024-02-02 NOTE — PROGRESS NOTES
CLINICAL NUTRITION SERVICES    Reviewed nutrition risk factors due to LOS. Pt is ordering 3 meals a day per [facility] and consuming % of meals per flowsheet. Good appetite per RN notes. Discontinued room service w/ assist as pt prefers to order his own meals per nutrition associate. Wt loss noted this admission, may be related to fluid shifts. No nutrition issues identified at this time. RD will follow via rounds at this time, unless consulted.    Stanton Baxter RD, LD  Available on [employer]  6C (beds [phone]) + 7C (beds 6577-7597) + ED   RD pager: [phone]  Weekend/Holiday RD pager: [phone]

## 2024-02-02 NOTE — PROGRESS NOTES
"VASCULAR SURGERY PROGRESS NOTE    Subjective:  NAEO. Repeat blood cultures negative so far. Afebrile, hemodynamically stable, normal renal function, leukocytosis resolved.    Objective:  Intake/Output Summary (Last 24 hours) at 2/2/2024 0926  Last data filed at 2/2/2024 0500  Gross per 24 hour   Intake 1290 ml   Output 1000 ml   Net 290 ml     Labs:  ROUTINE IP LABS (Last four results)  BMP  Recent Labs   Lab 02/02/24 0418 02/01/24  2210 02/01/24  1726 02/01/24  1219 02/01/24  0822 02/01/24  0628 01/31/24  0827 01/31/24  0437 01/30/24  0731 01/30/24  0603     --   --   --   --  134*  --  133*  --  134*   POTASSIUM 4.4  --   --   --   --  4.5  --  4.7  --  4.5   CHLORIDE 106  --   --   --   --  102  --  101  --  100   YARON 8.5*  --   --   --   --  8.4*  --  8.4*  --  8.4*   CO2 23  --   --   --   --  25  --  25  --  25   BUN 11.6  --   --   --   --  12.2  --  11.9  --  12.6   CR 0.91  --   --   --   --  0.89  --  0.91  --  0.90   * 126* 111* 108*   < > 108*   < > 112*   < > 93    < > = values in this interval not displayed.     CBC  Recent Labs   Lab 02/02/24 0418 02/01/24  0628 01/31/24  0437 01/30/24  0603   WBC 10.7 12.8* 13.3* 16.4*   RBC 3.91* 3.97* 3.75* 3.92*   HGB 8.1* 8.3* 7.8* 8.3*   HCT 25.2* 26.2* 24.8* 25.8*   MCV 65* 66* 66* 66*   MCH 20.7* 20.9* 20.8* 21.2*   MCHC 32.1 31.7 31.5 32.2   RDW 20.6* 20.3* 20.2* 19.9*    303 260 246     INR  Recent Labs   Lab 02/01/24  0628 01/31/24  0437 01/30/24  0603 01/29/24  0309   INR 1.29* 1.29* 1.26* 1.17*     PHYSICAL EXAM:  /85   Pulse 88   Temp 98  F (36.7  C) (Oral)   Resp 16   Ht 1.727 m (5' 8\")   Wt 81 kg (178 lb 9.6 oz)   SpO2 96%   BMI 27.16 kg/m    General: The patient is awake and alert, NAD  Psych: Pleasant affect, answers questions appropriately  Skin: Color appropriate for race, warm, dry.  Neuro: Sensorimotor intact in BUE and BLE, CN II-XII grossly intact  Respiratory: Breathing unlabored on RA  GI:  Abdomen soft, " non-distended.  Extremities: Bilateral femoral and DP pulses palpable, mild edema noted on ankles.      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 Due to worsening back pain and increased variation in BP from arterial lines in his right and left hands, repeat CT scan was done which showed Type A dissection with a thrombosed retrograde false lumen, so he was transferred to [facility]. Now s.p hemiarch repair and TEVAR on 1/23/24 with palpable pulses in femoral, and DP bilaterally. Leukocytosis continues to improve, afebrile, blood cultures from 1/30/2024 positive for Staphylococcus epidermidis, repeat blood cultures negative to date.    - From vascular surgery perspective we continue to recommend ambulation at least 4x daily for optimal hand rehabilitation  - Aspirin 81mg daily  - CTA 1/30/24 with patent ascending aortic graft. No leak. Postoperative collection with air bubbles around the graft measures up to 19 mm in thickness. Patent thoracic aortic endograft in the true lumen from the left subclavian artery origin to T7. Dissection ends at the diaphragm above the celiac origin.  - Patient has follow-up scheduled with Dr. Edyta Byrd and repeat CTA on 3/6/24    Discussed patients history, exam, assessment and plan with Dr. Rawls who will discuss with staff.     Candie Parkinson CNP  Vascular Surgery  Pager: [phone]  [email or web address]  Send message or 10 digit call back number Securely via [employer] with the [employer] Web Console (learn more here)

## 2024-02-02 NOTE — PLAN OF CARE
"Time of care 2300 - 0730    Neuro: Alert and oriented x4.   Cardiac: Sinus rhythm on monitor. Continues to be hypertensive. PRN IV hydralazine given x2.   Respiratory: Breathing comfortably on room air. Lung sounds clear. Last chest tube was removed yesterday, 2/1. Dsg CDI.   GI/: Voiding in adequate amounts. BM+.   Diet/appetite: 2g Na diet.   Activity: Up ad maninder. Steady gait. Denies dizziness/light-headedness.   Pain: Endorses chronic low back pain. 5 mg oxycodone given x1 with improvement.   Skin: No new deficits noted. Surgical incisions present - old chest tubes, midline sternum, and right upper chest.   Lines: PIV saline locked - receiving intermittent IV vanco.         Goal Outcome Evaluation: progressing - continuing to address BP goals.     /72 (BP Location: Left arm, Cuff Size: Adult Regular)   Pulse 80   Temp 97.8  F (36.6  C) (Oral)   Resp 16   Ht 1.727 m (5' 8\")   Wt 81 kg (178 lb 9.6 oz)   SpO2 96%   BMI 27.16 kg/m      "

## 2024-02-02 NOTE — PLAN OF CARE
Goal Outcome Evaluation:      Plan of Care Reviewed With: patient    Overall Patient Progress: improvingOverall Patient Progress: improving         D/I/A:Pt up ad maninder in room.  A+O x4 and making needs known.  Declined additional pain meds outside of scheduled meds.  Pleasant and cooperative.  VSS, medicated for SBP>120.  Voiding in fair amounts without difficulty.  Soft/loose stool reported; bowel meds held this evening.  Resting and watching TV between cares and treatments.    P: Continue to monitor status.

## 2024-02-03 LAB
ANION GAP SERPL CALCULATED.3IONS-SCNC: 10 MMOL/L (ref 7–15)
BUN SERPL-MCNC: 11.7 MG/DL (ref 6–20)
CALCIUM SERPL-MCNC: 8.7 MG/DL (ref 8.6–10)
CHLORIDE SERPL-SCNC: 103 MMOL/L (ref 98–107)
CREAT SERPL-MCNC: 0.9 MG/DL (ref 0.67–1.17)
DEPRECATED HCO3 PLAS-SCNC: 24 MMOL/L (ref 22–29)
EGFRCR SERPLBLD CKD-EPI 2021: >90 ML/MIN/1.73M2
ERYTHROCYTE [DISTWIDTH] IN BLOOD BY AUTOMATED COUNT: 20.5 % (ref 10–15)
GLUCOSE BLDC GLUCOMTR-MCNC: 104 MG/DL (ref 70–99)
GLUCOSE SERPL-MCNC: 103 MG/DL (ref 70–99)
HCT VFR BLD AUTO: 27 % (ref 40–53)
HGB BLD-MCNC: 8.5 G/DL (ref 13.3–17.7)
MAGNESIUM SERPL-MCNC: 2 MG/DL (ref 1.7–2.3)
MCH RBC QN AUTO: 20.8 PG (ref 26.5–33)
MCHC RBC AUTO-ENTMCNC: 31.5 G/DL (ref 31.5–36.5)
MCV RBC AUTO: 66 FL (ref 78–100)
PHOSPHATE SERPL-MCNC: 3.6 MG/DL (ref 2.5–4.5)
PLATELET # BLD AUTO: 326 10E3/UL (ref 150–450)
POTASSIUM SERPL-SCNC: 4.5 MMOL/L (ref 3.4–5.3)
RBC # BLD AUTO: 4.09 10E6/UL (ref 4.4–5.9)
SODIUM SERPL-SCNC: 137 MMOL/L (ref 135–145)
WBC # BLD AUTO: 9.5 10E3/UL (ref 4–11)

## 2024-02-03 PROCEDURE — 80048 BASIC METABOLIC PNL TOTAL CA: CPT | Performed by: PHYSICIAN ASSISTANT

## 2024-02-03 PROCEDURE — 250N000013 HC RX MED GY IP 250 OP 250 PS 637: Performed by: SURGERY

## 2024-02-03 PROCEDURE — 85027 COMPLETE CBC AUTOMATED: CPT

## 2024-02-03 PROCEDURE — 250N000011 HC RX IP 250 OP 636

## 2024-02-03 PROCEDURE — 36415 COLL VENOUS BLD VENIPUNCTURE: CPT | Performed by: INTERNAL MEDICINE

## 2024-02-03 PROCEDURE — 250N000013 HC RX MED GY IP 250 OP 250 PS 637: Performed by: STUDENT IN AN ORGANIZED HEALTH CARE EDUCATION/TRAINING PROGRAM

## 2024-02-03 PROCEDURE — 83735 ASSAY OF MAGNESIUM: CPT | Performed by: NURSE PRACTITIONER

## 2024-02-03 PROCEDURE — 250N000013 HC RX MED GY IP 250 OP 250 PS 637

## 2024-02-03 PROCEDURE — 120N000003 HC R&B IMCU UMMC

## 2024-02-03 PROCEDURE — 84100 ASSAY OF PHOSPHORUS: CPT | Performed by: INTERNAL MEDICINE

## 2024-02-03 PROCEDURE — 250N000013 HC RX MED GY IP 250 OP 250 PS 637: Performed by: PHYSICIAN ASSISTANT

## 2024-02-03 PROCEDURE — 36415 COLL VENOUS BLD VENIPUNCTURE: CPT | Performed by: NURSE PRACTITIONER

## 2024-02-03 RX ORDER — ROSUVASTATIN CALCIUM 20 MG/1
20 TABLET, COATED ORAL DAILY
Qty: 30 TABLET | Refills: 1 | Status: SHIPPED | OUTPATIENT
Start: 2024-02-04 | End: 2024-03-03 | Stop reason: SINTOL

## 2024-02-03 RX ORDER — HYDRALAZINE HYDROCHLORIDE 20 MG/ML
10 INJECTION INTRAMUSCULAR; INTRAVENOUS EVERY 4 HOURS PRN
Status: DISCONTINUED | OUTPATIENT
Start: 2024-02-03 | End: 2024-02-04 | Stop reason: HOSPADM

## 2024-02-03 RX ORDER — POLYETHYLENE GLYCOL 3350 17 G/17G
17 POWDER, FOR SOLUTION ORAL DAILY
Qty: 510 G | Refills: 0 | Status: SHIPPED | OUTPATIENT
Start: 2024-02-03 | End: 2024-02-15

## 2024-02-03 RX ORDER — LIDOCAINE 4 G/G
1-2 PATCH TOPICAL DAILY PRN
Qty: 24 PATCH | Refills: 0 | Status: SHIPPED | OUTPATIENT
Start: 2024-02-03 | End: 2024-02-15

## 2024-02-03 RX ORDER — ACETAMINOPHEN 325 MG/1
975 TABLET ORAL EVERY 6 HOURS PRN
Qty: 100 TABLET | Refills: 0 | Status: SHIPPED | OUTPATIENT
Start: 2024-02-03 | End: 2024-04-08

## 2024-02-03 RX ORDER — ASPIRIN 81 MG/1
81 TABLET, CHEWABLE ORAL DAILY
Qty: 90 TABLET | Refills: 0 | Status: SHIPPED | OUTPATIENT
Start: 2024-02-04 | End: 2024-05-09

## 2024-02-03 RX ORDER — METHOCARBAMOL 750 MG/1
750 TABLET, FILM COATED ORAL EVERY 6 HOURS PRN
Qty: 28 TABLET | Refills: 1 | Status: SHIPPED | OUTPATIENT
Start: 2024-02-03 | End: 2024-02-27

## 2024-02-03 RX ORDER — PANTOPRAZOLE SODIUM 40 MG/1
40 TABLET, DELAYED RELEASE ORAL
Qty: 14 TABLET | Refills: 0 | Status: SHIPPED | OUTPATIENT
Start: 2024-02-04 | End: 2024-02-15

## 2024-02-03 RX ADMIN — HYDRALAZINE HYDROCHLORIDE 10 MG: 20 INJECTION INTRAMUSCULAR; INTRAVENOUS at 07:54

## 2024-02-03 RX ADMIN — AMLODIPINE BESYLATE 5 MG: 5 TABLET ORAL at 07:57

## 2024-02-03 RX ADMIN — Medication 1 CAPSULE: at 21:18

## 2024-02-03 RX ADMIN — CARVEDILOL 25 MG: 25 TABLET, FILM COATED ORAL at 07:57

## 2024-02-03 RX ADMIN — Medication 10 MG: at 21:17

## 2024-02-03 RX ADMIN — OXYCODONE HYDROCHLORIDE 5 MG: 5 TABLET ORAL at 08:04

## 2024-02-03 RX ADMIN — CARVEDILOL 25 MG: 25 TABLET, FILM COATED ORAL at 19:35

## 2024-02-03 RX ADMIN — HEPARIN SODIUM 5000 UNITS: 5000 INJECTION, SOLUTION INTRAVENOUS; SUBCUTANEOUS at 06:01

## 2024-02-03 RX ADMIN — ASPIRIN 81 MG CHEWABLE TABLET 81 MG: 81 TABLET CHEWABLE at 07:57

## 2024-02-03 RX ADMIN — OXYCODONE HYDROCHLORIDE 5 MG: 5 TABLET ORAL at 23:21

## 2024-02-03 RX ADMIN — ACETAMINOPHEN 975 MG: 325 TABLET, FILM COATED ORAL at 15:45

## 2024-02-03 RX ADMIN — HYDRALAZINE HYDROCHLORIDE 10 MG: 20 INJECTION INTRAMUSCULAR; INTRAVENOUS at 04:07

## 2024-02-03 RX ADMIN — METHOCARBAMOL 750 MG: 750 TABLET ORAL at 19:35

## 2024-02-03 RX ADMIN — OXYCODONE HYDROCHLORIDE 5 MG: 5 TABLET ORAL at 19:46

## 2024-02-03 RX ADMIN — METHOCARBAMOL 750 MG: 750 TABLET ORAL at 07:57

## 2024-02-03 RX ADMIN — HEPARIN SODIUM 5000 UNITS: 5000 INJECTION, SOLUTION INTRAVENOUS; SUBCUTANEOUS at 21:18

## 2024-02-03 RX ADMIN — ACETAMINOPHEN 975 MG: 325 TABLET, FILM COATED ORAL at 10:34

## 2024-02-03 RX ADMIN — Medication 1 CAPSULE: at 10:34

## 2024-02-03 RX ADMIN — METHOCARBAMOL 750 MG: 750 TABLET ORAL at 15:45

## 2024-02-03 RX ADMIN — ACETAMINOPHEN 975 MG: 325 TABLET, FILM COATED ORAL at 21:17

## 2024-02-03 RX ADMIN — ACETAMINOPHEN 975 MG: 325 TABLET, FILM COATED ORAL at 03:58

## 2024-02-03 RX ADMIN — AMLODIPINE BESYLATE 5 MG: 5 TABLET ORAL at 19:35

## 2024-02-03 RX ADMIN — OXYCODONE HYDROCHLORIDE 5 MG: 5 TABLET ORAL at 15:46

## 2024-02-03 RX ADMIN — LOSARTAN POTASSIUM 50 MG: 50 TABLET, FILM COATED ORAL at 07:57

## 2024-02-03 RX ADMIN — PANTOPRAZOLE SODIUM 40 MG: 40 TABLET, DELAYED RELEASE ORAL at 07:58

## 2024-02-03 RX ADMIN — ROSUVASTATIN CALCIUM 20 MG: 20 TABLET, FILM COATED ORAL at 07:57

## 2024-02-03 RX ADMIN — HEPARIN SODIUM 5000 UNITS: 5000 INJECTION, SOLUTION INTRAVENOUS; SUBCUTANEOUS at 14:38

## 2024-02-03 ASSESSMENT — ACTIVITIES OF DAILY LIVING (ADL)
ADLS_ACUITY_SCORE: 20

## 2024-02-03 NOTE — PLAN OF CARE
"Goal Outcome Evaluation:    Neuro: A&Ox4. Calls appropriately  Cardiac: Afebrile, VSS. SR 60s-80s. SBP goal <120. PRN hydralazine given x2.   Respiratory: Sating appropriately on RA  GI/: Voiding spontaneously into urinal. No BM this shift.  Diet/appetite: Tolerating 2g Na diet. Denies nausea.  Activity: Up independently  Pain: Pt reports incisional pain and back pain. PRN oxycodone given x 2  Skin: No new deficits noted. CT dressing changed and CDI  Lines: L PIV SL  Replacements: Mag, phos, and Potassium protocols, no replacements given.    /67 (BP Location: Left arm)   Pulse 86   Temp 98.1  F (36.7  C) (Oral)   Resp 15   Ht 1.727 m (5' 8\")   Wt 80.9 kg (178 lb 4.8 oz)   SpO2 98%   BMI 27.11 kg/m       Continue with POC and notify team of any changes         "

## 2024-02-03 NOTE — PROGRESS NOTES
.Major Shift Events:  Hypertensive. Hydralazine given x 3. Additional dose of Losartan ordered. Nausea, several loose stools. Held second dose of oral Mag (per protocol), Notified Yazmin Read CVTS NP  Plan: monitor GI. Goal SBP <120  Recheck Mag in morning per Yazmin Read NP  For vital signs and complete assessments, please see documentation flowsheets

## 2024-02-03 NOTE — PROGRESS NOTES
Cardiovascular Surgery Progress Note  02/03/2024         Assessment and Plan:     Valdo Lyons is a 48 year old male with PMH of uncontrolled HTN and childhood seizures. 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 Repeat CTA 1/17 with decision for surgical management, medically managed with strict BP goals in interim. Now status post ascending aortic aneurysm hemiarch repair and TEVAR with CVTS and Vascular Surgery on 1/23/24.     Cardiovascular:   Type B dissection with c/f retrograde tear s/p hemiarch repair and TEVAR on 1/23/24  Hx of uncontrolled HTN   Pre-procedure: Patient was not taking any PTA medications for his high blood pressure. Bilateral arterial lines placed at [facility]. Ordered additional testing, as his BP medication requirements are exceeding expectations, rule out other/organic causes of elevated BP. TSH normal. Rads did not appreciate any renal mass other than a simple cyst - lower likelihood pheochromocytoma. EF 55-60%.   Required a few doses of IV hydralazine for borderline elevated BPs  Intra-op WILLIAM showed EF 55%  Post-op echo 2/1: EF 60-65%, mild TV insufficiency, mod concentric LV hypertrophy, no pericardial effusion  - ASA 81 mg daily  - Rosuvastatin 20 mg daily  - Coreg 25 mg BID  - Amlodipine 5 mg BID  - Losartan 50 mg daily  - SBP goal <120, IV hydralazine PRN  - baseline post-op CTA 1/31 complete  - Vascular Surgery following, reviewed post-op CTA - appreciate recommendations for follow up   - CTA 1/31 also shows a small, nonspecific gas containing fluid collection about the ascending thoracic aorta - Dr. Shaw reviewed, likely  expected post-op changes    Chest tubes: 3 mediastinals removed 1/29; R pleural removed 2/1 after clamp trial   TPW: removed    Pulmonary:  Right apical pneumothorax, stable   Extubated POD 1. Now saturating well on RA.   - Supplemental O2 PRN to keep sats > 92%  - Pulm hygiene, IS, OOB/activity and deep breathing  - CXR 1/27 am with small right apical pneumothorax, stable on subsequent CXRs - clamping trial prior to chest tube removal 2/1    Neurology/Psych:  - Vascular ok with q4h neuro checks to evaluate for spinal cord ischemia.  Acute post-operative pain   Pain well controlled with current regimen:  - scheduled acetaminophen, PO oxycodone PRN, methocarbamol QID, lidocaine patches, heat/ice  - discussed limiting opioids - IV dilaudid discontinued 1/31, methocarbamol increased to 750 mg QID 1/31  - patient having chronic back pain (see MSK below)  Baseline cognitive dysfunction  Patient's mother is his caregiver at home due to baseline cognitive dysfunction. The patient's overall understanding of the situation is inconsistent. He also developed some agitation/behavioral issues (spitting and kicking) 1/21 requiring sedation. Psychiatry saw patient earlier in his stay, see note for recs. SW on board per mother's request for additional caregiver resources.  - Delirium precautions  - Melatonin 10 mg at bedtime   Recent fall, atraumatic  Hx childhood seizure disorder  Seizure hx noted, not on any antiepileptics at time of admission. [facility]ed to [facility] s/p fall, CT head reported as negative for intracranial abnormalities 1/12  Hx Mariajuana use  Reports typically smokes mariajuana nightly. Denies tobacco, alcohol, or other illicit drugs  Anxiety  - patient reporting anxiety with recent life stressors, offered [facility] which patient amendable to, saw 1/31 - will plan to follow weekly during hospitalization      / Renal / Fluid / Electrolytes:  Hypervolemia, resolved  Hyponatremia, resolved  Hypophosphatemia,  replaced   Hematuria  Unknown baseline creatinine, was 1.6 on admission to [facility], last value in 2022 was 1.25. Cr now normalized with adequate UOP. Nephro comsulted and now signed off.    I/O past 24 hours: +285 mL   - Diuresis: not indicated  - Replete lytes per protocol  - Strict I/O, daily weights  - Avoid/limit nephrotoxins as able  - hematuria likely casanova related, recommended repeat UA on an outpatient basis to monitor for resolution     GI / Nutrition:   C/f ileus, resolved  Hypoalbuminemia  Diarrhea, improving  - Tolerating regular diet  - Bowel regimen transitioned to prn given diarrhea, likely 2/2 antibiotics  - Probiotic ordered  - Send for c. Diff testing today    Endocrine:  Stress-induced hyperglycemia, resolved   Prediabetes - Hgb A1c 6.1  - Initially managed on insulin drip postop, transitioned to sliding scale - will discontinue given rare need for supplemental insulin to maintain euglycemia; goal BG <180 for optimal healing    Infectious Disease:  Stress induced leukocytosis. resolved  WBC WNL, afebrile, no signs or symptoms of infection  - Completed perioperative antibiotics  - UA, blood cultures, sputum cultures 1/30   - UA negative  - sputum contaminated - patient without s/sx of pneumonia, repeat if clinically indicated   - Blood culture growing staph epi in 1 of 2 cultures, suspect contaminant. Blood cultures re-collected 1/31 - NGTD, follow for final results  - ID consulted 1/31, recommended monitoring off antibiotics however surgeon preferred to treat with IV vancomycin while awaiting repeat cultures - discontinued 2/2  - Monitor fever curve, CBC    Hematology:   Acute blood loss anemia  Thrombocytopenia, resolved  Beta thalassemia trait   Hgb stable; Plt WNL, no signs or symptoms of active bleeding  - Patient and mother report history of Beta thalassemia trait, noted on transfusion order   - s/p 1 unit pRBCs 1/28    Anticoagulation:   - ASA 81 mg    MSK/Skin:  Sternotomy  Surgical incision  -  Sternal precautions  - Incisional cares per protocol  - PT/OT/CR consults  Back pain, chronic  - patient reports chronic diffuse back pain, worse since fall prior to admission  - review of records from O[facility] show CT thoracic and lumbar spine without acute fracture/abnormality   - pain control & therapies as above     HEENT:   Transient bilateral blurry vision, resolved  - patient reports blurry vision in the ICU  - Ophthalmology consulted 1/28 - exam notable for cotton wool spot of the left eye. Possible etiology of cotton wool spot include hypertension vs anemia vs diabetes though patient does not have diabetes. Given multiple indwelling catheters and recent surgeries, less likely but cannot rule out endophthalmitis.   - blood cultures x2, follow for results as above  - Ophthalmology repeated dilated exam 1/30 - will follow blood cultures and repeat dilated exam in 7-10 days    Prophylaxis:   - Stress ulcer prophylaxis: Pantoprazole 40 mg daily for 30 days  - DVT prophylaxis: Subcutaneous heparin, SCD    Disposition:   - Transferred to  on 1/29  - Therapies recommending discharge home when medically ready pending adequate BP control without IV agents      Derick Read, CNP, [facility]-  Cardiothoracic Surgery  [employer] Pager [phone]          Interval History:   NAEO  Feeling good today after a shower.  Reports diarrhea improving - none today after multiple episodes yesterday.  Denies nausea; appetite back.  Minimal pain.  Denies dyspnea, chest pain, palpitations, dizziness, syncopal symptoms, fevers, chills, myalgias, or sternal popping/clicking/snapping.         Physical Exam:     Gen: A&Ox4, NAD, up in room at maninder  Neuro: no focal deficits, speech clear, face symmetric, BLACKBURN  CV: WWP, RRR on monitor, non-tachycardic  Pulm: unlabored breathing on RA   Abd: SNTND, no guarding or peritoneal signs  Ext: no peripheral edema  MSK: BLACKBURN, no gross deformities  Incision: clean, dry, intact, no erythema or induration,  sternum stable  Tubes/drain sites: dressing C/D/I         Data:    Imaging:  reviewed recent imaging    Recent Results (from the past 24 hour(s))   XR Chest 2 Views    Narrative    Exam: XR CHEST 2 VIEWS, 2/2/2024 9:40 AM    Comparison: 2/1/2024    History: s/p chest tube removal    Findings:  PA and lateral views of the upright chest. Post surgical changes with  aortic stent in stable position and intact sternotomy wires.. Trachea  is midline. Cardiomediastinal silhouette is within normal limits. No  focal airspace opacity. Small right pneumothorax. No pleural effusion.  The visualized upper abdomen is unremarkable. No acute osseous  abnormalities.      Impression    Impression: Small right pneumothorax.    I have personally reviewed the examination and initial interpretation  and I agree with the findings.    MARIA E CONLEY MD         SYSTEM ID:  [id number]          Labs:  Recent Labs   Lab 02/03/24  0751 02/03/24  0625 02/02/24  1444 02/02/24  1032 02/02/24  0418 02/01/24  0822 02/01/24  0628 01/31/24  0827 01/31/24  0437 01/30/24  0731 01/30/24  0603   WBC  --  9.5  --   --  10.7  --  12.8*  --  13.3*  --  16.4*   HGB  --  8.5*  --   --  8.1*  --  8.3*  --  7.8*  --  8.3*   MCV  --  66*  --   --  65*  --  66*  --  66*  --  66*   PLT  --  326  --   --  331  --  303  --  260  --  246   INR  --   --   --   --   --   --  1.29*  --  1.29*  --  1.26*   NA  --  137  --   --  137  --  134*  --  133*  --  134*   POTASSIUM  --  4.5  --   --  4.4  --  4.5  --  4.7  --  4.5   CHLORIDE  --  103  --   --  106  --  102  --  101  --  100   CO2  --  24  --   --  23  --  25  --  25  --  25   BUN  --  11.7  --   --  11.6  --  12.2  --  11.9  --  12.6   CR  --  0.90  --   --  0.91  --  0.89  --  0.91  --  0.90   ANIONGAP  --  10  --   --  8  --  7  --  7  --  9   YARON  --  8.7  --   --  8.5*  --  8.4*  --  8.4*  --  8.4*   * 103* 130*   < > 109*   < > 108*   < > 112*   < > 93   ALBUMIN  --   --   --   --   --   --  2.9*  --   2.9*  --  2.8*   PROTTOTAL  --   --   --   --   --   --  5.9*  --  5.6*  --  5.6*   BILITOTAL  --   --   --   --   --   --  0.5  --  0.5  --  0.6   ALKPHOS  --   --   --   --   --   --  69  --  64  --  65   ALT  --   --   --   --   --   --  37  --  34  --  29   AST  --   --   --   --   --   --  29  --  27  --  27    < > = values in this interval not displayed.      GLUCOSE:   Recent Labs   Lab 02/03/24  0751 02/03/24  0625 02/02/24  1444 02/02/24  1032 02/02/24  0418 02/01/24  2210   * 103* 130* 114* 109* 126*

## 2024-02-03 NOTE — PLAN OF CARE
"Shift Updates (0138-8918): Afebrile and VSS on RA. Pt took a shower and napped.    Neuro: A&Ox4.  Cardiac: NSR HR: 80-95. SBP slightly above goal this AM (125/74 MAP 90), gave PRN hydralazine and BP decreased to goal range. Primary team relaxed the SBP goal to greater than or equal to 125. Started additional PO antihypertensive this AM.  Respiratory: SpO2 98% on RA.  GI/: Adequate urine output. No BM on shift, needs stool sample for C. Diff rule out.  Diet/appetite: Tolerating 2gNa diet. Eating well and denies N/V.  Activity: Steady and up ad maninder.  Pain: Incisional pain, gave PRN oxy 5mg x1. At acceptable level on current regimen.   Skin: Sternal incision and R upper chest incision LISA and WNL. Old chest tube insertion sites x4, oozy, changed the dressing. No new deficits noted.  Access: L PIV saline locked.    /70 (BP Location: Left arm, Cuff Size: Adult Regular)   Pulse 85   Temp 98.5  F (36.9  C) (Oral)   Resp 16   Ht 1.727 m (5' 8\")   Wt 80.9 kg (178 lb 4.8 oz)   SpO2 98%   BMI 27.11 kg/m    "

## 2024-02-04 VITALS
HEIGHT: 68 IN | OXYGEN SATURATION: 98 % | RESPIRATION RATE: 16 BRPM | SYSTOLIC BLOOD PRESSURE: 101 MMHG | BODY MASS INDEX: 26.63 KG/M2 | HEART RATE: 82 BPM | DIASTOLIC BLOOD PRESSURE: 77 MMHG | WEIGHT: 175.7 LBS | TEMPERATURE: 98.2 F

## 2024-02-04 LAB
ANION GAP SERPL CALCULATED.3IONS-SCNC: 9 MMOL/L (ref 7–15)
BACTERIA BLD CULT: NO GROWTH
BUN SERPL-MCNC: 14.5 MG/DL (ref 6–20)
CALCIUM SERPL-MCNC: 8.8 MG/DL (ref 8.6–10)
CHLORIDE SERPL-SCNC: 101 MMOL/L (ref 98–107)
CREAT SERPL-MCNC: 0.94 MG/DL (ref 0.67–1.17)
DEPRECATED HCO3 PLAS-SCNC: 24 MMOL/L (ref 22–29)
EGFRCR SERPLBLD CKD-EPI 2021: >90 ML/MIN/1.73M2
ERYTHROCYTE [DISTWIDTH] IN BLOOD BY AUTOMATED COUNT: 20.3 % (ref 10–15)
GLUCOSE SERPL-MCNC: 97 MG/DL (ref 70–99)
HCT VFR BLD AUTO: 26.4 % (ref 40–53)
HGB BLD-MCNC: 8.3 G/DL (ref 13.3–17.7)
MAGNESIUM SERPL-MCNC: 2 MG/DL (ref 1.7–2.3)
MCH RBC QN AUTO: 21 PG (ref 26.5–33)
MCHC RBC AUTO-ENTMCNC: 31.4 G/DL (ref 31.5–36.5)
MCV RBC AUTO: 67 FL (ref 78–100)
PHOSPHATE SERPL-MCNC: 3.5 MG/DL (ref 2.5–4.5)
PLATELET # BLD AUTO: 361 10E3/UL (ref 150–450)
POTASSIUM SERPL-SCNC: 4.8 MMOL/L (ref 3.4–5.3)
RBC # BLD AUTO: 3.95 10E6/UL (ref 4.4–5.9)
SODIUM SERPL-SCNC: 134 MMOL/L (ref 135–145)
WBC # BLD AUTO: 10 10E3/UL (ref 4–11)

## 2024-02-04 PROCEDURE — 84100 ASSAY OF PHOSPHORUS: CPT | Performed by: STUDENT IN AN ORGANIZED HEALTH CARE EDUCATION/TRAINING PROGRAM

## 2024-02-04 PROCEDURE — 250N000013 HC RX MED GY IP 250 OP 250 PS 637: Performed by: SURGERY

## 2024-02-04 PROCEDURE — 250N000013 HC RX MED GY IP 250 OP 250 PS 637: Performed by: STUDENT IN AN ORGANIZED HEALTH CARE EDUCATION/TRAINING PROGRAM

## 2024-02-04 PROCEDURE — 250N000011 HC RX IP 250 OP 636

## 2024-02-04 PROCEDURE — 83735 ASSAY OF MAGNESIUM: CPT | Performed by: NURSE PRACTITIONER

## 2024-02-04 PROCEDURE — 250N000013 HC RX MED GY IP 250 OP 250 PS 637: Performed by: PHYSICIAN ASSISTANT

## 2024-02-04 PROCEDURE — 250N000013 HC RX MED GY IP 250 OP 250 PS 637

## 2024-02-04 PROCEDURE — 36415 COLL VENOUS BLD VENIPUNCTURE: CPT | Performed by: PHYSICIAN ASSISTANT

## 2024-02-04 PROCEDURE — 80048 BASIC METABOLIC PNL TOTAL CA: CPT | Performed by: PHYSICIAN ASSISTANT

## 2024-02-04 PROCEDURE — 85027 COMPLETE CBC AUTOMATED: CPT

## 2024-02-04 RX ORDER — OXYCODONE HYDROCHLORIDE 5 MG/1
5 TABLET ORAL EVERY 8 HOURS PRN
Qty: 10 TABLET | Refills: 0 | Status: ON HOLD | OUTPATIENT
Start: 2024-02-04 | End: 2024-05-29

## 2024-02-04 RX ORDER — CARVEDILOL 25 MG/1
25 TABLET ORAL 2 TIMES DAILY
Qty: 60 TABLET | Refills: 1 | Status: SHIPPED | OUTPATIENT
Start: 2024-02-04 | End: 2024-04-08

## 2024-02-04 RX ORDER — MAGNESIUM OXIDE 400 MG/1
400 TABLET ORAL EVERY 4 HOURS
Status: DISCONTINUED | OUTPATIENT
Start: 2024-02-04 | End: 2024-02-04 | Stop reason: HOSPADM

## 2024-02-04 RX ORDER — AMLODIPINE BESYLATE 5 MG/1
5 TABLET ORAL 2 TIMES DAILY
Qty: 60 TABLET | Refills: 1 | Status: SHIPPED | OUTPATIENT
Start: 2024-02-04 | End: 2024-04-08

## 2024-02-04 RX ORDER — LOSARTAN POTASSIUM 25 MG/1
62.5 TABLET ORAL DAILY
Qty: 75 TABLET | Refills: 1 | Status: SHIPPED | OUTPATIENT
Start: 2024-02-04 | End: 2024-02-15

## 2024-02-04 RX ADMIN — Medication 62.5 MG: at 08:13

## 2024-02-04 RX ADMIN — PANTOPRAZOLE SODIUM 40 MG: 40 TABLET, DELAYED RELEASE ORAL at 08:07

## 2024-02-04 RX ADMIN — Medication 1 CAPSULE: at 10:16

## 2024-02-04 RX ADMIN — CARVEDILOL 25 MG: 25 TABLET, FILM COATED ORAL at 08:13

## 2024-02-04 RX ADMIN — ROSUVASTATIN CALCIUM 20 MG: 20 TABLET, FILM COATED ORAL at 08:07

## 2024-02-04 RX ADMIN — ASPIRIN 81 MG CHEWABLE TABLET 81 MG: 81 TABLET CHEWABLE at 08:07

## 2024-02-04 RX ADMIN — ACETAMINOPHEN 975 MG: 325 TABLET, FILM COATED ORAL at 10:16

## 2024-02-04 RX ADMIN — ACETAMINOPHEN 975 MG: 325 TABLET, FILM COATED ORAL at 06:39

## 2024-02-04 RX ADMIN — MAGNESIUM OXIDE TAB 400 MG (241.3 MG ELEMENTAL MG) 400 MG: 400 (241.3 MG) TAB at 08:08

## 2024-02-04 RX ADMIN — METHOCARBAMOL 750 MG: 750 TABLET ORAL at 08:07

## 2024-02-04 RX ADMIN — AMLODIPINE BESYLATE 5 MG: 5 TABLET ORAL at 08:07

## 2024-02-04 RX ADMIN — OXYCODONE HYDROCHLORIDE 5 MG: 5 TABLET ORAL at 08:13

## 2024-02-04 RX ADMIN — OXYCODONE HYDROCHLORIDE 5 MG: 5 TABLET ORAL at 03:25

## 2024-02-04 RX ADMIN — HEPARIN SODIUM 5000 UNITS: 5000 INJECTION, SOLUTION INTRAVENOUS; SUBCUTANEOUS at 06:39

## 2024-02-04 ASSESSMENT — ACTIVITIES OF DAILY LIVING (ADL)
ADLS_ACUITY_SCORE: 20

## 2024-02-04 NOTE — DISCHARGE INSTRUCTIONS
AFTER YOU GO HOME FROM YOUR HEART SURGERY  (Ascending aortic aneurysm repair surgery and TEVAR - 1/23/2024)    You had a sternotomy, avoid lifting anything greater than ten pounds for 6 weeks after surgery and then less than 20 pounds for an additional 6 weeks.   Please try to sleep on your back for the first 4-6 weeks to avoid extra stress on your sternum (breastbone) while it is healing.   Do not reach backwards or use arms to push out of chair.   Do not let people pull on your arms to assist with standing.   Avoid twisting or reaching too far across your body.    Avoid strenuous activities such as bowling, vacuuming, raking, shoveling, golf or tennis for 12 weeks after your surgery.   It is okay to resume sex if you feel comfortable in doing so. You may have to try different positions with your partner.    Splint your chest incision by hugging a pillow or bringing your arms across your chest when coughing or sneezing.     No driving for 4 weeks after surgery or while on pain medication.     Shower or wash your incisions daily with soap and water (or as instructed), pat dry.   Keep wound clean and dry, showers are okay after discharge, but don't let spray hit directly on incision.   No baths or swimming for 1 month.   Cover chest tube sites with dry gauze until they stop draining, then leave open to air. It is not abnormal for chest tube sites to drain yellowish/clear fluid for up to 2-3 weeks after surgery.   Watch for signs of infection: increased redness, tenderness, warmth or any drainage that appears infected (pus like) or is persistent.  Also a temperature > 100.5 F or chills. Call your surgeon or primary care provider's office immediately.   Remove any skin glue left on incisions after 10-14 days. This will not affect your incision and can speed up healing.    Exercise is very important in your recovery. Please follow the guidelines set up for you in your cardiac rehab classes at the hospital. If  outpatient cardiac rehab was ordered for you, we highly recommend you participate. If you have problems arranging your cardiac rehab, please call [phone] for all locations, with the exception of [place], please call [phone] and [place], please call [phone].    Avoid sitting for prolonged periods of time, try to walk every hour during the day. If you have a leg incision, elevate your leg often when you are not walking.    Check your weight when you get home from the hospital and continue to check it daily through your recovery for at least a month. If you notice a weight gain of 2-3 pounds in a week, notify your primary care physician, cardiologist or surgeon.    Bowel activity may be slow after surgery. If necessary, you may take an over the counter laxative such as milk of magnesia or Miralax. You may have stool softeners prescribed (docusate sodium, Senokot). We recommend using stool softeners while using narcotics for pain (oxycodone/percocet, hydrocodone/vicodin, hydromorphone/dilaudid).      Wean OFF of narcotics (oxycodone, dilaudid, hydrocodone) as soon as possible. You should continue taking acetaminophen as long as you have any surgical pain as the first choice for pain control and add narcotics as necessary for pain to be tolerable.      DO NOT SMOKE.  IF YOU NEED HELP QUITTING, PLEASE TALK WITH YOUR CARDIOLOGIST OR PRIMARY DOCTOR.    REGARDING PRESCRIPTION REFILLS.  If you need a refill on your pain medication contact us to discuss your pain and a possible one time refill.   All other medications will be adjusted, discontinued and re-filled by your primary care physician and/or your cardiologist as they were prior to your surgery. We have given you enough for one to three month with possibly one refill.    POST-OPERATIVE CLINIC VISITS  You will have a follow up visit in CVTS Advance Practice Provider Clinic on at the [facility] ([facility] on [facility].  You will then  return to the care of your primary provider and your cardiologist. Future medication refills should come from your PCP or Cardiologist.   You should see your primary care provider about 1-2 weeks after discharge.   It is important to see your cardiologist about 4 weeks after discharge.    If you do not hear from a  in 7 days, please call [phone] (choose option 1) and request to be seen with a general cardiologist or someone that you have seen in the past.   If there is a need to return to see CT Surgery please call our  at [phone].    SURGICAL QUESTIONS  Please call on of the RN Coordinators listed below with surgical recovery and medication questions.  They will assist you with your needs and contact other surgery care team members as indicated.    On weekends or after hours, please call [phone] and ask the  to page the Cardiothoracic Surgery fellow on call.      Thank you,    Your Cardiothoracic Surgery Team   Adin Alcantara, RN Care Coordinator -  [phone]  Rafaela Qureshi, RN Care Coordinator - [phone]  Wendy Pierre RN Care Coordinator - [phone]  Carleen Joy, RN Care Coordinator - [phone]  Chelsey Coates, RN Care Coordinator - [phone]

## 2024-02-04 NOTE — PLAN OF CARE
Valdo Lyons is a 48 year old male with PMH of uncontrolled HTN and childhood seizures. 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 Repeat CTA 1/17 with decision for surgical management, medically managed with strict BP goals in interim. Now status post ascending aortic aneurysm hemiarch repair and TEVAR with CVTS and Vascular Surgery on 1/23/24.  Shift 15:00-23:30  Neuro: A&O x 4. Feeling very hopeful and happy today.  Resp: Lungs CTA, some pain with deep breaths. Sats high 90s 0n RA.  CV: NSR 80s. VSS, sl elevated BP at 8 PM, waiting for po antihypertensive meds to kick in and will recheck BP.   GI/: Eating and voiding well. No further diarrhea this shift.  Skin: Healing mid sternal and R chest incisions.  Pain: Taking oxycodone 5 mg x 2 for incisional pain.  Mobility: Taking walks in the [place]   Plan: Continue to encourage increased mobility and good respiratory toilet.CVTS with issues.

## 2024-02-04 NOTE — PLAN OF CARE
"DISCHARGE                         No discharge date for patient encounter.  ----------------------------------------------------------------------------  Discharged to: Home  Via: private transportation from pt's mom  Accompanied by: ROBERT  Discharge Instructions: cardiac diet, restricted activity, medications, follow up appointments, when to call the MD, aftercare instructions given.  Prescriptions: Filled by discharge pharmacy; medication list reviewed & sent with pt  Follow Up Appointments: arranged; information given  Belongings: All sent with pt  IV: d/c'd  Telemetry: d/c'd  Pt exhibits understanding of above discharge instructions; all questions answered.    Discharge Paperwork: Sent home with patient.       Neuro: A&Ox4.  Cardiac: NSR HR: 80-95. SBP slightly above goal this AM, gave scheduled antihypertensives and SBP decreased to within goal.  Respiratory: SpO2 98% on RA.  GI/: Adequate urine output. No BM on shift.  Diet/appetite: Tolerating 2gNa diet. Eating well and denies N/V.  Activity: Steady and up ad maninder.  Pain: Incisional pain, gave PRN oxy 5mg x1. At acceptable level on current regimen.   Skin: Sternal incision and R upper chest incision LISA and WNL. Old chest tube insertion sites x4, oozy, changed the dressing. No new deficits noted.  Access: L PIV saline locked.     /77 (Cuff Size: Adult Regular)   Pulse 82   Temp 98.2  F (36.8  C) (Oral)   Resp 16   Ht 1.727 m (5' 8\")   Wt 79.7 kg (175 lb 11.2 oz)   SpO2 98%   BMI 26.72 kg/m    "

## 2024-02-04 NOTE — PLAN OF CARE
"Goal Outcome Evaluation:    Neuro: A&Ox4. Calls appropriately  Cardiac: Afebrile, VSS. SR 60s-80s. SBP goal <125.   Respiratory: Sating appropriately on RA  GI/: Voiding spontaneously into urinal. No BM this shift.  Diet/appetite: Tolerating 2g Na diet. Denies nausea.  Activity: Up independently  Pain: Pt reports incisional pain and back pain. PRN oxycodone given x 2  Skin: No new deficits noted. Old CT sites dressing CDI. Sternal incision approximated.  Lines: L PIV SL  Replacements: Mag, phos, and Potassium protocols, no replacements given.     /75   Pulse 81   Temp 98.2  F (36.8  C) (Oral)   Resp 18   Ht 1.727 m (5' 8\")   Wt 79.7 kg (175 lb 11.2 oz)   SpO2 98%   BMI 26.72 kg/m        Continue with POC and notify team of any changes      "

## 2024-02-05 LAB
BACTERIA BLD CULT: NO GROWTH
BACTERIA BLD CULT: NO GROWTH

## 2024-02-05 NOTE — PLAN OF CARE
Physical Therapy Discharge Summary    Reason for therapy discharge:    Discharged to home with outpatient therapy.    Progress towards therapy goal(s). See goals on Care Plan in [facility] electronic health record for goal details.  Goals met    Therapy recommendation(s):    Continued therapy is recommended.  Rationale/Recommendations:  ongoing cardiac rehab recommended to promote activity tolerance.

## 2024-02-06 ENCOUNTER — PATIENT OUTREACH (OUTPATIENT)
Dept: CARE COORDINATION | Facility: CLINIC | Age: 49
End: 2024-02-06
Payer: COMMERCIAL

## 2024-02-06 ENCOUNTER — TELEPHONE (OUTPATIENT)
Dept: CARDIOLOGY | Facility: CLINIC | Age: 49
End: 2024-02-06
Payer: COMMERCIAL

## 2024-02-06 NOTE — PROGRESS NOTES
[facility] Care Resource Center Contact  [facility]/Voicemail     Clinical Data: Post-Discharge Outreach     Outreach attempted x 2.  Left message on patient's voicemail, providing [facility]'s central phone number of [phone] ([phone]) for questions/concerns and/or to schedule an appt with an [facility] provider, if they do not have a PCP.      Plan:  [facility] will do no further outreaches at this time.     JUAN J Francis  [phone]  [facility]     *Connected Care Resource Team does NOT follow patient ongoing. Referrals are identified based on internal discharge reports and the outreach is to ensure patient has an understanding of their discharge instructions.

## 2024-02-07 ENCOUNTER — TELEPHONE (OUTPATIENT)
Dept: CARDIOLOGY | Facility: CLINIC | Age: 49
End: 2024-02-07
Payer: COMMERCIAL

## 2024-02-07 ENCOUNTER — TELEPHONE (OUTPATIENT)
Dept: FAMILY MEDICINE | Facility: CLINIC | Age: 49
End: 2024-02-07
Payer: COMMERCIAL

## 2024-02-07 DIAGNOSIS — I71.00 DISSECTION OF AORTA, UNSPECIFIED PORTION OF AORTA (H): ICD-10-CM

## 2024-02-07 RX ORDER — OXYCODONE HYDROCHLORIDE 5 MG/1
5 TABLET ORAL EVERY 8 HOURS PRN
Qty: 10 TABLET | Refills: 0 | Status: CANCELLED | OUTPATIENT
Start: 2024-02-07

## 2024-02-07 NOTE — TELEPHONE ENCOUNTER
Aziza,     Please call patient to set up with any of our providers asap. Can use a same day or urgent appointment. Recently had open heart surgery and needs pain control.  High pain level following an aortic dissection can cause increase in blood pressure and that can lead to complications.      Mike Sow, DO

## 2024-02-07 NOTE — TELEPHONE ENCOUNTER
Patient called in with increased pain and requested oxycodone refill. Discussed with [facility] and unable to refill prescription until patient is seen in person. Patient scheduled for Friday to be seen in CVTS clinic. Encouraged patient to take tylenol and robaxin on a schedule to control pain. Also encouraged ice and rest. Reached out to patients PCP to see if he could be seen sooner by then if needed. Verbalized understanding.

## 2024-02-07 NOTE — TELEPHONE ENCOUNTER
S-(situation): Mom calling with patient, Jorje also on the line to request a refill of Oxycodone.     B-(background): Jorje had surgery on 1/23 and was prescribed 10 pills of oxycodone PRN for pain control. Was prescribed the medication when leaving the cardiac post op unit. Has not seen Dr. Sow at [place] yet but has appointment set up for 2/15.     A-(assessment): Jorje has been taking medication as prescribed. When medication wears off, his pain comes back at a 7/10. Just took his last pill this morning.     R-(recommendations): Advised that I would route this message over to Dr. Sow's team and also the Heart clinic in [place], who look like tried to contact him yesterday.     Sheree Coates RN

## 2024-02-12 ENCOUNTER — TELEPHONE (OUTPATIENT)
Dept: FAMILY MEDICINE | Facility: CLINIC | Age: 49
End: 2024-02-12
Payer: COMMERCIAL

## 2024-02-12 NOTE — TELEPHONE ENCOUNTER
Attempted to call patient but male who answered the phone stated the number was no longer Valdo's    [phone] (only number on file)    Argenis PEREZ RN  [facility]

## 2024-02-12 NOTE — TELEPHONE ENCOUNTER
It looks like they no-showed or cancelled appointments on the 9th with Dr. Sow and with cardiology. It might be a good idea for him to go into the ER to evaluate the dizziness and manage his pain as well.

## 2024-02-12 NOTE — TELEPHONE ENCOUNTER
Pt mom called ( no consent to communicate noted)   She is concerned that he is dizzy all the time and it could be from his med's.   Mom also requested more Oxycodone as pt is currently out  Was given last in hospital discharge.   Pt has apt sophia Sow 2/15/24   Please advise,   Gómez Sow RN  [facility]

## 2024-02-14 ENCOUNTER — HOSPITAL ENCOUNTER (INPATIENT)
Facility: CLINIC | Age: 49
LOS: 1 days | Discharge: HOME | End: 2024-02-15
Attending: EMERGENCY MEDICINE | Admitting: STUDENT IN AN ORGANIZED HEALTH CARE EDUCATION/TRAINING PROGRAM
Payer: MEDICAID

## 2024-02-14 DIAGNOSIS — I71.00 DISSECTION OF AORTA, UNSPECIFIED PORTION OF AORTA (H): ICD-10-CM

## 2024-02-14 DIAGNOSIS — I74.10 AORTIC THROMBUS (H): Primary | ICD-10-CM

## 2024-02-14 DIAGNOSIS — I63.9 ACUTE CVA (CEREBROVASCULAR ACCIDENT) (H): ICD-10-CM

## 2024-02-14 PROCEDURE — 99291 CRITICAL CARE FIRST HOUR: CPT | Mod: 25 | Performed by: EMERGENCY MEDICINE

## 2024-02-15 ENCOUNTER — APPOINTMENT (OUTPATIENT)
Dept: CT IMAGING | Facility: CLINIC | Age: 49
End: 2024-02-15
Attending: EMERGENCY MEDICINE
Payer: MEDICAID

## 2024-02-15 ENCOUNTER — APPOINTMENT (OUTPATIENT)
Dept: CARDIOLOGY | Facility: CLINIC | Age: 49
End: 2024-02-15
Payer: MEDICAID

## 2024-02-15 ENCOUNTER — APPOINTMENT (OUTPATIENT)
Dept: MRI IMAGING | Facility: CLINIC | Age: 49
End: 2024-02-15
Payer: MEDICAID

## 2024-02-15 VITALS
TEMPERATURE: 98.4 F | HEIGHT: 68 IN | RESPIRATION RATE: 18 BRPM | SYSTOLIC BLOOD PRESSURE: 146 MMHG | OXYGEN SATURATION: 98 % | BODY MASS INDEX: 25.16 KG/M2 | DIASTOLIC BLOOD PRESSURE: 80 MMHG | WEIGHT: 166 LBS | HEART RATE: 85 BPM

## 2024-02-15 PROBLEM — I63.9 ACUTE CVA (CEREBROVASCULAR ACCIDENT) (H): Status: ACTIVE | Noted: 2024-02-15

## 2024-02-15 PROBLEM — I74.10 AORTIC THROMBUS (H): Status: ACTIVE | Noted: 2024-02-15

## 2024-02-15 LAB
ABO/RH(D): NORMAL
ALBUMIN SERPL BCG-MCNC: 3.5 G/DL (ref 3.5–5.2)
ALP SERPL-CCNC: 94 U/L (ref 40–150)
ALT SERPL W P-5'-P-CCNC: 63 U/L (ref 0–70)
ANION GAP SERPL CALCULATED.3IONS-SCNC: 12 MMOL/L (ref 7–15)
ANTIBODY SCREEN: NEGATIVE
APTT PPP: 37 SECONDS (ref 22–38)
AST SERPL W P-5'-P-CCNC: 33 U/L (ref 0–45)
ATRIAL RATE - MUSE: 92 BPM
BASOPHILS # BLD AUTO: 0.1 10E3/UL (ref 0–0.2)
BASOPHILS NFR BLD AUTO: 1 %
BILIRUB SERPL-MCNC: 0.7 MG/DL
BUN SERPL-MCNC: 19.5 MG/DL (ref 6–20)
CALCIUM SERPL-MCNC: 8.9 MG/DL (ref 8.6–10)
CHLORIDE SERPL-SCNC: 99 MMOL/L (ref 98–107)
CHOLEST SERPL-MCNC: 77 MG/DL
CREAT BLD-MCNC: 1.2 MG/DL (ref 0.7–1.3)
CREAT SERPL-MCNC: 1.15 MG/DL (ref 0.67–1.17)
DEPRECATED HCO3 PLAS-SCNC: 22 MMOL/L (ref 22–29)
DIASTOLIC BLOOD PRESSURE - MUSE: NORMAL MMHG
EGFRCR SERPLBLD CKD-EPI 2021: 79 ML/MIN/1.73M2
EGFRCR SERPLBLD CKD-EPI 2021: >60 ML/MIN/1.73M2
EOSINOPHIL # BLD AUTO: 0 10E3/UL (ref 0–0.7)
EOSINOPHIL NFR BLD AUTO: 0 %
ERYTHROCYTE [DISTWIDTH] IN BLOOD BY AUTOMATED COUNT: 19.2 % (ref 10–15)
ERYTHROCYTE [DISTWIDTH] IN BLOOD BY AUTOMATED COUNT: 19.6 % (ref 10–15)
FLUAV RNA SPEC QL NAA+PROBE: NEGATIVE
FLUAV RNA SPEC QL NAA+PROBE: NEGATIVE
FLUBV RNA RESP QL NAA+PROBE: NEGATIVE
FLUBV RNA RESP QL NAA+PROBE: NEGATIVE
GLUCOSE BLDC GLUCOMTR-MCNC: 110 MG/DL (ref 70–99)
GLUCOSE SERPL-MCNC: 113 MG/DL (ref 70–99)
HCT VFR BLD AUTO: 25.4 % (ref 40–53)
HCT VFR BLD AUTO: 29.7 % (ref 40–53)
HDLC SERPL-MCNC: 25 MG/DL
HGB BLD-MCNC: 7.8 G/DL (ref 13.3–17.7)
HGB BLD-MCNC: 9.2 G/DL (ref 13.3–17.7)
IMM GRANULOCYTES # BLD: 0.1 10E3/UL
IMM GRANULOCYTES NFR BLD: 1 %
INR PPP: 1.33 (ref 0.85–1.15)
INR PPP: 1.38 (ref 0.85–1.15)
INTERPRETATION ECG - MUSE: NORMAL
LACTATE SERPL-SCNC: 1.4 MMOL/L (ref 0.7–2)
LDLC SERPL CALC-MCNC: 38 MG/DL
LVEF ECHO: NORMAL
LYMPHOCYTES # BLD AUTO: 1.4 10E3/UL (ref 0.8–5.3)
LYMPHOCYTES NFR BLD AUTO: 13 %
MCH RBC QN AUTO: 19.7 PG (ref 26.5–33)
MCH RBC QN AUTO: 20.1 PG (ref 26.5–33)
MCHC RBC AUTO-ENTMCNC: 30.7 G/DL (ref 31.5–36.5)
MCHC RBC AUTO-ENTMCNC: 31 G/DL (ref 31.5–36.5)
MCV RBC AUTO: 64 FL (ref 78–100)
MCV RBC AUTO: 65 FL (ref 78–100)
MONOCYTES # BLD AUTO: 1.7 10E3/UL (ref 0–1.3)
MONOCYTES NFR BLD AUTO: 16 %
NEUTROPHILS # BLD AUTO: 7.3 10E3/UL (ref 1.6–8.3)
NEUTROPHILS NFR BLD AUTO: 69 %
NONHDLC SERPL-MCNC: 52 MG/DL
NRBC # BLD AUTO: 0 10E3/UL
NRBC BLD AUTO-RTO: 0 /100
P AXIS - MUSE: 65 DEGREES
PHOSPHATE SERPL-MCNC: 3.2 MG/DL (ref 2.5–4.5)
PLATELET # BLD AUTO: 273 10E3/UL (ref 150–450)
PLATELET # BLD AUTO: 388 10E3/UL (ref 150–450)
POTASSIUM SERPL-SCNC: 4.1 MMOL/L (ref 3.4–5.3)
PR INTERVAL - MUSE: 172 MS
PROT SERPL-MCNC: 7.3 G/DL (ref 6.4–8.3)
QRS DURATION - MUSE: 90 MS
QT - MUSE: 364 MS
QTC - MUSE: 450 MS
R AXIS - MUSE: 23 DEGREES
RBC # BLD AUTO: 3.95 10E6/UL (ref 4.4–5.9)
RBC # BLD AUTO: 4.57 10E6/UL (ref 4.4–5.9)
RSV RNA SPEC NAA+PROBE: NEGATIVE
RSV RNA SPEC NAA+PROBE: NEGATIVE
SARS-COV-2 RNA RESP QL NAA+PROBE: NEGATIVE
SARS-COV-2 RNA RESP QL NAA+PROBE: NEGATIVE
SODIUM SERPL-SCNC: 133 MMOL/L (ref 135–145)
SPECIMEN EXPIRATION DATE: NORMAL
SYSTOLIC BLOOD PRESSURE - MUSE: NORMAL MMHG
T AXIS - MUSE: 245 DEGREES
TRIGL SERPL-MCNC: 70 MG/DL
TROPONIN T SERPL HS-MCNC: 25 NG/L
TROPONIN T SERPL HS-MCNC: 28 NG/L
TSH SERPL DL<=0.005 MIU/L-ACNC: 0.26 UIU/ML (ref 0.3–4.2)
UFH PPP CHRO-ACNC: >1.1 IU/ML
VENTRICULAR RATE- MUSE: 92 BPM
WBC # BLD AUTO: 10.5 10E3/UL (ref 4–11)
WBC # BLD AUTO: 8.6 10E3/UL (ref 4–11)

## 2024-02-15 PROCEDURE — 75635 CT ANGIO ABDOMINAL ARTERIES: CPT

## 2024-02-15 PROCEDURE — 85520 HEPARIN ASSAY: CPT | Performed by: EMERGENCY MEDICINE

## 2024-02-15 PROCEDURE — 71275 CT ANGIOGRAPHY CHEST: CPT | Mod: 26 | Performed by: RADIOLOGY

## 2024-02-15 PROCEDURE — 86900 BLOOD TYPING SEROLOGIC ABO: CPT | Performed by: EMERGENCY MEDICINE

## 2024-02-15 PROCEDURE — 93321 DOPPLER ECHO F-UP/LMTD STD: CPT | Mod: 26 | Performed by: INTERNAL MEDICINE

## 2024-02-15 PROCEDURE — 36415 COLL VENOUS BLD VENIPUNCTURE: CPT | Performed by: EMERGENCY MEDICINE

## 2024-02-15 PROCEDURE — A9585 GADOBUTROL INJECTION: HCPCS | Performed by: EMERGENCY MEDICINE

## 2024-02-15 PROCEDURE — 250N000013 HC RX MED GY IP 250 OP 250 PS 637

## 2024-02-15 PROCEDURE — 93308 TTE F-UP OR LMTD: CPT | Mod: 26 | Performed by: INTERNAL MEDICINE

## 2024-02-15 PROCEDURE — 93308 TTE F-UP OR LMTD: CPT | Performed by: EMERGENCY MEDICINE

## 2024-02-15 PROCEDURE — 93325 DOPPLER ECHO COLOR FLOW MAPG: CPT | Mod: 26 | Performed by: INTERNAL MEDICINE

## 2024-02-15 PROCEDURE — 250N000011 HC RX IP 250 OP 636: Performed by: EMERGENCY MEDICINE

## 2024-02-15 PROCEDURE — 70553 MRI BRAIN STEM W/O & W/DYE: CPT | Mod: 26 | Performed by: RADIOLOGY

## 2024-02-15 PROCEDURE — 84484 ASSAY OF TROPONIN QUANT: CPT

## 2024-02-15 PROCEDURE — 84484 ASSAY OF TROPONIN QUANT: CPT | Performed by: EMERGENCY MEDICINE

## 2024-02-15 PROCEDURE — 36415 COLL VENOUS BLD VENIPUNCTURE: CPT

## 2024-02-15 PROCEDURE — 93010 ELECTROCARDIOGRAM REPORT: CPT | Mod: 59 | Performed by: EMERGENCY MEDICINE

## 2024-02-15 PROCEDURE — 85025 COMPLETE CBC W/AUTO DIFF WBC: CPT | Performed by: EMERGENCY MEDICINE

## 2024-02-15 PROCEDURE — 93005 ELECTROCARDIOGRAM TRACING: CPT | Performed by: EMERGENCY MEDICINE

## 2024-02-15 PROCEDURE — 87637 SARSCOV2&INF A&B&RSV AMP PRB: CPT | Performed by: EMERGENCY MEDICINE

## 2024-02-15 PROCEDURE — 70498 CT ANGIOGRAPHY NECK: CPT | Mod: 26 | Performed by: STUDENT IN AN ORGANIZED HEALTH CARE EDUCATION/TRAINING PROGRAM

## 2024-02-15 PROCEDURE — 84100 ASSAY OF PHOSPHORUS: CPT | Performed by: STUDENT IN AN ORGANIZED HEALTH CARE EDUCATION/TRAINING PROGRAM

## 2024-02-15 PROCEDURE — 250N000011 HC RX IP 250 OP 636

## 2024-02-15 PROCEDURE — 85610 PROTHROMBIN TIME: CPT

## 2024-02-15 PROCEDURE — 255N000002 HC RX 255 OP 636: Performed by: EMERGENCY MEDICINE

## 2024-02-15 PROCEDURE — 120N000002 HC R&B MED SURG/OB UMMC

## 2024-02-15 PROCEDURE — 82565 ASSAY OF CREATININE: CPT

## 2024-02-15 PROCEDURE — 96375 TX/PRO/DX INJ NEW DRUG ADDON: CPT | Performed by: EMERGENCY MEDICINE

## 2024-02-15 PROCEDURE — 93308 TTE F-UP OR LMTD: CPT | Mod: 26 | Performed by: EMERGENCY MEDICINE

## 2024-02-15 PROCEDURE — 70496 CT ANGIOGRAPHY HEAD: CPT

## 2024-02-15 PROCEDURE — 250N000009 HC RX 250: Performed by: EMERGENCY MEDICINE

## 2024-02-15 PROCEDURE — 70553 MRI BRAIN STEM W/O & W/DYE: CPT

## 2024-02-15 PROCEDURE — 75635 CT ANGIO ABDOMINAL ARTERIES: CPT | Mod: 26 | Performed by: RADIOLOGY

## 2024-02-15 PROCEDURE — 87637 SARSCOV2&INF A&B&RSV AMP PRB: CPT

## 2024-02-15 PROCEDURE — 85730 THROMBOPLASTIN TIME PARTIAL: CPT | Performed by: EMERGENCY MEDICINE

## 2024-02-15 PROCEDURE — 96365 THER/PROPH/DIAG IV INF INIT: CPT | Performed by: EMERGENCY MEDICINE

## 2024-02-15 PROCEDURE — 99214 OFFICE O/P EST MOD 30 MIN: CPT | Mod: 24 | Performed by: THORACIC SURGERY (CARDIOTHORACIC VASCULAR SURGERY)

## 2024-02-15 PROCEDURE — 93325 DOPPLER ECHO COLOR FLOW MAPG: CPT

## 2024-02-15 PROCEDURE — 85610 PROTHROMBIN TIME: CPT | Performed by: EMERGENCY MEDICINE

## 2024-02-15 PROCEDURE — 99283 EMERGENCY DEPT VISIT LOW MDM: CPT | Mod: 24 | Performed by: NURSE PRACTITIONER

## 2024-02-15 PROCEDURE — 83605 ASSAY OF LACTIC ACID: CPT | Performed by: EMERGENCY MEDICINE

## 2024-02-15 PROCEDURE — 80053 COMPREHEN METABOLIC PANEL: CPT | Performed by: EMERGENCY MEDICINE

## 2024-02-15 PROCEDURE — 84443 ASSAY THYROID STIM HORMONE: CPT

## 2024-02-15 PROCEDURE — 70496 CT ANGIOGRAPHY HEAD: CPT | Mod: 26 | Performed by: STUDENT IN AN ORGANIZED HEALTH CARE EDUCATION/TRAINING PROGRAM

## 2024-02-15 PROCEDURE — 70450 CT HEAD/BRAIN W/O DYE: CPT

## 2024-02-15 PROCEDURE — 82962 GLUCOSE BLOOD TEST: CPT

## 2024-02-15 PROCEDURE — 82465 ASSAY BLD/SERUM CHOLESTEROL: CPT

## 2024-02-15 PROCEDURE — 87040 BLOOD CULTURE FOR BACTERIA: CPT | Performed by: EMERGENCY MEDICINE

## 2024-02-15 PROCEDURE — 85041 AUTOMATED RBC COUNT: CPT

## 2024-02-15 PROCEDURE — 250N000011 HC RX IP 250 OP 636: Mod: JZ | Performed by: EMERGENCY MEDICINE

## 2024-02-15 RX ORDER — AMLODIPINE BESYLATE 5 MG/1
5 TABLET ORAL 2 TIMES DAILY
Status: DISCONTINUED | OUTPATIENT
Start: 2024-02-15 | End: 2024-02-15 | Stop reason: HOSPADM

## 2024-02-15 RX ORDER — MORPHINE SULFATE 4 MG/ML
4 INJECTION, SOLUTION INTRAMUSCULAR; INTRAVENOUS ONCE
Status: COMPLETED | OUTPATIENT
Start: 2024-02-15 | End: 2024-02-15

## 2024-02-15 RX ORDER — PANTOPRAZOLE SODIUM 40 MG/1
40 TABLET, DELAYED RELEASE ORAL
Qty: 14 TABLET | Refills: 0 | Status: ON HOLD | OUTPATIENT
Start: 2024-02-15 | End: 2024-05-29

## 2024-02-15 RX ORDER — PANTOPRAZOLE SODIUM 40 MG/1
40 TABLET, DELAYED RELEASE ORAL
Status: DISCONTINUED | OUTPATIENT
Start: 2024-02-15 | End: 2024-02-15 | Stop reason: HOSPADM

## 2024-02-15 RX ORDER — IOPAMIDOL 755 MG/ML
90 INJECTION, SOLUTION INTRAVASCULAR ONCE
Status: COMPLETED | OUTPATIENT
Start: 2024-02-15 | End: 2024-02-15

## 2024-02-15 RX ORDER — HYDRALAZINE HYDROCHLORIDE 20 MG/ML
5 INJECTION INTRAMUSCULAR; INTRAVENOUS EVERY 6 HOURS PRN
Status: DISCONTINUED | OUTPATIENT
Start: 2024-02-15 | End: 2024-02-15 | Stop reason: HOSPADM

## 2024-02-15 RX ORDER — HYDRALAZINE HYDROCHLORIDE 20 MG/ML
5 INJECTION INTRAMUSCULAR; INTRAVENOUS EVERY 4 HOURS PRN
Status: DISCONTINUED | OUTPATIENT
Start: 2024-02-15 | End: 2024-02-15

## 2024-02-15 RX ORDER — GADOBUTROL 604.72 MG/ML
8 INJECTION INTRAVENOUS ONCE
Status: COMPLETED | OUTPATIENT
Start: 2024-02-15 | End: 2024-02-15

## 2024-02-15 RX ORDER — ACETAMINOPHEN 325 MG/1
975 TABLET ORAL EVERY 6 HOURS PRN
Status: DISCONTINUED | OUTPATIENT
Start: 2024-02-15 | End: 2024-02-15 | Stop reason: HOSPADM

## 2024-02-15 RX ORDER — IOPAMIDOL 755 MG/ML
67 INJECTION, SOLUTION INTRAVASCULAR ONCE
Status: COMPLETED | OUTPATIENT
Start: 2024-02-15 | End: 2024-02-15

## 2024-02-15 RX ORDER — LABETALOL HYDROCHLORIDE 5 MG/ML
10 INJECTION, SOLUTION INTRAVENOUS EVERY 4 HOURS PRN
Status: DISCONTINUED | OUTPATIENT
Start: 2024-02-15 | End: 2024-02-15 | Stop reason: HOSPADM

## 2024-02-15 RX ORDER — CARVEDILOL 25 MG/1
25 TABLET ORAL 2 TIMES DAILY
Status: DISCONTINUED | OUTPATIENT
Start: 2024-02-15 | End: 2024-02-15 | Stop reason: HOSPADM

## 2024-02-15 RX ORDER — HEPARIN SODIUM 10000 [USP'U]/100ML
0-5000 INJECTION, SOLUTION INTRAVENOUS CONTINUOUS
Status: DISCONTINUED | OUTPATIENT
Start: 2024-02-15 | End: 2024-02-15

## 2024-02-15 RX ORDER — ROSUVASTATIN CALCIUM 10 MG/1
20 TABLET, COATED ORAL DAILY
Status: DISCONTINUED | OUTPATIENT
Start: 2024-02-15 | End: 2024-02-15 | Stop reason: HOSPADM

## 2024-02-15 RX ORDER — OXYCODONE HYDROCHLORIDE 5 MG/1
5 TABLET ORAL EVERY 8 HOURS PRN
Status: DISCONTINUED | OUTPATIENT
Start: 2024-02-15 | End: 2024-02-15 | Stop reason: HOSPADM

## 2024-02-15 RX ORDER — LIDOCAINE 40 MG/G
CREAM TOPICAL
Status: DISCONTINUED | OUTPATIENT
Start: 2024-02-15 | End: 2024-02-15 | Stop reason: HOSPADM

## 2024-02-15 RX ADMIN — GADOBUTROL 8 ML: 604.72 INJECTION INTRAVENOUS at 03:21

## 2024-02-15 RX ADMIN — ACETAMINOPHEN 975 MG: 325 TABLET, FILM COATED ORAL at 10:02

## 2024-02-15 RX ADMIN — ROSUVASTATIN CALCIUM 20 MG: 10 TABLET, FILM COATED ORAL at 10:03

## 2024-02-15 RX ADMIN — IOPAMIDOL 67 ML: 755 INJECTION, SOLUTION INTRAVENOUS at 00:36

## 2024-02-15 RX ADMIN — IOPAMIDOL 90 ML: 755 INJECTION, SOLUTION INTRAVENOUS at 00:57

## 2024-02-15 RX ADMIN — HEPARIN SODIUM 950 UNITS/HR: 10000 INJECTION, SOLUTION INTRAVENOUS at 05:35

## 2024-02-15 RX ADMIN — SODIUM CHLORIDE, PRESERVATIVE FREE 90 ML: 5 INJECTION INTRAVENOUS at 00:57

## 2024-02-15 RX ADMIN — OXYCODONE HYDROCHLORIDE 5 MG: 5 TABLET ORAL at 10:02

## 2024-02-15 RX ADMIN — SODIUM CHLORIDE, PRESERVATIVE FREE 60 ML: 5 INJECTION INTRAVENOUS at 00:57

## 2024-02-15 RX ADMIN — MORPHINE SULFATE 4 MG: 4 INJECTION INTRAVENOUS at 04:02

## 2024-02-15 RX ADMIN — HEPARIN SODIUM 450 UNITS/HR: 10000 INJECTION, SOLUTION INTRAVENOUS at 15:15

## 2024-02-15 ASSESSMENT — ACTIVITIES OF DAILY LIVING (ADL)
ADLS_ACUITY_SCORE: 37

## 2024-02-15 ASSESSMENT — VISUAL ACUITY
OU: NORMAL ACUITY
OU: NORMAL ACUITY
OU: BASELINE
OU: BASELINE

## 2024-02-15 NOTE — PROGRESS NOTES
Pt reported to be upset, wanting to leave due to being told discharge would happen around 1300. Writer paged stat to address patient's needs. Patient speaking calmly to cardiovascular surgery. Cardiovascular surgery asked writer to page Neurology. Neurology on-call [facility] IP paged asking for dispo update

## 2024-02-15 NOTE — PHARMACY
Pharmacy Stroke Code Response    Pharmacist responded as part of the Stroke Code Team activation to patient care area ED. The Stroke Team determined that the patient was not a candidate for IV thrombolytic therapy and the pharmacy team was dismissed at 12:50 AM.    Nickolas Canseco [facility]

## 2024-02-15 NOTE — MEDICATION SCRIBE - ADMISSION MEDICATION HISTORY
Medication Scribe Admission Medication History    Admission medication history is complete. The information provided in this note is only as accurate as the sources available at the time of the update.    Information Source(s): Patient and CareEverywhere/SureScripts via in-person    Pertinent Information: Patient handed me his med list with check marks at the time he took his meds. Used that to update PTA.     Changes made to PTA medication list:  Added: None  Deleted: Lidocaine patch (not using), pantoprazole 40 mg (not taking), polyethylene glycol 17 gm (not taking).  Changed: None    Allergies reviewed with patient and updates made in EHR: yes    Medication History Completed By: Denise Pro 2/15/2024 9:55 AM    Prior to Admission medications    Medication Sig Last Dose Taking? Auth Provider Long Term End Date   acetaminophen (TYLENOL) 325 MG tablet Take 3 tablets (975 mg) by mouth every 6 hours as needed for mild pain 2/14/2024 at 2030 Yes Derick Read NP     amLODIPine (NORVASC) 5 MG tablet Take 1 tablet (5 mg) by mouth 2 times daily 2/14/2024 at 2030 Yes Derick Read NP Yes    aspirin (ASA) 81 MG chewable tablet 1 tablet (81 mg) by Oral or NG Tube route daily 2/14/2024 at AM Yes Derick Read NP     carvedilol (COREG) 25 MG tablet Take 1 tablet (25 mg) by mouth 2 times daily 2/14/2024 at 2030 Yes Derick Read NP Yes    losartan (COZAAR) 25 MG tablet Take 2.5 tablets (62.5 mg) by mouth daily 2/14/2024 at AM Yes Derick Read NP Yes    methocarbamol (ROBAXIN) 750 MG tablet Take 1 tablet (750 mg) by mouth every 6 hours as needed for muscle spasms 2/14/2024 at AM Yes Derick Read NP     oxyCODONE (ROXICODONE) 5 MG tablet Take 1 tablet (5 mg) by mouth every 8 hours as needed for moderate to severe pain Unknown at Unknown Yes Derick Read NP     rosuvastatin (CRESTOR) 20 MG tablet Take 1 tablet (20 mg) by mouth daily 2/14/2024 at AM Yes Derick Read NP Yes

## 2024-02-15 NOTE — CONSULTS
[facility]    Stroke Consult Note    Reason for Consult: Stroke Code     Chief Complaint: R-hemibody numbness and weakness      HPI  Valdo Lyons is a 48 year old male with a history of hypertension, diabetes, and type B aortic dissection which evolved into an acute type A dissection status post open repair 1/23/2024.  He reports he has been recovering well, but for persistent chest and back pain.    This evening, Mr. Lyons reports that he woke up at 830 pm from a nap and noted his right hemibody was numb, and he had difficulty moving it, and was unable to speak.  Does not endorse difficulty with speech.  Last known well approximately 7 PM, prior to going to sleep.  EMS was called, and he was brought to the [facility] stroke code was called on presentation.  At presentation he was noted to be mildly febrile and at least briefly hypotensive to 100 systolic (although this rapidly corrected into the 150s), with Mr. Lyons reporting an episode of diaphoresis earlier today.  Otherwise, he reports he has bee in his usual state of health, with the exception of resolving post-surgical pain as above.      At the time of my exam, Mr. Lyons's subjective deficits have nearly completely resolved.  He was endorsing a persistent sensation of disequilibrium, with very subtle RUE extremity dysmetria on exam, however sensation and motor function had returned to baseline per his report, and I was not able to detect any weakness on my initial exam.  Initial NIHSS of 1 for subtle dysmetria as above.  Admit MRS of 0.    CT was without acute findings, but was notable for multi territory hypodensities concerning for chronic infarction, many of which were present on prior CT in 2020.  CTA head/neck was without evidence of LVO amenable to thrombectomy, TNK contraindicated in the setting of recent thoracotomy, code was deescalated.    However, CTA H/N, CTA C/A/P did reveal an active  subclavian dissection (similar to prior) with a small thrombus in his aortic arch just distal to his anastomosis site.  Vascular imaging in the head and neck otherwise notable only for dolichoectasia in his posterior circulation, minimal intracranial atherosclerosis.  Follow-up MRI demonstrated multifocal, but very small, posterior circulation infarcts as below.    Case discussed with CVTS as described in impression, outcome: starting heparin gtt low intensity without bolus overnight.    Imaging Findings  HEAD CT:  1.  No CT evidence for acute intracranial process.  2.  Brain atrophy and advanced chronic microvascular ischemic changes as above.     HEAD CTA:   1.  No significant stenosis, aneurysm, or high flow vascular malformation identified.  2.  Variant [place] anatomy as above.     NECK CTA:  1.  No carotid or vertebral artery stenosis.  2.  Aortic dissection described separately. Dissection extends along the proximal left subclavian artery resulting in moderate to severe stenosis.    CTA C/A/P w/ runoff  1.  Status post ascending thoracic aortic graft repair and endoluminal stenting of descending thoracic aorta for type A aortic dissection.  2.  Small amount of thrombus in the aortic arch distal to the graft anastomosis.  3.  Dissection flap extends to the left subclavian artery and to the upper abdominal aorta above the celiac artery. Visualized arch vessels appear patent.  4.  Patent bilateral femoral and popliteal arteries. The distal runoff vessels in the bilateral lower extremities are nonopacified due to outrunning of contrast bolus.  5.  Mild cardiomegaly with small left pleural effusion.    MRI Brain w/w/o  IMPRESSION:  1.  7 mm focus of acute to subacute ischemic infarction at the right temporal/occipital white matter. Punctate focus of acute to early subacute ischemic infarction in the superior left cerebellum.  2.  Small subacute infarct suspected in the left cerebellum, left corona radiata  and left parietal white matter.  3.  Advanced small vessel ischemic disease for age with multifocal chronic lacunar infarctions as described.  This result has not been signed. Information might be incomplete.      Intravenous Thrombolysis  Not given due to:   - minor/isolated/quickly resolving symptoms    Endovascular Treatment  Not initiated due to absence of proximal vessel occlusion    Impression   #TIA, L-MCA syndrome  #Punctate multifocal acute/subacute posterior circulation infarctions (largest 7mm)  #Aortic thrombus  #Subclavian dissection  #HTN  #Chronic lacunar infarctions, right corona radiata and bilateral thalamic infarcts  #Markedly advanced small vessel disease for age  Mr Lyons reports symptoms consistent with a complete, thankfully transient, L-MCA syndrome (R-hemibody sensory and motor loss, aphasia), without evidence of infarction in that territory on MRI.  This occurs in the setting of a subclavian dissection and likely intraluminal aortic thrombus in c/o recent type A dissection repair.  His presentation is highly concerning for embolic TIA from his aortic arch, which I suspect is also the  of his small posterior circulation strokes.      I am additionally concerned that his development of a thrombus at his anastomosis site could reflect vessel wall injury, putting him at risk of further thromboembolism if not addressed.  Case discussed with CVTS on-call resident, who reportedly discussed with staff, who feel anticoagulation is safe and reasonable from their perspective -- they expressed some question about whether the aortic thrombus is intraluminal, but defer to radiology.  If intraluminal, they share our concerns that he may be at risk of further thromboembolism, even if the residual clot seen on imaging is small (given possible reflection of wall injury / thrombogenic potential).  With all of this in mind, we would recommend anticoagulation overnight with low intensity heparin gtt w/o  bolus.    Regarding other possibilities on the differential: Lower suspicion his subclavian dissection is involved given how distal it is to the L-ICA take-off, but it is nearing the L-vertebral artery take off and retrograde embolism from the dissection while asleep (i.e. while flat) is a possible explanation for his posterior circulation infarctions (however embolization from his aortic thrombus appears more likely).  Additionally, we cannot necessarily rule out more run-of-the-mill embolic causes: cardioembolism remains on the differential and should be evaluated for.  Low suspicion for large vessel atherosclerosis given very limited burden of carotid or intracranial atherosclerosis.      Recommendations:  - Consider admission to CVTS  - Defer BP goals to surgical team in light of dissection, apparent resolution of stroke Sx with minimal infarct burden or intracranial atherosclerosis   >Per discussion with CVTS, PRN SBP goal <130 recommended  - Neurochecks and Vital Signs minimum every q4h   - Initiate heparin gtt, low-intensity, no bolus  - Hold ASA while on heparin  - Statin: Continue PTA rosuvastatin 20 mg pending LDL  - MRI Brain with and without contrast  - Continuous telemetry while in house    > Will consider ambulatory monitoring at discharge  - TTE w/ bubble  - Bedside Glucose Monitoring  - A1c, Lipid Panel, TSH  - Stroke Education  - Euthermia, Euglycemia    Patient Follow-up     - final recommendation pending work-up    Thank you for this consult. We will continue to follow.      The patient was discussed with Stroke Fellow, Dr. Azul.  The Stroke Staff is Dr. iVck.    Andreas Monge MD  Neurology Resident  Stroke ASCOM:  *[id number]  ______________________________________________________    Clinically Significant Risk Factors Present on Admission                # Drug Induced Platelet Defect: home medication list includes an antiplatelet medication   # Hypertension: Noted on problem list      #  "Overweight: Estimated body mass index is 26.72 kg/m  as calculated from the following:    Height as of 1/16/24: 1.727 m (5' 8\").    Weight as of 2/4/24: 79.7 kg (175 lb 11.2 oz).                 Past Medical History   Past Medical History:   Diagnosis Date    Dissecting aneurysm of thoracic aorta, [place] type B (H)     HTN (hypertension)      Past Surgical History   Past Surgical History:   Procedure Laterality Date    ENDOVASCULAR REPAIR ANEURYSM THORACIC AORTIC N/A 1/23/2024    Procedure: Antegrade Thoracic Endovascular Aortic Repair (TEVAR) with [place] Conformable Thoracic Stent Graft 37mm x 10cm. Intravascular Ultrasound (IVUS), Aortography, Right common femoral access;  Surgeon: Artur Singh MD;  Location: [facility] OR    IR OR ANGIOGRAM  1/23/2024    PICC DOUBLE LUMEN PLACEMENT Right 01/16/2024    5FR DL PICC, basilic vein. L-43cm, 1cm out.    REPAIR ANEURYSM ASCENDING AORTA N/A 1/23/2024    Procedure: MEDIAN STERNOTOMY, RIGHT AXILLARY CUTDOWN, ON CARDIOPULMONARY BYPASS (CIRCULATORY ARREST), ASCENDING AORTA ANEURYSM REPAIR ([place] 30mm), INTRAOPERATIVE TRANSESOPHAGEAL ECHOCARDIOGRAM BY ANESTHESIA;  Surgeon: Addi Shaw MD;  Location: [facility] OR     Medications   Home Meds  Prior to Admission medications    Medication Sig Start Date End Date Taking? Authorizing Provider   acetaminophen (TYLENOL) 325 MG tablet Take 3 tablets (975 mg) by mouth every 6 hours as needed for mild pain 2/3/24   Derick Read NP   amLODIPine (NORVASC) 5 MG tablet Take 1 tablet (5 mg) by mouth 2 times daily 2/4/24   Derick Read NP   aspirin (ASA) 81 MG chewable tablet 1 tablet (81 mg) by Oral or NG Tube route daily 2/4/24   Derick Read NP   carvedilol (COREG) 25 MG tablet Take 1 tablet (25 mg) by mouth 2 times daily 2/4/24   Derick Read NP   Lidocaine (LIDOCARE) 4 % Patch Place 1-2 patches onto the skin daily as needed for moderate pain To prevent lidocaine toxicity, patient should be patch free for 12 " hrs daily. 2/3/24   Derick Read NP   losartan (COZAAR) 25 MG tablet Take 2.5 tablets (62.5 mg) by mouth daily 2/4/24   Derick Read NP   methocarbamol (ROBAXIN) 750 MG tablet Take 1 tablet (750 mg) by mouth every 6 hours as needed for muscle spasms 2/3/24   Derick Read NP   oxyCODONE (ROXICODONE) 5 MG tablet Take 1 tablet (5 mg) by mouth every 8 hours as needed for moderate to severe pain 2/4/24   Derick Read NP   pantoprazole (PROTONIX) 40 MG EC tablet Take 1 tablet (40 mg) by mouth every morning (before breakfast) 2/4/24   Derick Read NP   polyethylene glycol (MIRALAX) 17 GM/Dose powder Take 17 g by mouth daily 2/3/24   Derick Read NP   rosuvastatin (CRESTOR) 20 MG tablet Take 1 tablet (20 mg) by mouth daily 2/4/24   Derick Read NP       Scheduled Meds   iopamidol (ISOVUE-370)  67 mL Intravenous Once    iopamidol (ISOVUE-370)  90 mL Intravenous Once    sodium chloride 0.9 %  68 mL Intravenous Once    sodium chloride 0.9 %  90 mL Intravenous Once       Infusion Meds      PRN Meds      Allergies   No Known Allergies  Family History   History reviewed. No pertinent family history.  Social History        Review of Systems   The 10 point Review of Systems is negative other than noted in the HPI or here.        PHYSICAL EXAMINATION  Temp:  [100.9  F (38.3  C)] 100.9  F (38.3  C)  Pulse:  [94-96] 94  Resp:  [15] 15  BP: (100-152)/(64-85) 152/85  SpO2:  [97 %-99 %] 99 %     General Exam  General:  patient lying in bed without any acute distress    HEENT:  normocephalic/atraumatic, bilateral proptosis noted  Cardio:  RRR  Pulmonary:  no respiratory distress  Abdomen:  soft, non-tender, non-distended  Extremities:  no edema  Skin:  intact, warm/dry     Neuro Exam  Mental Status:  alert, oriented x 3, follows commands, speech clear and fluent, naming and repetition normal  Cranial Nerves:  visual fields intact, PERRL, EOMI with normal smooth pursuit, facial sensation intact and  symmetric, facial movements symmetric, hearing not formally tested but intact to conversation, palate elevation symmetric and uvula midline, no dysarthria, shoulder shrug strong bilaterally, tongue protrusion midline  Motor:  normal muscle tone and bulk, no abnormal movements, able to move all limbs spontaneously, strength 5/5 throughout upper and lower extremities, no pronator drift  Reflexes:  toes down-going  Sensory:  light touch sensation intact and symmetric throughout upper and lower extremities, no extinction on double simultaneous stimulation   Coordination:  Very subtle (questionable) dysmetria on FNF on the R, not clearly appreciated on finger following.  Otherwise, normal finger-to-nose and heel-to-shin bilaterally without dysmetria, rapid alternating movements symmetric  Station/Gait:  deferred    Dysphagia Screen  Per Nursing    Stroke Scales    NIHSS  1a. Level of Consciousness 0-->Alert, keenly responsive   1b. LOC Questions 0-->Answers both questions correctly   1c. LOC Commands 0-->Performs both tasks correctly   2.   Best Gaze 0-->Normal   3.   Visual 0-->No visual loss   4.   Facial Palsy 0-->Normal symmetrical movements   5a. Motor Arm, Left 0-->No drift, limb holds 90 (or 45) degrees for full 10 secs   5b. Motor Arm, Right 0-->No drift, limb holds 90 (or 45) degrees for full 10 secs   6a. Motor Leg, Left 0-->No drift, leg holds 30 degree position for full 5 secs   6b. Motor Leg, right 0-->No drift, leg holds 30 degree position for full 5 secs   7.   Limb Ataxia 1-->Present in one limb (very, very subtle dysmetria on the right)   8.   Sensory 0-->Normal, no sensory loss   9.   Best Language 0-->No aphasia, normal   10. Dysarthria 0-->Normal   11. Extinction and Inattention  0-->No abnormality   Total 1 (02/15/24 0033)       Modified [place] Score (Pre-morbid)  1 - No significant disability.  Able to carry out all usual activities, despite some symptoms.    Imaging  I personally reviewed all  "imaging; relevant findings per HPI.     Lab Results Data   CBC  No results for input(s): \"WBC\", \"RBC\", \"HGB\", \"HCT\", \"PLT\" in the last 168 hours.  Basic Metabolic Panel    Recent Labs   Lab 02/15/24  0019 02/15/24  0017   CR 1.2  --    GLC  --  110*     Liver Panel  No results for input(s): \"PROTTOTAL\", \"ALBUMIN\", \"BILITOTAL\", \"ALKPHOS\", \"AST\", \"ALT\", \"BILIDIRECT\" in the last 168 hours.  INR    Recent Labs   Lab Test 02/01/24  0628 01/31/24  0437 01/30/24  0603   INR 1.29* 1.29* 1.26*      Lipid Profile    Recent Labs   Lab Test 01/25/24  0342   CHOL 89   HDL 27*   LDL 41   TRIG 105     A1C    Recent Labs   Lab Test 01/16/24  0551   A1C 6.1*     Troponin  No results for input(s): \"CTROPT\", \"TROPONINIS\", \"TROPONINI\", \"GHTROP\" in the last 168 hours.       Stroke Code Data Data   Stroke Code Data  (for stroke code without tele)  Stroke code activated 02/15/24  0018   First stroke provider response 02/15/24  0020   Last known normal 02/14/24  1900   Time of discovery (or onset of symptoms) 02/14/24  2030   Head CT read by Stroke Neuro Provider 02/15/24  0035   Was stroke code de-escalated? Yes  02/15/24  0050     "

## 2024-02-15 NOTE — CONSULTS
"    [facility]    Consult Note - CVTS Service  Date of Admission:  2/14/2024  Consult Requested by: ED  Reason for Consult: RUE and RLE numbness    Assessment & Plan: Surgery   Valdo Lyons is a 48 year old male admitted on 2/14/2024. He presented with R hemibody paresthesias that have decreased to solely his right foot. He underwent CTA C/A/P that demonstrated a small aortic arch thrombus distal to graft anastomosis. Neurology concerned transient L-MCA syndrome. MRI demonstrated 7mm acute to subacute ischemic infarction to R temporal/occipital white matter, small subacute ischemic infarct of L cerebellum/L corona radiata/left parietal white matter, and chronic lacunar infarcts.     - neurology consult, appreciate recs  - goal SBP < 130   - ok to anticoagulate from a CVTS perspective   - ok to continue PTA ASA + statin from CVTS perspective  - CVTS will continue to follow       Drains: None     Code Status:  Full     Clinically Significant Risk Factors Present on Admission               # Coagulation Defect: INR = 1.33 (Ref range: 0.85 - 1.15) and/or PTT = 37 Seconds (Ref range: 22 - 38 Seconds), will monitor for bleeding  # Drug Induced Platelet Defect: home medication list includes an antiplatelet medication   # Hypertension: Noted on problem list      # Overweight: Estimated body mass index is 26.72 kg/m  as calculated from the following:    Height as of 1/16/24: 1.727 m (5' 8\").    Weight as of 2/4/24: 79.7 kg (175 lb 11.2 oz).            The patient's care was discussed with the fellow on call Dr. Lowery who discussed case with Dr. Brasher.     Liliana Dempsey MD  [facility]  Non-urgent messages: Securely message with [employer] (more info)  Text page via [employer] Paging/Directory     ______________________________________________________________________    Chief Complaint   RUE and RLE numbness    History of Present " Illness   Valdo Lyons is a 48 year old male with PMH of uncontrolled HTN and childhood seizures who recently presented as an [facility] transfer with an acute type aortic dissection c/b a Type A dissection with a thrombosed retrograde false lumen s/p ascending aortic aneurysm hemiarch repair and TEVAR with CVTS and Vascular Surgery on 1/23/24 who presents with right hemibody paresthesias.     Mr. Lyons reports that he woke up around 830 pm from a nap in his recliner. He noticed that his RUE felt numb at that time. He then moved and noticed that his right hemibody was numb, and he had difficulty moving it.  He states that the numbness extended from his right arm, right side of his abdomen/flank, and down his RLE. EMS was called, and he was brought to the ED and given the constellation of symptoms, a stroke code was called. Of note, on presentation to the ED he was febrile to 100.9F. He otherwise reports he has bee in his usual state of health, with the exception of resolving post-surgical pain and vertigo spells which he states that he has had since discharge. ROS also notable for back pain. He was seen by neurology who recommended patient undergo further imaging.     He reports that he has had some chills but is unsure of if he has had fevers. He says that the numbness is now limited to his r foot, and he states that the heaviness he was initially feeling with movement has decreased. He reports having some dyspnea but denies cough, diarrhea, or dysuria.     Past Medical History    Past Medical History:   Diagnosis Date    Dissecting aneurysm of thoracic aorta, [place] type B (H)     HTN (hypertension)        Past Surgical History   Past Surgical History:   Procedure Laterality Date    ENDOVASCULAR REPAIR ANEURYSM THORACIC AORTIC N/A 1/23/2024    Procedure: Antegrade Thoracic Endovascular Aortic Repair (TEVAR) with [place] Conformable Thoracic Stent Graft 37mm x 10cm. Intravascular Ultrasound (IVUS), Aortography, Right  common femoral access;  Surgeon: Artur Singh MD;  Location: [facility] OR    IR OR ANGIOGRAM  1/23/2024    PICC DOUBLE LUMEN PLACEMENT Right 01/16/2024    5FR DL PICC, basilic vein. L-43cm, 1cm out.    REPAIR ANEURYSM ASCENDING AORTA N/A 1/23/2024    Procedure: MEDIAN STERNOTOMY, RIGHT AXILLARY CUTDOWN, ON CARDIOPULMONARY BYPASS (CIRCULATORY ARREST), ASCENDING AORTA ANEURYSM REPAIR ([place] 30mm), INTRAOPERATIVE TRANSESOPHAGEAL ECHOCARDIOGRAM BY ANESTHESIA;  Surgeon: Addi Shaw MD;  Location: [facility] OR     Prior to Admission Medications   No current facility-administered medications for this encounter.     Current Outpatient Medications   Medication Sig    acetaminophen (TYLENOL) 325 MG tablet Take 3 tablets (975 mg) by mouth every 6 hours as needed for mild pain    amLODIPine (NORVASC) 5 MG tablet Take 1 tablet (5 mg) by mouth 2 times daily    aspirin (ASA) 81 MG chewable tablet 1 tablet (81 mg) by Oral or NG Tube route daily    carvedilol (COREG) 25 MG tablet Take 1 tablet (25 mg) by mouth 2 times daily    Lidocaine (LIDOCARE) 4 % Patch Place 1-2 patches onto the skin daily as needed for moderate pain To prevent lidocaine toxicity, patient should be patch free for 12 hrs daily.    losartan (COZAAR) 25 MG tablet Take 2.5 tablets (62.5 mg) by mouth daily    methocarbamol (ROBAXIN) 750 MG tablet Take 1 tablet (750 mg) by mouth every 6 hours as needed for muscle spasms    oxyCODONE (ROXICODONE) 5 MG tablet Take 1 tablet (5 mg) by mouth every 8 hours as needed for moderate to severe pain    pantoprazole (PROTONIX) 40 MG EC tablet Take 1 tablet (40 mg) by mouth every morning (before breakfast)    polyethylene glycol (MIRALAX) 17 GM/Dose powder Take 17 g by mouth daily    rosuvastatin (CRESTOR) 20 MG tablet Take 1 tablet (20 mg) by mouth daily        Physical Exam   Vital Signs: Temp: (!) 100.9  F (38.3  C) Temp src: Oral BP: 125/69 Pulse: 91   Resp: 27 SpO2: 98 % O2 Device: None (Room air)    Weight: 0 lbs 0  ozNo intake or output data in the 24 hours ending 02/15/24 0131    GENERAL: NAD, resting comfortably in bed  HEENT: atraumatic, normocephalic, no scleral icterus  CARDIO: normal rate and regular rhythm, no LE edema  CHEST: incision C/D/I without blanching erythema; mediastinal tube sites without blanching erythema  PULM: no increased WOB  ABD: non-distended, soft, and non-tender  MSK: b/l platar/dorsiflexion intact, full ROM of UE b/l, full ROM of LE b/l  NEURO: CN II-XII grossly intact  PSYCH: appropriate affect and behavior    Data     I have personally reviewed the following data over the past 24 hrs:    10.5  \   9.2 (L)   / 388     133 (L) 99 19.5 /  113 (H)   4.1 22 1.15 \     ALT: 63 AST: 33 AP: 94 TBILI: 0.7   ALB: 3.5 TOT PROTEIN: 7.3 LIPASE: N/A     Trop: 28 (H) BNP: N/A     Procal: N/A CRP: N/A Lactic Acid: 1.4       INR:  1.33 (H) PTT:  37   D-dimer:  N/A Fibrinogen:  N/A       Imaging results reviewed over the past 24 hrs:   Recent Results (from the past 24 hour(s))   POC US ECHO LIMITED    Impression    Limited Bedside Cardiac Ultrasound, performed and interpreted by me.   Indication: Chest Pain.  Parasternal long axis, parasternal short axis, and apical 4 chamber views were acquired.   Image quality was satisfactory.    Findings:    Global left ventricular function appears intact.  Chambers do not appear dilated.  There is no evidence of free fluid within the pericardium.    IMPRESSION: Grossly normal left ventricular function and chamber size.  No pericardial effusion..       CTA Head Neck with Contrast    Narrative    EXAM: CT HEAD W/O CONTRAST, CTA HEAD NECK W CONTRAST  LOCATION: [facility]  DATE/TIME: 2/15/2024 1:15 AM CST    INDICATION: Code stroke; right lower extremity weakness  COMPARISON:  MRI brain 08/20/2022.  CONTRAST: Isovue 370 67 mL  TECHNIQUE: Head and neck CT angiogram with IV contrast. Noncontrast head CT followed by axial helical CT  images of the head and neck vessels obtained during the arterial phase of intravenous contrast administration. Axial 2D reconstructed images and   multiplanar 3D MIP reconstructed images of the head and neck vessels were performed by the technologist. Dose reduction techniques were used. All stenosis measurements made according to NASCET criteria unless otherwise specified.    FINDINGS:   NONCONTRAST HEAD CT:   INTRACRANIAL CONTENTS: No intracranial hemorrhage, extraaxial collection, or mass effect.  No CT evidence of acute infarct.  Advanced presumed chronic small vessel ischemic changes with unchanged chronic right corona radiata and bilateral thalamic   lacunar infarcts. Mild generalized volume loss. No hydrocephalus.     VISUALIZED ORBITS/SINUSES/MASTOIDS: No intraorbital abnormality. No significant paranasal sinus mucosal disease. No middle ear or mastoid effusion.    BONES/SOFT TISSUES: No acute abnormality.    HEAD CTA:  ANTERIOR CIRCULATION: No stenosis/occlusion, aneurysm, or high flow vascular malformation. Fetal origin of the right posterior cerebral artery from the anterior circulation.    POSTERIOR CIRCULATION: No stenosis/occlusion, aneurysm, or high flow vascular malformation. Dominant left and smaller right vertebral artery contribute to a normal basilar artery.     DURAL VENOUS SINUSES: Not well evaluated on a technical basis.    NECK CTA:  RIGHT CAROTID:  The proximal common carotid artery is not well assessed in a region of beam hardening from the contrast bolus. No measurable stenosis or dissection, where visualized.    LEFT CAROTID: No measurable stenosis or dissection.    VERTEBRAL ARTERIES: No focal stenosis or dissection. Dominant left and smaller right vertebral arteries.    AORTIC ARCH: Aortic dissection described separately. The dissection extends along the left subclavian artery with moderate to severe stenosis of the proximal subclavian artery. No involvement of the left vertebral  artery.    NONVASCULAR STRUCTURES: Unremarkable.      Impression    IMPRESSION:   HEAD CT:  1.  No CT evidence for acute intracranial process.  2.  Brain atrophy and advanced chronic microvascular ischemic changes as above.    HEAD CTA:   1.  No significant stenosis, aneurysm, or high flow vascular malformation identified.  2.  Variant [place] of [place] anatomy as above.    NECK CTA:  1.  No carotid or vertebral artery stenosis.  2.  Aortic dissection described separately. Dissection extends along the proximal left subclavian artery resulting in moderate to severe stenosis.    Dr. Lutz was contacted by me on 2/15/2024 1:23 AM CST with the preliminary report.   CT Head w/o Contrast    Narrative    EXAM: CT HEAD W/O CONTRAST, CTA HEAD NECK W CONTRAST  LOCATION: [facility]  DATE/TIME: 2/15/2024 1:15 AM CST    INDICATION: Code stroke; right lower extremity weakness  COMPARISON:  MRI brain 08/20/2022.  CONTRAST: Isovue 370 67 mL  TECHNIQUE: Head and neck CT angiogram with IV contrast. Noncontrast head CT followed by axial helical CT images of the head and neck vessels obtained during the arterial phase of intravenous contrast administration. Axial 2D reconstructed images and   multiplanar 3D MIP reconstructed images of the head and neck vessels were performed by the technologist. Dose reduction techniques were used. All stenosis measurements made according to NASCET criteria unless otherwise specified.    FINDINGS:   NONCONTRAST HEAD CT:   INTRACRANIAL CONTENTS: No intracranial hemorrhage, extraaxial collection, or mass effect.  No CT evidence of acute infarct.  Advanced presumed chronic small vessel ischemic changes with unchanged chronic right corona radiata and bilateral thalamic   lacunar infarcts. Mild generalized volume loss. No hydrocephalus.     VISUALIZED ORBITS/SINUSES/MASTOIDS: No intraorbital abnormality. No significant paranasal sinus mucosal disease. No middle  ear or mastoid effusion.    BONES/SOFT TISSUES: No acute abnormality.    HEAD CTA:  ANTERIOR CIRCULATION: No stenosis/occlusion, aneurysm, or high flow vascular malformation. Fetal origin of the right posterior cerebral artery from the anterior circulation.    POSTERIOR CIRCULATION: No stenosis/occlusion, aneurysm, or high flow vascular malformation. Dominant left and smaller right vertebral artery contribute to a normal basilar artery.     DURAL VENOUS SINUSES: Not well evaluated on a technical basis.    NECK CTA:  RIGHT CAROTID:  The proximal common carotid artery is not well assessed in a region of beam hardening from the contrast bolus. No measurable stenosis or dissection, where visualized.    LEFT CAROTID: No measurable stenosis or dissection.    VERTEBRAL ARTERIES: No focal stenosis or dissection. Dominant left and smaller right vertebral arteries.    AORTIC ARCH: Aortic dissection described separately. The dissection extends along the left subclavian artery with moderate to severe stenosis of the proximal subclavian artery. No involvement of the left vertebral artery.    NONVASCULAR STRUCTURES: Unremarkable.      Impression    IMPRESSION:   HEAD CT:  1.  No CT evidence for acute intracranial process.  2.  Brain atrophy and advanced chronic microvascular ischemic changes as above.    HEAD CTA:   1.  No significant stenosis, aneurysm, or high flow vascular malformation identified.  2.  Variant [place] of [place] anatomy as above.    NECK CTA:  1.  No carotid or vertebral artery stenosis.  2.  Aortic dissection described separately. Dissection extends along the proximal left subclavian artery resulting in moderate to severe stenosis.    Dr. Lutz was contacted by me on 2/15/2024 1:23 AM CST with the preliminary report.

## 2024-02-15 NOTE — PROGRESS NOTES
"Stroke neurology paged at [phone] \"[facility] home Tylenol and oxycodone be ordered. Can you order?\" Tylenol and Oxycodone ordered.     Neurology paged again at [phone] \"[facility] requesting home aspirin and Robaxin be ordered as well. Please order? Do you know what the plan is for the aortic dissection?\" Requested to page resident via Push [employer] chat.    Neurology resident paged at [phone] \"[facility] requesting home aspirin and Robaxin be ordered as well. Please order? Do you know what the plan is for the aortic dissection?\"    Neurology resident paged at [phone] \"Lizzie Ahn [facility] NIKKI Lyons would you like to continue heparin drip? When will pt. discharge? Mother Kristen requesting update at [phone].\"    "

## 2024-02-15 NOTE — ED PROVIDER NOTES
ED Provider Note  [facility]      History     Chief Complaint   Patient presents with    Leg Pain     HPI  Valdo Lyons is a 48 year old male PMH of aortic dissection, kidney failure, HTN who presents to the ER for evaluation of leg numbness.    Patient had a dissection repair on the 23rd an was discharged from the hospital on the 5th of February. He states that tonight his whole right side is numb with a pins and needles sensation, at presentation is right leg is experiencing the most. He states the R leg had diminished sensation compared to the L leg. He started to feel dizzy (like vertigo) about an hour ago. He states that the numbness started at 8:30pm. Hr denies pain. He cannot walk due to the pins and needles. No chest pain or SOB at presentation. He states that he had a slight fever earlier. Patient noted he is diaphoretic.  Denies vomiting, diarrhea, abdominal pain.  No drainage from his wounds.  He says his wound is healing well from the procedure.  He has not any falls.  He has not any headaches.  No neck stiffness.  No sore throat, cough, shortness of breath.        Past Medical History  Past Medical History:   Diagnosis Date    Dissecting aneurysm of thoracic aorta, [place] type B (H)     HTN (hypertension)      Past Surgical History:   Procedure Laterality Date    ENDOVASCULAR REPAIR ANEURYSM THORACIC AORTIC N/A 1/23/2024    Procedure: Antegrade Thoracic Endovascular Aortic Repair (TEVAR) with [place] Conformable Thoracic Stent Graft 37mm x 10cm. Intravascular Ultrasound (IVUS), Aortography, Right common femoral access;  Surgeon: Artur Singh MD;  Location: [facility] OR    IR OR ANGIOGRAM  1/23/2024    PICC DOUBLE LUMEN PLACEMENT Right 01/16/2024    5FR DL PICC, basilic vein. L-43cm, 1cm out.    REPAIR ANEURYSM ASCENDING AORTA N/A 1/23/2024    Procedure: MEDIAN STERNOTOMY, RIGHT AXILLARY CUTDOWN, ON CARDIOPULMONARY BYPASS (CIRCULATORY ARREST), ASCENDING AORTA ANEURYSM  REPAIR ([place]ave 30mm), INTRAOPERATIVE TRANSESOPHAGEAL ECHOCARDIOGRAM BY ANESTHESIA;  Surgeon: Addi Shaw MD;  Location: [facility] OR     acetaminophen (TYLENOL) 325 MG tablet  amLODIPine (NORVASC) 5 MG tablet  aspirin (ASA) 81 MG chewable tablet  carvedilol (COREG) 25 MG tablet  Lidocaine (LIDOCARE) 4 % Patch  losartan (COZAAR) 25 MG tablet  methocarbamol (ROBAXIN) 750 MG tablet  oxyCODONE (ROXICODONE) 5 MG tablet  pantoprazole (PROTONIX) 40 MG EC tablet  polyethylene glycol (MIRALAX) 17 GM/Dose powder  rosuvastatin (CRESTOR) 20 MG tablet      No Known Allergies  Family History  History reviewed. No pertinent family history.  Social History          ROS: 14 point ROS neg other than the symptoms noted above in the HPI.      Physical Exam   BP: 100/64  Pulse: 96  Temp: (!) 100.9  F (38.3  C)  Resp: 15  SpO2: 97 %  Physical Exam  Physical Exam   Constitutional: oriented to person, place, and time. appears well-developed and well-nourished.   HENT:   Head: Normocephalic and atraumatic.   Neck: Normal range of motion.   Pulmonary/Chest: Effort normal. No respiratory distress.   Cardiac: No murmurs, rubs, gallops. RRR.  Abdominal: Abdomen soft, nontender, nondistended. No rebound tenderness.  MSK: Long bones without deformity or evidence of trauma.  Radial pulses 2+ and symmetric.  Right pedal pulses diminished but palpable, decreased strength of pulse compared to left foot  Neurological: alert and oriented to person, place, and time.  Diminished sensation of the right lower extremity compared to left.  Moving all extremities.  Pupils 3 mm and reactive bilaterally.  Extraocular muscles without restriction.  No ptosis.  No facial droop.  No arm drift.   strength and bicep strength symmetric.  Lower leg strength symmetric.  Skin: Skin is warm and dry.   Psychiatric:  normal mood and affect.  behavior is normal. Thought content normal.       ED Course, Procedures, & Data      Procedures            EKG  Interpretation:      Interpreted by Maurice Lutz MD  Time reviewed: 0030  Symptoms at time of EKG: Back pain   Rhythm: normal sinus   Rate: Normal  Axis: Normal  Ectopy: none  Conduction: normal  ST Segments/ T Waves: 2 inversions in the inferior leads, nonspecific ST changes in V5 V6 similar to prior EKG  Q Waves: none  Comparison to prior: Unchanged    Clinical Impression: No acute changes from prior EKG.                       No results found for any visits on 02/14/24.  Medications - No data to display  Labs Ordered and Resulted from Time of ED Arrival to Time of ED Departure - No data to display  No orders to display          Critical care was performed.   Critical Care Addendum  My initial assessment, based on my review of prehospital provider report, focused history, physical exam, discussion with neurology, CV surgery, and interpretation of MRI , established a high suspicion that Valdo Lyons has ischemic or hemorrhagic stroke, which requires immediate intervention, and therefore he is critically ill.     After the initial assessment, the care team initiated multiple lab tests and consulted with neurology  to provide stabilization care. Due to the critical nature of this patient, I reassessed nursing observations, physical exam, and neurologic status multiple times prior to his disposition.     Time also spent performing documentation, reviewing test results, discussion with consultants, and coordination of care.     Critical care time (excluding teaching time and procedures): 45 minutes.     Assessment & Plan    MDM  Patient presenting with neurologic symptoms with recent repair of dissection.  Exam here is fairly benign.  Neurology saw the patient at the bedside after stroke code was called.  Unfortunately there was some delay with the stroke code as by the time I arrived to work the patient has already been in the emergency department for about 40 minutes.  No indication for thrombolytics or  thrombectomy.MR with acute/subacute findings. .  CT did show an aortic thrombus likely cause of his symptoms, awaiting discussion with CV surgery and neurology for management.  Patient's symptoms are largely resolved at this point.    Neurology to start heparin after discussion with cardiovascular surgery regarding starting anticoagulation.  The patient will be admitted for further care either to the CV surgery or neurology service.    I have reviewed the nursing notes. I have reviewed the findings, diagnosis, plan and need for follow up with the patient.    New Prescriptions    No medications on file       Final diagnoses:   Acute CVA (cerebrovascular accident) (H)   Aortic thrombus (H)     I, Renate Wolfe, am serving as a trained medical scribe to document services personally performed by Maurice Lutz MD, based on the provider's statements to me.     I, Maurice Lutz MD, was physically present and have reviewed and verified the accuracy of this note documented by Renate Wolfe.    Maurice Lutz MD  [facility] EMERGENCY DEPARTMENT  2/14/2024        Maurice Lutz MD  02/15/24 0513

## 2024-02-15 NOTE — CONSULTS
VASCULAR SURGERY INPATIENT CONSULTATION / Initial In-Patient visit    VASCULAR SURGEON: Dr. Warren    LOCATION: [facility]    Valdo Lyons  Medical Record #:  [id number]  YOB: 1975  Age:  48 year old     Date of Service: 2/15/24    PRIMARY CARE PROVIDER: No Ref-Primary, Physician    Reason for consultation: Aortic thrombus, recommendations for anticoagulation    IMPRESSION / RECOMMENDATION:   Valdo Lyons is a PMH significant for HTN, Acute Type B aortic dissection 1/12/24 which evolved to a Type A dissection with a thrombosed retrograde false lumen, s/p hemiarch repair and TEVAR on 1/23/24 with palpable pulses in femoral, and DP bilaterally. Represented to ED last night with R-hemibody sensory and motor loss, aphasia which have largely resolved, found to have aortic thrombus in setting of recent type A dissection repair. CTA reviewed with Dr. Warren and we agree with presence of thrombus in the aortic arch, distal to graft anastomosis. Recommend anticoagulation for aortic thrombus with ASA 81mg and DOAC if CVTS in agreement, defer to primary team on type of DOAC. We will follow-up on an outpatient basis.    Discussed pt history, exam, assessment and formulated plan with Dr. Warren of the vascular surgery service, who is in agreement with the above.    Thank you for involving vascular surgery in the care of Valdo Lyons. Should any questions or concerns arise, please don't hesitate to contact us.    Candie Parkinson CNP  Vascular Surgery  Pager: [phone]  [email or web address]  Send message or 10 digit call back number Securely via [employer] with the [employer] Web Console (learn more here)      HPI:  Valdo Lyons is a 48 year old male who was seen today in consultation for aortic thrombus in setting of recent type A dissection repair.  Patient presented last night to the ED with right hemibody sensorimotor loss and aphasia for which CTA was completed. CT  was without acute findings, but was notable for multi territory hypodensities concerning for chronic infarction, many of which were present on prior CT in 2020.  CTA head/neck was without evidence of LVO amenable to thrombectomy, TNK contraindicated in the setting of recent thoracotomy. CTA H/N, CTA C/A/P did reveal an active subclavian dissection (similar to prior) with a small thrombus in his aortic arch just distal to his anastomosis site. While in the ED, patient's symptoms resolved and he was started on heparin drip low intensity without bolus. Today he continues to remain asymptomatic, no abdominal pain, no nausea no vomiting. Neurologically intact, at baseline.    PHH:    Past Medical History:   Diagnosis Date     Dissecting aneurysm of thoracic aorta, Negrito type B (H)      HTN (hypertension)          Past Surgical History:   Procedure Laterality Date     ENDOVASCULAR REPAIR ANEURYSM THORACIC AORTIC N/A 1/23/2024    Procedure: Antegrade Thoracic Endovascular Aortic Repair (TEVAR) with [place] Conformable Thoracic Stent Graft 37mm x 10cm. Intravascular Ultrasound (IVUS), Aortography, Right common femoral access;  Surgeon: Artur Singh MD;  Location: [facility] OR     IR OR ANGIOGRAM  1/23/2024     PICC DOUBLE LUMEN PLACEMENT Right 01/16/2024    5FR DL PICC, basilic vein. L-43cm, 1cm out.     REPAIR ANEURYSM ASCENDING AORTA N/A 1/23/2024    Procedure: MEDIAN STERNOTOMY, RIGHT AXILLARY CUTDOWN, ON CARDIOPULMONARY BYPASS (CIRCULATORY ARREST), ASCENDING AORTA ANEURYSM REPAIR ([place] 30mm), INTRAOPERATIVE TRANSESOPHAGEAL ECHOCARDIOGRAM BY ANESTHESIA;  Surgeon: Addi Shaw MD;  Location: [facility] OR        ALLERGIES:   No Known Allergies     MEDS:    Current Facility-Administered Medications:      acetaminophen (TYLENOL) tablet 975 mg, 975 mg, Oral, Q6H PRN, Ye Cody MD, 975 mg at 02/15/24 1002     [Held by provider] amLODIPine (NORVASC) tablet 5 mg, 5 mg, Oral, BID, Andreas Monge MD     [Held by  provider] carvedilol (COREG) tablet 25 mg, 25 mg, Oral, BID, Andreas Monge MD     heparin infusion 25,000 units in D5W 250 mL ANTICOAGULANT, 0-5,000 Units/hr, Intravenous, Continuous, Andreas Monge MD, Last Rate: 9.5 mL/hr at 02/15/24 1006, 950 Units/hr at 02/15/24 1006     hydrALAZINE (APRESOLINE) injection 5 mg, 5 mg, Intravenous, Q6H PRN, Andreas Monge MD     labetalol (NORMODYNE/TRANDATE) injection 10 mg, 10 mg, Intravenous, Q4H PRN, Andreas Monge MD     lidocaine (LMX4) cream, , Topical, Q1H PRN, Andraes Monge MD     lidocaine 1 % 0.1-1 mL, 0.1-1 mL, Other, Q1H PRN, Andreas Monge MD     [Held by provider] losartan (COZAAR) half-tab 62.5 mg, 62.5 mg, Oral, Daily, Andreas Monge MD     medication instruction - No oral meds if patient didn't pass dysphagia screen, , Does not apply, Continuous PRN, Andreas Monge MD     Medication Instructions - Avoid dextrose in IV solutions., , Intravenous, Continuous PRN, Andreas Monge MD     oxyCODONE (ROXICODONE) tablet 5 mg, 5 mg, Oral, Q8H PRN, Ye Cody MD, 5 mg at 02/15/24 1002     [Held by provider] pantoprazole (PROTONIX) EC tablet 40 mg, 40 mg, Oral, QAM AC, Andreas Monge MD     rosuvastatin (CRESTOR) tablet 20 mg, 20 mg, Oral or Feeding Tube, Daily, Andreas Monge MD, 20 mg at 02/15/24 1003     sodium chloride (PF) 0.9% PF flush 3 mL, 3 mL, Intracatheter, Q8H, Andreas Monge MD     sodium chloride (PF) 0.9% PF flush 3 mL, 3 mL, Intracatheter, q1 min prn, Andreas Monge MD    Current Outpatient Medications:      acetaminophen (TYLENOL) 325 MG tablet, Take 3 tablets (975 mg) by mouth every 6 hours as needed for mild pain, Disp: 100 tablet, Rfl: 0     amLODIPine (NORVASC) 5 MG tablet, Take 1 tablet (5 mg) by mouth 2 times daily, Disp: 60 tablet, Rfl: 1     aspirin (ASA) 81 MG chewable tablet, 1 tablet (81 mg) by Oral or NG Tube route daily, Disp: 90 tablet, Rfl: 0     carvedilol (COREG) 25 MG tablet, Take 1 tablet (25  "mg) by mouth 2 times daily, Disp: 60 tablet, Rfl: 1     losartan (COZAAR) 25 MG tablet, Take 2.5 tablets (62.5 mg) by mouth daily, Disp: 75 tablet, Rfl: 1     methocarbamol (ROBAXIN) 750 MG tablet, Take 1 tablet (750 mg) by mouth every 6 hours as needed for muscle spasms, Disp: 28 tablet, Rfl: 1     oxyCODONE (ROXICODONE) 5 MG tablet, Take 1 tablet (5 mg) by mouth every 8 hours as needed for moderate to severe pain, Disp: 10 tablet, Rfl: 0     rosuvastatin (CRESTOR) 20 MG tablet, Take 1 tablet (20 mg) by mouth daily, Disp: 30 tablet, Rfl: 1     SOCIAL HABITS:    Tobacco Use      Smoking status: None      Smokeless tobacco: None     Social History    Substance and Sexual Activity      Alcohol use: Not on file       History   Drug Use Not on file        FAMILY HISTORY:  History reviewed. No pertinent family history.    REVIEW OF SYSTEMS:    A 12 point ROS was reviewed and except for what is listed in the HPI above, all others are negative    PE:    Vital signs:  Temp: 98.4  F (36.9  C) Temp src: Oral BP: 123/69 Pulse: 84   Resp: 18 SpO2: 98 % O2 Device: None (Room air)   Height: 172.7 cm (5' 8\") Weight: 75.3 kg (166 lb)  Estimated body mass index is 25.24 kg/m  as calculated from the following:    Height as of this encounter: 1.727 m (5' 8\").    Weight as of this encounter: 75.3 kg (166 lb).       Wt Readings from Last 1 Encounters:   02/15/24 75.3 kg (166 lb)     Body mass index is 25.24 kg/m .    EXAM:  GENERAL: This is a well-developed 48 year old male who appears his stated age  CARDIAC:  Normal S1 and S2, no Murmur  CHEST/LUNG:  Clear lung fields bilaterally  GASTROINTESINAL (ABDOMEN):Soft, non-tender, no pulsatile mass   MUSCULOSKELETAL: Grossly normal and both lower extremities are intact.  HEME/LYMPH: No lymphedema  NEUROLOGIC: Focally intact, Alert and oriented x 3.   PSYCH: appropriate affect  INTEGUMENT: No open lesions or ulcers  VASCULAR: Palpable femoral pulses bilaterally, warm, well-perfused.  Palpable " radial pulses bilaterally.  Motor and sensory function intact.  Strength 5 out of 5 in all extremities.    DIAGNOSTIC STUDIES:     Images:  MR Brain w/o & w Contrast    Result Date: 2/15/2024  EXAM: MR BRAIN W/O AND W CONTRAST LOCATION: [facility] DATE: 2/15/2024 INDICATION: TIA vs stroke. Right hemibody weak, numb. COMPARISON: CTA head and neck 02/15/2024. CONTRAST: 8 ml Gadavist. TECHNIQUE: Routine multiplanar multisequence head MRI without and with intravenous contrast. FINDINGS: INTRACRANIAL CONTENTS: There is a 7 mm focus of restricted diffusion in the right temporal/occipital white matter. Probable late subacute lacunar infarct in the left corona radiata and in the left parietal white matter. Punctate focus of restricted diffusion in the superior left cerebellum. Scattered small foci of chronic hemosiderin deposition in the cerebral hemispheres. No mass, acute hemorrhage, or extra-axial fluid collections. Patchy and confluent nonspecific T2/FLAIR hyperintensities within the cerebral white matter most consistent with moderate chronic microvascular ischemic change. Chronic lacunar infarcts bilateral basal ganglia, bilateral thalami, bilateral corona radiata. Subacute infarct left cerebellum. Mild generalized cerebral atrophy. No hydrocephalus. Normal position of the cerebellar tonsils. Enhancement of the subacute left cerebellar infarct. SELLA: No abnormality accounting for technique. OSSEOUS STRUCTURES/SOFT TISSUES: Normal marrow signal. The major intracranial vascular flow voids are maintained. ORBITS: No abnormality accounting for technique. SINUSES/MASTOIDS: Retention cysts bilateral maxillary sinuses. No middle ear or mastoid effusion.     IMPRESSION: 1.  7 mm focus of acute to subacute ischemic infarction at the right temporal/occipital white matter. Punctate focus of acute to early subacute ischemic infarction in the superior left cerebellum. 2.  Small  subacute infarct suspected in the left cerebellum, left corona radiata and left parietal white matter. 3.  Advanced small vessel ischemic disease for age with multifocal chronic lacunar infarctions as described.    CTA Chest Abdomen Pelvis Runoff w Contrast    Result Date: 2/15/2024  EXAMINATION: CT ANGIOGRAPHY CHEST, ABDOMEN, PELVIS AND BILATERAL RUNOFF WITH INTRAVENOUS CONTRAST LOCATION: [facility] [place] DATE: 02/15/2024  INDICATION: Code stroke. Evaluate for potential thrombolysis and thrombectomy. TECHNIQUE: Helical acquisition through the chest, abdomen, pelvis, and bilateral lower extremities was performed during the arterial phase of contrast enhancement. Second phase arterial imaging was performed through the bilateral lower extremities. 2D and 3D reconstructions performed by the CT technologist. Dose reduction techniques were used. CONTRAST: 90 mL isovue 370 FINDINGS: ANGIOGRAPHIC FINDINGS: AORTA: Status post ascending thoracic aortic graft repair and stenting of the descending thoracic aorta for type A aortic dissection. Dissection flap extends to the level of the upper abdominal aorta above the level of the celiac artery. A small amount of mural thrombus is seen in the ascending thoracic aorta just distal to the repair graft. Dissection flap also extends into the left subclavian artery, which otherwise appears patent. Right brachiocephalic artery, right subclavian artery and visualized portions of bilateral carotid arteries are patent. Descending thoracic aorta is mildly aneurysmal measuring 4.6 cm. No abdominal aortic aneurysm. Mild atherosclerotic calcification seen in the infrarenal abdominal aorta. Celiac artery, SMA, single bilateral renal arteries, NILAM, and bilateral iliofemoral arteries are fully patent. RIGHT LEG: Patent femoral popliteal artery to the level of the knee joint. Distal popliteal artery and runoff vessels are poorly opacified. LEFT LEG: Patent femoral popliteal artery.  Runoff vessels are poorly opacified. NONVASCULAR FINDINGS: MEDIASTINUM: No lymphadenopathy. Mild cardiomegaly without pericardial effusion. LUNGS/PLEURA: Small left pleural effusion and mild left lower lobe/lingular atelectasis. No right pleural effusion, pneumonic consolidation or pneumothorax.. ABDOMEN/PELVIS: Multiple bilateral renal cysts appear simple in nature and do not require follow-up, largest 6 cm on the left. Liver, spleen, pancreas, adrenal glands, gallbladder, gastrointestinal tract including the appendix, urinary bladder and prostate gland are new normal on this arterial phase study. No lymphadenopathy, free fluid or free air. MUSCULOSKELETAL: No significant osseous abnormalities.     IMPRESSION: 1.  Status post ascending thoracic aortic graft repair and endoluminal stenting of descending thoracic aorta for type A aortic dissection. 2.  Small amount of thrombus in the aortic arch distal to the graft anastomosis. 3.  Dissection flap extends to the left subclavian artery and to the upper abdominal aorta above the celiac artery. Visualized arch vessels appear patent. 4.  Patent bilateral femoral and popliteal arteries. The distal runoff vessels in the bilateral lower extremities are nonopacified due to outrunning of contrast bolus. 5.  Mild cardiomegaly with small left pleural effusion.    LABS:      Sodium   Date Value Ref Range Status   02/15/2024 133 (L) 135 - 145 mmol/L Final     Comment:     Reference intervals for this test were updated on 09/26/2023 to more accurately reflect our healthy population. There may be differences in the flagging of prior results with similar values performed with this method. Interpretation of those prior results can be made in the context of the updated reference intervals.    02/04/2024 134 (L) 135 - 145 mmol/L Final     Comment:     Reference intervals for this test were updated on 09/26/2023 to more accurately reflect our healthy population. There may be differences  in the flagging of prior results with similar values performed with this method. Interpretation of those prior results can be made in the context of the updated reference intervals.    02/03/2024 137 135 - 145 mmol/L Final     Comment:     Reference intervals for this test were updated on 09/26/2023 to more accurately reflect our healthy population. There may be differences in the flagging of prior results with similar values performed with this method. Interpretation of those prior results can be made in the context of the updated reference intervals.      Urea Nitrogen   Date Value Ref Range Status   02/15/2024 19.5 6.0 - 20.0 mg/dL Final   02/04/2024 14.5 6.0 - 20.0 mg/dL Final   02/03/2024 11.7 6.0 - 20.0 mg/dL Final   08/20/2022 16 7 - 30 mg/dL Final     Hemoglobin   Date Value Ref Range Status   02/15/2024 7.8 (L) 13.3 - 17.7 g/dL Final   02/15/2024 9.2 (L) 13.3 - 17.7 g/dL Final   02/04/2024 8.3 (L) 13.3 - 17.7 g/dL Final     Platelet Count   Date Value Ref Range Status   02/15/2024 273 150 - 450 10e3/uL Final   02/15/2024 388 150 - 450 10e3/uL Final   02/04/2024 361 150 - 450 10e3/uL Final     INR   Date Value Ref Range Status   02/15/2024 1.38 (H) 0.85 - 1.15 Final   02/15/2024 1.33 (H) 0.85 - 1.15 Final   02/01/2024 1.29 (H) 0.85 - 1.15 Final

## 2024-02-15 NOTE — H&P
[facility]    Stroke Admission Note    Chief Complaint  L-sided numbness, weakness, aphasia    HPI  Valdo Lyons is a 48 year old male with a history of hypertension, prediabetes, and type B aortic dissection which evolved into an acute type A dissection status post open repair 1/23/2024.  He reports he has been recovering well, but for persistent chest and back pain.     This evening, Mr. Lyons reports that he woke up at 830 pm from a nap and noted his right hemibody was numb, and he had difficulty moving it, and was unable to speak.  Does not endorse difficulty with speech.  Last known well approximately 7 PM, prior to going to sleep.  EMS was called, and he was brought to the [facility] stroke code was called on presentation.  At presentation he was noted to be mildly febrile and at least briefly hypotensive to 100 systolic (although this rapidly corrected into the 150s), with Mr. Lyons reporting an episode of diaphoresis earlier today.  Otherwise, he reports he has bee in his usual state of health, with the exception of resolving post-surgical pain as above.       At the time of my exam, Mr. Lyons's subjective deficits have nearly completely resolved.  He was endorsing a persistent sensation of disequilibrium, with very subtle RUE extremity dysmetria on exam, however sensation and motor function had returned to baseline per his report, and I was not able to detect any weakness on my initial exam.  Initial NIHSS of 1 for subtle dysmetria as above.  Admit MRS of 0.     CT was without acute findings, but was notable for multi territory hypodensities concerning for chronic infarction, many of which were present on prior CT in 2020.  CTA head/neck was without evidence of LVO amenable to thrombectomy, TNK contraindicated in the setting of recent thoracotomy, code was deescalated.    However, CTA H/N, CTA C/A/P did reveal an active subclavian dissection (similar to  prior) with a small thrombus in his aortic arch just distal to his anastomosis site.  Vascular imaging in the head and neck otherwise notable only for dolichoectasia in his posterior circulation, minimal intracranial atherosclerosis.  Follow-up MRI demonstrated multifocal, but very small, posterior circulation infarcts as below.    After discussion with CVTS, who feel anticoagulation would be both safe and reasonable in light of his aortic thrombus, patient was started on low-intensity heparin gtt without bolus.     Intravenous Thrombolysis  Not given due to:   - minor/isolated/quickly resolving symptoms    Endovascular Treatment  Not initiated due to absence of proximal vessel occlusion    Impression   #TIA, L-MCA syndrome  #Punctate multifocal acute/subacute posterior circulation infarctions (largest 7mm)  #Aortic thrombus  #Subclavian dissection  #HTN  #Chronic lacunar infarctions, right corona radiata and bilateral thalamic infarcts  #Markedly advanced small vessel disease for age  Mr Lyons reports symptoms consistent with a complete, thankfully transient, L-MCA syndrome (R-hemibody sensory and motor loss, aphasia), without evidence of infarction in that territory on MRI.  This occurs in the setting of a subclavian dissection and likely intraluminal aortic thrombus in c/o recent type A dissection repair.  His presentation is highly concerning for embolic TIA from his aortic arch, which I suspect is also the  of his small posterior circulation strokes.       I am additionally concerned that his development of a thrombus at his anastomosis site could reflect vessel wall injury, putting him at risk of further thromboembolism if not addressed.  Case discussed with CVTS on-call resident, who reportedly discussed with staff, who feel anticoagulation is safe and reasonable from their perspective -- they expressed some question about whether the aortic thrombus is intraluminal, but defer to radiology.  If  intraluminal, they share our concerns that he may be at risk of further thromboembolism, even if the residual clot seen on imaging is small (given possible reflection of wall injury / thrombogenic potential).  With all of this in mind, we would recommend anticoagulation overnight with low intensity heparin gtt w/o bolus.    Regarding other possibilities on the differential: Lower suspicion his subclavian dissection is involved given how distal it is to the L-ICA take-off, but it is nearing the L-vertebral artery take off and retrograde embolism from the dissection while asleep (i.e. while flat) is a possible explanation for his posterior circulation infarctions (however embolization from his aortic thrombus appears more likely).  Additionally, we cannot necessarily rule out more run-of-the-mill embolic causes: cardioembolism remains on the differential and should be evaluated for.  Low suspicion for large vessel atherosclerosis given very limited burden of carotid or intracranial atherosclerosis.    Plan  #TIA, L-MCA syndrome  #Punctate multifocal acute/subacute posterior circulation infarctions (largest 7mm)  #Aortic thrombus  #Subclavian dissection  #HTN  #Chronic lacunar infarctions, right corona radiata and bilateral thalamic infarcts  #Markedly advanced small vessel disease for age  - ABCD2 Score: 4  - Admit to Neurology  - Defer BP goals to surgical team in light of dissection, apparent resolution of stroke Sx with minimal infarct burden or intracranial atherosclerosis              >Per discussion with CVTS, PRN SBP goal <130 recommended  - Neurochecks and Vital Signs minimum every q4h   - Initiate heparin gtt, low-intensity, no bolus  - Hold ASA while on heparin  - Statin: Continue PTA rosuvastatin 20 mg pending LDL  - MRI Brain with and without contrast  - Continuous telemetry while in house               > Will consider ambulatory monitoring at discharge  - TTE w/ bubble  - Bedside Glucose Monitoring  -  A1c, Lipid Panel, TSH  - Stroke Education  - Euthermia, Euglycemia      Additional problems:  # Type A aortic dissection with a thrombosed retrograde false lumen s/p ascending aortic aneurysm hemiarch repair and TEVAR  # Ascending aortic arch thrombus  Procedure was with CVTS and vascular surgery on 1/23/24. He has been recovering well but continues to have back and chest pain. AAA thrombus seen on CT imaging on admission. Given age of strokes on MRI, they likely occurred either at the time of the dissection or time of repair. Will need to discuss anticoagulation plan for the aortic thrombus with surgical teams.   - CVTS consulted, appreciate assistance  - Vascular surgery consulted, appreciate assistance    #Prediabetes  Last A1c was 6.1. No current medical management.  - Goal A1c < 6.5    Prophylaxis            For VTE Prevention:  - Heparin gtt    For Acid Suppression:  - GI prophylaxis is not indicated    Code Status  Full Code    During initial physical assessment, the plan of care was discussed and developed with patient.     Patient was admitted via [facility]    The patient will be admitted to the Neuro Critical Care/Stroke team..     The patient was discussed with the Stroke Staff Dr. Vick.    ALEKS SHAW MD  Neurology Resident  Pager:  [phone]  ___________________________________________________    Nutrition:   Orders Placed This Encounter      Combination Diet    Clinically Significant Risk Factors Present on Admission               # Coagulation Defect: INR = 1.33 (Ref range: 0.85 - 1.15) and/or PTT = 37 Seconds (Ref range: 22 - 38 Seconds), will monitor for bleeding  # Drug Induced Platelet Defect: home medication list includes an antiplatelet medication   # Hypertension: Noted on problem list                   Past Medical History   Past Medical History:   Diagnosis Date    Dissecting aneurysm of thoracic aorta, [place] type B (H)     HTN (hypertension)      Past  Surgical History   Past Surgical History:   Procedure Laterality Date    ENDOVASCULAR REPAIR ANEURYSM THORACIC AORTIC N/A 1/23/2024    Procedure: Antegrade Thoracic Endovascular Aortic Repair (TEVAR) with [place] Conformable Thoracic Stent Graft 37mm x 10cm. Intravascular Ultrasound (IVUS), Aortography, Right common femoral access;  Surgeon: Artur Singh MD;  Location: [facility] OR    IR OR ANGIOGRAM  1/23/2024    PICC DOUBLE LUMEN PLACEMENT Right 01/16/2024    5FR DL PICC, basilic vein. L-43cm, 1cm out.    REPAIR ANEURYSM ASCENDING AORTA N/A 1/23/2024    Procedure: MEDIAN STERNOTOMY, RIGHT AXILLARY CUTDOWN, ON CARDIOPULMONARY BYPASS (CIRCULATORY ARREST), ASCENDING AORTA ANEURYSM REPAIR ([place] 30mm), INTRAOPERATIVE TRANSESOPHAGEAL ECHOCARDIOGRAM BY ANESTHESIA;  Surgeon: Addi Shaw MD;  Location: [facility] OR     Medications   Home Meds  Prior to Admission medications    Medication Sig Start Date End Date Taking? Authorizing Provider   acetaminophen (TYLENOL) 325 MG tablet Take 3 tablets (975 mg) by mouth every 6 hours as needed for mild pain 2/3/24   Derick Read NP   amLODIPine (NORVASC) 5 MG tablet Take 1 tablet (5 mg) by mouth 2 times daily 2/4/24   Derick Read NP   aspirin (ASA) 81 MG chewable tablet 1 tablet (81 mg) by Oral or NG Tube route daily 2/4/24   Derick Read NP   carvedilol (COREG) 25 MG tablet Take 1 tablet (25 mg) by mouth 2 times daily 2/4/24   Derick Read NP   Lidocaine (LIDOCARE) 4 % Patch Place 1-2 patches onto the skin daily as needed for moderate pain To prevent lidocaine toxicity, patient should be patch free for 12 hrs daily. 2/3/24   Derick Read NP   losartan (COZAAR) 25 MG tablet Take 2.5 tablets (62.5 mg) by mouth daily 2/4/24   Derick Read NP   methocarbamol (ROBAXIN) 750 MG tablet Take 1 tablet (750 mg) by mouth every 6 hours as needed for muscle spasms 2/3/24   Derick Read NP   oxyCODONE (ROXICODONE) 5 MG tablet Take 1 tablet (5 mg) by  mouth every 8 hours as needed for moderate to severe pain 2/4/24   Derick Read NP   pantoprazole (PROTONIX) 40 MG EC tablet Take 1 tablet (40 mg) by mouth every morning (before breakfast) 2/4/24   Derick Read NP   polyethylene glycol (MIRALAX) 17 GM/Dose powder Take 17 g by mouth daily 2/3/24   Derick Read NP   rosuvastatin (CRESTOR) 20 MG tablet Take 1 tablet (20 mg) by mouth daily 2/4/24   Derick Read NP       Scheduled Meds   [Held by provider] amLODIPine  5 mg Oral BID    [Held by provider] carvedilol  25 mg Oral BID    [Held by provider] losartan  62.5 mg Oral Daily    [Held by provider] pantoprazole  40 mg Oral QAM AC    rosuvastatin  20 mg Oral or Feeding Tube Daily    sodium chloride (PF)  3 mL Intracatheter Q8H       Infusion Meds   heparin 950 Units/hr (02/15/24 0535)    - MEDICATION INSTRUCTIONS -      - MEDICATION INSTRUCTIONS -         PRN Meds  hydrALAZINE, labetalol, lidocaine 4%, lidocaine (buffered or not buffered), - MEDICATION INSTRUCTIONS -, - MEDICATION INSTRUCTIONS -, sodium chloride (PF)    Allergies   No Known Allergies  Family History   History reviewed. No pertinent family history.  Social History        Review of Systems   The 10 point Review of Systems is negative other than noted in the HPI or here.        PHYSICAL EXAMINATION  Temp:  [99  F (37.2  C)-100.9  F (38.3  C)] 99  F (37.2  C)  Pulse:  [84-96] 84  Resp:  [15-27] 20  BP: (100-152)/(59-85) 114/59  SpO2:  [97 %-99 %] 97 %    General:  patient lying in bed without any acute distress    HEENT:  normocephalic/atraumatic, bilateral proptosis noted  Cardio:  RRR  Pulmonary:  no respiratory distress  Abdomen:  soft, non-tender, non-distended  Extremities:  no edema  Skin:  intact, warm/dry      Neuro Exam  Mental Status:  alert, oriented x 3, follows commands, speech clear and fluent, naming and repetition normal  Cranial Nerves:  visual fields intact, PERRL, EOMI with normal smooth pursuit, facial sensation  intact and symmetric, facial movements symmetric, hearing not formally tested but intact to conversation, palate elevation symmetric and uvula midline, no dysarthria, shoulder shrug strong bilaterally, tongue protrusion midline  Motor:  normal muscle tone and bulk, no abnormal movements, able to move all limbs spontaneously, strength 5/5 throughout upper and lower extremities, no pronator drift  Reflexes:  toes down-going  Sensory:  light touch sensation intact and symmetric throughout upper and lower extremities, no extinction on double simultaneous stimulation   Coordination:  Normal finger-to-nose and heel-to-shin bilaterally without dysmetria  Station/Gait:  deferred    Dysphagia Screen  Per Nursing    Stroke Scales    NIHSS  1a. Level of Consciousness 0-->Alert, keenly responsive   1b. LOC Questions 0-->Answers both questions correctly   1c. LOC Commands 0-->Performs both tasks correctly   2.   Best Gaze 0-->Normal   3.   Visual 0-->No visual loss   4.   Facial Palsy 0-->Normal symmetrical movements   5a. Motor Arm, Left 0-->No drift, limb holds 90 (or 45) degrees for full 10 secs   5b. Motor Arm, Right 0-->No drift, limb holds 90 (or 45) degrees for full 10 secs   6a. Motor Leg, Left 0-->No drift, leg holds 30 degree position for full 5 secs   6b. Motor Leg, right 0-->No drift, leg holds 30 degree position for full 5 secs   7.   Limb Ataxia 0-->No dysmetria   8.   Sensory 0-->Normal, no sensory loss   9.   Best Language 0-->No aphasia, normal   10. Dysarthria 0-->Normal   11. Extinction and Inattention  0-->No abnormality   Total 0 (02/15/24)       Modified Edmond Score (Pre-morbid)  0-No deficits    Imaging  HEAD CT:  1.  No CT evidence for acute intracranial process.  2.  Brain atrophy and advanced chronic microvascular ischemic changes as above.     HEAD CTA:   1.  No significant stenosis, aneurysm, or high flow vascular malformation identified.  2.  Variant [place] of [place] anatomy as above.     NECK  CTA:  1.  No carotid or vertebral artery stenosis.  2.  Aortic dissection described separately. Dissection extends along the proximal left subclavian artery resulting in moderate to severe stenosis.    CTA C/A/P w/ runoff  1.  Status post ascending thoracic aortic graft repair and endoluminal stenting of descending thoracic aorta for type A aortic dissection.  2.  Small amount of thrombus in the aortic arch distal to the graft anastomosis.  3.  Dissection flap extends to the left subclavian artery and to the upper abdominal aorta above the celiac artery. Visualized arch vessels appear patent.  4.  Patent bilateral femoral and popliteal arteries. The distal runoff vessels in the bilateral lower extremities are nonopacified due to outrunning of contrast bolus.  5.  Mild cardiomegaly with small left pleural effusion.    MRI Brain w/w/o  IMPRESSION:  1.  7 mm focus of acute to subacute ischemic infarction at the right temporal/occipital white matter. Punctate focus of acute to early subacute ischemic infarction in the superior left cerebellum.  2.  Small subacute infarct suspected in the left cerebellum, left corona radiata and left parietal white matter.  3.  Advanced small vessel ischemic disease for age with multifocal chronic lacunar infarctions as described.  This result has not been signed. Information might be incomplete.    Lab Results Data   CBC  Recent Labs   Lab 02/15/24  0548 02/15/24  0021   WBC 8.6 10.5   RBC 3.95* 4.57   HGB 7.8* 9.2*   HCT 25.4* 29.7*    388     Basic Metabolic Panel   Recent Labs   Lab 02/15/24  0021 02/15/24  0019 02/15/24  0017   *  --   --    POTASSIUM 4.1  --   --    CHLORIDE 99  --   --    CO2 22  --   --    BUN 19.5  --   --    CR 1.15 1.2  --    *  --  110*   YARON 8.9  --   --      Liver Panel  Recent Labs   Lab 02/15/24  0021   PROTTOTAL 7.3   ALBUMIN 3.5   BILITOTAL 0.7   ALKPHOS 94   AST 33   ALT 63     INR    Recent Labs   Lab Test 02/15/24  0021  02/01/24  0628 01/31/24  0437   INR 1.33* 1.29* 1.29*      Lipid Profile    Recent Labs   Lab Test 01/25/24  0342   CHOL 89   HDL 27*   LDL 41   TRIG 105     A1C    Recent Labs   Lab Test 01/16/24  0551   A1C 6.1*     Troponin    Recent Labs   Lab 02/15/24  0021   CTROPT 28*          Stroke Code / Stroke Consult Data Data    Stroke code activated 02/15/24   0018   First stroke provider response         Last known normal 02/14/24   1900   Time of discovery   (or onset of symptoms) 02/14/24   2030   Head CT read by Stroke Neuro Dr/Provider 02/15/24   0035   Was stroke code de-escalated? Yes 02/15/24 0050

## 2024-02-15 NOTE — ED TRIAGE NOTES
BIBA from home woke up with numbness in arms progressed to right leg.  Open heart surgery 2 weeks ago  PMH- dissection  VSS for ems  temp 100.9  Pt took tylenol and ibuprofen about an hour ago

## 2024-02-15 NOTE — PROGRESS NOTES
Stroke Code Nurse-Responder Note    Arrival Time to Stroke Code: 0022    Stroke Code Team interventions: Labs, CT    ED/Bedside Nurse providing handoff: Tigre MAY    Time left for CT: 0029    Time arrived to next location (ED/Unit/IR): 0055. De-escalation at 0051.    ED/Bedside Nurse given handoff (name/time): Tigre Lu RN

## 2024-02-16 NOTE — DISCHARGE SUMMARY
[facility]    Neurology Stroke Discharge Summary    Date of Admission: 2/14/2024  Date of Discharge: 02/15/2024    Disposition: Discharged to home  Primary Care Physician: No Ref-Primary, Physician      Admission Diagnosis:   Transient episode of right upper and right lower extremity weakness and numbness    Discharge Diagnosis:   Possible transient ischemic attack in the setting of aortic arch thrombus  Multifocal subacute embolic appearing infarcts on MRI brain, likely jhoana procedural and asymptomatic (none of which correlate with patient's presentation)    Problem Leading to Hospitalization (from [facility]):   Valdo Lyons is a 48 year old male with a history of hypertension, prediabetes, and type B aortic dissection which evolved into an acute type A dissection status post open repair 1/23/2024.  He reports he has been recovering well, but for persistent chest and back pain.     This evening, Mr. Lyons reports that he woke up at 830 pm from a nap and noted his right hemibody was numb, and he had difficulty moving it, and was unable to speak.  Does not endorse difficulty with speech.  Last known well approximately 7 PM, prior to going to sleep.  EMS was called, and he was brought to the [facility] stroke code was called on presentation.  At presentation he was noted to be mildly febrile and at least briefly hypotensive to 100 systolic (although this rapidly corrected into the 150s), with Mr. Lyons reporting an episode of diaphoresis earlier today.  Otherwise, he reports he has bee in his usual state of health, with the exception of resolving post-surgical pain as above.      By the time this patient was examined by the neurology team in the emergency department, his complaints and deficits had completely resolved.  No longer was complaining of any right hemibody weakness or numbness.  His exam was essentially benign, but was noted to have some possible subtle  dysmetria, earning him a score of 1 on the NIH stroke scale.  His mRS on admission was 0.    Workup for stroke was pursued with CT/CTA head and neck did not show any intracranial LVO.  Both TNK administration and thrombectomy were not warranted.  Incidentally, his CTA head and neck did reveal an active portion of subclavian dissection as well as a small thrombus noted in the aortic arch.  An MRI was also performed and showed multifocal subacute infarcts were determined to be likely periprocedural 2/2 his recent aortic dissection repair at the end of January.    After discussion with thoracic surgery as well as vascular surgery teams that were both involved in his surgical case, it was felt that the irregularity within his aortic arch was in fact a thrombus, and anticoagulation was appropriate.  The patient was placed on heparin infusion.  Patient was then prescribed Eliquis at time of discharge and instructed to follow-up in the outpatient setting with his respective surgical teams to monitor for thrombus resolution.  No formal neurology follow-up needed at this time.    Please see H&P dated 2/14/2024 for further details about presentation.    Brief Hospital Course:   Patient presented with transient right upper extremity and right lower extremity weakness and numbness, thought to be a possible TIA in the setting of aortic arch thrombus likely associated with his recent aortic dissection repair.      Found to have thrombus in his aortic arch.  Did not have an LVO seen on head CTA.  Did not have any evidence of an infarct to explain his transient symptoms, but he was found to have multifocal small subacute embolic appearing infarcts likely due to his procedure few weeks ago.    IV thrombolysis was not given as patient symptoms had resolved/nondisabling at time of presentation.      Work-up as stated below under Pertinent Investigations.    Etiology of possible TIA is thought to be embolic.      Rehab evaluation: No  rehab services required due to lack of ongoing deficit.     Smoking Cessation: education provided    BP Long-term Goal: 140/90 or less -was on losartan 62.5 mg daily, Coreg 25 mg daily and amlodipine 5 mg daily for blood pressure control, however he had fairly low blood pressures on arrival.  Patient did state that he has been having lightheadedness and dizziness recently since starting the medications.  It was therefore determined to discontinue his losartan at discharge and have him continue only Coreg and amlodipine with a PCP follow-up in the near future to further discuss his regimen.    Antithrombotic/Anticoagulant Agent: aspirin 81 mg and apixaban (Eliquis)    Statins: Continue Crestor 20 mg daily    Hgb A1C Goal: < 7.0    Complications: None.     Other problems addressed during this hospitalization:  Aortic dissection status postsurgical repair    PERTINENT INVESTIGATIONS    Labs  Lipid Panel:   Recent Labs   Lab Test 02/15/24  0021   CHOL 77   HDL 25*   LDL 38   TRIG 70     A1C:   Lab Results   Component Value Date    A1C 6.1 01/16/2024     INR:   Recent Labs   Lab 02/15/24  0951 02/15/24  0021   INR 1.38* 1.33*      Coag Panel / Hypercoag Workup: Not indicated  Pending test results: None    Echo: Interpretation Summary  Known Type B aortic dissection     Left ventricular size, wall motion and function are normal. The ejection  fraction is 60-65%.  Right ventricular function, chamber size, wall motion, and thickness are  normal.  Very faintly positive bubble study  The inferior vena cava was normal in size with preserved respiratory  variability. No pericardial effusion is present. Dissection flap noted in the  descending thoracic aorta    Imaging:   IMPRESSION:   HEAD CT:  1.  No CT evidence for acute intracranial process.  2.  Brain atrophy and advanced chronic microvascular ischemic changes as above.     HEAD CTA:   1.  No significant stenosis, aneurysm, or high flow vascular malformation identified.  2.   Variant [place] anatomy as above.     NECK CTA:  1.  No carotid or vertebral artery stenosis.  2.  Aortic dissection described separately. Dissection extends along the proximal left subclavian artery resulting in moderate to severe stenosis.    MRI brain   IMPRESSION:  1.  7 mm focus of acute to subacute ischemic infarction at the right temporal/occipital white matter. Punctate focus of acute to early subacute ischemic infarction in the superior left cerebellum.  2.  Small subacute infarct suspected in the left cerebellum, left corona radiata and left parietal white matter.  3.  Advanced small vessel ischemic disease for age with multifocal chronic lacunar infarctions as described.    Endovascular procedure: None     Cardiac Monitoring: Patient had > 24 hrs of cardiac monitor while in hospital.    Findings: No atrial fibrillation was found.    Sleep Apnea Screen:   Did not screen patient since this was not an ischemic stroke    PHQ-9 Depression Screen Score: Did not screen as this was not an acute ischemic stroke event    Education discussed with: patient on blood pressure management and follow-up recommendations/plan.    During daily rounds, the plan of care was discussed and developed with patient.  Plan of care includes: Initiating anticoagulation with Eliquis 5 mg twice daily, continuing aspirin 81 mg daily, continuing Crestor 20 mg daily, continuing Coreg 25 mg daily, continuing amlodipine 5 mg daily, discontinue losartan 62.5 mg daily, follow-up with primary care provider as soon as able, follow-up with vascular surgical team's for reevaluation of thrombus, no dedicated neurology follow-up needed at this time .    PHYSICAL EXAMINATION  Vital Signs:  B/P: 146/80, T: 98.4, P: 85, R: 18    General:  patient lying in bed without any acute distress     HEENT:  normocephalic/atraumatic  Cardio:  RRR  Pulmonary:  no respiratory distress  Abdomen:  soft  Extremities:  no edema  Skin:  intact      Neurologic  Mental Status:  fully alert, attentive and oriented, follows commands, speech clear and fluent  Cranial Nerves:  visual fields intact, PERRL, EOMI with normal smooth pursuit, facial sensation intact and symmetric, facial movements symmetric, hearing not formally tested but intact to conversation, palate elevation symmetric and uvula midline, no dysarthria, shoulder shrug strong bilaterally, tongue protrusion midline  Motor:  no abnormal movements, no pronator drift, strength 5/5 throughout upper and lower extremities  Reflexes:  no clonus  Sensory:  intact/symmetric to light touch and pin prick throughout upper and lower extremities  Coordination:  FNF and HS intact without dysmetria  Station/Gait: Did not assess    National Institutes of Health Stroke Scale (on day of discharge)  NIHSS Total Score: 0    Modified [place] Score (Discharge)    -  0    Medications    Discharge Medication List as of 2/15/2024  5:38 PM        START taking these medications    Details   apixaban ANTICOAGULANT (ELIQUIS) 5 MG tablet Take 1 tablet (5 mg) by mouth 2 times daily, Disp-30 tablet, R-3, E-Prescribe           CONTINUE these medications which have CHANGED    Details   pantoprazole (PROTONIX) 40 MG EC tablet Take 1 tablet (40 mg) by mouth every morning (before breakfast), Disp-14 tablet, R-0, E-Prescribe           CONTINUE these medications which have NOT CHANGED    Details   acetaminophen (TYLENOL) 325 MG tablet Take 3 tablets (975 mg) by mouth every 6 hours as needed for mild pain, Disp-100 tablet, R-0, E-Prescribe      amLODIPine (NORVASC) 5 MG tablet Take 1 tablet (5 mg) by mouth 2 times daily, Disp-60 tablet, R-1, E-Prescribe      aspirin (ASA) 81 MG chewable tablet 1 tablet (81 mg) by Oral or NG Tube route daily, Disp-90 tablet, R-0, E-Prescribe      carvedilol (COREG) 25 MG tablet Take 1 tablet (25 mg) by mouth 2 times daily, Disp-60 tablet, R-1, E-Prescribe      methocarbamol (ROBAXIN) 750 MG tablet Take 1  tablet (750 mg) by mouth every 6 hours as needed for muscle spasms, Disp-28 tablet, R-1, E-Prescribe      oxyCODONE (ROXICODONE) 5 MG tablet Take 1 tablet (5 mg) by mouth every 8 hours as needed for moderate to severe pain, Disp-10 tablet, R-0, E-Prescribe      rosuvastatin (CRESTOR) 20 MG tablet Take 1 tablet (20 mg) by mouth daily, Disp-30 tablet, R-1, E-Prescribe           STOP taking these medications       losartan (COZAAR) 25 MG tablet Comments:   Reason for Stopping:               Additional recommendations and follow up:       Reason for your hospital stay    You were hospitalized due to clot found in you aorta that may be related to your recent surgery.  You were placed on a blood thinner with aspirin to help prevent complications from the clot and prevent it from getting larger.  You should follow up with your vascular team as scheduled. Vascular surgery is changing your appointment for repeat imaging so that they can look at the clot sooner to see if is getting smaller. Because your blood pressure has been low, we are also stopping one of blood pressure medications at this time (your losartan). You should follow up with your primary care provider to discuss further blood pressure management.     Activity    Your activity upon discharge: activity as tolerated     Follow Up and recommended labs and tests    No neurology follow up needed.  Should follow up with vascular surgery team for continued monitoring of your surgical area and this clot.    Take Aspirin 81 mg daily and Eliquis 5 mg twice daily.  Stop taking losartan.  Keep taking amlodipine 5 mg daily and Coreg 25 mg daily.     Diet    Follow this diet upon discharge: Regular adult diet       Patient was seen and discussed with the Attending, Dr. Vick.    Ye Cody MD  ASCOM *[phone]

## 2024-02-20 LAB
BACTERIA BLD CULT: NO GROWTH
BACTERIA BLD CULT: NO GROWTH

## 2024-02-27 ENCOUNTER — HOSPITAL ENCOUNTER (EMERGENCY)
Facility: CLINIC | Age: 49
Discharge: HOME | End: 2024-02-27
Attending: STUDENT IN AN ORGANIZED HEALTH CARE EDUCATION/TRAINING PROGRAM | Admitting: STUDENT IN AN ORGANIZED HEALTH CARE EDUCATION/TRAINING PROGRAM
Payer: MEDICAID

## 2024-02-27 ENCOUNTER — OFFICE VISIT (OUTPATIENT)
Dept: PHARMACY | Facility: CLINIC | Age: 49
End: 2024-02-27
Payer: MEDICAID

## 2024-02-27 ENCOUNTER — APPOINTMENT (OUTPATIENT)
Dept: CT IMAGING | Facility: CLINIC | Age: 49
End: 2024-02-27
Attending: STUDENT IN AN ORGANIZED HEALTH CARE EDUCATION/TRAINING PROGRAM
Payer: MEDICAID

## 2024-02-27 ENCOUNTER — OFFICE VISIT (OUTPATIENT)
Dept: FAMILY MEDICINE | Facility: CLINIC | Age: 49
End: 2024-02-27
Payer: MEDICAID

## 2024-02-27 ENCOUNTER — APPOINTMENT (OUTPATIENT)
Dept: GENERAL RADIOLOGY | Facility: CLINIC | Age: 49
End: 2024-02-27
Attending: STUDENT IN AN ORGANIZED HEALTH CARE EDUCATION/TRAINING PROGRAM
Payer: MEDICAID

## 2024-02-27 VITALS
BODY MASS INDEX: 24.86 KG/M2 | OXYGEN SATURATION: 98 % | RESPIRATION RATE: 16 BRPM | HEIGHT: 68 IN | SYSTOLIC BLOOD PRESSURE: 134 MMHG | TEMPERATURE: 97.7 F | WEIGHT: 164 LBS | DIASTOLIC BLOOD PRESSURE: 87 MMHG | HEART RATE: 97 BPM

## 2024-02-27 VITALS
TEMPERATURE: 98 F | RESPIRATION RATE: 18 BRPM | HEART RATE: 92 BPM | DIASTOLIC BLOOD PRESSURE: 94 MMHG | SYSTOLIC BLOOD PRESSURE: 135 MMHG | OXYGEN SATURATION: 99 %

## 2024-02-27 DIAGNOSIS — E78.5 DYSLIPIDEMIA: ICD-10-CM

## 2024-02-27 DIAGNOSIS — I71.00 DISSECTION OF AORTA, UNSPECIFIED PORTION OF AORTA (H): ICD-10-CM

## 2024-02-27 DIAGNOSIS — R42 DIZZINESS: Primary | ICD-10-CM

## 2024-02-27 DIAGNOSIS — I74.10 AORTIC THROMBUS (H): ICD-10-CM

## 2024-02-27 DIAGNOSIS — I95.1 ORTHOSTATIC HYPOTENSION: Primary | ICD-10-CM

## 2024-02-27 DIAGNOSIS — Z79.01 LONG TERM CURRENT USE OF ANTICOAGULANT THERAPY: ICD-10-CM

## 2024-02-27 DIAGNOSIS — R52 GENERALIZED PAIN: ICD-10-CM

## 2024-02-27 DIAGNOSIS — I95.1 ORTHOSTATIC HYPOTENSION: ICD-10-CM

## 2024-02-27 DIAGNOSIS — I63.9 ACUTE CVA (CEREBROVASCULAR ACCIDENT) (H): ICD-10-CM

## 2024-02-27 DIAGNOSIS — R07.89 CHEST WALL PAIN: ICD-10-CM

## 2024-02-27 LAB
ABO/RH(D): NORMAL
ALBUMIN SERPL BCG-MCNC: 3.9 G/DL (ref 3.5–5.2)
ALP SERPL-CCNC: 107 U/L (ref 40–150)
ALT SERPL W P-5'-P-CCNC: 32 U/L (ref 0–70)
ANION GAP SERPL CALCULATED.3IONS-SCNC: 10 MMOL/L (ref 7–15)
ANTIBODY SCREEN: NEGATIVE
APTT PPP: 36 SECONDS (ref 22–38)
AST SERPL W P-5'-P-CCNC: 21 U/L (ref 0–45)
ATRIAL RATE - MUSE: 95 BPM
BASOPHILS # BLD AUTO: 0.1 10E3/UL (ref 0–0.2)
BASOPHILS NFR BLD AUTO: 1 %
BILIRUB SERPL-MCNC: 0.5 MG/DL
BUN SERPL-MCNC: 22.4 MG/DL (ref 6–20)
CALCIUM SERPL-MCNC: 9.5 MG/DL (ref 8.6–10)
CHLORIDE SERPL-SCNC: 103 MMOL/L (ref 98–107)
CREAT SERPL-MCNC: 1.02 MG/DL (ref 0.67–1.17)
DEPRECATED HCO3 PLAS-SCNC: 25 MMOL/L (ref 22–29)
DIASTOLIC BLOOD PRESSURE - MUSE: NORMAL MMHG
EGFRCR SERPLBLD CKD-EPI 2021: >90 ML/MIN/1.73M2
EOSINOPHIL # BLD AUTO: 0.2 10E3/UL (ref 0–0.7)
EOSINOPHIL NFR BLD AUTO: 2 %
ERYTHROCYTE [DISTWIDTH] IN BLOOD BY AUTOMATED COUNT: 20.7 % (ref 10–15)
GLUCOSE SERPL-MCNC: 103 MG/DL (ref 70–99)
HCT VFR BLD AUTO: 34.2 % (ref 40–53)
HGB BLD-MCNC: 10.5 G/DL (ref 13.3–17.7)
IMM GRANULOCYTES # BLD: 0.1 10E3/UL
IMM GRANULOCYTES NFR BLD: 1 %
INR PPP: 1.26 (ref 0.85–1.15)
INTERPRETATION ECG - MUSE: NORMAL
LACTATE SERPL-SCNC: 0.9 MMOL/L (ref 0.7–2)
LYMPHOCYTES # BLD AUTO: 1.3 10E3/UL (ref 0.8–5.3)
LYMPHOCYTES NFR BLD AUTO: 12 %
MAGNESIUM SERPL-MCNC: 1.9 MG/DL (ref 1.7–2.3)
MCH RBC QN AUTO: 19.8 PG (ref 26.5–33)
MCHC RBC AUTO-ENTMCNC: 30.7 G/DL (ref 31.5–36.5)
MCV RBC AUTO: 65 FL (ref 78–100)
MONOCYTES # BLD AUTO: 0.8 10E3/UL (ref 0–1.3)
MONOCYTES NFR BLD AUTO: 7 %
NEUTROPHILS # BLD AUTO: 8.3 10E3/UL (ref 1.6–8.3)
NEUTROPHILS NFR BLD AUTO: 77 %
NRBC # BLD AUTO: 0 10E3/UL
NRBC BLD AUTO-RTO: 0 /100
P AXIS - MUSE: 64 DEGREES
PLATELET # BLD AUTO: 361 10E3/UL (ref 150–450)
POTASSIUM SERPL-SCNC: 4.6 MMOL/L (ref 3.4–5.3)
PR INTERVAL - MUSE: 172 MS
PROT SERPL-MCNC: 7.8 G/DL (ref 6.4–8.3)
QRS DURATION - MUSE: 92 MS
QT - MUSE: 350 MS
QTC - MUSE: 439 MS
R AXIS - MUSE: 22 DEGREES
RBC # BLD AUTO: 5.29 10E6/UL (ref 4.4–5.9)
SODIUM SERPL-SCNC: 138 MMOL/L (ref 135–145)
SPECIMEN EXPIRATION DATE: NORMAL
SYSTOLIC BLOOD PRESSURE - MUSE: NORMAL MMHG
T AXIS - MUSE: -67 DEGREES
TROPONIN T SERPL HS-MCNC: 15 NG/L
VENTRICULAR RATE- MUSE: 95 BPM
WBC # BLD AUTO: 10.8 10E3/UL (ref 4–11)

## 2024-02-27 PROCEDURE — 36415 COLL VENOUS BLD VENIPUNCTURE: CPT | Performed by: STUDENT IN AN ORGANIZED HEALTH CARE EDUCATION/TRAINING PROGRAM

## 2024-02-27 PROCEDURE — 83735 ASSAY OF MAGNESIUM: CPT | Performed by: STUDENT IN AN ORGANIZED HEALTH CARE EDUCATION/TRAINING PROGRAM

## 2024-02-27 PROCEDURE — 96360 HYDRATION IV INFUSION INIT: CPT | Mod: 59 | Performed by: STUDENT IN AN ORGANIZED HEALTH CARE EDUCATION/TRAINING PROGRAM

## 2024-02-27 PROCEDURE — 93308 TTE F-UP OR LMTD: CPT | Performed by: STUDENT IN AN ORGANIZED HEALTH CARE EDUCATION/TRAINING PROGRAM

## 2024-02-27 PROCEDURE — 74174 CTA ABD&PLVS W/CONTRAST: CPT | Mod: 26 | Performed by: RADIOLOGY

## 2024-02-27 PROCEDURE — 85610 PROTHROMBIN TIME: CPT | Performed by: STUDENT IN AN ORGANIZED HEALTH CARE EDUCATION/TRAINING PROGRAM

## 2024-02-27 PROCEDURE — 83605 ASSAY OF LACTIC ACID: CPT | Performed by: STUDENT IN AN ORGANIZED HEALTH CARE EDUCATION/TRAINING PROGRAM

## 2024-02-27 PROCEDURE — 74174 CTA ABD&PLVS W/CONTRAST: CPT

## 2024-02-27 PROCEDURE — 84484 ASSAY OF TROPONIN QUANT: CPT | Performed by: STUDENT IN AN ORGANIZED HEALTH CARE EDUCATION/TRAINING PROGRAM

## 2024-02-27 PROCEDURE — 99607 MTMS BY PHARM ADDL 15 MIN: CPT | Performed by: PHARMACIST

## 2024-02-27 PROCEDURE — 99605 MTMS BY PHARM NP 15 MIN: CPT | Performed by: PHARMACIST

## 2024-02-27 PROCEDURE — 93308 TTE F-UP OR LMTD: CPT | Mod: 26 | Performed by: STUDENT IN AN ORGANIZED HEALTH CARE EDUCATION/TRAINING PROGRAM

## 2024-02-27 PROCEDURE — 71046 X-RAY EXAM CHEST 2 VIEWS: CPT

## 2024-02-27 PROCEDURE — 86900 BLOOD TYPING SEROLOGIC ABO: CPT | Performed by: STUDENT IN AN ORGANIZED HEALTH CARE EDUCATION/TRAINING PROGRAM

## 2024-02-27 PROCEDURE — 99204 OFFICE O/P NEW MOD 45 MIN: CPT | Mod: GC

## 2024-02-27 PROCEDURE — 85730 THROMBOPLASTIN TIME PARTIAL: CPT | Performed by: STUDENT IN AN ORGANIZED HEALTH CARE EDUCATION/TRAINING PROGRAM

## 2024-02-27 PROCEDURE — 99285 EMERGENCY DEPT VISIT HI MDM: CPT | Mod: 25 | Performed by: STUDENT IN AN ORGANIZED HEALTH CARE EDUCATION/TRAINING PROGRAM

## 2024-02-27 PROCEDURE — 82040 ASSAY OF SERUM ALBUMIN: CPT | Performed by: STUDENT IN AN ORGANIZED HEALTH CARE EDUCATION/TRAINING PROGRAM

## 2024-02-27 PROCEDURE — 93005 ELECTROCARDIOGRAM TRACING: CPT | Mod: 59 | Performed by: STUDENT IN AN ORGANIZED HEALTH CARE EDUCATION/TRAINING PROGRAM

## 2024-02-27 PROCEDURE — 71046 X-RAY EXAM CHEST 2 VIEWS: CPT | Mod: 26 | Performed by: RADIOLOGY

## 2024-02-27 PROCEDURE — 99284 EMERGENCY DEPT VISIT MOD MDM: CPT | Mod: 25 | Performed by: STUDENT IN AN ORGANIZED HEALTH CARE EDUCATION/TRAINING PROGRAM

## 2024-02-27 PROCEDURE — 258N000003 HC RX IP 258 OP 636: Performed by: STUDENT IN AN ORGANIZED HEALTH CARE EDUCATION/TRAINING PROGRAM

## 2024-02-27 PROCEDURE — 250N000011 HC RX IP 250 OP 636: Performed by: STUDENT IN AN ORGANIZED HEALTH CARE EDUCATION/TRAINING PROGRAM

## 2024-02-27 PROCEDURE — 93010 ELECTROCARDIOGRAM REPORT: CPT | Mod: 59 | Performed by: STUDENT IN AN ORGANIZED HEALTH CARE EDUCATION/TRAINING PROGRAM

## 2024-02-27 PROCEDURE — 71275 CT ANGIOGRAPHY CHEST: CPT | Mod: 26 | Performed by: RADIOLOGY

## 2024-02-27 PROCEDURE — 85025 COMPLETE CBC W/AUTO DIFF WBC: CPT | Performed by: STUDENT IN AN ORGANIZED HEALTH CARE EDUCATION/TRAINING PROGRAM

## 2024-02-27 RX ORDER — METHOCARBAMOL 750 MG/1
750 TABLET, FILM COATED ORAL EVERY 6 HOURS PRN
Qty: 28 TABLET | Refills: 1 | Status: SHIPPED | OUTPATIENT
Start: 2024-02-27 | End: 2024-02-28

## 2024-02-27 RX ORDER — IOPAMIDOL 755 MG/ML
90 INJECTION, SOLUTION INTRAVASCULAR ONCE
Status: COMPLETED | OUTPATIENT
Start: 2024-02-27 | End: 2024-02-27

## 2024-02-27 RX ADMIN — SODIUM CHLORIDE, POTASSIUM CHLORIDE, SODIUM LACTATE AND CALCIUM CHLORIDE 1000 ML: 600; 310; 30; 20 INJECTION, SOLUTION INTRAVENOUS at 13:27

## 2024-02-27 RX ADMIN — IOPAMIDOL 90 ML: 755 INJECTION, SOLUTION INTRAVENOUS at 15:16

## 2024-02-27 ASSESSMENT — ACTIVITIES OF DAILY LIVING (ADL)
ADLS_ACUITY_SCORE: 35
ADLS_ACUITY_SCORE: 37

## 2024-02-27 ASSESSMENT — COLUMBIA-SUICIDE SEVERITY RATING SCALE - C-SSRS
6. HAVE YOU EVER DONE ANYTHING, STARTED TO DO ANYTHING, OR PREPARED TO DO ANYTHING TO END YOUR LIFE?: NO
2. HAVE YOU ACTUALLY HAD ANY THOUGHTS OF KILLING YOURSELF IN THE PAST MONTH?: NO
1. IN THE PAST MONTH, HAVE YOU WISHED YOU WERE DEAD OR WISHED YOU COULD GO TO SLEEP AND NOT WAKE UP?: NO

## 2024-02-27 NOTE — PROGRESS NOTES
Assessment & Plan     Dizziness  Dissection of aorta, unspecified portion of aorta (H)  Aortic thrombus (H)  Acute CVA (cerebrovascular accident) (H)  Orthostatic hypotension  Sent the patient to the [facility]. I called and gave a warm handoff. Valdo's dizziness is so severe that he is struggling to move from one place to another in his house without fear of falling. His next appointment with Vasc Surg is 03/06/2024, but his symptoms appear too severe to wait until then. Dizziness was the presenting symptoms of his recent TIA in the setting of an aortic thrombus. Orthostatic vitals suggest orthostatic hypotension that may be the source of his dizziness, as signified by the reduction of > 20 mmHg in SBP and > 10 mmHg in DBP. He did not take his medications this morning thinking that it would help with his dizziness, but it did not. His losartan was recently discontinued, but he still takes carvedilol and amlodipine. Recommend medication optimization and rule out of any medication-induced causes of dizziness. During his hospitalization from 1/12/2024 to 1/16/2024, he received a blood transfusion for acute blood loss anemia. His Hgb on 2/15/2024 was 7.8. There is concern for anemia requiring blood transfusion due to his pallor and symptoms of dizziness. Recommend STAT CBC and blood transfusion if Hgb < 7.     Orthostatic Vitals:    Supine: /95, HR 89  Sitting: /110, HR 92  Standing: /98,     Chest wall pain  Likely secondary to surgery. Reproducible tenderness on exam. Refill requested: refilled.  - methocarbamol (ROBAXIN) 750 MG tablet; Take 1 tablet (750 mg) by mouth every 6 hours as needed for muscle spasms    MED REC REQUIRED{  Post Medication Reconciliation Status:  Discharge medications reconciled, continue medications without change until you are seen by a specialist    Return in about 2 weeks (around 3/12/2024).    Hoang Lyle is a 48 year old,  "presenting for the following health issues:  Hospital F/U (New PCP. Dizziness since left hospital, unsure if it's medication related )    HPI   - Recent aortic dissection that was repaired on 1/23/24    - Received blood transfusion while in the hospital on 1/28 and experienced resolution of the blurry vision he had at the time  - Visited ED on 2/14/2024 for evaluation of right hemibody weakness and was diagnosed with a TIA    - He was also found to have an aortic thrombus and was started on heparin drip, then discharged on Eliquis   - Hemoglobin on 2/15 was 7.8  - Ongoing dizziness began since aortic dissection operation but has worsened since starting Eliquis on 2/14/24, although chart review shows that his presenting symptoms for his admission in 01/2024 was multiple syncopal episodes   - No nausea or vomiting related to dizziness   - Feels like he is going to fallen over   - Worse with changing positions from sitting to standing  - Dizziness does not improve without taking meds  - BP at home is 120-130s/70-80s   - Highest SBP at home was 139 since leave the hospital,    - Lowest SBP was 106, and it was associated with dizziness     Review of Systems  Constitutional, neuro, ENT, endocrine, pulmonary, cardiac, gastrointestinal, genitourinary, musculoskeletal, integument and psychiatric systems are negative, except as otherwise noted.      Objective    /87   Pulse 97   Temp 97.7  F (36.5  C) (Oral)   Resp 16   Ht 1.727 m (5' 8\")   Wt 74.4 kg (164 lb)   SpO2 98%   BMI 24.94 kg/m    Body mass index is 24.94 kg/m .  Physical Exam  Vitals reviewed.   Constitutional:       Appearance: He is ill-appearing.   HENT:      Head: Normocephalic and atraumatic.   Eyes:      Extraocular Movements: Extraocular movements intact.      Conjunctiva/sclera: Conjunctivae normal.   Cardiovascular:      Rate and Rhythm: Normal rate and regular rhythm.      Heart sounds: Normal heart sounds.   Pulmonary:      Effort: Pulmonary " effort is normal.      Breath sounds: Normal breath sounds.   Musculoskeletal:         General: Tenderness (right chest wall) present.   Skin:     General: Skin is warm and dry.      Coloration: Skin is pale.   Neurological:      Mental Status: He is oriented to person, place, and time.      Comments: No nystagmus.       Orthostatic Vitals  Lying down: /95, HR 89  Sitting: /110, HR 92  Standing /98,         Signed Electronically by: Susanna Pena DO, MPH

## 2024-02-27 NOTE — ED PROVIDER NOTES
[place] EMERGENCY DEPARTMENT ([facility])    2/27/24       ED PROVIDER NOTE   Hallway I   History     Chief Complaint   Patient presents with    Dizziness     The history is provided by the patient and medical records.     Valdo Lyons is a 48 year old male with complex past medical history including type B aortic dissection s/p repair who presents with orthostatic hypotension from PCP clinic. He has a history of significant hospitalizations from 1/12-1/16/2024 at [facility].  He had presented to [facility] ER after multiple syncopal events and back pain, found to have a type B aortic dissection.  [facility] vascular surgery was consulted, they had initially recommended medical management with blood pressure and rate control.  However he developed decreased left radial art line pressures concerning for progression of his dissection, stat CT aorta was obtained showing type a aortic dissection.  He was transferred to [facility] on 1/16/24 for further management, underwent dissection repair and TEVAR on 1/23/2024. He was discharged on 2/4/24.  At home patient has been struggling with extreme lightheadedness with ambulating short distances in his own home which he managed by placing chairs every few feet.  He had presented to family practice clinic earlier today reporting the symptoms.  It was noted that his next appointment with vascular surgery is on 3/6/2024 but symptoms are too severe to wait until then.  He was sent to the ED for further evaluation.  Here patient reports no extremity weakness, no vertigo, no significant chest pain.  He does describe some left upper quadrant discomfort.  No shortness of breath..  He denies any melena or hematochezia.  No hematuria.  He does not feel he is any more pale than usual.    Patient is on Eliquis, Protonix, baby aspirin, Crestor, amlodipine, carvedilol, oxycodone.    Past Medical History  Past Medical History:   Diagnosis Date    Dissecting  aneurysm of thoracic aorta, Negrito type B (H)     HTN (hypertension)      Past Surgical History:   Procedure Laterality Date    ENDOVASCULAR REPAIR ANEURYSM THORACIC AORTIC N/A 1/23/2024    Procedure: Antegrade Thoracic Endovascular Aortic Repair (TEVAR) with [place] Conformable Thoracic Stent Graft 37mm x 10cm. Intravascular Ultrasound (IVUS), Aortography, Right common femoral access;  Surgeon: Artur Singh MD;  Location: [facility] OR    IR OR ANGIOGRAM  1/23/2024    PICC DOUBLE LUMEN PLACEMENT Right 01/16/2024    5FR DL PICC, basilic vein. L-43cm, 1cm out.    REPAIR ANEURYSM ASCENDING AORTA N/A 1/23/2024    Procedure: MEDIAN STERNOTOMY, RIGHT AXILLARY CUTDOWN, ON CARDIOPULMONARY BYPASS (CIRCULATORY ARREST), ASCENDING AORTA ANEURYSM REPAIR ([place] 30mm), INTRAOPERATIVE TRANSESOPHAGEAL ECHOCARDIOGRAM BY ANESTHESIA;  Surgeon: Addi Shaw MD;  Location: [facility] OR     acetaminophen (TYLENOL) 325 MG tablet  amLODIPine (NORVASC) 5 MG tablet  apixaban ANTICOAGULANT (ELIQUIS) 5 MG tablet  aspirin (ASA) 81 MG chewable tablet  carvedilol (COREG) 25 MG tablet  methocarbamol (ROBAXIN) 750 MG tablet  oxyCODONE (ROXICODONE) 5 MG tablet  pantoprazole (PROTONIX) 40 MG EC tablet  rosuvastatin (CRESTOR) 20 MG tablet      No Known Allergies  Family History  History reviewed. No pertinent family history.  Social History   Social History     Tobacco Use    Smoking status: Never    Smokeless tobacco: Never      Past medical history, past surgical history, medications, allergies, family history, and social history were reviewed with the patient. No additional pertinent items.      A medically appropriate review of systems was performed with pertinent positives and negatives noted in the HPI, and all other systems negative.    Physical Exam   BP: (!) 127/95  Pulse: 78  Temp: 97.8  F (36.6  C)  Resp: 18  SpO2: 99 %  Lying Orthostatic BP: 174/107  Lying Orthostatic Pulse: 96 bpm  Sitting Orthostatic BP: 172/105  Sitting  Orthostatic Pulse: 104 bpm  Standing Orthostatic BP: 138/107 (pt dizzy and unsteady)  Standing Orthostatic Pulse: 112 bpm    Wt Readings from Last 10 Encounters:   02/27/24 74.4 kg (164 lb)   02/15/24 75.3 kg (166 lb)   02/04/24 79.7 kg (175 lb 11.2 oz)       Physical Exam  Vital Signs Reviewed  Gen: Well nourished, well developed, resting comfortably, no acute distress  HEENT: NC/AT, PERRL, EOMI, MMM  Neck: Supple, FROM  CV: Regular Rate  Lungs/Chest: Normal Effort  Abd: Non-distended, nontender with no rigidity rebound or guarding  MSK/Back: FROM, no visible deformity  Neuro: A&Ox3, GCS 15, CN II-XII unremarkable. Strength and sensation globally intact.  Skin: Warm, Dry, Intact, no visible lesions     ED Course, Procedures, & Data     ED Course as of 02/27/24 2100   Tue Feb 27, 2024   1337 Bedside ultrasound suggests intravascular volume depletion.  CBC suggest mild concentration.     Procedures  Results for orders placed during the hospital encounter of 02/27/24    POC US ECHO LIMITED    Impression  Limited Bedside Cardiac Ultrasound, performed and interpreted by me.  Indication: Near syncope.  Parasternal long axis, parasternal short axis, apical 4 chamber, and subcostal views were acquired.  Image quality was satisfactory.    Findings:  Abnormal -grossly preserved left ventricular ejection fraction.  No visualized RV dilation.  No visualized pericardial effusion.  IVC is small and collapses greater than 50% with respiration.    IMPRESSION: Abnormal -grossly preserved left ventricular ejection fraction.  No visualized RV dilation.  No visualized pericardial effusion.  IVC is small and collapses greater than 50% with respiration.            EKG Interpretation:      Interpreted by Agus Bowie MD  Time reviewed: 1330  Symptoms at time of EKG: Near syncope   Rhythm: sinus  Rate: Normal  Axis: Normal  Ectopy: premature ventricular contractions (frequent)  Conduction: normal  ST Segments/ T Waves: Non-specific  ST-T wave changes  Q Waves: none  Comparison to prior: Morphology grossly unchanged from prior, occasional PVCs.    Clinical Impression: Sinus rhythm at 95 bpm with no acute ischemic changes/PABLO, occasional PVCs         Results for orders placed or performed during the hospital encounter of 02/27/24   POC US ECHO LIMITED     Status: None    Impression    Limited Bedside Cardiac Ultrasound, performed and interpreted by me.   Indication: Near syncope.  Parasternal long axis, parasternal short axis, apical 4 chamber, and subcostal views were acquired.   Image quality was satisfactory.    Findings:    Abnormal -grossly preserved left ventricular ejection fraction.  No visualized RV dilation.  No visualized pericardial effusion.  IVC is small and collapses greater than 50% with respiration.    IMPRESSION: Abnormal -grossly preserved left ventricular ejection fraction.  No visualized RV dilation.  No visualized pericardial effusion.  IVC is small and collapses greater than 50% with respiration.       XR Chest 2 Views     Status: None    Narrative    EXAM: XR CHEST 2 VIEWS  2/27/2024 1:54 PM     HISTORY: Hx aortic dissection, lightheadedness       COMPARISON: CT 2/15/2024. Radiograph 2/2/2024.    TECHNIQUE: Upright PA and lateral views of the chest    FINDINGS:   Devices, lines, tubes: Stable position of thoracic aortic endograft.  Median sternotomy wires are intact. Surgical clips projecting over the  right axilla.    The trachea is midline. The cardiomediastinal silhouette is within  normal limits. The pulmonary vasculature is distinct.  No appreciable  pneumothorax, pleural effusion, or focal consolidative opacity. No  acute osseous abnormality.        Impression    IMPRESSION:   1. Stable position of thoracic aortic endograft.  2. No acute air space opacity.    I have personally reviewed the examination and initial interpretation  and I agree with the findings.    EDILMA VALENZUELA,          SYSTEM ID:  [id number]   CTA  Chest Abdomen Pelvis w Contrast     Status: None (Preliminary result)    Impression    RESIDENT PRELIMINARY INTERPRETATION  Impression:    1. Patent ascending aortic graft. No evidence leak or complication.  2. Patent descending thoracic aortic endograft in the true lumen from  the left subclavian artery origin to approximately T6. No evidence of  leak or complication. Dissection extends through the left subclavian  vein, terminus now well-visualized. Dissection extends to the level of  the celiac trunk.  3. No acute findings in the chest, abdomen, or pelvis to expand the  patient's symptoms.   INR     Status: Abnormal   Result Value Ref Range    INR 1.26 (H) 0.85 - 1.15   Partial thromboplastin time     Status: Normal   Result Value Ref Range    aPTT 36 22 - 38 Seconds   Comprehensive metabolic panel     Status: Abnormal   Result Value Ref Range    Sodium 138 135 - 145 mmol/L    Potassium 4.6 3.4 - 5.3 mmol/L    Carbon Dioxide (CO2) 25 22 - 29 mmol/L    Anion Gap 10 7 - 15 mmol/L    Urea Nitrogen 22.4 (H) 6.0 - 20.0 mg/dL    Creatinine 1.02 0.67 - 1.17 mg/dL    GFR Estimate >90 >60 mL/min/1.73m2    Calcium 9.5 8.6 - 10.0 mg/dL    Chloride 103 98 - 107 mmol/L    Glucose 103 (H) 70 - 99 mg/dL    Alkaline Phosphatase 107 40 - 150 U/L    AST 21 0 - 45 U/L    ALT 32 0 - 70 U/L    Protein Total 7.8 6.4 - 8.3 g/dL    Albumin 3.9 3.5 - 5.2 g/dL    Bilirubin Total 0.5 <=1.2 mg/dL   Lactic acid whole blood     Status: Normal   Result Value Ref Range    Lactic Acid 0.9 0.7 - 2.0 mmol/L   Troponin T, High Sensitivity     Status: Normal   Result Value Ref Range    Troponin T, High Sensitivity 15 <=22 ng/L   Magnesium     Status: Normal   Result Value Ref Range    Magnesium 1.9 1.7 - 2.3 mg/dL   CBC with platelets and differential     Status: Abnormal   Result Value Ref Range    WBC Count 10.8 4.0 - 11.0 10e3/uL    RBC Count 5.29 4.40 - 5.90 10e6/uL    Hemoglobin 10.5 (L) 13.3 - 17.7 g/dL    Hematocrit 34.2 (L) 40.0 - 53.0 %     MCV 65 (L) 78 - 100 fL    MCH 19.8 (L) 26.5 - 33.0 pg    MCHC 30.7 (L) 31.5 - 36.5 g/dL    RDW 20.7 (H) 10.0 - 15.0 %    Platelet Count 361 150 - 450 10e3/uL    % Neutrophils 77 %    % Lymphocytes 12 %    % Monocytes 7 %    % Eosinophils 2 %    % Basophils 1 %    % Immature Granulocytes 1 %    NRBCs per 100 WBC 0 <1 /100    Absolute Neutrophils 8.3 1.6 - 8.3 10e3/uL    Absolute Lymphocytes 1.3 0.8 - 5.3 10e3/uL    Absolute Monocytes 0.8 0.0 - 1.3 10e3/uL    Absolute Eosinophils 0.2 0.0 - 0.7 10e3/uL    Absolute Basophils 0.1 0.0 - 0.2 10e3/uL    Absolute Immature Granulocytes 0.1 <=0.4 10e3/uL    Absolute NRBCs 0.0 10e3/uL   EKG 12-lead, tracing only     Status: None   Result Value Ref Range    Systolic Blood Pressure  mmHg    Diastolic Blood Pressure  mmHg    Ventricular Rate 95 BPM    Atrial Rate 95 BPM    NC Interval 172 ms    QRS Duration 92 ms     ms    QTc 439 ms    P Axis 64 degrees    R AXIS 22 degrees    T Axis -67 degrees    Interpretation ECG       Sinus rhythm with frequent Premature ventricular complexes  Possible Left atrial enlargement  T wave abnormality, consider inferior ischemia  Abnormal ECG    Unconfirmed report - interpretation of this ECG is computer generated - see medical record for final interpretation  Confirmed by - EMERGENCY ROOM, PHYSICIAN ([id number]),  SEVTA TRAVIS ([id number]) on 2/27/2024 7:12:26 PM     Adult Type and Screen     Status: None   Result Value Ref Range    ABO/RH(D) O POS     Antibody Screen Negative Negative    SPECIMEN EXPIRATION DATE [id number]    CBC with platelets differential     Status: Abnormal    Narrative    The following orders were created for panel order CBC with platelets differential.  Procedure                               Abnormality         Status                     ---------                               -----------         ------                     CBC with platelets and d...[[id number]]  Abnormal            Final result                  Please view results for these tests on the individual orders.   ABO/Rh type and screen     Status: None    Narrative    The following orders were created for panel order ABO/Rh type and screen.  Procedure                               Abnormality         Status                     ---------                               -----------         ------                     Adult Type and Screen[[id number]]                            Final result                 Please view results for these tests on the individual orders.     Medications   lactated ringers BOLUS 1,000 mL (0 mLs Intravenous Stopped 2/27/24 1430)   iopamidol (ISOVUE-370) solution 90 mL (90 mLs Intravenous $Given 2/27/24 1516)   sodium chloride (PF) 0.9% PF flush 90 mL (90 mLs Intravenous $Given 2/27/24 1516)     Labs Ordered and Resulted from Time of ED Arrival to Time of ED Departure   INR - Abnormal       Result Value    INR 1.26 (*)    COMPREHENSIVE METABOLIC PANEL - Abnormal    Sodium 138      Potassium 4.6      Carbon Dioxide (CO2) 25      Anion Gap 10      Urea Nitrogen 22.4 (*)     Creatinine 1.02      GFR Estimate >90      Calcium 9.5      Chloride 103      Glucose 103 (*)     Alkaline Phosphatase 107      AST 21      ALT 32      Protein Total 7.8      Albumin 3.9      Bilirubin Total 0.5     CBC WITH PLATELETS AND DIFFERENTIAL - Abnormal    WBC Count 10.8      RBC Count 5.29      Hemoglobin 10.5 (*)     Hematocrit 34.2 (*)     MCV 65 (*)     MCH 19.8 (*)     MCHC 30.7 (*)     RDW 20.7 (*)     Platelet Count 361      % Neutrophils 77      % Lymphocytes 12      % Monocytes 7      % Eosinophils 2      % Basophils 1      % Immature Granulocytes 1      NRBCs per 100 WBC 0      Absolute Neutrophils 8.3      Absolute Lymphocytes 1.3      Absolute Monocytes 0.8      Absolute Eosinophils 0.2      Absolute Basophils 0.1      Absolute Immature Granulocytes 0.1      Absolute NRBCs 0.0     PARTIAL THROMBOPLASTIN TIME - Normal    aPTT 36     LACTIC ACID WHOLE  BLOOD - Normal    Lactic Acid 0.9     TROPONIN T, HIGH SENSITIVITY - Normal    Troponin T, High Sensitivity 15     MAGNESIUM - Normal    Magnesium 1.9     TYPE AND SCREEN, ADULT    ABO/RH(D) O POS      Antibody Screen Negative      SPECIMEN EXPIRATION DATE [id number]     ABO/RH TYPE AND SCREEN     CTA Chest Abdomen Pelvis w Contrast   Preliminary Result   RESIDENT PRELIMINARY INTERPRETATION   Impression:      1. Patent ascending aortic graft. No evidence leak or complication.   2. Patent descending thoracic aortic endograft in the true lumen from   the left subclavian artery origin to approximately T6. No evidence of   leak or complication. Dissection extends through the left subclavian   vein, terminus now well-visualized. Dissection extends to the level of   the celiac trunk.   3. No acute findings in the chest, abdomen, or pelvis to expand the   patient's symptoms.      XR Chest 2 Views   Final Result   IMPRESSION:    1. Stable position of thoracic aortic endograft.   2. No acute air space opacity.      I have personally reviewed the examination and initial interpretation   and I agree with the findings.      EDILMA VALENZUELA DO            SYSTEM ID:  [id number]      POC US ECHO LIMITED   Final Result   Limited Bedside Cardiac Ultrasound, performed and interpreted by me.    Indication: Near syncope.   Parasternal long axis, parasternal short axis, apical 4 chamber, and subcostal views were acquired.    Image quality was satisfactory.      Findings:     Abnormal -grossly preserved left ventricular ejection fraction.  No visualized RV dilation.  No visualized pericardial effusion.  IVC is small and collapses greater than 50% with respiration.      IMPRESSION: Abnormal -grossly preserved left ventricular ejection fraction.  No visualized RV dilation.  No visualized pericardial effusion.  IVC is small and collapses greater than 50% with respiration.                   Critical care was not performed.     Medical  Decision Making  The patient's presentation was of high complexity (an acute health issue posing potential threat to life or bodily function).    The patient's evaluation involved:  review of external note(s) from 1 sources (PCP clinic)  ordering and/or review of 3+ test(s) in this encounter (see separate area of note for details)  discussion of management or test interpretation with another health professional (Radiology)    The patient's management necessitated moderate risk (prescription drug management including medications given in the ED).    Assessment & Plan    Valdo yLons is a 48 year old male with complex past medical history including type B aortic dissection s/p repair who presents with orthostatic hypotension from PCP clinic.  On evaluation the patient has a normal blood pressure, he is orthostatic on arrival with tachycardic response and a drop in blood pressure.  Bedside ultrasound suggest he is intravascularly volume depleted.  His complete blood count also suggest that he has some volume concentration.  For the duration of symptoms patient with negative troponin atypical ACS symptoms EKG without PABLO low suspicion for ACS.  No obvious infectious source.  No active bleeding.  Metabolic panel reassuring without MIKA.    CTA obtained. Preliminary read with redemonstration of known aortic pathology. I spoke with the on-call radiology resident as final CTA reads will not be in until the AM - they reported this appears largely stable.    Review of clinic note reports concern for anemia this does not appear to be the case today even with correction for presumed volume concentration.  He does not appear to need a blood transfusion.  Patient received IV fluids and his symptoms improved.  He still had a slight degree of BP drop on reassessment without clinical orthostasis or significant tachycardia.      Patient states he is feeling much better and would like to try to continue management at home with oral  fluids.  I think this is reasonable.  Given his hypovolemic status and improvement in symptoms with volume I do not think he needs medications adjusted from the emergency department but he will need to continue to follow-up closely with his outpatient cardiothoracic and primary care teams regarding these especially given his recent dissection strict blood pressure control as necessary.  Patient is aware that if his symptoms return or he has any new or concerning symptoms you should return to the emergency department immediately.    I have reviewed the nursing notes. I have reviewed the findings, diagnosis, plan and need for follow up with the patient.    Discharge Medication List as of 2/27/2024  5:42 PM          Final diagnoses:   Orthostatic hypotension     Agus Bowie Jr., MD   [facility] EMERGENCY DEPARTMENT  2/27/2024     Agus Bowie MD  02/27/24 1167       Agus Bowie MD  02/29/24 [id number]

## 2024-02-27 NOTE — PROGRESS NOTES
CV ICU PROGRESS NOTE        CO-MORBIDITIES:   Aortic Dissection  Hypertensive urgency     ASSESSMENT: Valdo Lyons is a 48 year old male with PMH of HTN, childhood seizures. [place] [facility] [place] with acute type B aortic dissection from the origin of the left subclavian to the diaphragm just superior to the celiac axis.Yesterday he had worsening back pain and increased variation in BP from arterial lines in his right and left hands, so repeat CT scan was done which showed Type A dissection with a thrombosed retrograde false lumen, so he was transferred to [facility]. Repeat CTA 1/17 for vascular to decide on further management (surgery vs medical management). CT or vascular to operate early next week (~1/23). BP management until then.     TODAY'S PROGRESS:   - close monitor for pain - can be an indicator of worsening dissection  Milk of Mag today. No BM yet.    PLAN:  Neuro/ pain/ sedation:  Psychiatry saw patient, see note for recs. Nothing actionable.   - Monitor neurological status. Notify the MD for any acute changes in exam.  - precedex gtt started d/t agitation  #Acute pain - resolved  - PRN: acetaminophen    # Recent fall, atraumatic  # Hx Childhood seizure disorder  - Seizure hx noted, not on any antiepileptics at time of admission  - Presented to [facility] s/p fall, CT head reported as negative for intracranial abnormalities 1/12)    # Hx Mariajuana use  - Typically smokes mariajuana every night  - Denies tobacco, alcohol, or other illicit drugs     Pulmonary care:   # Acute hypoxic insufficiency  Intermittently requiring 4-6L NC to achieve SPO2% goals.   - Goal SpO2 > 92%  - Encourage IS q15-30 minutes when awake (though patient has been refusing).   - Encourage up out of bed activity. Get into the chair.      Cardiovascular:    # Type B dissection, c/f retrograde tear  # Hx uncontrolled HTN  Patient was not taking any PTA medications for his high blood pressure. Bilateral arterial lines placed at  [facility]. Will treat based on right arterial line. Ordered additional testing, as his BP medication requirements are exceeding expectations, rule out other/organic causes of elevated BP. TSH normal. 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 Surgery planned for ~1/23.   - Goal MAP >65 but less than 80, SBP <120.  - nicardipine, esmolol, nipride gtt to achieve goal hemodynamics (wean esmolol first then nicardipine if able).   - Carvedilol 25 mg BID and amlodipine 10 mg daily, hydralazine 100mg q6h, Losartan 100 mg daily   - PRN labetalol, hydralazine    GI care / Nutrition:   # At risk for protein malnutrition  - Regular diet after renal US.   - PPI not indicated  - Bowel regimen: MiraLAX TID, senna 2 tablet BID - still no BM, will trial milk of magnesia today    Renal / Fluids / Electrolytes:   # Chronic kidney disease vs MIKA  # volume status  # electrolyte derangement, resolved   Unknown baseline, was 1.6 on admission to [facility], last value in 2022 was 1.25. Cr continues to improve. Decent response to lasix x1. Renal ultrasound showed expected changes from the dissection (increased velocities) and a right pleural effusion (NTD).   - Daily BMP  - Strict I/O, daily weights - patient intermittently incontinent   - Avoid/limit nephrotoxins as able  - Replete lytes PRN per protocol  - ua showed mucous in urine   - Lasix 60mg x2 today, goal net negative    Endocrine:    # Stress hyperglycemia  Preop A1c pending.  - Gluc checks per protocol  - Goal BG <180 for optimal healing     ID / Antibiotics:  # Stress induced leukocytosis  - No s/sx infection  - Monitor fever curve, WBC, and inflammatory markers as appropriate     Heme:     # Acute blood loss anemia, considered but ruled out   # Acute blood loss thrombocytopenia, considered and ruled out   No s/sx active bleeding  - Continue to monitor  - CBC on arrival  - Hgb goal > 7.0     MSK / Skin:  # no acute concerns  - PT/OT/CR -  ordered      Prophylaxis:     - Mechanical ppx for DVT  - Chemical DVT ppx: SQH  - PPI: n/a  - Bowel regimen: MiraLAX, senna - scheduled     Lines / Tubes / Drains:  - Arterial Line (Rt. Radial)  - PIV x3  - PICC      Disposition:  - CVICU    Patient seen, findings and plan discussed with CV ICU staff, Dr. Meyer.     Luis Brown MD on 1/21/2024 at 8:22 AM   Anesthesiology Resident PGY3      ====================================    SUBJECTIVE:   Alert to name, place, and time. Aware of why he is in the hospital. Intermittently confused. Frustrated.     OBJECTIVE:   1. VITAL SIGNS:      No data recorded      2. INTAKE/ OUTPUT:   No intake/output data recorded.    3. PHYSICAL EXAMINATION:   General: laying in bed, conversant  Neuro: A&O, grossly intact  Resp: RA, NAD  CV: RR  Extremities: warm and well perfused    4. INVESTIGATIONS:   Arterial Blood Gases   No lab results found in last 7 days.    Complete Blood Count   No lab results found in last 7 days.    Basic Metabolic Panel  No lab results found in last 7 days.    Liver Function Tests  No lab results found in last 7 days.    Pancreatic Enzymes  No lab results found in last 7 days.  Coagulation Profile  No lab results found in last 7 days.        5. RADIOLOGY:   No results found for this or any previous visit (from the past 24 hour(s)).        =========================================

## 2024-02-27 NOTE — PROGRESS NOTES
"Medication Therapy Management (MTM) Encounter    ASSESSMENT:                            Medication Adherence/Access: No issues identified    Orthostatic hypotension:   Pt was evaluated by physician team and directed to the ED for further evaluation .      PLAN:                            Follow up after ER evaluation     Side effect of rosuvastatin has been recorded and added to allergies.     Follow-up: 1-2 weeks    SUBJECTIVE/OBJECTIVE:                          Valdo Lyons is a 48 year old male coming in for an initial visit. He was referred to me from Dr. Pena.      Reason for visit: ER follow up.    Allergies/ADRs: Reviewed in chart  Past Medical History: Reviewed in chart  Tobacco: He reports that he has never smoked. He has never used smokeless tobacco.  Alcohol: not currently using    Medication Adherence/Access: no issues reported    Lipids:   Stopped rosuvastatin due to muscle pain, legs. Also reports this as general body pain. Was on this medication for one month before stopping. STEFAN resolved in 2 days.        Pain:   Robaxin and oxycodone   Ran out of robaxin prior to today  Unclear when last oxycodone dose was.        Cardiovascular :   Carvedilol 25 mg twice daily.  Amlodipine 5 mg daily   Did not take his dose this morning.  Concerned about dizziness.  Feeling unable to do activities.        Losartan was stopped on 2/15/24.    Anticoag:   ASA 81mg daily   Apixiban 5 mg twice daily     Did not take the DOAC this morning due to   Denies any side effects related to anticoagulation.      Cost is an issue.  Was able to receive one month by use of a voucher, but worried that insurance may not cover next month.        Today's Vitals:   BP Readings from Last 1 Encounters:   02/27/24 134/87     Pulse Readings from Last 1 Encounters:   02/27/24 97     Wt Readings from Last 1 Encounters:   02/27/24 164 lb (74.4 kg)     Ht Readings from Last 1 Encounters:   02/27/24 5' 8\" (1.727 m)     Estimated body mass " "index is 24.94 kg/m  as calculated from the following:    Height as of an earlier encounter on 24: 5' 8\" (1.727 m).    Weight as of an earlier encounter on 24: 164 lb (74.4 kg).    Temp Readings from Last 1 Encounters:   24 97.7  F (36.5  C) (Oral)       Laying down: 158/95  HR 89  Sittin/110  HR 92  Standin/98        ----------------  Post Discharge Medication Reconciliation Status: discharge medications reconciled and changed, per note/orders.    I spent 30 minutes with this patient today. Dr. Pena (resident physician) was provided the recommendations above  in clinic today and Dr. Holm is the authorizing prescriber for this visit through the pharmacist collaborative practice agreement.. A copy of the visit note was provided to the patient's provider(s).    A summary of these recommendations was given to the patient (see AVS from today's appointment with Dr. Forman).    Byron Stoner, Pharm.D.         Medication Therapy Recommendations  Dyslipidemia    Current Medication: rosuvastatin (CRESTOR) 20 MG tablet (Discontinued)   Rationale: Undesirable effect - Adverse medication event - Safety   Recommendation: Discontinue Medication   Status: Accepted - no CPA Needed            "

## 2024-02-27 NOTE — PROGRESS NOTES
Preceptor Attestation:   Patient seen, evaluated and discussed with the resident. I have verified the content of the note, which accurately reflects my assessment of the patient and the plan of care.   Supervising Physician:  Fausto Holm MD

## 2024-02-27 NOTE — PATIENT INSTRUCTIONS
Patient Education   Here is the plan from today's visit    1. Dissection of aorta, unspecified portion of aorta (H)  - methocarbamol (ROBAXIN) 750 MG tablet; Take 1 tablet (750 mg) by mouth every 6 hours as needed for muscle spasms  Dispense: 28 tablet; Refill: 1    2. Orthostatic hypotension      Please call or return to clinic if your symptoms don't go away.    Follow up plan  Return in about 2 weeks (around 3/12/2024).    Thank you for coming to [facility] today.  Lab Testing:  **If you had lab testing today and your results are reassuring or normal they will be mailed to you or sent through [employer] within 7 days.   **If the lab tests need quick action we will call you with the results.  **If you are having labs done on a different day, please call [phone] to schedule at [facility] or [phone] for other [facility] locations. Labs do not offer walk-in appointments.  The phone number we will call with results is # [phone] (home) . If this is not the best number please call our clinic and change the number.  Medication Refills:  If you need any refills please call your pharmacy and they will contact us.   If you need to  your refill at a new pharmacy, please contact the new pharmacy directly. The new pharmacy will help you get your medications transferred faster.   Scheduling:  If you have any concerns about today's visit or wish to schedule another appointment please call our office during normal business hours [phone] (8-5:00 M-F). If you can no longer make a scheduled visit, please cancel via [employer] or call us to cancel.   If a referral was made to an [facility] specialty provider and you do not get a call from central scheduling, please refer to directions on your visit summary or call our office during normal business hours for assistance.   If a Mammogram was ordered for you at the Breast Center call [phone] to schedule or change your  appointment.  If you had an XRay/CT/Ultrasound/MRI ordered the number is [phone] to schedule or change your radiology appointment.   [facility] has limited ultrasound appointments available on Wednesdays, if you would like your ultrasound at [facility], please call [phone] to schedule.   Medical Concerns:  If you have urgent medical concerns please call [phone] at any time of the day.    Susanna Pena, DO

## 2024-02-27 NOTE — DISCHARGE INSTRUCTIONS
Thank you for coming to the [facility] ED.  You are sent here for dizziness worse with standing.  Your blood work suggest that you have decreased blood volume and this matches the symptoms of orthostatic hypotension you are demonstrating.  Your blood counts however are otherwise reassuring as is a CT scan.  Your symptoms could be caused by dehydration.  Please keep yourself aggressively hydrated at home.  Please keep your outpatient follow-up appointments for the upcoming week.    If you develop new or worsening lightheadedness, lose consciousness, develop blood in the stool or any other concerning symptoms please do not hesitate to immediately return to the ED.

## 2024-02-28 ENCOUNTER — TELEPHONE (OUTPATIENT)
Dept: FAMILY MEDICINE | Facility: CLINIC | Age: 49
End: 2024-02-28
Payer: COMMERCIAL

## 2024-02-28 DIAGNOSIS — R07.89 CHEST WALL PAIN: ICD-10-CM

## 2024-02-28 LAB — RADIOLOGIST FLAGS: ABNORMAL

## 2024-02-28 RX ORDER — METHOCARBAMOL 750 MG/1
750 TABLET, FILM COATED ORAL EVERY 6 HOURS PRN
Qty: 28 TABLET | Refills: 1 | Status: SHIPPED | OUTPATIENT
Start: 2024-02-28 | End: 2024-04-08

## 2024-02-28 NOTE — ED NOTES
Pt provided with AVS.  Pt vitally stable and ambulatory on discharge.    Pt instructed to follow up? Yes, with PCP    Questions answered.

## 2024-02-28 NOTE — TELEPHONE ENCOUNTER
RN called pt to let him know script was sent over. Received confirmation that fax went through. Pt verbalized understanding.    Isela Bolaños RN

## 2024-02-28 NOTE — CONFIDENTIAL NOTE
[facility] phone call message- patient requesting a refill:    Full Medication Name: methocarbamol (ROBAXIN) 750 MG tablet     Dose: Take 1 tablet (750 mg) by mouth every 6 hours as needed for muscle spasms - Oral     Pharmacy confirmed as   [id number] - [place], [place] - [address]  [address]  [facility] [address]  Phone: [phone] Fax: [phone]  : Yes    Additional Comments: Patient states [facility] pharmacy is requesting above rx to be faxed to their pharmacy F:[phone] since their electronic system is down.     OK to leave a message on voice mail? Yes    Primary language: English      needed? No    Call taken on February 28, 2024 at 1:33 PM by Melinda Fuentes

## 2024-02-28 NOTE — TELEPHONE ENCOUNTER
RN called [facility] and they script faxed. RN talked to preceptor. Queued up med and sending to preceptor.    Isela Bolaños RN

## 2024-02-28 NOTE — ED PROVIDER NOTES
I was called by radiology today to update a provider on the final read of his CT which was obtained yesterday.    Prior to the patient being discharged, preliminary read demonstrated no evidence of any changes of his previously noted aortic and thoracic aneurysm.  However final CT read today did demonstrate some changes which were critical and needed to be directly given to physician.  CT impression below:     CTA CHEST, ABDOMEN, AND PELVIS WITH 3D AND MULTIPLANAR RECONSTRUCTIONS   2/27/2024 3:41 PM                                                             IMPRESSION:  1. Expanding thoracic aorta. Proximal descending aorta, including true  and false lumens, measures 41 mm in diameter, previously 39 mm on  2/15/2024 and 36 mm on 1/30/2024.     2. Patent ascending graft. No leak. Patent endograft, unchanged in  position.     3. Dissection unchanged in configuration.     4. Persistent non occlusive deep venous thrombus or chronic venous  changes in the left internal jugular vein.       After being informed of these results, I spoke with vascular surgery (attending/staff Dr. Deric Smith).  The vascular surgery attending reviewed the images, and we discussed the case from the limited available information including the note at that time as well as the images.    After reviewing the images, he stated that as long as the patient was not having any significantly poorly controlled hypertension and chest pain, it would be reasonable for him to wait and follow-up with vascular surgery outpatient as the amount of change is not concerning for an acute emergent change at this time.  They will plan to see him sooner in clinic.    I did call the patient, and spoke with his mother as well as him.  He stated that overall he was feeling better although he was having some incisional pain across his chest, he denies any deep chest pain.  Blood pressure from this morning was 120s over 80s, and repeated his blood pressure while on the  phone with me and it was 130s over 80s.  Patient was encouraged to return to the emergency department with any worsening severe symptoms, and he is aware of these, will return at any point in time due to concerns, will otherwise plan to follow-up with vascular surgery as previously planned.    MD Luciano Becerra, MD [place]  02/28/24 [id number]

## 2024-03-06 ENCOUNTER — OFFICE VISIT (OUTPATIENT)
Dept: VASCULAR SURGERY | Facility: CLINIC | Age: 49
End: 2024-03-06
Attending: NURSE PRACTITIONER
Payer: MEDICAID

## 2024-03-06 VITALS — OXYGEN SATURATION: 99 % | SYSTOLIC BLOOD PRESSURE: 101 MMHG | DIASTOLIC BLOOD PRESSURE: 65 MMHG | HEART RATE: 88 BPM

## 2024-03-06 DIAGNOSIS — Z95.828 S/P ASCENDING AORTIC REPLACEMENT: ICD-10-CM

## 2024-03-06 DIAGNOSIS — I71.019 INTRAMURAL HEMATOMA OF THORACIC AORTA (H): ICD-10-CM

## 2024-03-06 DIAGNOSIS — Z95.828 STATUS POST INSERTION OF ENDOVASCULAR THORACIC AORTIC STENT GRAFT: ICD-10-CM

## 2024-03-06 DIAGNOSIS — I71.03 THORACOABDOMINAL AORTIC DISSECTION (H): Primary | ICD-10-CM

## 2024-03-06 PROCEDURE — 99024 POSTOP FOLLOW-UP VISIT: CPT | Mod: GC | Performed by: SURGERY

## 2024-03-06 NOTE — PATIENT INSTRUCTIONS
Thank you so much for choosing us for your care. It was a pleasure to see you at the vascular clinic today.     Follow-up recommendations: We will see you back in late April. Please do not hesitate to reach out to us in the meantime with any concerns. Our schedulers will get in touch with you to set up your follow-up visit.    Additional testing/imaging ordered today: Repeat CTA prior to next visit.      Our scheduling team will get in touch with you to set up any follow-up testing/imaging and/or appointments. Please be aware that any testing/imaging recommended today will need to completed prior to your next visit with the provider. If testing/imaging is not completed prior to your next visit, your visit may be rescheduled.     If you have any questions, please contact our clinic directly at [phone] and ask for the nurse. We also encourage the use of [employer] to communicate with your healthcare provider.    If you have an urgent need after business hours (8:00 am to 4:30 pm) please call [phone], option 4, and ask for the vascular attending on call. For non-urgent after hours needs, please call the vascular clinic at [phone]. For scheduling needs, please call our clinic directly at [phone].    =====================================================================    [facility] is recognized by the [facility] as a comprehensive stroke center. As part of our commitment to better patient outcomes and excellent stroke education, we attach the below stroke education materials to ALL of the after visit summaries in our vascular clinic.        Learning About BE FAST: Stroke Warning Signs  BE FAST is a simple way to remember the main symptoms of stroke. These symptoms happen suddenly. So learning what to look for helps you know when to call for medical help. BE FAST stands for:    B - Balance.  Loss of balance or trouble walking.     E - Eyes.  Trouble seeing out of one or  both eyes.     F - Face.  Weakness or drooping on one side of the face.     A - Arm.  Weakness or numbness in an arm or leg.     S - Speech.  Trouble speaking.     T - Time to call 911.  Also call 911 if you have other stroke symptoms. They include:  Sudden confusion.  Sudden trouble understanding simple statements.  Fainting.  A seizure.  A sudden, severe headache.     A stroke happens when a blood vessel in the brain bursts or is blocked by a blood clot. The blood supply to part of the brain--and the oxygen the blood carries--is reduced. This damages the brain.   If you have a stroke, quick treatment may save your life. And it may reduce the damage in your brain so that you have fewer problems after the stroke.   Current as of: December 18, 2022               Content Version: 13.8    2285-3953 [employer].   Care instructions adapted under license by your healthcare professional. If you have questions about a medical condition or this instruction, always ask your healthcare professional. [employer] disclaims any warranty or liability for your use of this information.    Learning About How to Prevent a Stroke  What is a stroke?  A stroke is damage to the brain that occurs when a blood vessel in the brain bursts or is blocked by a blood clot. Without blood and the oxygen it carries, part of the brain starts to die. The part of the body controlled by the damaged area of the brain can't work properly.  Brain damage can start within minutes of a stroke. But quick treatment can help limit the damage and increase the chance of a full recovery.  What puts you at risk for stroke?  A risk factor is anything that makes you more likely to have a particular health problem.  Risk factors for stroke that you can manage or change include:  Health problems like atrial fibrillation, diabetes, high blood pressure, high cholesterol, hardening of the arteries (atherosclerosis), and sickle cell  disease.  Smoking.  Drinking more than 2 alcoholic drinks a day for men and 1 drink a day for women.  Being overweight.  Not eating healthy foods.  Not getting enough physical activity.  Risk factors you can't change include:  Having a previous stroke.  Family history of stroke.  Being older.  Being , [place] Native, , or South  American.  Being female.  Having certain problems during pregnancy, such as preeclampsia.  Being past menopause.  Your doctor can help you know your risk. Then you and your doctor can talk about whether to take steps to lower it.  How can you help prevent a stroke?  Here are some things you can do to help prevent a stroke.  Manage health problems that raise your risk. These include atrial fibrillation, diabetes, high blood pressure, and high cholesterol.  Have a heart-healthy lifestyle.  Don't smoke. If you need help quitting, talk to your doctor about stop-smoking programs and medicines. These can increase your chances of quitting for good.  Limit alcohol to 2 drinks a day for men and 1 drink a day for women.  Stay at a healthy weight. Lose weight if you need to.  Be active. Get at least 30 minutes of exercise on most days of the week. Walking is a good choice. You also may want to do other activities, such as running, swimming, cycling, or playing tennis or team sports.  Eat heart-healthy foods. These include vegetables, fruits, nuts, beans, lean meat, fish, and whole grains. Limit sodium and sugar.  If you think you may have a problem with alcohol or drug use, talk to your doctor.  If you use hormone therapy for menopause or hormonal birth control, talk with your doctor. Ask if these are right for you. They may raise the risk of stroke in some people.  Decide with your doctor whether you will also take medicines to help lower your risk. For example, you and your doctor may decide you will take a medicine that prevents blood clots.  What are the symptoms  of a stroke?  Symptoms of a stroke happen quickly. A stroke may cause:  Sudden numbness, tingling, weakness, or loss of movement in your face, arm, or leg, especially on only one side of your body.  Sudden vision changes.  Sudden trouble speaking.  Sudden confusion or trouble understanding simple statements.  Sudden problems with walking or balance.  A sudden, severe headache that is different from past headaches.  Fainting.  A seizure.  It's important to call for medical help if you have stroke symptoms. Quick treatment may save your life. And it may reduce the damage in your brain so that you have fewer problems after the stroke.  Follow-up care is a key part of your treatment and safety. Be sure to make and go to all appointments, and call your doctor if you are having problems. It's also a good idea to know your test results and keep a list of the medicines you take.    Current as of: December 18, 2022               Content Version: 13.8    1859-3779 [employer].   Care instructions adapted under license by your healthcare professional. If you have questions about a medical condition or this instruction, always ask your healthcare professional. [employer] disclaims any warranty or liability for your use of this information.

## 2024-03-06 NOTE — PROGRESS NOTES
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    Stable hemiarch and TEVAR stent graft, with stable residual type B   2.   Recent TIA with bilateral multifocal acute/subacute strokes on MRI  3.   Left internal jugular nonocculsive thrombus     -We reviewed the results of his CT scan from 2/27 and explained that the proximal decent thoracic aorta has grown a few millimeters since his initial repair which is to be expected in the first month and remains stable when comparing scans on 2/15/2024 and 2/27/2024.  -Will continue anti and pulse therapy with a goal systolic less than 120 and goal heart rate less than 70.  Losartan recently stopped, remains on Coreg and amlodipine  -Suspect the dizziness is present with today is somewhat related to dehydration and lack of oral intake today, he will continue monitoring blood pressure at home and will follow-up with his primary care physician for continued blood pressure monitoring  -Eliquis for left IJ DVT  -Continue aspirin 81 mg daily  -Intolerant of rosuvastatin, encouraged him to discuss alternative to statin therapy with his primary care physician  -He will follow-up in vascular clinic in 2 months for repeat CTA chest abdomen pelvis, this will be completed at the 3-month marcio after his aortic repair    Patient was seen and discussed with staff, Dr. Francisco Rawls MD           Interval History:   Mr. Lyons is a 48-year-old male with a history of acute type a dissection (zone 0-5) with retrograde intramural hematoma and refractory hypertension.  He  underwent stent and hemiarch repair with antegrade placement of TEVAR stent graft (37 by 100 mm [facility] cTAG) on 1/23/2024.  He was readmitted several weeks ago with a TIA involving transient right face, arm, and leg paralysis and paresthesia.  MRI showed bilateral, multifocal subacute infarcts and microhemorrhages in both frontal and posterior distribution.  His neurodeficits last approximate 45 minutes, self resolved, and he had no neurologic deficits since.  He is found to have a nonocclusive thrombus in in the left IJ and started on Eliquis.  He has struggled with lightheadedness, dizziness, and orthostatic hypotension.  His primary care physician stopped his losartan several weeks ago which is largely led to resolution of his dizziness.  However today he does note significant dizziness and walking into clinic.  He had sleep disturbances in the last several nights, has not had anything to eat today, and also notes some left ear pain.  He continues to have no chest or back pain and reports his blood pressure has been well-controlled at home measuring in the 120s systolic.          Medications:     Current Outpatient Medications:     acetaminophen (TYLENOL) 325 MG tablet, Take 3 tablets (975 mg) by mouth every 6 hours as needed for mild pain, Disp: 100 tablet, Rfl: 0    amLODIPine (NORVASC) 5 MG tablet, Take 1 tablet (5 mg) by mouth 2 times daily, Disp: 60 tablet, Rfl: 1    apixaban ANTICOAGULANT (ELIQUIS) 5 MG tablet, Take 1 tablet (5 mg) by mouth 2 times daily, Disp: 30 tablet, Rfl: 3    aspirin (ASA) 81 MG chewable tablet, 1 tablet (81 mg) by Oral or NG Tube route daily, Disp: 90 tablet, Rfl: 0    carvedilol (COREG) 25 MG tablet, Take 1 tablet (25 mg) by mouth 2 times daily, Disp: 60 tablet, Rfl: 1    methocarbamol (ROBAXIN) 750 MG tablet, Take 1 tablet (750 mg) by mouth every 6 hours as needed for muscle spasms, Disp: 28 tablet, Rfl: 1    oxyCODONE (ROXICODONE) 5 MG tablet, Take 1 tablet (5 mg) by mouth every 8  hours as needed for moderate to severe pain (Patient not taking: Reported on 2/27/2024), Disp: 10 tablet, Rfl: 0    pantoprazole (PROTONIX) 40 MG EC tablet, Take 1 tablet (40 mg) by mouth every morning (before breakfast) (Patient not taking: Reported on 2/27/2024), Disp: 14 tablet, Rfl: 0         Physical Exam:   /65 (BP Location: Left arm, Patient Position: Sitting, Cuff Size: Adult Regular)   Pulse 88   SpO2 99%   Wt Readings from Last 1 Encounters:   02/27/24 74.4 kg (164 lb)     There is no height or weight on file to calculate BMI.    EXAM:  Healthy adult male sitting comfortable in exam chair  Nonlabored breathing on room air  Abdomen soft, nontender, nondistended  Well-healed midline sternotomy incision  Bilateral palpable femoral pulses  Normal strength and sensation in all 4 extremities          Data:   Brain MRI 2/15/2024 reviewed shows subacute ischemic infarct in the right temporal occipital region, punctate acute infarct in the superior left cerebellum.  There is also a small subacute infarct in the left cerebellum and left parietal region.  Small vessel ischemic disease with multifocal chronic lacunar infarcts.    CTA chest abdomen pelvis on 2/27/2024 reviewed shows stable descending thoracic aortic diameter of 39 to 40 mm.  This was read by the radiologist is measuring 41 mm.  While there is tactically growth since the scan on 2/15/2024 and 1/30/2024, several millimeters of growth is to be expected and he remained stable as compared to the scan on 2/15/2024.  Dissection extends into the left subclavian artery to the left vertebral artery, and distally to the descending thoracic aorta at zone 5.  This is appears stable right previous is mentioned above.

## 2024-03-06 NOTE — LETTER
3/6/2024       RE: Valdo Lyons  [address]  [facility] [address]     Dear Colleague,    Thank you for referring your patient, Valdo Lyons, to the [facility] at [facility]. Please see a copy of my visit note below.    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    Stable hemiarch and TEVAR stent graft, with stable residual type B   2.   Recent TIA with bilateral multifocal acute/subacute strokes on MRI  3.   Left internal jugular nonocculsive thrombus     -We reviewed the results of his CT scan from 2/27 and explained that the proximal decent thoracic aorta has grown a few millimeters since his initial repair which is to be expected in the first month and remains stable when comparing scans on 2/15/2024 and 2/27/2024.  -Will continue anti and pulse therapy with a goal systolic less than 120 and goal heart rate less than 70.  Losartan recently stopped, remains on Coreg and amlodipine  -Suspect the dizziness is present with today is somewhat related to dehydration and lack of oral intake today, he will continue monitoring blood pressure at home and will follow-up with his primary care physician for continued blood pressure monitoring  -Eliquis for left IJ DVT  -Continue aspirin 81 mg daily  -Intolerant of rosuvastatin, encouraged him to discuss alternative to statin therapy with his primary care physician  -He will follow-up in vascular clinic in 2 months for repeat CTA chest abdomen pelvis, this will be completed at the  3-month marcio after his aortic repair    Patient was seen and discussed with staff, Dr. Francisco Rawls MD           Interval History:   Mr. Lyons is a 48-year-old male with a history of acute type a dissection (zone 0-5) with retrograde intramural hematoma and refractory hypertension.  He underwent stent and hemiarch repair with antegrade placement of TEVAR stent graft (37 by 100 mm [facility] cTAG) on 1/23/2024.  He was readmitted several weeks ago with a TIA involving transient right face, arm, and leg paralysis and paresthesia.  MRI showed bilateral, multifocal subacute infarcts and microhemorrhages in both frontal and posterior distribution.  His neurodeficits last approximate 45 minutes, self resolved, and he had no neurologic deficits since.  He is found to have a nonocclusive thrombus in in the left IJ and started on Eliquis.  He has struggled with lightheadedness, dizziness, and orthostatic hypotension.  His primary care physician stopped his losartan several weeks ago which is largely led to resolution of his dizziness.  However today he does note significant dizziness and walking into clinic.  He had sleep disturbances in the last several nights, has not had anything to eat today, and also notes some left ear pain.  He continues to have no chest or back pain and reports his blood pressure has been well-controlled at home measuring in the 120s systolic.          Medications:     Current Outpatient Medications:     acetaminophen (TYLENOL) 325 MG tablet, Take 3 tablets (975 mg) by mouth every 6 hours as needed for mild pain, Disp: 100 tablet, Rfl: 0    amLODIPine (NORVASC) 5 MG tablet, Take 1 tablet (5 mg) by mouth 2 times daily, Disp: 60 tablet, Rfl: 1    apixaban ANTICOAGULANT (ELIQUIS) 5 MG tablet, Take 1 tablet (5 mg) by mouth 2 times daily, Disp: 30 tablet, Rfl: 3    aspirin (ASA) 81 MG chewable tablet, 1 tablet (81 mg) by Oral or NG Tube route daily, Disp: 90 tablet, Rfl: 0    carvedilol  (COREG) 25 MG tablet, Take 1 tablet (25 mg) by mouth 2 times daily, Disp: 60 tablet, Rfl: 1    methocarbamol (ROBAXIN) 750 MG tablet, Take 1 tablet (750 mg) by mouth every 6 hours as needed for muscle spasms, Disp: 28 tablet, Rfl: 1    oxyCODONE (ROXICODONE) 5 MG tablet, Take 1 tablet (5 mg) by mouth every 8 hours as needed for moderate to severe pain (Patient not taking: Reported on 2/27/2024), Disp: 10 tablet, Rfl: 0    pantoprazole (PROTONIX) 40 MG EC tablet, Take 1 tablet (40 mg) by mouth every morning (before breakfast) (Patient not taking: Reported on 2/27/2024), Disp: 14 tablet, Rfl: 0         Physical Exam:   /65 (BP Location: Left arm, Patient Position: Sitting, Cuff Size: Adult Regular)   Pulse 88   SpO2 99%   Wt Readings from Last 1 Encounters:   02/27/24 74.4 kg (164 lb)     There is no height or weight on file to calculate BMI.    EXAM:  Healthy adult male sitting comfortable in exam chair  Nonlabored breathing on room air  Abdomen soft, nontender, nondistended  Well-healed midline sternotomy incision  Bilateral palpable femoral pulses  Normal strength and sensation in all 4 extremities          Data:   Brain MRI 2/15/2024 reviewed shows subacute ischemic infarct in the right temporal occipital region, punctate acute infarct in the superior left cerebellum.  There is also a small subacute infarct in the left cerebellum and left parietal region.  Small vessel ischemic disease with multifocal chronic lacunar infarcts.    CTA chest abdomen pelvis on 2/27/2024 reviewed shows stable descending thoracic aortic diameter of 39 to 40 mm.  This was read by the radiologist is measuring 41 mm.  While there is tactically growth since the scan on 2/15/2024 and 1/30/2024, several millimeters of growth is to be expected and he remained stable as compared to the scan on 2/15/2024.  Dissection extends into the left subclavian artery to the left vertebral artery, and distally to the descending thoracic aorta at  zone 5.  This is appears stable right previous is mentioned above.             Attestation signed by Artur Singh MD at 3/6/2024  7:50 PM:  Vascular & Endovascular Surgery Supervisory Return Visit Note  [facility]    Patient seen and examined with Dr. Rawls, my senior vascular surgery resident.  I corroborated the history and reperformed physical examination.  Agree with findings as documented in their note with the exception as documented below.    Valdo Lyons  Medical Record #:  [id number]  YOB: 1975  Age:  48 year old     Date of Service: 03/06/24     Primary Care Provider: Susanna Pena    Chief Complaint/Reason for Visit: 1 month follow up after Stented Ascending hemiarch replacement with antegrade TEVAR for Acute Type B [0-5] Aortic Dissection with retrograde ascending aortic intramural hematoma    HPI  In brief, Mr. Lyons is a pleasant 48 year old male seen today for follow up after stented hemiarch for acute type B dissection with retrograde ascending IMH and refractory hypertension.  HPI is well described by Dr. Rawls.    CV risk factors include Type B dissection status post stented ascending hemiarch replacement, hypertension, TIA with multifocal subacute infarcts/microhemorrhages on MRI, left internal jugular non occlusive DVT. He is not a smoker.    Relevant surgical history:  - Stented Aggressive Ascending Hemiarch replacement with antegrade TEVAR with [place] 37x100 cTAG thoracic stent graft in zone 3-4. Right common femoral artery access, IVUS, thoracic and abdominal aortography (Dr. Shaw and myself - 1/23/2024)    Physical Examination:  /65 (BP Location: Left arm, Patient Position: Sitting, Cuff Size: Adult Regular)   Pulse 88   SpO2 99%   Wt Readings from Last 1 Encounters:   02/27/24 164 lb     There is no height or weight on file to calculate BMI.    Exam:  General: No apparent distress. Answers questions appropriately    Neurologic: Focally intact, Alert and oriented x 3.   Eyes: Grossly normal.   Cardiac:  RRR  Pulmonary:  Non labored breathing with normal respiratory effort  Abdominal: Soft, non distended, non tender to palpation.  No pulsatile or expansile mass.   Musculoskeletal/Extremity: 5/5 gross lower extremity strength and gross sensation. No open wounds or abrasions.     Vascular Exam:     Femoral: Left: 2+   Right: 2+    Laboratory Findings:  None new    Imaging:  I personally reviewed the CTA from 2/27/2024 and compared to those from 2/15/2024 and 1/30/2024 which shows stable 40-41mm descending thoracic aortic dilation compared to 2/15 with expected small growth from preoperative.  Stable changes of ascending hemiarch replacement. Thrombus not identified in graft.  Stent without migration.  False lumen in subclavian still perfuses false lumen in chest. No radiologic signs of malperfusion or access site complication.      Impression/Shared Medical Decision Making:    #1- Type B [0-5] Dissection with retrograde ascending intramural hematoma now status post Aggressive stented hemiarch replacement, Dr. Shaw and myself 1/23/2024  #2- Hypertension  #3- TIA, evidence of previous subacute strokes on MRI from 2/15/2024  #4- Dizziness  Mr. Lyons is a pleasant 48 year old male evaluated after aggrestive stented hemiarch replacement for Type B [0-5] dissection with retrograde IMH and refractory hypertension.  He is overall doing ok.  He did have a TIA which has resolved.  He has occasional dizziness which has been improving but more present today as he has not eaten or drank much this morning.  Overall he is doing well. Despite radiology reads, his aorta does appear to be stabilizing at approximately 40 mm.  There is some persistent false lumen flow that is not expected to be obliterated. Will continue medical management at this time.  Plan for return visit at the 3 month anniversary of the operation with repeat CTA chest  abdomen and pelvis.    Discussed warning signs including, but not limited to, symptomatic aneurysms, aortic rupture, new chest, abdominal or back pain, visceral or extremity malperfusion.  If he experiences any of these, he has been advised to seek treatment and contact my team.    Mr. Lyons and his mother are in agreement with this plan as outlined.    Recommendations/Plan:  Return visit with CTA chest, abdomen, and pelvis in 2 months which will be the 3 month anniversary of the operation  Continue optimal medical therapy    25 minutes spent on the day of encounter doing chart review from our system and care everywhere, history and exam, documentation, coordinating care, and further activities as noted with over half spent counseling.       Again, thank you for allowing me to participate in the care of your patient.      Sincerely,    Artur Signh MD

## 2024-03-12 ENCOUNTER — TELEPHONE (OUTPATIENT)
Dept: FAMILY MEDICINE | Facility: CLINIC | Age: 49
End: 2024-03-12

## 2024-03-12 DIAGNOSIS — I71.00 DISSECTION OF AORTA, UNSPECIFIED PORTION OF AORTA (H): ICD-10-CM

## 2024-03-12 DIAGNOSIS — I74.10 AORTIC THROMBUS (H): ICD-10-CM

## 2024-03-12 NOTE — TELEPHONE ENCOUNTER
[facility] phone call message- medication clarification/question:    Full Medication Name: apixaban ANTICOAGULANT (ELIQUIS) 5 MG tablet        Question: The patient was prescribed this medication by a  ED provider in Feb '24. The pharmacy called to state the provider is not covered under the patient's insurance and is wondering if the patient's PCP can rewrite the prescription.    The patient did have a hospital follow up with PCP and MTM on 02/27/2024.    Pharmacy confirmed as     [facility][id number] - [facility] [address]    : Yes    OK to leave a message on voice mail? Yes    Primary language: English      needed? No    Call taken on March 12, 2024 at 4:21 PM by Janine Luz

## 2024-03-13 NOTE — TELEPHONE ENCOUNTER
Pharmacy requesting PCP send refill due to ED provider not covered under pt's insurance. RN queued up medication and sending to provider for review.    Isela Bolaños RN

## 2024-03-14 DIAGNOSIS — I71.00 DISSECTION OF AORTA, UNSPECIFIED PORTION OF AORTA (H): ICD-10-CM

## 2024-03-14 DIAGNOSIS — I74.10 AORTIC THROMBUS (H): ICD-10-CM

## 2024-03-19 ENCOUNTER — TELEPHONE (OUTPATIENT)
Dept: VASCULAR SURGERY | Facility: CLINIC | Age: 49
End: 2024-03-19
Payer: COMMERCIAL

## 2024-03-19 DIAGNOSIS — I74.10 AORTIC THROMBUS (H): ICD-10-CM

## 2024-03-19 DIAGNOSIS — I71.00 DISSECTION OF AORTA, UNSPECIFIED PORTION OF AORTA (H): ICD-10-CM

## 2024-03-19 NOTE — TELEPHONE ENCOUNTER
[facility] Call Center    Phone Message    May a detailed message be left on voicemail: yes     Reason for Call: Patient recently had heart surgery and was prescribed apixaban ANTICOAGULANT (ELIQUIS) 5 MG tablet - insurance won't cover as  is not covered under insurance - patient is asking if his Vasc Surg dr can send Rx as he is out and this is for the blood clot in his heart - please call back [phone] Thank you    Action Taken: Message routed to:  [facility] Vasc    Travel Screening: Not Applicable

## 2024-03-20 NOTE — TELEPHONE ENCOUNTER
[facility] Call Center    Phone Message    May a detailed message be left on voicemail: yes     Reason for Call: Medication Question or concern regarding medication   Prescription Clarification  Name of Medication: ANTICOAGULANT (ELIQUIS) 5 MG tablet   Prescribing Provider: Ye Cody MD    Pharmacy: [facility][id number] - [facility] [address]     What on the order needs clarification? Pt said the insurance will not cover if not signed by MD. Pt was order Rx with resident provider and it needs to be MD only not resident. Please have a MD sign for Rx or a new order from MD.   Pt has not been able to take blood thinners now for 3 days.   Thanks      Action Taken: Message routed to:  [facility] VS    Travel Screening: Not Applicable

## 2024-03-20 NOTE — TELEPHONE ENCOUNTER
Pt needs script ASAP. RN resent script for apixaban ANTICOAGULANT (ELIQUIS) 5 MG tablet under PCP advisor per standing order. RN called and let pt know that a new script was sent to [facility] under faculty provider. Pt verbalized understanding.    Isela Bolaños RN

## 2024-03-20 NOTE — TELEPHONE ENCOUNTER
Pt's anticoagulation is being managed by PCP. Routed encounter to their pool.    ALEXUS BragaN, RN  RN Care Coordinator  [facility] Vascular Surgery - [facility] phone: [phone]  Fax: [phone]

## 2024-03-21 NOTE — TELEPHONE ENCOUNTER
Rec'd notification that script must come from vascular. Refill sent to pt pharmacy.    ALEXUS BragaN, RN  RN Care Coordinator  [facility] Vascular Surgery - [facility] phone: [phone]  Fax: [phone]

## 2024-04-08 ENCOUNTER — TELEPHONE (OUTPATIENT)
Dept: FAMILY MEDICINE | Facility: CLINIC | Age: 49
End: 2024-04-08
Payer: COMMERCIAL

## 2024-04-08 DIAGNOSIS — I16.0 HYPERTENSIVE URGENCY: ICD-10-CM

## 2024-04-08 DIAGNOSIS — I71.00 DISSECTION OF AORTA, UNSPECIFIED PORTION OF AORTA (H): ICD-10-CM

## 2024-04-08 DIAGNOSIS — R07.89 CHEST WALL PAIN: ICD-10-CM

## 2024-04-08 RX ORDER — METHOCARBAMOL 750 MG/1
750 TABLET, FILM COATED ORAL EVERY 6 HOURS PRN
Qty: 28 TABLET | Refills: 1 | Status: SHIPPED | OUTPATIENT
Start: 2024-04-08 | End: 2024-06-03 | Stop reason: DRUGHIGH

## 2024-04-08 RX ORDER — CARVEDILOL 25 MG/1
25 TABLET ORAL 2 TIMES DAILY
Qty: 60 TABLET | Refills: 0 | Status: SHIPPED | OUTPATIENT
Start: 2024-04-08 | End: 2024-05-09

## 2024-04-08 RX ORDER — AMLODIPINE BESYLATE 5 MG/1
5 TABLET ORAL 2 TIMES DAILY
Qty: 60 TABLET | Refills: 1 | Status: SHIPPED | OUTPATIENT
Start: 2024-04-08 | End: 2024-06-10

## 2024-04-08 RX ORDER — ACETAMINOPHEN 325 MG/1
975 TABLET ORAL EVERY 6 HOURS PRN
Qty: 100 TABLET | Refills: 0 | Status: SHIPPED | OUTPATIENT
Start: 2024-04-08 | End: 2024-05-07

## 2024-04-08 NOTE — TELEPHONE ENCOUNTER
The pharmacy called to request another prescription sent per the patient's request.    carvedilol (COREG) 25 MG tablet     [facility][id number] - [place], [place] - [address]

## 2024-04-08 NOTE — TELEPHONE ENCOUNTER
"Pt mother called because pt is very upset. He has been without his BP medicine for 4 days. RN queued up medication and sending to preceptor.    Last seen 2/27/24    Request for medication refill:    methocarbamol (ROBAXIN) 750 MG tablet   amLODIPine (NORVASC) 5 MG tablet   acetaminophen (TYLENOL) 325 MG tablet     Providers if patient needs an appointment and you are willing to give a one month supply please refill for one month and  send a letter/MyChart using \".SMILLIMITEDREFILL\" .smillimited and route chart to \"P SMI \" (Giving one month refill in non controlled medications is strongly recommended before denial)    If refill has been denied, meaning absolutely no refills without visit, please complete the smart phrase \".smirxrefuse\" and route it to the \"P SMI MED REFILLS\"  pool to inform the patient and the pharmacy.    Isela Bolaños RN    "

## 2024-04-08 NOTE — TELEPHONE ENCOUNTER
RN received VO from preceptor to send per standing order. RN sent to pharmacy requested.    Isela Bolaños RN

## 2024-04-08 NOTE — TELEPHONE ENCOUNTER
[facility] phone call message- patient requesting a refill:    Full Medication Name: carvedilol (COREG)     Dose: 25 MG tablet     Pharmacy confirmed as   [facility][id number] - [place], [place] - [address]  [address]  [facility] [address]  Phone: [phone] Fax: [phone]  : Yes    Additional Comments: Patient is requesting this medication be sent to the pharmacy as well. He states he has been out for 4 days.    OK to leave a message on voice mail? Yes    Primary language: English      needed? No    Call taken on April 8, 2024 at 4:33 PM by Katya Santiago

## 2024-04-08 NOTE — TELEPHONE ENCOUNTER
carvedilol (COREG) 25 MG tablet was sent in separate encounter. Please see TE from 4/8/24.    Isela Bolaños RN

## 2024-05-02 DIAGNOSIS — Z95.828 S/P ASCENDING AORTIC REPLACEMENT: ICD-10-CM

## 2024-05-02 DIAGNOSIS — Z95.828 STATUS POST INSERTION OF ENDOVASCULAR THORACIC AORTIC STENT GRAFT: ICD-10-CM

## 2024-05-02 DIAGNOSIS — I71.00 DISSECTION OF AORTA, UNSPECIFIED PORTION OF AORTA (H): Primary | ICD-10-CM

## 2024-05-07 DIAGNOSIS — I71.00 DISSECTION OF AORTA, UNSPECIFIED PORTION OF AORTA (H): ICD-10-CM

## 2024-05-07 RX ORDER — ACETAMINOPHEN 325 MG/1
975 TABLET ORAL EVERY 6 HOURS PRN
Qty: 100 TABLET | Refills: 0 | Status: SHIPPED | OUTPATIENT
Start: 2024-05-07 | End: 2024-06-18

## 2024-05-07 NOTE — TELEPHONE ENCOUNTER
"Request for medication refill:  acetaminophen (TYLENOL) 325 MG tablet     Providers if patient needs an appointment and you are willing to give a one month supply please refill for one month and  send a letter/MyChart using \".SMILLIMITEDREFILL\" .smillimited and route chart to \"P SMI \" (Giving one month refill in non controlled medications is strongly recommended before denial)    If refill has been denied, meaning absolutely no refills without visit, please complete the smart phrase \".smirxrefuse\" and route it to the \"P SMI MED REFILLS\"  pool to inform the patient and the pharmacy.    Valorie Abebe, CMA      "

## 2024-05-09 DIAGNOSIS — I71.00 DISSECTION OF AORTA, UNSPECIFIED PORTION OF AORTA (H): ICD-10-CM

## 2024-05-09 DIAGNOSIS — I16.0 HYPERTENSIVE URGENCY: ICD-10-CM

## 2024-05-09 RX ORDER — ASPIRIN 81 MG/1
81 TABLET, CHEWABLE ORAL DAILY
Qty: 90 TABLET | Refills: 0 | Status: SHIPPED | OUTPATIENT
Start: 2024-05-09 | End: 2024-09-27

## 2024-05-09 RX ORDER — CARVEDILOL 25 MG/1
25 TABLET ORAL 2 TIMES DAILY
Qty: 60 TABLET | Refills: 0 | Status: SHIPPED | OUTPATIENT
Start: 2024-05-09 | End: 2024-06-17

## 2024-05-09 NOTE — TELEPHONE ENCOUNTER
"Patient also requested acetaminophen but that was just filled on 5/7/24    Request for medication refill:    Providers if patient needs an appointment and you are willing to give a one month supply please refill for one month and  send a letter/MyChart using \".SMILLIMITEDREFILL\" .smillimited and route chart to \"P SMI \" (Giving one month refill in non controlled medications is strongly recommended before denial)    If refill has been denied, meaning absolutely no refills without visit, please complete the smart phrase \".smirxrefuse\" and route it to the \"P SMI MED REFILLS\"  pool to inform the patient and the pharmacy.    Bety Copeland RN      "

## 2024-05-09 NOTE — TELEPHONE ENCOUNTER
[facility] phone call message- patient requesting a refill:    Full Medication Name:     -carvedilol (COREG) 25 MG tablet   -acetaminophen (TYLENOL) 325 MG tablet   -aspirin (ASA) 81 MG chewable tablet         Pharmacy confirmed as   [facility][id number] - [place], [place] - [address]  [address]  [facility] [address]  Phone: [phone] Fax: [phone]    : Yes    Additional Comments: The patient is requesting a refill on these medications.       OK to leave a message on voice mail? Yes    Primary language: English      needed? No    Call taken on May 9, 2024 at 11:13 AM by Janine Luz

## 2024-05-17 ENCOUNTER — ANCILLARY PROCEDURE (OUTPATIENT)
Dept: CT IMAGING | Facility: CLINIC | Age: 49
End: 2024-05-17
Attending: SURGERY
Payer: COMMERCIAL

## 2024-05-17 DIAGNOSIS — Z95.828 S/P ASCENDING AORTIC REPLACEMENT: ICD-10-CM

## 2024-05-17 DIAGNOSIS — Z95.828 STATUS POST INSERTION OF ENDOVASCULAR THORACIC AORTIC STENT GRAFT: ICD-10-CM

## 2024-05-17 DIAGNOSIS — I71.00 DISSECTION OF AORTA, UNSPECIFIED PORTION OF AORTA (H): ICD-10-CM

## 2024-05-17 PROCEDURE — 71275 CT ANGIOGRAPHY CHEST: CPT | Mod: GC | Performed by: STUDENT IN AN ORGANIZED HEALTH CARE EDUCATION/TRAINING PROGRAM

## 2024-05-17 PROCEDURE — 74174 CTA ABD&PLVS W/CONTRAST: CPT | Mod: GC | Performed by: STUDENT IN AN ORGANIZED HEALTH CARE EDUCATION/TRAINING PROGRAM

## 2024-05-17 RX ORDER — IOPAMIDOL 755 MG/ML
90 INJECTION, SOLUTION INTRAVASCULAR ONCE
Status: COMPLETED | OUTPATIENT
Start: 2024-05-17 | End: 2024-05-17

## 2024-05-17 RX ADMIN — IOPAMIDOL 90 ML: 755 INJECTION, SOLUTION INTRAVASCULAR at 10:54

## 2024-05-17 NOTE — DISCHARGE INSTRUCTIONS

## 2024-05-19 LAB — RADIOLOGIST FLAGS: ABNORMAL

## 2024-05-29 ENCOUNTER — OFFICE VISIT (OUTPATIENT)
Dept: VASCULAR SURGERY | Facility: CLINIC | Age: 49
End: 2024-05-29
Payer: COMMERCIAL

## 2024-05-29 ENCOUNTER — APPOINTMENT (OUTPATIENT)
Dept: CT IMAGING | Facility: CLINIC | Age: 49
End: 2024-05-29
Attending: SURGERY
Payer: COMMERCIAL

## 2024-05-29 ENCOUNTER — HOSPITAL ENCOUNTER (INPATIENT)
Facility: CLINIC | Age: 49
LOS: 5 days | Discharge: HOME | End: 2024-06-03
Attending: SURGERY | Admitting: SURGERY
Payer: COMMERCIAL

## 2024-05-29 VITALS — SYSTOLIC BLOOD PRESSURE: 163 MMHG | DIASTOLIC BLOOD PRESSURE: 97 MMHG | HEART RATE: 86 BPM | OXYGEN SATURATION: 100 %

## 2024-05-29 DIAGNOSIS — I71.00 AORTIC DISSECTION (H): ICD-10-CM

## 2024-05-29 DIAGNOSIS — I71.23 ANEURYSM OF DESCENDING THORACIC AORTA WITHOUT RUPTURE (H): Primary | ICD-10-CM

## 2024-05-29 DIAGNOSIS — I10 BENIGN ESSENTIAL HYPERTENSION: ICD-10-CM

## 2024-05-29 DIAGNOSIS — Z95.828 STATUS POST INSERTION OF ENDOVASCULAR THORACIC AORTIC STENT GRAFT: ICD-10-CM

## 2024-05-29 DIAGNOSIS — I71.012 DISSECTING ANEURYSM OF THORACIC AORTA, STANFORD TYPE B (H): Primary | ICD-10-CM

## 2024-05-29 DIAGNOSIS — I71.03 THORACOABDOMINAL AORTIC DISSECTION (H): ICD-10-CM

## 2024-05-29 DIAGNOSIS — Z95.828 S/P ASCENDING AORTIC REPLACEMENT: ICD-10-CM

## 2024-05-29 LAB
ACANTHOCYTES BLD QL SMEAR: NORMAL
ALBUMIN SERPL BCG-MCNC: 3.7 G/DL (ref 3.5–5.2)
ALP SERPL-CCNC: 76 U/L (ref 40–150)
ALT SERPL W P-5'-P-CCNC: 6 U/L (ref 0–70)
ANION GAP SERPL CALCULATED.3IONS-SCNC: 14 MMOL/L (ref 7–15)
AST SERPL W P-5'-P-CCNC: 23 U/L (ref 0–45)
AUER BODIES BLD QL SMEAR: NORMAL
BASO STIPL BLD QL SMEAR: NORMAL
BILIRUB SERPL-MCNC: 0.6 MG/DL
BITE CELLS BLD QL SMEAR: NORMAL
BLISTER CELLS BLD QL SMEAR: NORMAL
BUN SERPL-MCNC: 15.9 MG/DL (ref 6–20)
BURR CELLS BLD QL SMEAR: NORMAL
CALCIUM SERPL-MCNC: 8.8 MG/DL (ref 8.6–10)
CHLORIDE SERPL-SCNC: 103 MMOL/L (ref 98–107)
CREAT SERPL-MCNC: 1.07 MG/DL (ref 0.67–1.17)
DACRYOCYTES BLD QL SMEAR: NORMAL
DEPRECATED HCO3 PLAS-SCNC: 20 MMOL/L (ref 22–29)
EGFRCR SERPLBLD CKD-EPI 2021: 86 ML/MIN/1.73M2
ELLIPTOCYTES BLD QL SMEAR: NORMAL
ERYTHROCYTE [DISTWIDTH] IN BLOOD BY AUTOMATED COUNT: 17.6 % (ref 10–15)
FRAGMENTS BLD QL SMEAR: NORMAL
GLUCOSE BLDC GLUCOMTR-MCNC: 103 MG/DL (ref 70–99)
GLUCOSE SERPL-MCNC: 92 MG/DL (ref 70–99)
HCT VFR BLD AUTO: 35 % (ref 40–53)
HGB BLD-MCNC: 10.6 G/DL (ref 13.3–17.7)
HGB C CRYSTALS: NORMAL
HOLD SPECIMEN: NORMAL
HOWELL-JOLLY BOD BLD QL SMEAR: NORMAL
MAGNESIUM SERPL-MCNC: 1.9 MG/DL (ref 1.7–2.3)
MCH RBC QN AUTO: 18.6 PG (ref 26.5–33)
MCHC RBC AUTO-ENTMCNC: 30.3 G/DL (ref 31.5–36.5)
MCV RBC AUTO: 62 FL (ref 78–100)
NEUTS HYPERSEG BLD QL SMEAR: NORMAL
PHOSPHATE SERPL-MCNC: 2.4 MG/DL (ref 2.5–4.5)
PLAT MORPH BLD: NORMAL
PLATELET # BLD AUTO: 264 10E3/UL (ref 150–450)
POLYCHROMASIA BLD QL SMEAR: NORMAL
POTASSIUM SERPL-SCNC: 4.4 MMOL/L (ref 3.4–5.3)
PROT SERPL-MCNC: 7.2 G/DL (ref 6.4–8.3)
RBC # BLD AUTO: 5.69 10E6/UL (ref 4.4–5.9)
RBC AGGLUT BLD QL: NORMAL
RBC MORPH BLD: NORMAL
ROULEAUX BLD QL SMEAR: NORMAL
SICKLE CELLS BLD QL SMEAR: NORMAL
SMUDGE CELLS BLD QL SMEAR: NORMAL
SODIUM SERPL-SCNC: 137 MMOL/L (ref 135–145)
SPHEROCYTES BLD QL SMEAR: NORMAL
STOMATOCYTES BLD QL SMEAR: NORMAL
TARGETS BLD QL SMEAR: NORMAL
TOXIC GRANULES BLD QL SMEAR: NORMAL
TROPONIN T SERPL HS-MCNC: 8 NG/L
VARIANT LYMPHS BLD QL SMEAR: NORMAL
WBC # BLD AUTO: 10.1 10E3/UL (ref 4–11)

## 2024-05-29 PROCEDURE — 93010 ELECTROCARDIOGRAM REPORT: CPT | Performed by: INTERNAL MEDICINE

## 2024-05-29 PROCEDURE — 71275 CT ANGIOGRAPHY CHEST: CPT

## 2024-05-29 PROCEDURE — 93005 ELECTROCARDIOGRAM TRACING: CPT

## 2024-05-29 PROCEDURE — 80053 COMPREHEN METABOLIC PANEL: CPT | Performed by: STUDENT IN AN ORGANIZED HEALTH CARE EDUCATION/TRAINING PROGRAM

## 2024-05-29 PROCEDURE — 85027 COMPLETE CBC AUTOMATED: CPT | Performed by: STUDENT IN AN ORGANIZED HEALTH CARE EDUCATION/TRAINING PROGRAM

## 2024-05-29 PROCEDURE — 99215 OFFICE O/P EST HI 40 MIN: CPT | Performed by: SURGERY

## 2024-05-29 PROCEDURE — 86900 BLOOD TYPING SEROLOGIC ABO: CPT

## 2024-05-29 PROCEDURE — 250N000013 HC RX MED GY IP 250 OP 250 PS 637: Performed by: STUDENT IN AN ORGANIZED HEALTH CARE EDUCATION/TRAINING PROGRAM

## 2024-05-29 PROCEDURE — 36415 COLL VENOUS BLD VENIPUNCTURE: CPT | Performed by: STUDENT IN AN ORGANIZED HEALTH CARE EDUCATION/TRAINING PROGRAM

## 2024-05-29 PROCEDURE — 83735 ASSAY OF MAGNESIUM: CPT | Performed by: SURGERY

## 2024-05-29 PROCEDURE — 250N000011 HC RX IP 250 OP 636: Mod: JZ | Performed by: STUDENT IN AN ORGANIZED HEALTH CARE EDUCATION/TRAINING PROGRAM

## 2024-05-29 PROCEDURE — 250N000011 HC RX IP 250 OP 636: Performed by: STUDENT IN AN ORGANIZED HEALTH CARE EDUCATION/TRAINING PROGRAM

## 2024-05-29 PROCEDURE — 250N000009 HC RX 250

## 2024-05-29 PROCEDURE — 71275 CT ANGIOGRAPHY CHEST: CPT | Mod: 26 | Performed by: RADIOLOGY

## 2024-05-29 PROCEDURE — 200N000002 HC R&B ICU UMMC

## 2024-05-29 PROCEDURE — 250N000013 HC RX MED GY IP 250 OP 250 PS 637: Performed by: SURGERY

## 2024-05-29 PROCEDURE — 84484 ASSAY OF TROPONIN QUANT: CPT | Performed by: STUDENT IN AN ORGANIZED HEALTH CARE EDUCATION/TRAINING PROGRAM

## 2024-05-29 PROCEDURE — 74174 CTA ABD&PLVS W/CONTRAST: CPT | Mod: 26 | Performed by: RADIOLOGY

## 2024-05-29 PROCEDURE — 84100 ASSAY OF PHOSPHORUS: CPT | Performed by: SURGERY

## 2024-05-29 PROCEDURE — 99291 CRITICAL CARE FIRST HOUR: CPT | Mod: 25 | Performed by: SURGERY

## 2024-05-29 PROCEDURE — 999N000285 HC STATISTIC VASC ACCESS LAB DRAW WITH PIV START

## 2024-05-29 PROCEDURE — 36620 INSERTION CATHETER ARTERY: CPT | Mod: GC | Performed by: SURGERY

## 2024-05-29 PROCEDURE — C9248 INJ, CLEVIDIPINE BUTYRATE: HCPCS | Mod: JZ | Performed by: STUDENT IN AN ORGANIZED HEALTH CARE EDUCATION/TRAINING PROGRAM

## 2024-05-29 PROCEDURE — 36415 COLL VENOUS BLD VENIPUNCTURE: CPT | Performed by: SURGERY

## 2024-05-29 PROCEDURE — 250N000013 HC RX MED GY IP 250 OP 250 PS 637

## 2024-05-29 RX ORDER — ACETAMINOPHEN 325 MG/10.15ML
650 LIQUID ORAL EVERY 4 HOURS PRN
Status: DISCONTINUED | OUTPATIENT
Start: 2024-05-29 | End: 2024-06-01

## 2024-05-29 RX ORDER — ESMOLOL HYDROCHLORIDE 20 MG/ML
50-300 INJECTION, SOLUTION INTRAVENOUS CONTINUOUS
Status: DISCONTINUED | OUTPATIENT
Start: 2024-05-29 | End: 2024-05-30

## 2024-05-29 RX ORDER — CARVEDILOL 25 MG/1
25 TABLET ORAL 2 TIMES DAILY WITH MEALS
Status: DISCONTINUED | OUTPATIENT
Start: 2024-05-29 | End: 2024-06-03 | Stop reason: HOSPADM

## 2024-05-29 RX ORDER — MAGNESIUM OXIDE 400 MG/1
400 TABLET ORAL EVERY 4 HOURS
Status: COMPLETED | OUTPATIENT
Start: 2024-05-29 | End: 2024-05-30

## 2024-05-29 RX ORDER — DEXTROSE MONOHYDRATE 25 G/50ML
25-50 INJECTION, SOLUTION INTRAVENOUS
Status: DISCONTINUED | OUTPATIENT
Start: 2024-05-29 | End: 2024-06-03 | Stop reason: HOSPADM

## 2024-05-29 RX ORDER — NALOXONE HYDROCHLORIDE 0.4 MG/ML
0.2 INJECTION, SOLUTION INTRAMUSCULAR; INTRAVENOUS; SUBCUTANEOUS
Status: DISCONTINUED | OUTPATIENT
Start: 2024-05-29 | End: 2024-06-03 | Stop reason: HOSPADM

## 2024-05-29 RX ORDER — NALOXONE HYDROCHLORIDE 0.4 MG/ML
0.4 INJECTION, SOLUTION INTRAMUSCULAR; INTRAVENOUS; SUBCUTANEOUS
Status: DISCONTINUED | OUTPATIENT
Start: 2024-05-29 | End: 2024-06-03 | Stop reason: HOSPADM

## 2024-05-29 RX ORDER — ACETAMINOPHEN 325 MG/1
650 TABLET ORAL EVERY 4 HOURS PRN
Status: DISCONTINUED | OUTPATIENT
Start: 2024-05-29 | End: 2024-06-01

## 2024-05-29 RX ORDER — LIDOCAINE HYDROCHLORIDE 10 MG/ML
10 INJECTION, SOLUTION EPIDURAL; INFILTRATION; INTRACAUDAL; PERINEURAL ONCE
Status: COMPLETED | OUTPATIENT
Start: 2024-05-29 | End: 2024-05-29

## 2024-05-29 RX ORDER — NICOTINE POLACRILEX 4 MG
15-30 LOZENGE BUCCAL
Status: DISCONTINUED | OUTPATIENT
Start: 2024-05-29 | End: 2024-06-03 | Stop reason: HOSPADM

## 2024-05-29 RX ORDER — BISACODYL 5 MG
5 TABLET, DELAYED RELEASE (ENTERIC COATED) ORAL DAILY PRN
Status: DISCONTINUED | OUTPATIENT
Start: 2024-05-29 | End: 2024-06-02

## 2024-05-29 RX ORDER — BISACODYL 5 MG
15 TABLET, DELAYED RELEASE (ENTERIC COATED) ORAL DAILY PRN
Status: DISCONTINUED | OUTPATIENT
Start: 2024-05-29 | End: 2024-06-02

## 2024-05-29 RX ORDER — ONDANSETRON 4 MG/1
4 TABLET, ORALLY DISINTEGRATING ORAL EVERY 6 HOURS PRN
Status: DISCONTINUED | OUTPATIENT
Start: 2024-05-29 | End: 2024-06-03 | Stop reason: HOSPADM

## 2024-05-29 RX ORDER — OXYCODONE HYDROCHLORIDE 5 MG/1
5-10 TABLET ORAL EVERY 4 HOURS PRN
Status: DISCONTINUED | OUTPATIENT
Start: 2024-05-29 | End: 2024-06-03 | Stop reason: HOSPADM

## 2024-05-29 RX ORDER — IOPAMIDOL 755 MG/ML
90 INJECTION, SOLUTION INTRAVASCULAR ONCE
Status: COMPLETED | OUTPATIENT
Start: 2024-05-29 | End: 2024-05-29

## 2024-05-29 RX ORDER — BISACODYL 5 MG
10 TABLET, DELAYED RELEASE (ENTERIC COATED) ORAL DAILY PRN
Status: DISCONTINUED | OUTPATIENT
Start: 2024-05-29 | End: 2024-06-02

## 2024-05-29 RX ORDER — HYDROMORPHONE HCL IN WATER/PF 6 MG/30 ML
0.2 PATIENT CONTROLLED ANALGESIA SYRINGE INTRAVENOUS
Status: DISCONTINUED | OUTPATIENT
Start: 2024-05-29 | End: 2024-05-31

## 2024-05-29 RX ORDER — ONDANSETRON 2 MG/ML
4 INJECTION INTRAMUSCULAR; INTRAVENOUS EVERY 6 HOURS PRN
Status: DISCONTINUED | OUTPATIENT
Start: 2024-05-29 | End: 2024-06-03 | Stop reason: HOSPADM

## 2024-05-29 RX ORDER — AMLODIPINE BESYLATE 5 MG/1
5 TABLET ORAL 2 TIMES DAILY
Status: DISCONTINUED | OUTPATIENT
Start: 2024-05-29 | End: 2024-06-01

## 2024-05-29 RX ADMIN — OXYCODONE HYDROCHLORIDE 10 MG: 5 TABLET ORAL at 20:06

## 2024-05-29 RX ADMIN — ESMOLOL HYDROCHLORIDE IN SODIUM CHLORIDE 250 MCG/KG/MIN: 20 INJECTION INTRAVENOUS at 20:02

## 2024-05-29 RX ADMIN — HYDROMORPHONE HYDROCHLORIDE 0.2 MG: 0.2 INJECTION, SOLUTION INTRAMUSCULAR; INTRAVENOUS; SUBCUTANEOUS at 21:18

## 2024-05-29 RX ADMIN — IOPAMIDOL 90 ML: 755 INJECTION, SOLUTION INTRAVENOUS at 18:04

## 2024-05-29 RX ADMIN — LIDOCAINE HYDROCHLORIDE 10 ML: 10 INJECTION, SOLUTION EPIDURAL; INFILTRATION; INTRACAUDAL; PERINEURAL at 22:55

## 2024-05-29 RX ADMIN — MAGNESIUM OXIDE TAB 400 MG (241.3 MG ELEMENTAL MG) 400 MG: 400 (241.3 MG) TAB at 23:53

## 2024-05-29 RX ADMIN — HYDROMORPHONE HYDROCHLORIDE 0.2 MG: 0.2 INJECTION, SOLUTION INTRAMUSCULAR; INTRAVENOUS; SUBCUTANEOUS at 18:55

## 2024-05-29 RX ADMIN — HYDROMORPHONE HYDROCHLORIDE 0.2 MG: 0.2 INJECTION, SOLUTION INTRAMUSCULAR; INTRAVENOUS; SUBCUTANEOUS at 23:53

## 2024-05-29 RX ADMIN — ACETAMINOPHEN 650 MG: 325 TABLET, FILM COATED ORAL at 23:53

## 2024-05-29 RX ADMIN — ESMOLOL HYDROCHLORIDE IN SODIUM CHLORIDE 150 MCG/KG/MIN: 20 INJECTION INTRAVENOUS at 22:07

## 2024-05-29 RX ADMIN — ESMOLOL HYDROCHLORIDE IN SODIUM CHLORIDE 250 MCG/KG/MIN: 20 INJECTION INTRAVENOUS at 18:32

## 2024-05-29 RX ADMIN — AMLODIPINE BESYLATE 5 MG: 5 TABLET ORAL at 20:06

## 2024-05-29 RX ADMIN — CLEVIPIDINE 2 MG/HR: 0.5 EMULSION INTRAVENOUS at 16:03

## 2024-05-29 RX ADMIN — OXYCODONE HYDROCHLORIDE 10 MG: 5 TABLET ORAL at 15:35

## 2024-05-29 RX ADMIN — CLEVIPIDINE 10 MG/HR: 0.5 EMULSION INTRAVENOUS at 20:28

## 2024-05-29 RX ADMIN — HYDROMORPHONE HYDROCHLORIDE 0.2 MG: 0.2 INJECTION, SOLUTION INTRAMUSCULAR; INTRAVENOUS; SUBCUTANEOUS at 16:16

## 2024-05-29 RX ADMIN — ESMOLOL HYDROCHLORIDE IN SODIUM CHLORIDE 50 MCG/KG/MIN: 20 INJECTION INTRAVENOUS at 15:52

## 2024-05-29 ASSESSMENT — ACTIVITIES OF DAILY LIVING (ADL)
ADLS_ACUITY_SCORE: 37

## 2024-05-29 NOTE — PROGRESS NOTES
Pt c/o chest pain w/ shoulder pain for ~2 weeks per provider. Note BP is poorly controlled as well. AAA expanding. [place] [facility] [place] [facility]. Pt accepted for direct admission per patient placement. Dr Dodge called and gave report to inpatient team. Non-emergency transport called - approx 1 hr wait until they would arrive. Pt elected to go to [place] in private vehicle with garfield Peterson. Provider notified and aware.     LAEXUS BragaN, RN  RN Care Coordinator  [facility] Vascular Surgery - [facility] phone: [phone]  Fax: [phone]

## 2024-05-29 NOTE — CONSULTS
SURGICAL ICU ADMISSION NOTE  5/29/2024    PRIMARY TEAM: Vascular Surgery  PRIMARY PHYSICIAN: Dr. Singh    REASON FOR CRITICAL CARE ADMISSION: Rapidly expanding descending aorta in setting of Type B dissection, anti  impulse control  ADMITTING PHYSICIAN: Dr. 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 Shaw and Dr. Singh. Post-op course complicated by TIA on 2/14/24 with evidence of bilateral, multifocal subacute infarcts and microhemorrhages in both frontal and posterior distributions and left internal jugular non-occlusive DVT started on ASA and Eliquis. Patient admitted to SICU on 5/29 for treatment of rapidly expanding descending aorta in setting of Type B dissection and anti impulse control with plan for surgical intervention with Vascular Surgery.     PLAN:   Neuro/ pain/ sedation:  -Monitor neurological status. Notify the MD for any acute changes in exam.  -PRN dilaudid, oxycodone, Tylenol for pain.     Pulmonary care:   -Supplemental oxygen to keep saturation above 92 %.  - Incentive spirometer every 15- 30 minutes when awake.     Cardiovascular:    #Type B aortic dissection s/p stented hemiarch repair  #Expanding descending aortic dissection  #Refractory hypertension  -Monitor hemodynamic status.   -Acute Type B aortic dissection protocol  -- Goals: SBP <120, HR <70  -- First line IV infusion: Clevidipine, Esmolol  -- First line PO medications: amlodipine 10 mg PO daily (hold only if SBP >90), carvedilol 25 mg BID (hold only if HR <50)  - CBC, CMP, lactate, troponin pending  -EKG, ECHO, and CTA C/A/P pending  -No arterial access LUE due to retrograde flow from brachial artery into false lumen  -Plan for extension of TEVAR distally to stop retrograde flow vs  subclavian intervention    #Hx of TIA  #Hx of non-occlusive internal jugular DVT  - ASA and eliquis discontinue prior to admission     GI care:   -NPO except ice chips and medications.       Fluids/ Electrolytes/ Nutrition:   - IV fluid hydration PRN  -ICU electrolyte replacement protocol  -No indication for parenteral nutrition.  -Nutrition consulted. Appreciate recs     Renal/ Fluid Balance:    -Will continue to monitor intake and output.       Endocrine:    -No management indication.     ID/ Antibiotics:  -No indication for antibiotics.      Heme:     -Hemoglobin stable.      Prophylaxis:    -Mechanical prophylaxis for DVT.      MSK:    -PT and OT consulted. Appreciate recs.       Lines/ tubes/ drains:  -PIV x 2     Disposition:  -Surgical ICU.     Patient seen, findings and plan discussed with SICU staff, Dr. 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 Shaw and Dr. Singh. Post-op course complicated by TIA on 2/14/24 with evidence of bilateral, multifocal subacute infarcts and microhemorrhages in both frontal and posterior distributions. Patient also found to have left internal jugular non-occlusive DVT started on ASA and Eliquis (no longer taking). CTA C/A/P from 5/17/24 demonstrated expanding thoracic aorta (4.1 to 4.7 cm) compared to previous on 2/17/24. Patient presented to Vascular Surgery clinic on 5/29 with chest pain radiating to back, L shoulder pain, and poorly controlled blood pressure. He was found to be hypertensive to 163/97. Patient was directly admitted  to SICU for blood pressure control with plan for TEVAR extension with possible subclavian work once patient stabilizes.     REVIEW OF SYSTEMS: 10 point ROS neg other than the symptoms noted above in the HPI.    PAST MEDICAL HISTORY:    has a past medical history of Dissecting aneurysm of thoracic aorta, [place] type B (H) and HTN (hypertension).    SURGICAL HISTORY:    has a past surgical history that includes PICC/Midline Placement (Right, 01/16/2024); IR OR Angiogram (1/23/2024); Repair aneurysm ascending aorta (N/A, 1/23/2024); and Endovascular repair aneurysm thoracic aortic (N/A, 1/23/2024).    SOCIAL HISTORY:    reports that he has never smoked. He has never used smokeless tobacco.    FAMILY HISTORY: No bleeding/clotting disorders nor problems with anesthesia.     ALLERGIES:      Allergies   Allergen Reactions    Rosuvastatin Muscle Pain (Myalgia)     Took the statin for one month and then could not tolerate side effects.  Myalgias resolved within 2 days of stopping the medication       MEDICATIONS:  Current Facility-Administered Medications   Medication Dose Route Frequency Provider Last Rate Last Admin    acetaminophen (TYLENOL) tablet 650 mg  650 mg Oral Q4H PRN Onesimo Sanders MD        Or    acetaminophen (TYLENOL) oral liquid 650 mg  650 mg Per NG tube Q4H PRN Onesimo Sanders MD        amLODIPine (NORVASC) tablet 5 mg  5 mg Oral BID Onesimo Sanders MD        bisacodyl (DULCOLAX) EC tablet 5 mg  5 mg Oral Daily PRN Onesimo Sanders MD        Or    bisacodyl (DULCOLAX) EC tablet 10 mg  10 mg Oral Daily PRN Onesimo Sanders MD        Or    bisacodyl (DULCOLAX) EC tablet 15 mg  15 mg Oral Daily PRN Onesimo Sanders MD        carvedilol (COREG) tablet 25 mg  25 mg Oral BID w/meals Onesimo Sanders MD        clevidipine (CLEVIPREX) 0.5 mg/mL infusion 100 mL  0-16 mg/hr Intravenous Continuous Onesimo Sanders MD 16 mL/hr at 05/29/24 1629 8 mg/hr at 05/29/24 1629    glucose gel 15-30 g  15-30  g Oral Q15 Min PRN Onesimo Sanders MD        Or    dextrose 50 % injection 25-50 mL  25-50 mL Intravenous Q15 Min PRN Onesimo Sanders MD        Or    glucagon injection 1 mg  1 mg Subcutaneous Q15 Min PRN Onesimo Sandesr MD        esmolol 20 mg/mL (BREVIBLOC) infusion 100 mL   mcg/kg/min Intravenous Continuous Onesimo Sanders MD 33.3 mL/hr at 05/29/24 1620 150 mcg/kg/min at 05/29/24 1620    HYDROmorphone (DILAUDID) injection 0.2 mg  0.2 mg Intravenous Q2H PRN Onesimo Sanders MD   0.2 mg at 05/29/24 1616    naloxone (NARCAN) injection 0.2 mg  0.2 mg Intravenous Q2 Min PRN Artur Singh MD        Or    naloxone (NARCAN) injection 0.4 mg  0.4 mg Intravenous Q2 Min PRN Artur Singh MD        Or    naloxone (NARCAN) injection 0.2 mg  0.2 mg Intramuscular Q2 Min PRN Artur Singh MD        Or    naloxone (NARCAN) injection 0.4 mg  0.4 mg Intramuscular Q2 Min PRN Artur Singh MD        ondansetron (ZOFRAN ODT) ODT tab 4 mg  4 mg Oral Q6H PRN Onesimo Sanders MD        Or    ondansetron (ZOFRAN) injection 4 mg  4 mg Intravenous Q6H PRN Onesimo Sanders MD        oxyCODONE (ROXICODONE) tablet 5-10 mg  5-10 mg Oral Q4H PRN Juanpablo Benitez MD   10 mg at 05/29/24 1535       PHYSICAL EXAMINATION:  Temp:  [98.1  F (36.7  C)] 98.1  F (36.7  C)  Pulse:  [73-86] 73  Resp:  [19] 19  BP: (142-163)/(86-97) 142/86  SpO2:  [100 %] 100 %  General: Awake, alert, mild distress due to pain  Neuro: Grossly intact, alert and oriented, follows commands, spontaneously moves all extremities  Pulm/Resp: Normal work of breathing on RA  CV: RRR  Abdomen: Soft, ntnd, no rebound tenderness or guarding  MSK/Extremities: No peripheral edema, extremities wwp    LABS: Reviewed.   Arterial Blood Gases   No lab results found in last 7 days.  Complete Blood Count   No lab results found in last 7 days.  Basic Metabolic Panel  Recent Labs   Lab 05/29/24  1506   *     Liver Function  "Tests  No lab results found in last 7 days.  Pancreatic Enzymes  No lab results found in last 7 days.  Coagulation Profile  No lab results found in last 7 days.  Lactate  Invalid input(s): \"LACTATE\"    IMAGING:  No results found for this or any previous visit (from the past 24 hour(s)).      "

## 2024-05-29 NOTE — LETTER
5/29/2024       RE: Valdo Lyons  [address]  [facility] [address]       Dear Colleague,    Thank you for referring your patient, Valdo Lyons, to the [facility] [place] [facility]. Please see a copy of my visit note below.    VASCULAR SURGERY OUTPATIENT VISIT    VASCULAR SURGEON: Dr. 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 Concern now for refractory pain and HTN which could be related to his rapidly enlarging descending thoracic aorta. He will require extension of his current TEVAR, possibly both proximally and distally. We feel that this should be done urgently with plans for impulse control in the ICU.    RECOMMENDATION:      - Admission to [place] [facility] for impulse control  - Plan for extension of TEVAR distally to stop retrograde flow into the false lumen as well as TBE vs subclavian intervention to address retrograde flow from brachial artery into false lumen, likely OR 5/31    HPI:  Valdo Lyons  is a 49 year old  male who was seen today for follow-up after stented hemiarch procedure on 1/23/2024. Since discharge, he has had L shoulder pain, however this has worsened over the last 2 weeks since he exerted himself doing lawn work. His blood pressure has been poorly controlled over the last several weeks, mostly around  160/100 despite taking his carvedilol and amlodipine as prescribed. Prior to this pain starting, his blood pressure was much better, mostly around 120s systolic. He has also had worsening dizziness with any walking and he has weakness in his legs, requiring him to sit down after only 20-30 feet. He denies any loss of motor or sensory function when this occurs. He has occasional darkening of peripheral vision as well when this occurs.    His pain over his left shoulder is reproducible with palpation and he does have a knot there. This pain radiates to the opposite shoulder. He has some improvement in pain with position changes at night, but these are usually short-lived for less than an hour. He also has chest pain and back pain, though not as severe as his shoulder pain. He is frustrated because his pain is significant and he has been unable to sleep more than 2-3 hours per day for several weeks.    PHH:    Past Medical History:   Diagnosis Date    Dissecting aneurysm of thoracic aorta, [place] type B (H)     HTN (hypertension)          Past Surgical History:   Procedure Laterality Date    ENDOVASCULAR REPAIR ANEURYSM THORACIC AORTIC N/A 1/23/2024    Procedure: Antegrade Thoracic Endovascular Aortic Repair (TEVAR) with [place] Conformable Thoracic Stent Graft 37mm x 10cm. Intravascular Ultrasound (IVUS), Aortography, Right common femoral access;  Surgeon: Artur Singh MD;  Location: [facility] OR    IR OR ANGIOGRAM  1/23/2024    PICC DOUBLE LUMEN PLACEMENT Right 01/16/2024    5FR DL PICC, basilic vein. L-43cm, 1cm out.    REPAIR ANEURYSM ASCENDING AORTA N/A 1/23/2024    Procedure: MEDIAN STERNOTOMY, RIGHT AXILLARY CUTDOWN, ON CARDIOPULMONARY BYPASS (CIRCULATORY ARREST), ASCENDING AORTA ANEURYSM REPAIR ([place] 30mm), INTRAOPERATIVE TRANSESOPHAGEAL ECHOCARDIOGRAM BY ANESTHESIA;  Surgeon: Addi Shaw MD;  Location: [facility] OR        ALLERGIES:     Allergies   Allergen Reactions    Rosuvastatin Muscle Pain  (Myalgia)     Took the statin for one month and then could not tolerate side effects.  Myalgias resolved within 2 days of stopping the medication        MEDS:    Current Outpatient Medications   Medication Sig Dispense Refill    aspirin (ASA) 81 MG chewable tablet 1 tablet (81 mg) by Oral or NG Tube route daily 90 tablet 0    carvedilol (COREG) 25 MG tablet Take 1 tablet (25 mg) by mouth 2 times daily 60 tablet 0    methocarbamol (ROBAXIN) 750 MG tablet Take 1 tablet (750 mg) by mouth every 6 hours as needed for muscle spasms 28 tablet 1    acetaminophen (TYLENOL) 325 MG tablet Take 3 tablets (975 mg) by mouth every 6 hours as needed for mild pain (Patient not taking: Reported on 5/29/2024) 100 tablet 0    amLODIPine (NORVASC) 5 MG tablet Take 1 tablet (5 mg) by mouth 2 times daily (Patient not taking: Reported on 5/29/2024) 60 tablet 1    apixaban ANTICOAGULANT (ELIQUIS) 5 MG tablet Take 1 tablet (5 mg) by mouth 2 times daily (Patient not taking: Reported on 5/29/2024) 180 tablet 3    oxyCODONE (ROXICODONE) 5 MG tablet Take 1 tablet (5 mg) by mouth every 8 hours as needed for moderate to severe pain (Patient not taking: Reported on 2/27/2024) 10 tablet 0    pantoprazole (PROTONIX) 40 MG EC tablet Take 1 tablet (40 mg) by mouth every morning (before breakfast) (Patient not taking: Reported on 2/27/2024) 14 tablet 0     No current facility-administered medications for this visit.        SOCIAL HABITS:    Tobacco Use      Smoking status: Never      Smokeless tobacco: Never       Alcohol Use: Not on file       History   Drug Use Not on file        FAMILY HISTORY:  No significant family hx       REVIEW OF SYSTEMS:    A 12 point ROS was reviewed and except for what is listed in the HPI above, all others are negative    PE:  BP (!) 163/97 (BP Location: Right arm, Patient Position: Sitting, Cuff Size: Adult Regular)   Pulse 86   SpO2 100%    0 lbs 0 oz   There is no height or weight on file to calculate BMI.      EXAM:  GENERAL: This is a well-developed.49 year old male who appears his  stated age  EYES: Grossly normal.  MOUTH: Buccal mucosa normal   CARDIAC:   Regular rate by radial pulse, hypertensive, not tachycardic  CHEST/LUNG:   Breathing unlabored on RA. Pain with palpation over L shoulder with muscle knot noted  GASTROINTESINAL (ABDOMEN): Nontender, nondistended, soft    MUSCULOSKELETAL: Grossly normal and both lower extremities are intact.  HEME/LYMPH: No lymphedema  NEUROLOGIC: Focally intact, Alert and oriented x 3.   PSYCH: appropriate affect  INTEGUMENT: No open lesions or ulcers  Pulse Exam:   Palpable radial and PT pulses bilaterally          DIAGNOSTIC STUDIES:     Images:  CTA Chest Abdomen Pelvis w Contrast    Result Date: 5/19/2024  Exam: Computed tomographic angiography of the chest, abdomen and pelvis without and with contrast including 3D reformations dated 5/17/2024 11:18 AM Clinical information: Dissection of aorta, unspecified portion. Status post thoracic aortic stent graft. History aortic repair and dissection. Technique: Axial images through the chest and abdomen obtained before the administration of intravenous contrast media and following the injection of contrast media  in the arterial phase. Source images reviewed as well as 3D and multi-planar reconstructions at a 3D workstation. Contrast: Isovue 370 90cc DLP: 2313 mGy*cm Comparison: 2/27/2024. Findings:  Ascending aortic graft patent. Endograft unchanged in position in the true lumen from the left subclavian artery to the level of T7. Dissection extends through the left subclavian origin. Linear defect extending to the axillary artery. Left subclavian artery true lumen is compressed to 4 mm. Left internal thoracic artery originates from the false lumen. Left vertebral artery originates from the true lumen. 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 Dissection ends above the celiac origin. Distal fenestration in the dissection, there is perfusion of the false  lumen, with continued extension as described below. Thoracic aortic diameters:   Sinuses of Valsalva: 3.7 x 3.7 cm   Sinotubular ridge: 3.2 x 3.3 cm   Ascending aorta:  3.2 x 3.5 cm   Arch: 3.3 x 3.1 cm (including the true and false lumen)   Proximal descending thoracic aorta: 3.8 x 4.7 cm (including the true and false lumen), previously 4.1 cm and 3.9 cm on 1/30/2024   Mid descending thoracic aorta: 3.3 x 3.7 cm   Descending thoracic aorta at the diaphragm: 3.5 x 3.6 cm   Infrarenal abdominal aorta: 2.2 x 2.3 cm   Bifurcation: 2.2 x 1.9 cm. There is normal branching pattern of the great vessels. The proximal pulmonary vasculature  appears normal. The celiac axis, SMA and NILAM are patent  . The renal arteries are patent bilaterally. Chest and abdomen: No hilar, mediastinal or axillary lymphadenopathy is seen. No focal parenchymal abnormalities are identified. The liver, adrenal glands, pancreas, show no focal abnormalities. Bilateral simple renal cysts. Median sternotomy wires are intact. Cholelithiasis without evidence of cholecystitis. Diverticulosis without evidence of diverticulitis.     Impression: 1. Expanding thoracic aorta. Proximal descending aorta including true and false lumens measuring up to 4.7 cm, previously 4.1 cm on 2/1 7/24 and 3.9 cm on 2/15/2024 . 2. Patent ascending graft which is unchanged in position 3. Dissection is unchanged in configuration. [Access Center: Expanding thoracic aorta. Proximal descending aorta including true and false lumens measuring up to 4.7 cm, previously 4.1 cm on 2/27/24 and 3.9 cm on 2/15/2024 .] This report will be copied to the [facility] to ensure a provider acknowledges the finding. Access Center is available Monday through Friday 8am-3:30 pm. I have personally reviewed the examination and initial interpretation and I agree with the findings. SUHAS DEJESUS MD   SYSTEM ID:  [id number]       I personally reviewed the images and my interpretation is  expansion of false lumen via retrograde flow from distal aorta beyond dissection as well as from L brachial artery (distal extension of dissection). Proximal descending thoracic aorta now measuring 4.7-4.8 cm, previously 4.1-4.2 cm.    LABS:      Sodium   Date Value Ref Range Status   02/27/2024 138 135 - 145 mmol/L Final     Comment:     Reference intervals for this test were updated on 09/26/2023 to more accurately reflect our healthy population. There may be differences in the flagging of prior results with similar values performed with this method. Interpretation of those prior results can be made in the context of the updated reference intervals.    02/15/2024 133 (L) 135 - 145 mmol/L Final     Comment:     Reference intervals for this test were updated on 09/26/2023 to more accurately reflect our healthy population. There may be differences in the flagging of prior results with similar values performed with this method. Interpretation of those prior results can be made in the context of the updated reference intervals.    02/04/2024 134 (L) 135 - 145 mmol/L Final     Comment:     Reference intervals for this test were updated on 09/26/2023 to more accurately reflect our healthy population. There may be differences in the flagging of prior results with similar values performed with this method. Interpretation of those prior results can be made in the context of the updated reference intervals.      Urea Nitrogen   Date Value Ref Range Status   02/27/2024 22.4 (H) 6.0 - 20.0 mg/dL Final   02/15/2024 19.5 6.0 - 20.0 mg/dL Final   02/04/2024 14.5 6.0 - 20.0 mg/dL Final   08/20/2022 16 7 - 30 mg/dL Final     Hemoglobin   Date Value Ref Range Status   02/27/2024 10.5 (L) 13.3 - 17.7 g/dL Final   02/15/2024 7.8 (L) 13.3 - 17.7 g/dL Final   02/15/2024 9.2 (L) 13.3 - 17.7 g/dL Final     Platelet Count   Date Value Ref Range Status   02/27/2024 361 150 - 450 10e3/uL Final   02/15/2024 273 150 - 450 10e3/uL Final    02/15/2024 388 150 - 450 10e3/uL Final     INR   Date Value Ref Range Status   02/27/2024 1.26 (H) 0.85 - 1.15 Final   02/15/2024 1.38 (H) 0.85 - 1.15 Final   02/15/2024 1.33 (H) 0.85 - 1.15 Final     60 minutes spent on the day of encounter doing chart review, history and exam, documentation, and further activities as noted.     Jorge Dodge MD  Vascular Surgery Resident     Attestation signed by Artur Singh MD at 6/3/2024  8:14 AM:  Vascular & Endovascular Surgery Supervisory Return Note  [facility]    Patient seen and examined with Dr. Dodge, my vascular surgery resident.  I corroborated the history and reperformed physical examination.  Agree with findings as documented in their note with the exception as documented below.    Valdo Lyons  Medical Record #:  [id number]  YOB: 1975  Age:  49 year old     Date of Service: 5/29/2024    Referring Provider: Referred Self  Primary Care Provider: Marjorie Mercedes    Chief Complaint/Reason for Visit: Type B [0-5] Dissection with retrograde ascending intramural hematoma now status post stented hemiarch replacement with antegrade TEVAR in frozen elephant trunk configuration with post dissection thoracic aneurysm    HPI  In brief, Mr. Lyons is a pleasant 49 year old male seen today for follow up.  Unfortunately over the last few weeks he has had issues with his blood pressure. Now running in the 160s or greater after months of being 100-120s.  He has back pain as well over this time    CV risk factors include Type B dissection status post stented ascending hemiarch replacement, hypertension, TIA with multifocal subacute infarcts/microhemorrhages on MRI, left internal jugular non occlusive DVT. He is not a smoker.    Relevant surgical history:  - Stented Ascending Hemiarch replacement with antegrade TEVAR in frozen elephant trunk configuration with [place] 37x100 cTAG thoracic stent graft in zone 3-4.  Right common femoral artery access, IVUS, thoracic and abdominal aortography (Dr. Shaw and myself - 1/23/2024)     Physical Examination:  BP (!) 163/97 (BP Location: Right arm, Patient Position: Sitting, Cuff Size: Adult Regular)   Pulse 86   SpO2 100%   Wt Readings from Last 1 Encounters:   06/03/24 76.6 kg (168 lb 14 oz)     There is no height or weight on file to calculate BMI.    Exam:  General: No apparent distress. Answers questions appropriately   Neurologic: Focally intact, Alert and oriented x 3.   Eyes: Grossly normal.   Cardiac:  RRR, hypertensive  Pulmonary:  Non labored breathing with normal respiratory effort  Abdominal: Soft, non distended, non tender to palpation.  No pulsatile abdominal mass.   Musculoskeletal/Extremity: 5/5 gross upper and lower extremity strength and gross sensation.  Tenderness to palpation over left sided paraspinous muscles    Vascular Exam:     Radial: Left: 2+    Femoral: Left: 2+   Right: 2+    Laboratory Findings:  None    Imaging:  I personally reviewed the CTA from 5/17/2024 and compared to that from 2/27/2024 which shows stable post operative changes from ascending hemiarch replacement and TEVAR.  Rapid expansion of the 48 mm from 41 mm.  He has had change in the morphology of the distal re-entry in zone 5.  The has retrograde entry flow from the subclavian as well.    Impression/Shared Medical Decision Making:    #1- Type B [0-5] Dissection with retrograde ascending intramural hematoma now status post stented hemiarch replacement with antegrade TEVAR in frozen elephant trunk configuration, Dr. Shaw and myself 1/23/2024  #2- Rapidly expanding descending thoracic post-dissection aneurysm  #2- Hypertension  #3- TIA, evidence of previous subacute strokes on MRI from 2/15/2024  Mr. Lyons is a pleasant 49 year old male evaluated for follow up.  During this visit, he has new back pain and recent loss of control of blood pressure.  His CTA shows expansion of his post  dissection aneurysm.    Risks, benefits, and alternatives including but not limited to the risk of death, anesthetic or cardiopulmonary complications, bleeding, infection, myocardial infarction, stroke, renal insufficiency requiring temporary or permanent dialysis, bowel infarction necessitating bowel resection and colostomy, vessel injury, dissection, distal embolization, perforation, worsening ischemia with either compartment syndrome or limb loss and spinal cord injury.  Discussed possibility of interval aortic events including rupture.  Discussed the potential need for bank blood products, advanced directives, and the need for a team approach as well as the potential for medical photography. The patient and his Mother understand the risks and would like to proceed with     Mr. Lyons and his mother are in agreement with this plan as outlined.    Recommendations/Plan:  We will admit urgently given rapid expansion of his aneurysm, poor blood pressure control, and back pain which may be msk vs aortic related.  Tentatively plan for aortic reconstruction on 5/31 after medical optimization  CTA tonight for preoperative planning and identifying any high risk aortic changes over the last two weeks.    40 minutes spent on the day of encounter doing chart review from our system and care everywhere, history and exam, documentation, coordinating care, and further activities as noted with over half spent counseling.    Pt c/o chest pain w/ shoulder pain for ~2 weeks per provider. Note BP is poorly controlled as well. AAA expanding. Provider recommended pt proceed to [facility] for direct admit to unit 4E. Pt accepted for direct admission per patient placement. Dr Dodge called and gave report to inpatient team. Non-emergency transport called - approx 1 hr wait until they would arrive. Pt elected to go to [place] in private vehicle with garfield Peterson. Provider notified and aware.       Again, thank you for allowing  me to participate in the care of your patient.      Sincerely,    Artur Singh MD

## 2024-05-29 NOTE — PROCEDURES
[facility]    Arterial line placement    Date/Time: 5/29/2024 5:24 PM    Performed by: Edda Winn MD  Authorized by: Edda Winn MD      UNIVERSAL PROTOCOL   Site Marked: Yes  Prior Images Obtained and Reviewed:  Yes  Required items: Required blood products, implants, devices and special equipment available    Patient identity confirmed:  Verbally with patient, provided demographic data and hospital-assigned identification number  Patient was reevaluated immediately before administering moderate or deep sedation or anesthesia  Confirmation Checklist:  Patient's identity using two indicators, relevant allergies, procedure was appropriate and matched the consent or emergent situation and correct equipment/implants were available  Time out: Immediately prior to the procedure a time out was called    Universal Protocol: the Joint Commission Universal Protocol was followed    Preparation: Patient was prepped and draped in usual sterile fashion    ESBL (mL):  3  Indication:  hemodynamic monitoring  Location:  Right radial       ANESTHESIA    Anesthesia:  Local infiltration  Local Anesthetic:  Lidocaine 1% without epinephrine  Anesthetic Total (mL):  2      SEDATION    Patient Sedated: No      PROCEDURE DETAILS  Gildardo's Test Normal?: Gildardo's test not abnormal  Needle Gauge:  20  Seldinger technique: Seldinger technique used    Number of Attempts:  1  Post-procedure:  Line sutured and dressing applied  CMS: normal    PROCEDURE  Describe Procedure: Right radial line placed under ultrasound guidance with no complications. Line sutured with 1-0 silk. Dressing applied.  Patient Tolerance:  Patient tolerated the procedure well with no immediate complications  Length of time physician/provider present for 1:1 monitoring during sedation: 10   Dr. Yvonne Chen, SICU Attending was available during the procedure.      Edda Winn MD, FACS  Surgical Critical  Care Fellow

## 2024-05-29 NOTE — PROGRESS NOTES
VASCULAR SURGERY OUTPATIENT VISIT    VASCULAR SURGEON: Dr. Singh    LOCATION:  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 Concern now for refractory pain and HTN which could be related to his rapidly enlarging descending thoracic aorta. He will require extension of his current TEVAR, possibly both proximally and distally. We feel that this should be done urgently with plans for impulse control in the ICU.    RECOMMENDATION:      - Admission to [facility] for impulse control  - Plan for extension of TEVAR distally to stop retrograde flow into the false lumen as well as TBE vs subclavian intervention to address retrograde flow from brachial artery into false lumen, likely OR 5/31    HPI:  Valdo Lyons  is a 49 year old  male who was seen today for follow-up after stented hemiarch procedure on 1/23/2024. Since discharge, he has had L shoulder pain, however this has worsened over the last 2 weeks since he exerted himself doing lawn work. His blood pressure has been poorly controlled over the last several weeks, mostly around 160/100 despite taking his carvedilol and amlodipine as prescribed. Prior to this pain starting, his blood pressure was much better, mostly around 120s systolic. He has also had worsening dizziness with any walking and he has weakness in his legs, requiring him to sit down after only 20-30 feet. He denies any loss  of motor or sensory function when this occurs. He has occasional darkening of peripheral vision as well when this occurs.    His pain over his left shoulder is reproducible with palpation and he does have a knot there. This pain radiates to the opposite shoulder. He has some improvement in pain with position changes at night, but these are usually short-lived for less than an hour. He also has chest pain and back pain, though not as severe as his shoulder pain. He is frustrated because his pain is significant and he has been unable to sleep more than 2-3 hours per day for several weeks.    PHH:    Past Medical History:   Diagnosis Date    Dissecting aneurysm of thoracic aorta, [place] type B (H)     HTN (hypertension)          Past Surgical History:   Procedure Laterality Date    ENDOVASCULAR REPAIR ANEURYSM THORACIC AORTIC N/A 1/23/2024    Procedure: Antegrade Thoracic Endovascular Aortic Repair (TEVAR) with [place] Conformable Thoracic Stent Graft 37mm x 10cm. Intravascular Ultrasound (IVUS), Aortography, Right common femoral access;  Surgeon: Artur Singh MD;  Location: [facility] OR    IR OR ANGIOGRAM  1/23/2024    PICC DOUBLE LUMEN PLACEMENT Right 01/16/2024    5FR DL PICC, basilic vein. L-43cm, 1cm out.    REPAIR ANEURYSM ASCENDING AORTA N/A 1/23/2024    Procedure: MEDIAN STERNOTOMY, RIGHT AXILLARY CUTDOWN, ON CARDIOPULMONARY BYPASS (CIRCULATORY ARREST), ASCENDING AORTA ANEURYSM REPAIR (Gel[place]ave 30mm), INTRAOPERATIVE TRANSESOPHAGEAL ECHOCARDIOGRAM BY ANESTHESIA;  Surgeon: Addi Shaw MD;  Location: [facility] OR        ALLERGIES:     Allergies   Allergen Reactions    Rosuvastatin Muscle Pain (Myalgia)     Took the statin for one month and then could not tolerate side effects.  Myalgias resolved within 2 days of stopping the medication        MEDS:    Current Outpatient Medications   Medication Sig Dispense Refill    aspirin (ASA) 81 MG chewable tablet 1 tablet (81 mg) by Oral or NG Tube route daily 90  tablet 0    carvedilol (COREG) 25 MG tablet Take 1 tablet (25 mg) by mouth 2 times daily 60 tablet 0    methocarbamol (ROBAXIN) 750 MG tablet Take 1 tablet (750 mg) by mouth every 6 hours as needed for muscle spasms 28 tablet 1    acetaminophen (TYLENOL) 325 MG tablet Take 3 tablets (975 mg) by mouth every 6 hours as needed for mild pain (Patient not taking: Reported on 5/29/2024) 100 tablet 0    amLODIPine (NORVASC) 5 MG tablet Take 1 tablet (5 mg) by mouth 2 times daily (Patient not taking: Reported on 5/29/2024) 60 tablet 1    apixaban ANTICOAGULANT (ELIQUIS) 5 MG tablet Take 1 tablet (5 mg) by mouth 2 times daily (Patient not taking: Reported on 5/29/2024) 180 tablet 3    oxyCODONE (ROXICODONE) 5 MG tablet Take 1 tablet (5 mg) by mouth every 8 hours as needed for moderate to severe pain (Patient not taking: Reported on 2/27/2024) 10 tablet 0    pantoprazole (PROTONIX) 40 MG EC tablet Take 1 tablet (40 mg) by mouth every morning (before breakfast) (Patient not taking: Reported on 2/27/2024) 14 tablet 0     No current facility-administered medications for this visit.        SOCIAL HABITS:    Tobacco Use      Smoking status: Never      Smokeless tobacco: Never       Alcohol Use: Not on file       History   Drug Use Not on file        FAMILY HISTORY:  No significant family hx       REVIEW OF SYSTEMS:    A 12 point ROS was reviewed and except for what is listed in the HPI above, all others are negative    PE:  BP (!) 163/97 (BP Location: Right arm, Patient Position: Sitting, Cuff Size: Adult Regular)   Pulse 86   SpO2 100%    0 lbs 0 oz   There is no height or weight on file to calculate BMI.     EXAM:  GENERAL: This is a well-developed.49 year old male who appears his  stated age  EYES: Grossly normal.  MOUTH: Buccal mucosa normal   CARDIAC:   Regular rate by radial pulse, hypertensive, not tachycardic  CHEST/LUNG:   Breathing unlabored on RA. Pain with palpation over L shoulder with muscle knot  noted  GASTROINTESINAL (ABDOMEN): Nontender, nondistended, soft    MUSCULOSKELETAL: Grossly normal and both lower extremities are intact.  HEME/LYMPH: No lymphedema  NEUROLOGIC: Focally intact, Alert and oriented x 3.   PSYCH: appropriate affect  INTEGUMENT: No open lesions or ulcers  Pulse Exam:   Palpable radial and PT pulses bilaterally          DIAGNOSTIC STUDIES:     Images:  CTA Chest Abdomen Pelvis w Contrast    Result Date: 5/19/2024  Exam: Computed tomographic angiography of the chest, abdomen and pelvis without and with contrast including 3D reformations dated 5/17/2024 11:18 AM Clinical information: Dissection of aorta, unspecified portion. Status post thoracic aortic stent graft. History aortic repair and dissection. Technique: Axial images through the chest and abdomen obtained before the administration of intravenous contrast media and following the injection of contrast media  in the arterial phase. Source images reviewed as well as 3D and multi-planar reconstructions at a 3D workstation. Contrast: Isovue 370 90cc DLP: 2313 mGy*cm Comparison: 2/27/2024. Findings:  Ascending aortic graft patent. Endograft unchanged in position in the true lumen from the left subclavian artery to the level of T7. Dissection extends through the left subclavian origin. Linear defect extending to the axillary artery. Left subclavian artery true lumen is compressed to 4 mm. Left internal thoracic artery originates from the false lumen. Left vertebral artery originates from the true lumen. 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 Dissection ends above the celiac origin. Distal fenestration in the dissection, there is perfusion of the false lumen, with continued extension as described below. Thoracic aortic diameters:   Sinuses of Valsalva: 3.7 x 3.7 cm   Sinotubular ridge: 3.2 x 3.3 cm   Ascending aorta:  3.2 x 3.5 cm   Arch: 3.3 x 3.1 cm (including the true and false lumen)   Proximal descending thoracic aorta: 3.8 x 4.7 cm (including the true  and false lumen), previously 4.1 cm and 3.9 cm on 1/30/2024   Mid descending thoracic aorta: 3.3 x 3.7 cm   Descending thoracic aorta at the diaphragm: 3.5 x 3.6 cm   Infrarenal abdominal aorta: 2.2 x 2.3 cm   Bifurcation: 2.2 x 1.9 cm. There is normal branching pattern of the great vessels. The proximal pulmonary vasculature  appears normal. The celiac axis, SMA and NILAM are patent  . The renal arteries are patent bilaterally. Chest and abdomen: No hilar, mediastinal or axillary lymphadenopathy is seen. No focal parenchymal abnormalities are identified. The liver, adrenal glands, pancreas, show no focal abnormalities. Bilateral simple renal cysts. Median sternotomy wires are intact. Cholelithiasis without evidence of cholecystitis. Diverticulosis without evidence of diverticulitis.     Impression: 1. Expanding thoracic aorta. Proximal descending aorta including true and false lumens measuring up to 4.7 cm, previously 4.1 cm on 2/1 7/24 and 3.9 cm on 2/15/2024 . 2. Patent ascending graft which is unchanged in position 3. Dissection is unchanged in configuration. [Access Center: Expanding thoracic aorta. Proximal descending aorta including true and false lumens measuring up to 4.7 cm, previously 4.1 cm on 2/27/24 and 3.9 cm on 2/15/2024 .] This report will be copied to the [facility] to ensure a provider acknowledges the finding. Access Center is available Monday through Friday 8am-3:30 pm. I have personally reviewed the examination and initial interpretation and I agree with the findings. SUHAS DEJESUS MD   SYSTEM ID:  [id number]       I personally reviewed the images and my interpretation is expansion of false lumen via retrograde flow from distal aorta beyond dissection as well as from L brachial artery (distal extension of dissection). Proximal descending thoracic aorta now measuring 4.7-4.8 cm, previously 4.1-4.2 cm.    LABS:      Sodium   Date Value Ref Range Status   02/27/2024 138 135 - 145 mmol/L  Final     Comment:     Reference intervals for this test were updated on 09/26/2023 to more accurately reflect our healthy population. There may be differences in the flagging of prior results with similar values performed with this method. Interpretation of those prior results can be made in the context of the updated reference intervals.    02/15/2024 133 (L) 135 - 145 mmol/L Final     Comment:     Reference intervals for this test were updated on 09/26/2023 to more accurately reflect our healthy population. There may be differences in the flagging of prior results with similar values performed with this method. Interpretation of those prior results can be made in the context of the updated reference intervals.    02/04/2024 134 (L) 135 - 145 mmol/L Final     Comment:     Reference intervals for this test were updated on 09/26/2023 to more accurately reflect our healthy population. There may be differences in the flagging of prior results with similar values performed with this method. Interpretation of those prior results can be made in the context of the updated reference intervals.      Urea Nitrogen   Date Value Ref Range Status   02/27/2024 22.4 (H) 6.0 - 20.0 mg/dL Final   02/15/2024 19.5 6.0 - 20.0 mg/dL Final   02/04/2024 14.5 6.0 - 20.0 mg/dL Final   08/20/2022 16 7 - 30 mg/dL Final     Hemoglobin   Date Value Ref Range Status   02/27/2024 10.5 (L) 13.3 - 17.7 g/dL Final   02/15/2024 7.8 (L) 13.3 - 17.7 g/dL Final   02/15/2024 9.2 (L) 13.3 - 17.7 g/dL Final     Platelet Count   Date Value Ref Range Status   02/27/2024 361 150 - 450 10e3/uL Final   02/15/2024 273 150 - 450 10e3/uL Final   02/15/2024 388 150 - 450 10e3/uL Final     INR   Date Value Ref Range Status   02/27/2024 1.26 (H) 0.85 - 1.15 Final   02/15/2024 1.38 (H) 0.85 - 1.15 Final   02/15/2024 1.33 (H) 0.85 - 1.15 Final     60 minutes spent on the day of encounter doing chart review, history and exam, documentation, and further activities as  noted.     Jorge Dodge MD  Vascular Surgery Resident

## 2024-05-29 NOTE — PHARMACY-ADMISSION MEDICATION HISTORY
Pharmacist Admission Medication History    Admission medication history is complete. The information provided in this note is only as accurate as the sources available at the time of the update.    Information Source(s): Patient via in-person    Pertinent Information: Patient stats that he has been taking APAP and Robaxin around the clock to help with his pain. He also stated he just finished taking a 3 month course of apixaban for a provoked clot and is no longer taking it. He took all his medications before coming into the hospital this morning.     Changes made to PTA medication list:  Added: None  Deleted:   Apixaban   Oxycodone  Changed: None    Allergies reviewed with patient and updates made in EHR: no    Medication History Completed By: PAUL ALMARAZ [facility] 5/29/2024 3:51 PM    PTA Med List   Medication Sig Last Dose    acetaminophen (TYLENOL) 325 MG tablet Take 3 tablets (975 mg) by mouth every 6 hours as needed for mild pain 5/29/2024 at 0800    amLODIPine (NORVASC) 5 MG tablet Take 1 tablet (5 mg) by mouth 2 times daily 5/29/2024 at 0800    aspirin (ASA) 81 MG chewable tablet 1 tablet (81 mg) by Oral or NG Tube route daily 5/29/2024 at 0800    carvedilol (COREG) 25 MG tablet Take 1 tablet (25 mg) by mouth 2 times daily 5/29/2024 at 0800    methocarbamol (ROBAXIN) 750 MG tablet Take 1 tablet (750 mg) by mouth every 6 hours as needed for muscle spasms 5/29/2024 at 0800

## 2024-05-29 NOTE — H&P
VASCULAR SURGERY ADMISSION NOTE    VASCULAR SURGEON: Dr. Singh    LOCATION:  [facility]    DATE OF SERVICE: 5/29/2024    REASON FOR ADMISSION:    Enlarging descending aorta in the setting of type B[0,5] dissection  Hypertensive emeregency    HPI:  Valdo Lyons is a 49 year old male who was seen today in clinic for follow-up for type B dissection (s/p hemiarch and TEVAR).    He was found to have left shoulder/chest/back pain, worsening progressively for 2 weeks and uncontrolled hypertension.    He is now in ICU, reporting pain has subsided quite well after oxy and when he is in good position. No leg/arm pain/numbness/weakness. No abdominal pain.    REVIEW OF SYSTEMS:    A 12 point ROS was reviewed and except for what is listed in the HPI above, all others are negative    PHH:    Past Medical History:   Diagnosis Date    Dissecting aneurysm of thoracic aorta, [place] type B (H)     HTN (hypertension)         Past Surgical History:   Procedure Laterality Date    ENDOVASCULAR REPAIR ANEURYSM THORACIC AORTIC N/A 1/23/2024    Procedure: Antegrade Thoracic Endovascular Aortic Repair (TEVAR) with [place] Conformable Thoracic Stent Graft 37mm x 10cm. Intravascular Ultrasound (IVUS), Aortography, Right common femoral access;  Surgeon: Artur Singh MD;  Location: [facility] OR    IR OR ANGIOGRAM  1/23/2024    PICC DOUBLE LUMEN PLACEMENT Right 01/16/2024    5FR DL PICC, basilic vein. L-43cm, 1cm out.    REPAIR ANEURYSM ASCENDING AORTA N/A 1/23/2024    Procedure: MEDIAN STERNOTOMY, RIGHT AXILLARY CUTDOWN, ON CARDIOPULMONARY BYPASS (CIRCULATORY ARREST), ASCENDING AORTA ANEURYSM REPAIR ([place]ave 30mm), INTRAOPERATIVE TRANSESOPHAGEAL ECHOCARDIOGRAM BY ANESTHESIA;  Surgeon: Addi Shaw MD;  Location: [facility] OR       ALLERGIES:    Allergies   Allergen Reactions    Rosuvastatin Muscle Pain (Myalgia)     Took the statin for one month and then could not tolerate side effects.  Myalgias resolved within 2 days of stopping the  medication       MEDS:    Current Facility-Administered Medications:     acetaminophen (TYLENOL) tablet 650 mg, 650 mg, Oral, Q4H PRN **OR** acetaminophen (TYLENOL) oral liquid 650 mg, 650 mg, Per NG tube, Q4H PRN, Onesimo Sanders MD    amLODIPine (NORVASC) tablet 5 mg, 5 mg, Oral, BID, Onesimo Sanders MD    bisacodyl (DULCOLAX) EC tablet 5 mg, 5 mg, Oral, Daily PRN **OR** bisacodyl (DULCOLAX) EC tablet 10 mg, 10 mg, Oral, Daily PRN **OR** bisacodyl (DULCOLAX) EC tablet 15 mg, 15 mg, Oral, Daily PRN, Onesimo Sanders MD    carvedilol (COREG) tablet 25 mg, 25 mg, Oral, BID w/meals, Onesimo Sanders MD    clevidipine (CLEVIPREX) 0.5 mg/mL infusion 100 mL, 0-16 mg/hr, Intravenous, Continuous, Onesimo Sanders MD, Last Rate: 20 mL/hr at 05/29/24 1700, 10 mg/hr at 05/29/24 1700    glucose gel 15-30 g, 15-30 g, Oral, Q15 Min PRN **OR** dextrose 50 % injection 25-50 mL, 25-50 mL, Intravenous, Q15 Min PRN **OR** glucagon injection 1 mg, 1 mg, Subcutaneous, Q15 Min PRN, Onesimo Sanders MD    esmolol 20 mg/mL (BREVIBLOC) infusion 100 mL,  mcg/kg/min, Intravenous, Continuous, Onesimo Sanders MD, Last Rate: 33.3 mL/hr at 05/29/24 1709, 150 mcg/kg/min at 05/29/24 1709    HYDROmorphone (DILAUDID) injection 0.2 mg, 0.2 mg, Intravenous, Q2H PRN, Onesimo Sanders MD, 0.2 mg at 05/29/24 1616    IF patient diabetic - HOLD: ALL ORAL HYPOGLYCEMICS: glipizide, glyburide, glimepiride, gliclazide, metformin (Glucophage), any metformin (Glucophage) containing medication, rosiglitazone (Avandia), pioglitazone (Actos), or sitagliptin (Januvia) on day of the procedure, , Does not apply, HOLD, Anastacia Spann MD    naloxone (NARCAN) injection 0.2 mg, 0.2 mg, Intravenous, Q2 Min PRN **OR** naloxone (NARCAN) injection 0.4 mg, 0.4 mg, Intravenous, Q2 Min PRN **OR** naloxone (NARCAN) injection 0.2 mg, 0.2 mg, Intramuscular, Q2 Min PRN **OR** naloxone (NARCAN) injection 0.4 mg, 0.4 mg, Intramuscular, Q2 Min PRN,  "Artur Singh MD    ondansetron (ZOFRAN ODT) ODT tab 4 mg, 4 mg, Oral, Q6H PRN **OR** ondansetron (ZOFRAN) injection 4 mg, 4 mg, Intravenous, Q6H PRN, Onesimo Sanders MD    oxyCODONE (ROXICODONE) tablet 5-10 mg, 5-10 mg, Oral, Q4H PRN, Juanpablo Benitez MD, 10 mg at 05/29/24 1535    SOCIAL HABITS:   Social History    Substance and Sexual Activity      Alcohol use: Not on file      History   Drug Use Not on file      History   Smoking Status    Never   Smokeless Tobacco    Never        FAMILY HISTORY:  No family history on file.    PE:  /67   Pulse 71   Temp 98.1  F (36.7  C)   Resp 15   Ht 1.727 m (5' 8\")   Wt 73.9 kg (163 lb)   SpO2 98%   BMI 24.78 kg/m    Wt Readings from Last 1 Encounters:   05/29/24 73.9 kg (163 lb)     Body mass index is 24.78 kg/m .    EXAM:  GENERAL: This is a well-developed 49 year old male who appears his stated age  EYES: Grossly normal.  CARDIAC:  Warm, well-perfused, RRR   VASCULAR: Palp bilateral radials, femoral, and DP/PT pulses  ABDOMEN: Soft, non-tender, no pulsatile mass  MUSCULOSKELETAL: Grossly normal and both lower extremities are intact. Tender to palpate medial to the left scapula between the scapula and vertebra  NEUROLOGIC: Bilateral upper/lower extremity strength 5/5, sensation intact to light touch and equal bilaterally  PSYCH: appropriate affect  INTEGUMENT: Well-healed midline sternotomy    IMPRESSION:  50 yo male with known type B[0,5] dissection who had hemiarch and TEVAR on 1/23/2024. Now with enlargement of descending thoracic aorta from 4.1 cm to 4.7 cm in the setting of left back/shoulder pain with hypertensive emergency.    Plan:    - Admission to ICU  - Anti-impulse therapy -- SBP goal <120 and HR<70  - Will need to have STAT CTA  - Let us know with any sudden changes in hemodynamics or any concerning features    Discussed with staff, Dr. Francisco Spann MD  Vascular Surgery Fellow    "

## 2024-05-30 ENCOUNTER — ANESTHESIA EVENT (OUTPATIENT)
Dept: SURGERY | Facility: CLINIC | Age: 49
End: 2024-05-30
Payer: COMMERCIAL

## 2024-05-30 ENCOUNTER — PREP FOR PROCEDURE (OUTPATIENT)
Dept: SURGERY | Facility: CLINIC | Age: 49
End: 2024-05-30
Payer: COMMERCIAL

## 2024-05-30 ENCOUNTER — APPOINTMENT (OUTPATIENT)
Dept: CARDIOLOGY | Facility: CLINIC | Age: 49
End: 2024-05-30
Attending: SURGERY
Payer: COMMERCIAL

## 2024-05-30 ENCOUNTER — PREP FOR PROCEDURE (OUTPATIENT)
Dept: VASCULAR SURGERY | Facility: CLINIC | Age: 49
End: 2024-05-30
Payer: COMMERCIAL

## 2024-05-30 DIAGNOSIS — I71.23 ANEURYSM OF DESCENDING THORACIC AORTA WITHOUT RUPTURE (H): Primary | ICD-10-CM

## 2024-05-30 DIAGNOSIS — I71.012 DISSECTING ANEURYSM OF THORACIC AORTA, STANFORD TYPE B (H): ICD-10-CM

## 2024-05-30 LAB
ABO/RH(D): NORMAL
ANTIBODY SCREEN: NEGATIVE
GLUCOSE BLDC GLUCOMTR-MCNC: 95 MG/DL (ref 70–99)
LVEF ECHO: NORMAL
MAGNESIUM SERPL-MCNC: 2 MG/DL (ref 1.7–2.3)
PHOSPHATE SERPL-MCNC: 3.4 MG/DL (ref 2.5–4.5)
POTASSIUM SERPL-SCNC: 4.3 MMOL/L (ref 3.4–5.3)
RADIOLOGIST FLAGS: ABNORMAL
SPECIMEN EXPIRATION DATE: NORMAL

## 2024-05-30 PROCEDURE — 84100 ASSAY OF PHOSPHORUS: CPT | Performed by: SURGERY

## 2024-05-30 PROCEDURE — 250N000009 HC RX 250: Performed by: SURGERY

## 2024-05-30 PROCEDURE — 99231 SBSQ HOSP IP/OBS SF/LOW 25: CPT | Performed by: NURSE PRACTITIONER

## 2024-05-30 PROCEDURE — 258N000003 HC RX IP 258 OP 636: Performed by: SURGERY

## 2024-05-30 PROCEDURE — 36620 INSERTION CATHETER ARTERY: CPT | Mod: GC

## 2024-05-30 PROCEDURE — 84132 ASSAY OF SERUM POTASSIUM: CPT | Performed by: SURGERY

## 2024-05-30 PROCEDURE — C9248 INJ, CLEVIDIPINE BUTYRATE: HCPCS | Mod: JZ | Performed by: STUDENT IN AN ORGANIZED HEALTH CARE EDUCATION/TRAINING PROGRAM

## 2024-05-30 PROCEDURE — 250N000013 HC RX MED GY IP 250 OP 250 PS 637: Performed by: SURGERY

## 2024-05-30 PROCEDURE — 93306 TTE W/DOPPLER COMPLETE: CPT | Mod: 26 | Performed by: INTERNAL MEDICINE

## 2024-05-30 PROCEDURE — 99291 CRITICAL CARE FIRST HOUR: CPT | Mod: GC | Performed by: SURGERY

## 2024-05-30 PROCEDURE — C8929 TTE W OR WO FOL WCON,DOPPLER: HCPCS

## 2024-05-30 PROCEDURE — 250N000011 HC RX IP 250 OP 636: Performed by: STUDENT IN AN ORGANIZED HEALTH CARE EDUCATION/TRAINING PROGRAM

## 2024-05-30 PROCEDURE — 200N000002 HC R&B ICU UMMC

## 2024-05-30 PROCEDURE — 250N000013 HC RX MED GY IP 250 OP 250 PS 637: Performed by: NURSE PRACTITIONER

## 2024-05-30 PROCEDURE — 255N000002 HC RX 255 OP 636: Performed by: INTERNAL MEDICINE

## 2024-05-30 PROCEDURE — 83735 ASSAY OF MAGNESIUM: CPT | Performed by: SURGERY

## 2024-05-30 PROCEDURE — 250N000013 HC RX MED GY IP 250 OP 250 PS 637

## 2024-05-30 PROCEDURE — 250N000013 HC RX MED GY IP 250 OP 250 PS 637: Performed by: STUDENT IN AN ORGANIZED HEALTH CARE EDUCATION/TRAINING PROGRAM

## 2024-05-30 RX ORDER — METHOCARBAMOL 500 MG/1
1000 TABLET, FILM COATED ORAL 4 TIMES DAILY
Status: DISCONTINUED | OUTPATIENT
Start: 2024-05-30 | End: 2024-05-31

## 2024-05-30 RX ORDER — ESMOLOL HYDROCHLORIDE 20 MG/ML
50-300 INJECTION, SOLUTION INTRAVENOUS CONTINUOUS
Status: DISCONTINUED | OUTPATIENT
Start: 2024-05-30 | End: 2024-06-01

## 2024-05-30 RX ORDER — MAGNESIUM OXIDE 400 MG/1
400 TABLET ORAL EVERY 4 HOURS
Status: COMPLETED | OUTPATIENT
Start: 2024-05-30 | End: 2024-05-30

## 2024-05-30 RX ORDER — ESMOLOL HYDROCHLORIDE 20 MG/ML
50-300 INJECTION, SOLUTION INTRAVENOUS CONTINUOUS
Status: ACTIVE | OUTPATIENT
Start: 2024-05-30 | End: 2024-05-30

## 2024-05-30 RX ORDER — LIDOCAINE 4 G/G
2 PATCH TOPICAL ONCE
Status: COMPLETED | OUTPATIENT
Start: 2024-05-30 | End: 2024-05-30

## 2024-05-30 RX ADMIN — ESMOLOL HYDROCHLORIDE 250 MCG/KG/MIN: 20 INJECTION INTRAVENOUS at 23:29

## 2024-05-30 RX ADMIN — OXYCODONE HYDROCHLORIDE 5 MG: 5 TABLET ORAL at 20:56

## 2024-05-30 RX ADMIN — HYDROMORPHONE HYDROCHLORIDE 0.2 MG: 0.2 INJECTION, SOLUTION INTRAMUSCULAR; INTRAVENOUS; SUBCUTANEOUS at 11:23

## 2024-05-30 RX ADMIN — OXYCODONE HYDROCHLORIDE 5 MG: 5 TABLET ORAL at 01:07

## 2024-05-30 RX ADMIN — OXYCODONE HYDROCHLORIDE 10 MG: 5 TABLET ORAL at 16:22

## 2024-05-30 RX ADMIN — CLEVIPIDINE 4 MG/HR: 0.5 EMULSION INTRAVENOUS at 23:29

## 2024-05-30 RX ADMIN — OXYCODONE HYDROCHLORIDE 5 MG: 5 TABLET ORAL at 09:51

## 2024-05-30 RX ADMIN — CLEVIPIDINE 8 MG/HR: 0.5 EMULSION INTRAVENOUS at 12:36

## 2024-05-30 RX ADMIN — CLEVIPIDINE 10 MG/HR: 0.5 EMULSION INTRAVENOUS at 17:40

## 2024-05-30 RX ADMIN — METHOCARBAMOL 1000 MG: 500 TABLET ORAL at 11:23

## 2024-05-30 RX ADMIN — OXYCODONE HYDROCHLORIDE 5 MG: 5 TABLET ORAL at 05:13

## 2024-05-30 RX ADMIN — ESMOLOL HYDROCHLORIDE 150 MCG/KG/MIN: 20 INJECTION INTRAVENOUS at 13:24

## 2024-05-30 RX ADMIN — HUMAN ALBUMIN MICROSPHERES AND PERFLUTREN 5 ML: 10; .22 INJECTION, SOLUTION INTRAVENOUS at 09:08

## 2024-05-30 RX ADMIN — CARVEDILOL 25 MG: 25 TABLET, FILM COATED ORAL at 18:10

## 2024-05-30 RX ADMIN — ESMOLOL HYDROCHLORIDE IN SODIUM CHLORIDE 200 MCG/KG/MIN: 20 INJECTION INTRAVENOUS at 03:21

## 2024-05-30 RX ADMIN — HYDROMORPHONE HYDROCHLORIDE 0.2 MG: 0.2 INJECTION, SOLUTION INTRAMUSCULAR; INTRAVENOUS; SUBCUTANEOUS at 08:45

## 2024-05-30 RX ADMIN — HYDROMORPHONE HYDROCHLORIDE 0.2 MG: 0.2 INJECTION, SOLUTION INTRAMUSCULAR; INTRAVENOUS; SUBCUTANEOUS at 23:14

## 2024-05-30 RX ADMIN — HYDROMORPHONE HYDROCHLORIDE 0.2 MG: 0.2 INJECTION, SOLUTION INTRAMUSCULAR; INTRAVENOUS; SUBCUTANEOUS at 02:07

## 2024-05-30 RX ADMIN — ESMOLOL HYDROCHLORIDE IN SODIUM CHLORIDE 200 MCG/KG/MIN: 20 INJECTION INTRAVENOUS at 00:38

## 2024-05-30 RX ADMIN — HYDROMORPHONE HYDROCHLORIDE 0.2 MG: 0.2 INJECTION, SOLUTION INTRAMUSCULAR; INTRAVENOUS; SUBCUTANEOUS at 20:13

## 2024-05-30 RX ADMIN — HYDROMORPHONE HYDROCHLORIDE 0.2 MG: 0.2 INJECTION, SOLUTION INTRAMUSCULAR; INTRAVENOUS; SUBCUTANEOUS at 04:09

## 2024-05-30 RX ADMIN — METHOCARBAMOL 1000 MG: 500 TABLET ORAL at 20:13

## 2024-05-30 RX ADMIN — MAGNESIUM OXIDE TAB 400 MG (241.3 MG ELEMENTAL MG) 400 MG: 400 (241.3 MG) TAB at 09:06

## 2024-05-30 RX ADMIN — MAGNESIUM OXIDE TAB 400 MG (241.3 MG ELEMENTAL MG) 400 MG: 400 (241.3 MG) TAB at 06:06

## 2024-05-30 RX ADMIN — POTASSIUM PHOSPHATE, MONOBASIC AND POTASSIUM PHOSPHATE, DIBASIC 9 MMOL: 224; 236 INJECTION, SOLUTION, CONCENTRATE INTRAVENOUS at 00:25

## 2024-05-30 RX ADMIN — CLEVIPIDINE 8 MG/HR: 0.5 EMULSION INTRAVENOUS at 02:07

## 2024-05-30 RX ADMIN — METHOCARBAMOL 1000 MG: 500 TABLET ORAL at 16:15

## 2024-05-30 RX ADMIN — ACETAMINOPHEN 650 MG: 325 TABLET, FILM COATED ORAL at 04:05

## 2024-05-30 RX ADMIN — OXYCODONE HYDROCHLORIDE 5 MG: 5 TABLET ORAL at 09:10

## 2024-05-30 RX ADMIN — MAGNESIUM OXIDE TAB 400 MG (241.3 MG ELEMENTAL MG) 400 MG: 400 (241.3 MG) TAB at 04:05

## 2024-05-30 RX ADMIN — HYDROMORPHONE HYDROCHLORIDE 0.2 MG: 0.2 INJECTION, SOLUTION INTRAMUSCULAR; INTRAVENOUS; SUBCUTANEOUS at 13:21

## 2024-05-30 RX ADMIN — AMLODIPINE BESYLATE 5 MG: 5 TABLET ORAL at 09:07

## 2024-05-30 RX ADMIN — HYDROMORPHONE HYDROCHLORIDE 0.2 MG: 0.2 INJECTION, SOLUTION INTRAMUSCULAR; INTRAVENOUS; SUBCUTANEOUS at 06:06

## 2024-05-30 RX ADMIN — ESMOLOL HYDROCHLORIDE IN SODIUM CHLORIDE 150 MCG/KG/MIN: 20 INJECTION INTRAVENOUS at 10:47

## 2024-05-30 RX ADMIN — HYDROMORPHONE HYDROCHLORIDE 0.2 MG: 0.2 INJECTION, SOLUTION INTRAMUSCULAR; INTRAVENOUS; SUBCUTANEOUS at 18:13

## 2024-05-30 RX ADMIN — ESMOLOL HYDROCHLORIDE IN SODIUM CHLORIDE 100 MCG/KG/MIN: 20 INJECTION INTRAVENOUS at 07:12

## 2024-05-30 RX ADMIN — CARVEDILOL 25 MG: 25 TABLET, FILM COATED ORAL at 09:06

## 2024-05-30 ASSESSMENT — ACTIVITIES OF DAILY LIVING (ADL)
ADLS_ACUITY_SCORE: 20
DEPENDENT_IADLS:: SHOPPING
ADLS_ACUITY_SCORE: 22
ADLS_ACUITY_SCORE: 20
ADLS_ACUITY_SCORE: 20
ADLS_ACUITY_SCORE: 22
ADLS_ACUITY_SCORE: 37
ADLS_ACUITY_SCORE: 20
ADLS_ACUITY_SCORE: 37
ADLS_ACUITY_SCORE: 22
ADLS_ACUITY_SCORE: 20
ADLS_ACUITY_SCORE: 22
ADLS_ACUITY_SCORE: 37
ADLS_ACUITY_SCORE: 20

## 2024-05-30 ASSESSMENT — ENCOUNTER SYMPTOMS: SEIZURES: 1

## 2024-05-30 NOTE — PROGRESS NOTES
SURGICAL ICU PROGRESS NOTE  05/30/2024      PRIMARY TEAM: Vascular Surgery  PRIMARY PHYSICIAN: Dr. Singh     REASON FOR CRITICAL CARE ADMISSION: Rapidly expanding descending aorta in setting of Type B dissection, anti  impulse control  ADMITTING PHYSICIAN: Dr. 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 Shaw and Dr. Singh. Post-op course complicated by TIA on 2/14/24 with evidence of bilateral, multifocal subacute infarcts and microhemorrhages in both frontal and posterior distributions and left internal jugular non-occlusive DVT started on ASA and Eliquis (now discontinued). Patient admitted to SICU on 5/29 for treatment of rapidly expanding descending aorta in setting of Type B dissection and anti impulse control with plan for surgical intervention with Vascular Surgery.      PLAN:   Neuro/ pain/ sedation:  -Monitor neurological status. Notify the MD for any acute changes in exam.  -PRN dilaudid, oxycodone, Tylenol for pain.     Pulmonary care:   -Supplemental oxygen to keep saturation above 92 %.  - Incentive spirometer every 15- 30 minutes when awake.      Cardiovascular:    #Type B aortic dissection s/p stented hemiarch repair  #Expanding descending aorta  #Refractory hypertension  5/29 CTA C/A/P demonstrates 1 mm (4.7 to 4.8) interval increase in descending aorta compared to 5/17.   -Monitor hemodynamic status.   -Acute Type B aortic dissection protocol  -- Goals: SBP <120, HR <70  -- First line IV infusion: Clevidipine, Esmolol  -- First line PO medications: amlodipine 10 mg PO daily (hold only if SBP >90), carvedilol 25 mg BID (hold only if HR <50)  -No arterial access LUE due to retrograde flow from brachial artery into false lumen  -- Right radial arterial line  removed due to pain, plan to replace today  -Plan for extension of TEVAR distally to stop retrograde flow vs subclavian intervention with Vascular Surgery on 5/31        GI care:   - Ok for regular diet today  - NPO at midnight for OR on 5/31         Fluids/ Electrolytes/ Nutrition:   - IV fluid hydration PRN  -ICU electrolyte replacement protocol  -No indication for parenteral nutrition.       Renal/ Fluid Balance:    - Adequate urine output  - Will continue to monitor intake and output.         Endocrine:    -No management indication.      ID/ Antibiotics:  -No indication for antibiotics.       Heme:     -Hemoglobin stable.   - Type and screen ordered      Prophylaxis:    #Hx of TIA  #Hx of non-occlusive internal jugular DVT  - ASA and eliquis discontinue prior to admission  -Mechanical prophylaxis      MSK:    - No indication for PT/OT at this time.         Lines/ tubes/ drains:  -PIV x 2      Disposition:  -Surgical ICU.     Patient seen and discussed with staff, Dr. Brandon Benitez MD  PGY-1    ====================================    TODAY'S SUBJECTIVE/INTERVAL HISTORY:   Right arterial line replaced and removed due to causing discomfort. Patient continues to have back pain controlled with PRN pain medications.    OBJECTIVE:     Temp:  [97.9  F (36.6  C)-98.5  F (36.9  C)] 98.2  F (36.8  C)  Pulse:  [59-86] 64  Resp:  [10-24] 16  BP: ()/(48-97) 116/54  MAP:  [36 mmHg-292 mmHg] 69 mmHg  Arterial Line BP: ()/() 121/54  SpO2:  [93 %-100 %] 94 %  Resp: 16      I/O last 3 completed shifts:  In: 886.82 [P.O.:200; I.V.:686.82]  Out: 800 [Urine:800]    General: Awake, alert, mild distress due to pain  Neuro: Grossly intact, alert and oriented, follows commands, spontaneously moves all extremities  Pulm/Resp: Normal work of breathing on RA  CV: RRR  Abdomen: Soft, ntnd, no rebound tenderness or guarding  MSK/Extremities: No peripheral edema, extremities wwp    LABS:   Arterial Blood Gases   No  lab results found in last 7 days.  Complete Blood Count   Recent Labs   Lab 05/29/24  1541   WBC 10.1   HGB 10.6*        Basic Metabolic Panel  Recent Labs   Lab 05/30/24  0414 05/30/24  0412 05/29/24  1541 05/29/24  1506   NA  --   --  137  --    POTASSIUM  --  4.3 4.4  --    CHLORIDE  --   --  103  --    CO2  --   --  20*  --    BUN  --   --  15.9  --    CR  --   --  1.07  --    GLC 95  --  92 103*     Liver Function Tests  Recent Labs   Lab 05/29/24  1541   AST 23   ALT 6   ALKPHOS 76   BILITOTAL 0.6   ALBUMIN 3.7     Pancreatic Enzymes  No lab results found in last 7 days.  Coagulation Profile  No lab results found in last 7 days.      IMAGING:   Recent Results (from the past 24 hour(s))   CTA Chest Abdomen Pelvis w Contrast   Result Value    Radiologist flags Thoracic aorta continues to enlarge. 48 mm in the (Urgent)    Narrative    CTA CHEST, ABDOMEN, AND PELVIS WITH 3D AND MULTIPLANAR RECONSTRUCTIONS  5/29/2024 6:27 PM    CLINICAL HISTORY: aortic dissection. Ascending aortic repair with 30  mm Gelweave graft and placement of 37 mm thoracic aortic stent-graft  1/23/2024. Proximal descending aorta continued to enlarge over last  two CTAs.    COMPARISONS: CTA chest, abdomen, and pelvis 5/17/2024 and 2/27/2024.    REFERRING PROVIDER: GUILLE MILLER    TECHNIQUE: Unenhanced CT chest, abdomen, and pelvis. After the  uneventful administration of intravenous contrast, EKG gated CTA  chest, abdomen, and pelvis. After 3 minute delay, repeat CT through  the chest. Coronal and sagittal reconstructions were produced. Lung  MIP produced.    3D and multiplanar reconstructions were produced and reviewed.    CONTRAST: 90 mL Isovue 370    DOSE (DLP): 1310 mGy*cm    FINDINGS: CTA: Ascending aortic graft patent. No leak. Thoracic aortic  endograft in the true lumen extends from just distal to the left  subclavian artery to T7, unchanged. Endograft patent.    Dissection unchanged in configuration from the left subclavian  artery  to T11. 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    Dissection extends through the left subclavian artery to the axillary  artery. Left vertebral artery originates from the true lumen. Left  internal thoracic artery originates from the false.    Aortic measurements:   Sinuses of Valsalva: 37 mm  Inferior graft anastomosis: 28 mm  Mid ascending graft: 31 mm  Superior graft anastomosis: 26 mm  Arch: 25 mm  Proximal descendin mm (previously 47 mm on 2024 and 41 mm on  2024)  Mid descendin mm (previously 38 mm on 2024)  Diaphragm (T11): 33 mm, unchanged    Right innominate, subclavian, and carotid arteries patent. Left  carotid artery patent.     Left subclavian artery measures 17 mm in diameter at its base,  previously 18 mm.    Bilateral vertebral arteries patent.    Celiac, superior mesenteric, renal, and inferior mesenteric arteries  patent.    Bilateral common, internal, and external iliac arteries patent.  Bilateral corona mortis. Bilateral common femoral arteries patent.    CT: Sternal wires intact.     Stable calcified granulomas. No suspicious pulmonary nodule. Small  left effusion.    Gallstone.    Renal cysts.    Diverticulosis.      Impression    IMPRESSION:  1. Thoracic aorta continues to enlarge. 48 mm in the proximal  descending (previously 47 mm on 2024 and 41 mm on 2024) and  41 mm in the mid descending (previously 38 mm on 2024).    2. Patent ascending aortic graft and thoracic aortic stent-graft in  the true lumen from just distal to the left subclavian artery to T7.    3. Dissection unchanged in configuration. Left subclavian artery  through axillary artery to T11.    [Access Center: Thoracic aorta continues to enlarge. 48 mm in the  proximal descending (previously 47 mm on 2024 and 41 mm on  2024) and 41 mm in the mid descending (previously 38 mm on  2024).  ]    This report will be copied to the [facility] to ensure a  provider  acknowledges the finding. Access Center is available Monday  through Friday 8am-3:30 pm.     I have personally reviewed the examination and initial interpretation  and I agree with the findings.    LIZBETH BEACH MD         SYSTEM ID:  [id number]   Echo Complete   Result Value    LVEF  55-60%    Narrative    [id number]  [id number]  [id number]  [id number]^PAUL^GUILLE     [facility]  Echocardiography Laboratory  [address] [place] [address]     Name: AME CHOW  MRN: [id number]  : 1975  Study Date: 2024 08:43 AM  Age: 49 yrs  Gender: Male  Patient Location: [facility]  Reason For Study: Aortic Aneurysm  Ordering Physician: GUILLE MILLER  Referring Physician: FRANK VALENCIA  Performed By: Radha Ann     BSA: 1.9 m2  Height: 68 in  Weight: 163 lb  BP: 134/69 mmHg  ______________________________________________________________________________  Procedure  Complete Portable Echo Adult. Contrast Optison. Patient was given 5 ml mixture  of 3 ml Optison and 6 ml saline. 4 ml wasted.  ______________________________________________________________________________  Interpretation Summary  Left ventricular size, wall motion and function are normal. The ejection  fraction is 55-60%.  Right ventricular function, chamber size, wall motion, and thickness are  normal.  The inferior vena cava was normal in size with preserved respiratory  variability.  Dissection flap noted in the descending thoracic aorta  No pericardial effusion is present.     Compared to prior study dissection flap is now present  ______________________________________________________________________________  Left Ventricle  Left ventricular size, wall motion and function are normal. The ejection  fraction is 55-60%. Left ventricular diastolic function is normal. No regional  wall motion abnormalities are seen.     Right Ventricle  Right ventricular function, chamber size, wall motion, and  thickness are  normal.     Atria  Both atria appear normal.     Mitral Valve  The mitral valve is normal.     Aortic Valve  Aortic valve is normal in structure and function. The aortic valve is  tricuspid. No aortic regurgitation is present.     Tricuspid Valve  The tricuspid valve is normal. Trace tricuspid insufficiency is present. The  right ventricular systolic pressure is approximated at 9.2 mmHg plus the right  atrial pressure.     Pulmonic Valve  The pulmonic valve is normal.     Vessels  The aorta root is normal. The inferior vena cava was normal in size with  preserved respiratory variability. Dissection flap noted in the descending  thoracic aorta.     Pericardium  No pericardial effusion is present.     Compared to Previous Study  Compared to prior study dissection flap is now present.  ______________________________________________________________________________  MMode/2D Measurements & Calculations     IVSd: 1.00 cm  LVIDd: 4.3 cm  LVIDs: 3.2 cm  LVPWd: 1.2 cm  FS: 26.0 %  LV mass(C)d: 164.5 grams  LV mass(C)dI: 87.8 grams/m2  Ao root diam: 3.7 cm  asc Aorta Diam: 3.1 cm  LVOT diam: 2.3 cm  LVOT area: 4.3 cm2  Ao root diam index Ht(cm/m): 2.2  Ao root diam index BSA (cm/m2): 2.0  Asc Ao diam index BSA (cm/m2): 1.6  Asc Ao diam index Ht(cm/m): 1.8  LA Volume (BP): 30.9 ml     LA Volume Index (BP): 16.5 ml/m2  RWT: 0.57  TAPSE: 1.8 cm     Doppler Measurements & Calculations  MV E max sal: 65.5 cm/sec  MV A max sal: 76.5 cm/sec  MV E/A: 0.86  MV dec time: 0.19 sec  TR max sal: 151.3 cm/sec  TR max P.2 mmHg  E/E' av.1  Lateral E/e': 7.9  Medial E/e': 10.2  RV S Sal: 8.6 cm/sec     ______________________________________________________________________________  Report approved by: Steve ULRICH 2024 10:04 AM

## 2024-05-30 NOTE — PROCEDURES
[facility]    Arterial line placement    Date/Time: 5/30/2024 12:19 AM    Performed by: Brisa Santamaria MD  Authorized by: Brisa Santamaria MD      UNIVERSAL PROTOCOL   Site Marked: NA  Prior Images Obtained and Reviewed:  NA  Required items: Required blood products, implants, devices and special equipment available    Patient identity confirmed:  Verbally with patient and arm band  Patient was reevaluated immediately before administering moderate or deep sedation or anesthesia  Confirmation Checklist:  Patient's identity using two indicators and relevant allergies  Universal Protocol: the Joint Commission Universal Protocol was followed    Preparation: Patient was prepped and draped in usual sterile fashion    ESBL (mL):  2  Indication:  hemodynamic monitoring  Location:  Right radial       ANESTHESIA    Local Anesthetic:  Lidocaine 1% without epinephrine  Anesthetic Total (mL):  3      SEDATION    Patient Sedated: No      PROCEDURE DETAILS            Post-procedure:  Line sutured and dressing applied      PROCEDURE    Length of time physician/provider present for 1:1 monitoring during sedation: 0      Purpose of procedure:  Patient with malignant hypertension on antihypertensive medications and progressing Type A/B aortic dissection with previous TAVR. Art line placed to best manage/treat hypertension in preparation for revision of TAVR.  I was present for the entire procedure and agree with the above note.    Yvonne Chen MD

## 2024-05-30 NOTE — PROGRESS NOTES
"CLINICAL NUTRITION SERVICES    Reviewed nutrition risk factors due to (+) MST for >34 lbs weight loss. Pt reports he has restored 20 lbs of weight since discharge from January admission for surgery.  \"Loves to eat\" and denies need for oral supplements. No nutrition issues identified at this time. RD will follow via rounds at this time, unless consulted.    Sydnee Coleman RD, LD, CNSC  Available on [employer] - can search by name or 4E Clinical Dietitian  **Clinical Nutrition is no longer available via pager  Weekend/Holiday RD - \"Weekend Clinical Dietitian\" on [employer]    "

## 2024-05-30 NOTE — CONSULTS
Care Management Initial Consult    General Information  Assessment completed with: Patient,    Type of CM/SW Visit: Initial Assessment    Primary Care Provider verified and updated as needed: Yes (Marjorie Mercedes  [phone]  [address] [facility] [address])   Readmission within the last 30 days: no previous admission in last 30 days      Reason for Consult: discharge planning  Advance Care Planning: Advance Care Planning Reviewed: no concerns identified (Pt firmly declined HCD)   (Pt DECLINED HCD)     Communication Assessment  Patient's communication style: spoken language (English or Bilingual)    Hearing Difficulty or Deaf: no   Wear Glasses or Blind: no    Cognitive  Cognitive/Neuro/Behavioral: WDL  Level of Consciousness: alert  Arousal Level: opens eyes spontaneously  Orientation: oriented x 4  Mood/Behavior: cooperative  Best Language: 0 - No aphasia  Speech: clear, spontaneous, logical    Living Environment:   People in home: parent(s) (Mother Kristen)  Kristen Lyons  Current living Arrangements: house      Able to return to prior arrangements: other (see comments)  Living Arrangement Comments: TBD    Family/Social Support:  Care provided by: parent(s) (mother)  Provides care for: no one  Marital Status: Single  Other (specify), Sibling(s) (Girlfriend and brother)          Description of Support System: Supportive    Support Assessment: Adequate family and caregiver support, Adequate social supports, Other (see comments) (pt has hx of neurological deficits and behavioral issues.)    Current Resources:   Patient receiving home care services: No     Community Resources: None  Equipment currently used at home: none  Supplies currently used at home: None    Employment/Financial:  Employment Status: unemployed, disabled (Patient and PCP are working on completing disability paperwork)     Employment/ Comments: [employer]  Financial Concerns: other (see comments) (Cielo has  no income. Mother is supporting patient's needs while he is applying for SSDI. Pt has insurance-[facility].)   Referral to Financial Worker: No     Does the patient's insurance plan have a 3 day qualifying hospital stay waiver?  No    Lifestyle & Psychosocial Needs:  Social Determinants of Health     Food Insecurity: Food Insecurity Present (1/13/2024)    Received from [facility]    Hunger Vital Sign     Worried About Running Out of Food in the Last Year: Sometimes true     Ran Out of Food in the Last Year: Sometimes true   Depression: Not at risk (2/27/2024)    PHQ-2     PHQ-2 Score: 0   Housing Stability: Low Risk  (1/13/2024)    Received from [facility]    Housing Stability     What is your housing situation today?: 3 - I have housing   Tobacco Use: Low Risk  (5/29/2024)    Patient History     Smoking Tobacco Use: Never     Smokeless Tobacco Use: Never     Passive Exposure: Not on file   Financial Resource Strain: Medium Risk (1/13/2024)    Received from [facility]    Overall Financial Resource Strain (CARDIA)     Difficulty of Paying Living Expenses: Somewhat hard   Alcohol Use: Not on file   Transportation Needs: No Transportation Needs (1/13/2024)    Received from [facility]    PRAPARE - Transportation     Lack of Transportation (Medical): No     Lack of Transportation (Non-Medical): No   Physical Activity: Not on file   Interpersonal Safety: Not At Risk (1/13/2024)    Received from [facility]    Humiliation, Afraid, Rape, and Kick questionnaire     Fear of Current or Ex-Partner: No     Emotionally Abused: No     Physically Abused: No     Sexually Abused: No   Stress: Not on file   Social Connections: Not on file   Health Literacy: Not on file     Functional Status:  Prior to admission patient needed assistance:   Dependent ADLs::  "Independent  Dependent IADLs:: Shopping  Assesssment of Functional Status: Not at  functional baseline    Mental Health Status:  Mental Health Status: No Current Concerns       Chemical Dependency Status:  Chemical Dependency Status: No Current Concerns       Values/Beliefs:  Spiritual, Cultural Beliefs, Oriental orthodox Practices, Values that affect care: no       Values/Beliefs Comment: none    Additional Information:  From H&P: Valdo Lyons is a 49 year-old-male with PMH of childhood seizures, prediabetes, uncontrolled HTN, and Type B aortic dissection with retrograde ascending aortic intramural hematoma s/p stented hemiarch repair with antegrade TEVAR on 1/23/24 with Dr. Shaw and Dr. Singh. Post-op course complicated by TIA on 2/14/24 with evidence of bilateral, multifocal subacute infarcts and microhemorrhages in both frontal and posterior distributions and left internal jugular non-occlusive DVT started on ASA and Eliquis. Patient admitted to SICU on 5/29 for treatment of rapidly expanding descending aorta in setting of Type B dissection and anti impulse control with plan for surgical intervention with Vascular Surgery.      RNCC completed CM assessment due to elevated risk score and need for discharge planning. RNCC met with pt at bedside and introduced RNCC role. Patient states that he lives in his mother's home. Patient stated he lives on the main floor and there is a full bath but laundry is in the basement.   Patient stated that he is as independent as possible but has a lifting restriction as a result of he prior TEVAR. Patient stated he shares chores with his mother. Mother does all the shopping and any lifting. Patient stated he has no DME in the home nor any services. Patient stated his other supports are his girlfriend and his brother. Patient stated he does not want a healthcare directive. RNCC advised that if patient is unable to speak it will default to his mother. Pt stated \"that is fine\". " "    Financial: Patient stated that he is unemployed but used to work in [employer] but can no longer work. Patient has no savings nor income. Patient is working with PCP to get all of his paperwork completed to file for SSDI. Mother is assisting with all support. Mother, Ivett, does all the shopping, cooking, dishes, and pays for everything. Patient uses mother's cell phone.   RNCC spoke with mother, Ivett. Ivett stated she received [facility] for interim assistance paperwork from last admission. Ivett stated when patient lost job he also lost his car. Patient drives mother's car. Ivett stated she her car  and she purchased a used car so they have transportation. Ivett stated she has her own health issues and frequent medical appts. Ivett stated pt doesn't need to move out and she likes having son at home \"he's a honey\". Ivett said \"we help each other. He's good human being\". Ivett stated she hasn't noted any behavioral issues in the last 5-6 months. Ivett stated she hasn't filled out all the papers/resources from the last admission but she is \"working on them\".     NEXT: RNCC will continue to follow and assist with discharge planning. CARMEN Mcadams RNCC float  Nurse Coordinator  Covering for 4A/4C  Phone [phone]   Social Work and Care Management Department   SEARCHABLE in [facility] - search CARE COORDINATOR     [place] ([phone]) Saturday & ; ([phone]) [facility] Recognized Holidays     Units: 5A Onc 5201 thru 5219 RNCC, 5A Onc 5220 thru 5240 RNCC, 5C OFFSERVICE [id number] RNCC & 5C OFF SERVICE [phone] RNCC Pager: [phone]    Units: 6B Vocera, 6C Card 6401 thru 6420 RNCC, 6C Card 6502 thru 6514 RNCC, & 6C Card 6515 thru 6522 RNCC  Pager: [phone]    Units: 7A SOT RNCC Vocera, 7B Med Surg Vocera, 7C Med Surg 7401 thru 7418 RNCC & 7C Med Surg 7502 thru 5516 RNCC Pager: [phone]    Units: 6A Vocera & 4A CVICU Vocera, 4C MICU Vocera, and 4E SICU Vocera   Pager: " [phone]    Units: 5 Ortho Vocera & 5 Med Surg Vocera  Pager: [phone]    Units: 6 Med Surg Vocera & 4 Med Surg Vocera  Pager [phone]

## 2024-05-30 NOTE — PROGRESS NOTES
Vascular Surgery Progress Note  05/30/2024       Subjective:  Continues to have chest to back pain, requiring p.o. opioids for management.  Otherwise resting comfortably in bed, is having TTE completed this morning.     Objective:  Temp:  [97.9  F (36.6  C)-98.5  F (36.9  C)] 98.4  F (36.9  C)  Pulse:  [60-86] 60  Resp:  [10-24] 18  BP: ()/(48-97) 115/68  MAP:  [36 mmHg-292 mmHg] 69 mmHg  Arterial Line BP: ()/() 121/54  SpO2:  [93 %-100 %] 96 %    I/O last 3 completed shifts:  In: 886.82 [P.O.:200; I.V.:686.82]  Out: 800 [Urine:800]      Gen: Awake, alert, NAD  CV: RRR per telemetry  Resp: NLB on room air  Abd: soft, nondistended, nontender  Ext: WWP, no edema  Vascular: Palpable DP and PTs bilaterally, palpable +2 femoral pulses, strength 5/5 in all extremities.     Labs:  Recent Labs   Lab 05/29/24  1541   WBC 10.1   HGB 10.6*          Recent Labs   Lab 05/30/24  0414 05/30/24  0412 05/29/24  2216 05/29/24  1541 05/29/24  1506   NA  --   --   --  137  --    POTASSIUM  --  4.3  --  4.4  --    CHLORIDE  --   --   --  103  --    CO2  --   --   --  20*  --    BUN  --   --   --  15.9  --    CR  --   --   --  1.07  --    GLC 95  --   --  92 103*   YARON  --   --   --  8.8  --    MAG  --  2.0 1.9  --   --    PHOS  --  3.4 2.4*  --   --        Imaging:  CTA 5/29/24 at 6:27 PM:                                                               IMPRESSION:  1. Thoracic aorta continues to enlarge. 48 mm in the proximal descending (previously 47 mm on 5/17/2024 and 41 mm on 2/27/2024) and 41 mm in the mid descending (previously 38 mm on 2/27/2024).     2. Patent ascending aortic graft and thoracic aortic stent-graft in the true lumen from just distal to the left subclavian artery to T7.     3. Dissection unchanged in configuration. Left subclavian artery through axillary artery to T11.     [Access Center: Thoracic aorta continues to enlarge. 48 mm in the proximal descending (previously 47 mm on 5/17/2024 and  41 mm on 2/27/2024) and 41 mm in the mid descending (previously 38 mm on 2/27/2024).     Assessment/Plan:   49 year old male with PMH of childhood seizures, prediabetes, uncontrolled HTN, and Type B aortic dissection with retrograde ascending aortic intramural hematoma s/p stented hemiarch repair with antegrade TEVAR on 1/23/24 with Dr. Shaw and Dr. Singh. Post-op course complicated by TIA on 2/14/24 with evidence of bilateral, multifocal subacute infarcts and microhemorrhages in both frontal and posterior distributions and left internal jugular non-occlusive DVT started on ASA and Eliquis. Patient admitted to SICU on 5/29 from clinic for treatment of rapidly expanding descending aorta in setting of Type B dissection and anti impulse control with plan for surgical intervention. Plan for extension of TEVAR distally to stop retrograde flow vs subclavian intervention with Vascular Surgery on 5/31.     - Continue anti-impulse therapy SBP goal <120 and HR <70  - Stop all p.o. antihypertensives starting this evening  - Okay with p.o. muscle relaxants today, please avoid p.o. opiates and muscle relaxant meds starting tomorrow morning  - Multimodal pain management with lidocaine patches, IV/p.o. opioids, muscle relaxants, therapeutic repositioning in bed, ice packs if needed.  - No arterial access LUE due to retrograde flow from brachial artery into false lumen   - NPO after midnight for OR tomorrow, pre-op orders placed     Discussed with vascular surgery staff, who is in agreement with the above.    Candie Parkinson CNP  Vascular Surgery  Pager: [phone]  [email or web address]  Send message or 10 digit call back number Securely via [employer] with the Vocera Web Console (learn more here)

## 2024-05-30 NOTE — PLAN OF CARE
Major Shift Events:  Alert and oriented x4, following commands and moving all extremities. Oxy, robaxin and dilaudid given for pain control. HR goal <70 and SBP goal <120 - maintained with esmolol and clevidipine drips. Afebrile. RA, lungs clear. Regular diet with poor appetite. No BM. Voiding in bedside urinal. Encouraged activity but pt refused.   Plan: NPO at midnight for OR tomorrow.   For vital signs and complete assessments, please see documentation flowsheets.

## 2024-05-30 NOTE — PLAN OF CARE
Major Shift Events:  Neuro intact. Pain managed with PRN oxy and dilaudid. Sinus rhythm. Both art lines removed overnight. SBP <120, goal met with esmolol gtt and clevidipine gtt. Afebrile. Reg diet, good appetite. Occasional nausea when pain is intense. Voids spontaneously. No BM overnight.   Plan: Manage BP and pain. Monitor and treat per POC.   For vital signs and complete assessments, please see documentation flowsheets.

## 2024-05-30 NOTE — PLAN OF CARE
Admitted/transferred from: Clinic  Reason for admission/transfer: Type B dissection and impulse control with plan for surgical intervention with Vascular.  Patient status upon admission/transfer: A&Ox 4 c/o severe left scapula (behind heart) pulsating pain and hypertensive.   Interventions: Oxycodone 10mg given one, dilaudid 0.2mg given x 2 started on Esmolol and clevidipine drips started to keep SBP<120. CTA of chest/ abdomen and pelvis done.  Plan: Awaiting results of CTA  2 RN skin assessment: completed by not completed passed on to oncoming RN  Sexual Orientation and Gender Identity (SOGI) smartfom completed:Not Done  Result of skin assessment and interventions/actions: no open areas noted , but full skin assessment not done  Height, weight, drug calc weight: Done  Patient belongings (see Flowsheet - Adult Profile for details):   MDRO education (if applicable):  Goal Outcome Evaluation:n/A

## 2024-05-30 NOTE — ANESTHESIA PREPROCEDURE EVALUATION
Anesthesia Pre-Procedure Evaluation    Patient: Valdo Lyons   MRN: [id number] : 1975        Procedure : Procedure(s):  REPAIR, ANEURYSM, THORACIC AORTIC, ENDOVASCULAR, TEVAR Extension, possible single retrograde branch TEVAR. Left upper extremity cutdown brachial versus percutaneous radial access          Past Medical History:   Diagnosis Date    Dissecting aneurysm of thoracic aorta, Negrito type B (H)     HTN (hypertension)       Past Surgical History:   Procedure Laterality Date    ENDOVASCULAR REPAIR ANEURYSM THORACIC AORTIC N/A 2024    Procedure: Antegrade Thoracic Endovascular Aortic Repair (TEVAR) with [place] Conformable Thoracic Stent Graft 37mm x 10cm. Intravascular Ultrasound (IVUS), Aortography, Right common femoral access;  Surgeon: Artur Singh MD;  Location: [facility] OR    IR OR ANGIOGRAM  2024    PICC DOUBLE LUMEN PLACEMENT Right 2024    5FR DL PICC, basilic vein. L-43cm, 1cm out.    REPAIR ANEURYSM ASCENDING AORTA N/A 2024    Procedure: MEDIAN STERNOTOMY, RIGHT AXILLARY CUTDOWN, ON CARDIOPULMONARY BYPASS (CIRCULATORY ARREST), ASCENDING AORTA ANEURYSM REPAIR ([place] 30mm), INTRAOPERATIVE TRANSESOPHAGEAL ECHOCARDIOGRAM BY ANESTHESIA;  Surgeon: Addi Shaw MD;  Location: [facility] OR      Allergies   Allergen Reactions    Rosuvastatin Muscle Pain (Myalgia)     Took the statin for one month and then could not tolerate side effects.  Myalgias resolved within 2 days of stopping the medication      Social History     Tobacco Use    Smoking status: Never    Smokeless tobacco: Never   Substance Use Topics    Alcohol use: Not on file      Wt Readings from Last 1 Encounters:   24 73.9 kg (163 lb)        Anesthesia Evaluation   Pt has had prior anesthetic.     No history of anesthetic complications       ROS/MED HX  ENT/Pulmonary:  - neg pulmonary ROS     Neurologic: Comment: Hx of baseline cognitive dysfunction    (+)       seizures, last seizure: In childhood.  Not on AEDs,  CVA (Occured post-operatively on 2/14/24 with evidence of bilateral, multifocal subacute infarcts and microhemorrhages in both frontal and posterior distributions. no residual deficits per pt.),                   (-) no TIA   Cardiovascular: Comment: - History of Type B aortic dissection s/p repair ascending aortic graft in 1/2024. Now with ongoing thoracic aortic aneurysm with new descending aortic dissection flap noted on repeat imaging. Retrograde flow from brachial artery into the false lumen.    CTA 5/29/24:  1. Thoracic aorta continues to enlarge. 48 mm in the proximal  descending (previously 47 mm on 5/17/2024 and 41 mm on 2/27/2024) and  41 mm in the mid descending (previously 38 mm on 2/27/2024).     2. Patent ascending aortic graft and thoracic aortic stent-graft in  the true lumen from just distal to the left subclavian artery to T7.     3. Dissection unchanged in configuration. Left subclavian artery  through axillary artery to T11.        (+) Dyslipidemia hypertension- Peripheral Vascular Disease-   -  - -   Taking blood thinners                              Previous cardiac testing   Echo: Date: 5/29/24 Results:  Left ventricular size, wall motion and function are normal. The ejection  fraction is 55-60%. Right ventricular function, chamber size, wall motion, and thickness are normal. The inferior vena cava was normal in size with preserved respiratory  variability. Dissection flap noted in the descending thoracic aorta  No pericardial effusion is present.   Compared to prior study dissection flap is now present    Stress Test:  Date: Results:    ECG Reviewed:  Date: Results:    Cath:  Date: Results:      METS/Exercise Tolerance:     Hematologic:     (+) History of blood clots,    pt is anticoagulated, anemia,          Musculoskeletal:  - neg musculoskeletal ROS     GI/Hepatic:  - neg GI/hepatic ROS     Renal/Genitourinary:  - neg Renal ROS     Endo:  - neg endo ROS    "  Psychiatric/Substance Use:  - neg psychiatric ROS   (+)     Recreational drug usage: Cannabis.    Infectious Disease:  - neg infectious disease ROS     Malignancy:  - neg malignancy ROS     Other:            Physical Exam    Airway        Mallampati: I   TM distance: > 3 FB   Neck ROM: full   Mouth opening: > 3 cm    Respiratory Devices and Support         Dental       (+) Minor Abnormalities - some fillings, tiny chips      Cardiovascular          Rhythm and rate: regular and normal     Pulmonary   pulmonary exam normal                OUTSIDE LABS:  CBC:   Lab Results   Component Value Date    WBC 10.1 05/29/2024    WBC 10.8 02/27/2024    HGB 10.6 (L) 05/29/2024    HGB 10.5 (L) 02/27/2024    HCT 35.0 (L) 05/29/2024    HCT 34.2 (L) 02/27/2024     05/29/2024     02/27/2024     BMP:   Lab Results   Component Value Date     05/29/2024     02/27/2024    POTASSIUM 4.3 05/30/2024    POTASSIUM 4.4 05/29/2024    CHLORIDE 103 05/29/2024    CHLORIDE 103 02/27/2024    CO2 20 (L) 05/29/2024    CO2 25 02/27/2024    BUN 15.9 05/29/2024    BUN 22.4 (H) 02/27/2024    CR 1.07 05/29/2024    CR 1.02 02/27/2024    GLC 95 05/30/2024    GLC 92 05/29/2024     COAGS:   Lab Results   Component Value Date    PTT 36 02/27/2024    INR 1.26 (H) 02/27/2024    FIBR 333 01/23/2024     POC: No results found for: \"BGM\", \"HCG\", \"HCGS\"  HEPATIC:   Lab Results   Component Value Date    ALBUMIN 3.7 05/29/2024    PROTTOTAL 7.2 05/29/2024    ALT 6 05/29/2024    AST 23 05/29/2024    ALKPHOS 76 05/29/2024    BILITOTAL 0.6 05/29/2024     OTHER:   Lab Results   Component Value Date    PH 7.33 (L) 01/24/2024    LACT 0.9 02/27/2024    A1C 6.1 (H) 01/16/2024    YARON 8.8 05/29/2024    PHOS 3.4 05/30/2024    MAG 2.0 05/30/2024    TSH 0.26 (L) 02/15/2024       Anesthesia Plan    ASA Status:  4    NPO Status:  NPO Appropriate    Anesthesia Type: General.     - Airway: ETT   Induction: Intravenous.   Maintenance: Balanced.   Techniques and " Equipment:     - Lines/Monitors: 2nd IV, Arterial Line, Central Line     - Blood: T&S     Consents    Anesthesia Plan(s) and associated risks, benefits, and realistic alternatives discussed. Questions answered and patient/representative(s) expressed understanding.     - Discussed: Risks, Benefits and Alternatives for BOTH SEDATION and the PROCEDURE were discussed     - Discussed with:  Patient      - Extended Intubation/Ventilatory Support Discussed: Yes.      Use of blood products discussed: Yes.     Postoperative Care    Pain management: IV analgesics.   PONV prophylaxis: Ondansetron (or other 5HT-3), Background Propofol Infusion     Comments:               Jamin Claros    I have reviewed the pertinent notes and labs in the chart from the past 30 days and (re)examined the patient.  Any updates or changes from those notes are reflected in this note.             # Drug Induced Platelet Defect: home medication list includes an antiplatelet medication

## 2024-05-31 ENCOUNTER — APPOINTMENT (OUTPATIENT)
Dept: INTERVENTIONAL RADIOLOGY/VASCULAR | Facility: CLINIC | Age: 49
End: 2024-05-31
Attending: SURGERY
Payer: COMMERCIAL

## 2024-05-31 ENCOUNTER — APPOINTMENT (OUTPATIENT)
Dept: GENERAL RADIOLOGY | Facility: CLINIC | Age: 49
End: 2024-05-31
Attending: SURGERY
Payer: COMMERCIAL

## 2024-05-31 ENCOUNTER — ANESTHESIA (OUTPATIENT)
Dept: SURGERY | Facility: CLINIC | Age: 49
End: 2024-05-31
Payer: COMMERCIAL

## 2024-05-31 LAB
ABO/RH(D): NORMAL
ACT BLD: 190 SECONDS (ref 74–150)
ACT BLD: 199 SECONDS (ref 74–150)
ACT BLD: 207 SECONDS (ref 74–150)
ACT BLD: 216 SECONDS (ref 74–150)
ACT BLD: 220 SECONDS (ref 74–150)
ACT BLD: 224 SECONDS (ref 74–150)
ACT BLD: 228 SECONDS (ref 74–150)
ACT BLD: 241 SECONDS (ref 74–150)
ANION GAP SERPL CALCULATED.3IONS-SCNC: 10 MMOL/L (ref 7–15)
ANION GAP SERPL CALCULATED.3IONS-SCNC: 10 MMOL/L (ref 7–15)
ANTIBODY SCREEN: NEGATIVE
APTT PPP: 26 SECONDS (ref 22–38)
APTT PPP: 38 SECONDS (ref 22–38)
APTT PPP: >240 SECONDS (ref 22–38)
BASE EXCESS BLDA CALC-SCNC: -2.6 MMOL/L (ref -3–3)
BASE EXCESS BLDA CALC-SCNC: -2.8 MMOL/L (ref -3–3)
BASE EXCESS BLDA CALC-SCNC: -3 MMOL/L (ref -3–3)
BLD PROD TYP BPU: NORMAL
BLOOD COMPONENT TYPE: NORMAL
BUN SERPL-MCNC: 12.6 MG/DL (ref 6–20)
BUN SERPL-MCNC: 9.4 MG/DL (ref 6–20)
CA-I BLD-MCNC: 4.7 MG/DL (ref 4.4–5.2)
CA-I BLD-MCNC: 4.7 MG/DL (ref 4.4–5.2)
CA-I BLD-MCNC: 4.8 MG/DL (ref 4.4–5.2)
CALCIUM SERPL-MCNC: 8.5 MG/DL (ref 8.6–10)
CALCIUM SERPL-MCNC: 8.6 MG/DL (ref 8.6–10)
CHLORIDE SERPL-SCNC: 103 MMOL/L (ref 98–107)
CHLORIDE SERPL-SCNC: 107 MMOL/L (ref 98–107)
CODING SYSTEM: NORMAL
CREAT SERPL-MCNC: 0.86 MG/DL (ref 0.67–1.17)
CREAT SERPL-MCNC: 1 MG/DL (ref 0.67–1.17)
CROSSMATCH: NORMAL
DEPRECATED HCO3 PLAS-SCNC: 21 MMOL/L (ref 22–29)
DEPRECATED HCO3 PLAS-SCNC: 21 MMOL/L (ref 22–29)
EGFRCR SERPLBLD CKD-EPI 2021: >90 ML/MIN/1.73M2
EGFRCR SERPLBLD CKD-EPI 2021: >90 ML/MIN/1.73M2
ERYTHROCYTE [DISTWIDTH] IN BLOOD BY AUTOMATED COUNT: 16.4 % (ref 10–15)
ERYTHROCYTE [DISTWIDTH] IN BLOOD BY AUTOMATED COUNT: 16.5 % (ref 10–15)
FIBRINOGEN PPP-MCNC: 543 MG/DL (ref 170–490)
FIBRINOGEN PPP-MCNC: 604 MG/DL (ref 170–490)
GLUCOSE BLD-MCNC: 86 MG/DL (ref 70–99)
GLUCOSE BLD-MCNC: 87 MG/DL (ref 70–99)
GLUCOSE BLD-MCNC: 87 MG/DL (ref 70–99)
GLUCOSE BLDC GLUCOMTR-MCNC: 86 MG/DL (ref 70–99)
GLUCOSE BLDC GLUCOMTR-MCNC: 86 MG/DL (ref 70–99)
GLUCOSE SERPL-MCNC: 96 MG/DL (ref 70–99)
GLUCOSE SERPL-MCNC: 98 MG/DL (ref 70–99)
HCO3 BLDA-SCNC: 22 MMOL/L (ref 21–28)
HCT VFR BLD AUTO: 30.6 % (ref 40–53)
HCT VFR BLD AUTO: 31.2 % (ref 40–53)
HGB BLD-MCNC: 8.6 G/DL (ref 13.3–17.7)
HGB BLD-MCNC: 8.7 G/DL (ref 13.3–17.7)
HGB BLD-MCNC: 8.8 G/DL (ref 13.3–17.7)
HGB BLD-MCNC: 9.6 G/DL (ref 13.3–17.7)
HGB BLD-MCNC: 9.7 G/DL (ref 13.3–17.7)
INR PPP: 1.2 (ref 0.85–1.15)
INR PPP: 1.27 (ref 0.85–1.15)
ISSUE DATE AND TIME: NORMAL
LACTATE BLD-SCNC: 0.7 MMOL/L (ref 0.7–2)
LACTATE BLD-SCNC: 0.7 MMOL/L (ref 0.7–2)
LACTATE BLD-SCNC: 0.8 MMOL/L (ref 0.7–2)
LACTATE SERPL-SCNC: 0.7 MMOL/L (ref 0.7–2)
MAGNESIUM SERPL-MCNC: 2.3 MG/DL (ref 1.7–2.3)
MCH RBC QN AUTO: 18.9 PG (ref 26.5–33)
MCH RBC QN AUTO: 19 PG (ref 26.5–33)
MCHC RBC AUTO-ENTMCNC: 31.1 G/DL (ref 31.5–36.5)
MCHC RBC AUTO-ENTMCNC: 31.4 G/DL (ref 31.5–36.5)
MCV RBC AUTO: 61 FL (ref 78–100)
MCV RBC AUTO: 61 FL (ref 78–100)
O2/TOTAL GAS SETTING VFR VENT: 45 %
O2/TOTAL GAS SETTING VFR VENT: 50 %
O2/TOTAL GAS SETTING VFR VENT: 50 %
OXYHGB MFR BLDA: 98 % (ref 92–100)
OXYHGB MFR BLDA: 98 % (ref 92–100)
OXYHGB MFR BLDA: 99 % (ref 92–100)
PCO2 BLDA: 37 MM HG (ref 35–45)
PCO2 BLDA: 38 MM HG (ref 35–45)
PCO2 BLDA: 39 MM HG (ref 35–45)
PH BLDA: 7.36 [PH] (ref 7.35–7.45)
PH BLDA: 7.38 [PH] (ref 7.35–7.45)
PH BLDA: 7.38 [PH] (ref 7.35–7.45)
PHOSPHATE SERPL-MCNC: 2.7 MG/DL (ref 2.5–4.5)
PLATELET # BLD AUTO: 218 10E3/UL (ref 150–450)
PLATELET # BLD AUTO: 229 10E3/UL (ref 150–450)
PO2 BLDA: 112 MM HG (ref 80–105)
PO2 BLDA: 134 MM HG (ref 80–105)
PO2 BLDA: 231 MM HG (ref 80–105)
POTASSIUM BLD-SCNC: 4 MMOL/L (ref 3.4–5.3)
POTASSIUM BLD-SCNC: 4.2 MMOL/L (ref 3.4–5.3)
POTASSIUM BLD-SCNC: 4.2 MMOL/L (ref 3.4–5.3)
POTASSIUM SERPL-SCNC: 4.1 MMOL/L (ref 3.4–5.3)
POTASSIUM SERPL-SCNC: 4.2 MMOL/L (ref 3.4–5.3)
RBC # BLD AUTO: 5.05 10E6/UL (ref 4.4–5.9)
RBC # BLD AUTO: 5.12 10E6/UL (ref 4.4–5.9)
SAO2 % BLDA: 99 % (ref 96–97)
SAO2 % BLDA: 99 % (ref 96–97)
SAO2 % BLDA: >100 % (ref 96–97)
SODIUM BLD-SCNC: 136 MMOL/L (ref 135–145)
SODIUM BLD-SCNC: 136 MMOL/L (ref 135–145)
SODIUM BLD-SCNC: 137 MMOL/L (ref 135–145)
SODIUM SERPL-SCNC: 134 MMOL/L (ref 135–145)
SODIUM SERPL-SCNC: 138 MMOL/L (ref 135–145)
SPECIMEN EXPIRATION DATE: NORMAL
UNIT ABO/RH: NORMAL
UNIT NUMBER: NORMAL
UNIT STATUS: NORMAL
UNIT TYPE ISBT: 5100
WBC # BLD AUTO: 9.5 10E3/UL (ref 4–11)
WBC # BLD AUTO: 9.6 10E3/UL (ref 4–11)

## 2024-05-31 PROCEDURE — 250N000011 HC RX IP 250 OP 636

## 2024-05-31 PROCEDURE — 37252 INTRVASC US NONCORONARY 1ST: CPT | Mod: GC | Performed by: SURGERY

## 2024-05-31 PROCEDURE — 258N000003 HC RX IP 258 OP 636

## 2024-05-31 PROCEDURE — 02VW3DZ RESTRICTION OF THORACIC AORTA, DESCENDING WITH INTRALUMINAL DEVICE, PERCUTANEOUS APPROACH: ICD-10-PCS | Performed by: SURGERY

## 2024-05-31 PROCEDURE — 85730 THROMBOPLASTIN TIME PARTIAL: CPT

## 2024-05-31 PROCEDURE — 370N000017 HC ANESTHESIA TECHNICAL FEE, PER MIN: Performed by: SURGERY

## 2024-05-31 PROCEDURE — 999N000065 XR CHEST PORT 1 VIEW

## 2024-05-31 PROCEDURE — 250N000024 HC ISOFLURANE, PER MIN: Performed by: SURGERY

## 2024-05-31 PROCEDURE — 250N000011 HC RX IP 250 OP 636: Performed by: SURGERY

## 2024-05-31 PROCEDURE — 85730 THROMBOPLASTIN TIME PARTIAL: CPT | Performed by: SURGERY

## 2024-05-31 PROCEDURE — 250N000009 HC RX 250: Performed by: SURGERY

## 2024-05-31 PROCEDURE — 272N000002 HC OR SUPPLY OTHER OPNP: Performed by: SURGERY

## 2024-05-31 PROCEDURE — C1887 CATHETER, GUIDING: HCPCS | Performed by: SURGERY

## 2024-05-31 PROCEDURE — 258N000003 HC RX IP 258 OP 636: Performed by: SURGERY

## 2024-05-31 PROCEDURE — 36200 PLACE CATHETER IN AORTA: CPT | Mod: GC | Performed by: SURGERY

## 2024-05-31 PROCEDURE — 85347 COAGULATION TIME ACTIVATED: CPT

## 2024-05-31 PROCEDURE — 85610 PROTHROMBIN TIME: CPT

## 2024-05-31 PROCEDURE — 99207 PR NO BILLABLE SERVICE THIS VISIT: CPT | Performed by: NURSE PRACTITIONER

## 2024-05-31 PROCEDURE — 250N000011 HC RX IP 250 OP 636: Performed by: STUDENT IN AN ORGANIZED HEALTH CARE EDUCATION/TRAINING PROGRAM

## 2024-05-31 PROCEDURE — 258N000003 HC RX IP 258 OP 636: Performed by: NURSE ANESTHETIST, CERTIFIED REGISTERED

## 2024-05-31 PROCEDURE — 84100 ASSAY OF PHOSPHORUS: CPT | Performed by: SURGERY

## 2024-05-31 PROCEDURE — C1769 GUIDE WIRE: HCPCS | Performed by: SURGERY

## 2024-05-31 PROCEDURE — 360N000086 HC SURGERY LEVEL 6 W/ FLUORO, PER MIN: Performed by: SURGERY

## 2024-05-31 PROCEDURE — 999N000083 IR OR ANGIOGRAM: Mod: TC

## 2024-05-31 PROCEDURE — 33880 EVASC RPR TA NDGFT COV LSA: CPT | Mod: 22 | Performed by: SURGERY

## 2024-05-31 PROCEDURE — 250N000009 HC RX 250: Performed by: NURSE ANESTHETIST, CERTIFIED REGISTERED

## 2024-05-31 PROCEDURE — 86900 BLOOD TYPING SEROLOGIC ABO: CPT | Performed by: NURSE PRACTITIONER

## 2024-05-31 PROCEDURE — 85027 COMPLETE CBC AUTOMATED: CPT | Performed by: NURSE PRACTITIONER

## 2024-05-31 PROCEDURE — 278N000051 HC OR IMPLANT GENERAL: Performed by: SURGERY

## 2024-05-31 PROCEDURE — 34834 OPN BRACH ART EXPOS: CPT | Mod: LT | Performed by: SURGERY

## 2024-05-31 PROCEDURE — 85730 THROMBOPLASTIN TIME PARTIAL: CPT | Performed by: NURSE PRACTITIONER

## 2024-05-31 PROCEDURE — 03H40DZ INSERTION OF INTRALUMINAL DEVICE INTO LEFT SUBCLAVIAN ARTERY, OPEN APPROACH: ICD-10-PCS | Performed by: SURGERY

## 2024-05-31 PROCEDURE — C1768 GRAFT, VASCULAR: HCPCS | Performed by: SURGERY

## 2024-05-31 PROCEDURE — 250N000013 HC RX MED GY IP 250 OP 250 PS 637: Performed by: STUDENT IN AN ORGANIZED HEALTH CARE EDUCATION/TRAINING PROGRAM

## 2024-05-31 PROCEDURE — 84295 ASSAY OF SERUM SODIUM: CPT

## 2024-05-31 PROCEDURE — 83735 ASSAY OF MAGNESIUM: CPT | Performed by: SURGERY

## 2024-05-31 PROCEDURE — C1894 INTRO/SHEATH, NON-LASER: HCPCS | Performed by: SURGERY

## 2024-05-31 PROCEDURE — C1877 STENT, NON-COAT/COV W/O DEL: HCPCS | Performed by: SURGERY

## 2024-05-31 PROCEDURE — 86923 COMPATIBILITY TEST ELECTRIC: CPT

## 2024-05-31 PROCEDURE — 80048 BASIC METABOLIC PNL TOTAL CA: CPT | Performed by: NURSE PRACTITIONER

## 2024-05-31 PROCEDURE — C1773 RET DEV, INSERTABLE: HCPCS | Performed by: SURGERY

## 2024-05-31 PROCEDURE — 83605 ASSAY OF LACTIC ACID: CPT

## 2024-05-31 PROCEDURE — 255N000002 HC RX 255 OP 636: Performed by: SURGERY

## 2024-05-31 PROCEDURE — C1753 CATH, INTRAVAS ULTRASOUND: HCPCS | Performed by: SURGERY

## 2024-05-31 PROCEDURE — 250N000025 HC SEVOFLURANE, PER MIN: Performed by: SURGERY

## 2024-05-31 PROCEDURE — 33880 EVASC RPR TA NDGFT COV LSA: CPT | Performed by: ANESTHESIOLOGY

## 2024-05-31 PROCEDURE — C1760 CLOSURE DEV, VASC: HCPCS | Performed by: SURGERY

## 2024-05-31 PROCEDURE — 85384 FIBRINOGEN ACTIVITY: CPT

## 2024-05-31 PROCEDURE — 200N000002 HC R&B ICU UMMC

## 2024-05-31 PROCEDURE — 85027 COMPLETE CBC AUTOMATED: CPT

## 2024-05-31 PROCEDURE — 71045 X-RAY EXAM CHEST 1 VIEW: CPT | Mod: 26 | Performed by: RADIOLOGY

## 2024-05-31 PROCEDURE — P9016 RBC LEUKOCYTES REDUCED: HCPCS

## 2024-05-31 PROCEDURE — 85384 FIBRINOGEN ACTIVITY: CPT | Performed by: NURSE PRACTITIONER

## 2024-05-31 PROCEDURE — 4A1134G MONITORING OF PERIPHERAL NERVOUS ELECTRICAL ACTIVITY, INTRAOPERATIVE, PERCUTANEOUS APPROACH: ICD-10-PCS | Performed by: SURGERY

## 2024-05-31 PROCEDURE — 250N000011 HC RX IP 250 OP 636: Performed by: NURSE ANESTHETIST, CERTIFIED REGISTERED

## 2024-05-31 PROCEDURE — B24BZZ3 ULTRASONOGRAPHY OF HEART WITH AORTA, INTRAVASCULAR: ICD-10-PCS | Performed by: SURGERY

## 2024-05-31 PROCEDURE — 272N000001 HC OR GENERAL SUPPLY STERILE: Performed by: SURGERY

## 2024-05-31 PROCEDURE — 33880 EVASC RPR TA NDGFT COV LSA: CPT

## 2024-05-31 PROCEDURE — C1889 IMPLANT/INSERT DEVICE, NOC: HCPCS | Performed by: SURGERY

## 2024-05-31 PROCEDURE — 85610 PROTHROMBIN TIME: CPT | Performed by: NURSE PRACTITIONER

## 2024-05-31 DEVICE — COIL EMBOLIZATION 4CM 2MM AZUR CX HDRC 45-780204: Type: IMPLANTABLE DEVICE | Site: THORACIC | Status: FUNCTIONAL

## 2024-05-31 DEVICE — IMPLANTABLE DEVICE: Type: IMPLANTABLE DEVICE | Site: THORACIC | Status: FUNCTIONAL

## 2024-05-31 RX ORDER — SODIUM CHLORIDE, SODIUM LACTATE, POTASSIUM CHLORIDE, CALCIUM CHLORIDE 600; 310; 30; 20 MG/100ML; MG/100ML; MG/100ML; MG/100ML
INJECTION, SOLUTION INTRAVENOUS CONTINUOUS
Status: DISCONTINUED | OUTPATIENT
Start: 2024-05-31 | End: 2024-05-31 | Stop reason: HOSPADM

## 2024-05-31 RX ORDER — NALOXONE HYDROCHLORIDE 0.4 MG/ML
0.1 INJECTION, SOLUTION INTRAMUSCULAR; INTRAVENOUS; SUBCUTANEOUS
Status: DISCONTINUED | OUTPATIENT
Start: 2024-05-31 | End: 2024-05-31

## 2024-05-31 RX ORDER — LIDOCAINE 40 MG/G
CREAM TOPICAL
Status: DISCONTINUED | OUTPATIENT
Start: 2024-05-31 | End: 2024-05-31 | Stop reason: HOSPADM

## 2024-05-31 RX ORDER — CEFAZOLIN SODIUM 2 G/100ML
2 INJECTION, SOLUTION INTRAVENOUS
Status: COMPLETED | OUTPATIENT
Start: 2024-05-31 | End: 2024-05-31

## 2024-05-31 RX ORDER — HYDROMORPHONE HCL IN WATER/PF 6 MG/30 ML
.2-.4 PATIENT CONTROLLED ANALGESIA SYRINGE INTRAVENOUS
Status: DISCONTINUED | OUTPATIENT
Start: 2024-05-31 | End: 2024-06-02

## 2024-05-31 RX ORDER — HYDROMORPHONE HCL IN WATER/PF 6 MG/30 ML
0.2 PATIENT CONTROLLED ANALGESIA SYRINGE INTRAVENOUS EVERY 5 MIN PRN
Status: DISCONTINUED | OUTPATIENT
Start: 2024-05-31 | End: 2024-05-31

## 2024-05-31 RX ORDER — LABETALOL HYDROCHLORIDE 5 MG/ML
10 INJECTION, SOLUTION INTRAVENOUS
Status: DISCONTINUED | OUTPATIENT
Start: 2024-05-31 | End: 2024-06-01

## 2024-05-31 RX ORDER — ONDANSETRON 2 MG/ML
4 INJECTION INTRAMUSCULAR; INTRAVENOUS EVERY 30 MIN PRN
Status: DISCONTINUED | OUTPATIENT
Start: 2024-05-31 | End: 2024-05-31

## 2024-05-31 RX ORDER — HEPARIN SODIUM 1000 [USP'U]/ML
INJECTION, SOLUTION INTRAVENOUS; SUBCUTANEOUS PRN
Status: DISCONTINUED | OUTPATIENT
Start: 2024-05-31 | End: 2024-05-31

## 2024-05-31 RX ORDER — PROTAMINE SULFATE 10 MG/ML
INJECTION, SOLUTION INTRAVENOUS PRN
Status: DISCONTINUED | OUTPATIENT
Start: 2024-05-31 | End: 2024-05-31

## 2024-05-31 RX ORDER — FENTANYL CITRATE 50 UG/ML
50 INJECTION, SOLUTION INTRAMUSCULAR; INTRAVENOUS EVERY 5 MIN PRN
Status: DISCONTINUED | OUTPATIENT
Start: 2024-05-31 | End: 2024-05-31

## 2024-05-31 RX ORDER — CEFAZOLIN SODIUM 2 G/100ML
2 INJECTION, SOLUTION INTRAVENOUS SEE ADMIN INSTRUCTIONS
Status: DISCONTINUED | OUTPATIENT
Start: 2024-05-31 | End: 2024-05-31 | Stop reason: HOSPADM

## 2024-05-31 RX ORDER — CEFAZOLIN SODIUM 2 G/100ML
2 INJECTION, SOLUTION INTRAVENOUS
Status: DISCONTINUED | OUTPATIENT
Start: 2024-05-31 | End: 2024-05-31 | Stop reason: HOSPADM

## 2024-05-31 RX ORDER — LIDOCAINE HYDROCHLORIDE 20 MG/ML
INJECTION, SOLUTION INFILTRATION; PERINEURAL PRN
Status: DISCONTINUED | OUTPATIENT
Start: 2024-05-31 | End: 2024-05-31

## 2024-05-31 RX ORDER — HYDRALAZINE HYDROCHLORIDE 20 MG/ML
2.5-5 INJECTION INTRAMUSCULAR; INTRAVENOUS EVERY 10 MIN PRN
Status: DISCONTINUED | OUTPATIENT
Start: 2024-05-31 | End: 2024-06-01

## 2024-05-31 RX ORDER — ONDANSETRON 2 MG/ML
INJECTION INTRAMUSCULAR; INTRAVENOUS PRN
Status: DISCONTINUED | OUTPATIENT
Start: 2024-05-31 | End: 2024-05-31

## 2024-05-31 RX ORDER — DEXAMETHASONE SODIUM PHOSPHATE 4 MG/ML
4 INJECTION, SOLUTION INTRA-ARTICULAR; INTRALESIONAL; INTRAMUSCULAR; INTRAVENOUS; SOFT TISSUE
Status: DISCONTINUED | OUTPATIENT
Start: 2024-05-31 | End: 2024-06-03 | Stop reason: HOSPADM

## 2024-05-31 RX ORDER — FENTANYL CITRATE 50 UG/ML
25 INJECTION, SOLUTION INTRAMUSCULAR; INTRAVENOUS EVERY 5 MIN PRN
Status: DISCONTINUED | OUTPATIENT
Start: 2024-05-31 | End: 2024-05-31

## 2024-05-31 RX ORDER — HYDROMORPHONE HCL IN WATER/PF 6 MG/30 ML
0.4 PATIENT CONTROLLED ANALGESIA SYRINGE INTRAVENOUS EVERY 5 MIN PRN
Status: DISCONTINUED | OUTPATIENT
Start: 2024-05-31 | End: 2024-05-31

## 2024-05-31 RX ORDER — ACETAMINOPHEN 325 MG/1
975 TABLET ORAL ONCE
Status: DISCONTINUED | OUTPATIENT
Start: 2024-05-31 | End: 2024-05-31 | Stop reason: HOSPADM

## 2024-05-31 RX ORDER — CEFAZOLIN SODIUM/WATER 2 G/20 ML
2 SYRINGE (ML) INTRAVENOUS
Status: DISCONTINUED | OUTPATIENT
Start: 2024-05-31 | End: 2024-05-31 | Stop reason: HOSPADM

## 2024-05-31 RX ORDER — PROPOFOL 10 MG/ML
INJECTION, EMULSION INTRAVENOUS PRN
Status: DISCONTINUED | OUTPATIENT
Start: 2024-05-31 | End: 2024-05-31

## 2024-05-31 RX ORDER — IODIXANOL 320 MG/ML
INJECTION, SOLUTION INTRAVASCULAR PRN
Status: DISCONTINUED | OUTPATIENT
Start: 2024-05-31 | End: 2024-05-31 | Stop reason: HOSPADM

## 2024-05-31 RX ORDER — LIDOCAINE 40 MG/G
CREAM TOPICAL
Status: DISCONTINUED | OUTPATIENT
Start: 2024-05-31 | End: 2024-06-03 | Stop reason: HOSPADM

## 2024-05-31 RX ORDER — PROPOFOL 10 MG/ML
INJECTION, EMULSION INTRAVENOUS CONTINUOUS PRN
Status: DISCONTINUED | OUTPATIENT
Start: 2024-05-31 | End: 2024-05-31

## 2024-05-31 RX ORDER — NOREPINEPHRINE BITARTRATE 0.06 MG/ML
.01-.6 INJECTION, SOLUTION INTRAVENOUS CONTINUOUS
Status: DISCONTINUED | OUTPATIENT
Start: 2024-05-31 | End: 2024-06-01

## 2024-05-31 RX ORDER — SODIUM CHLORIDE, SODIUM LACTATE, POTASSIUM CHLORIDE, CALCIUM CHLORIDE 600; 310; 30; 20 MG/100ML; MG/100ML; MG/100ML; MG/100ML
INJECTION, SOLUTION INTRAVENOUS CONTINUOUS
Status: DISCONTINUED | OUTPATIENT
Start: 2024-05-31 | End: 2024-05-31

## 2024-05-31 RX ORDER — SODIUM CHLORIDE, SODIUM LACTATE, POTASSIUM CHLORIDE, CALCIUM CHLORIDE 600; 310; 30; 20 MG/100ML; MG/100ML; MG/100ML; MG/100ML
INJECTION, SOLUTION INTRAVENOUS CONTINUOUS PRN
Status: DISCONTINUED | OUTPATIENT
Start: 2024-05-31 | End: 2024-05-31

## 2024-05-31 RX ORDER — SODIUM CHLORIDE, SODIUM LACTATE, POTASSIUM CHLORIDE, CALCIUM CHLORIDE 600; 310; 30; 20 MG/100ML; MG/100ML; MG/100ML; MG/100ML
INJECTION, SOLUTION INTRAVENOUS CONTINUOUS
Status: DISCONTINUED | OUTPATIENT
Start: 2024-05-31 | End: 2024-06-01

## 2024-05-31 RX ORDER — ONDANSETRON 4 MG/1
4 TABLET, ORALLY DISINTEGRATING ORAL EVERY 30 MIN PRN
Status: DISCONTINUED | OUTPATIENT
Start: 2024-05-31 | End: 2024-05-31

## 2024-05-31 RX ORDER — CEFAZOLIN SODIUM/WATER 2 G/20 ML
2 SYRINGE (ML) INTRAVENOUS SEE ADMIN INSTRUCTIONS
Status: DISCONTINUED | OUTPATIENT
Start: 2024-05-31 | End: 2024-05-31 | Stop reason: HOSPADM

## 2024-05-31 RX ORDER — FENTANYL CITRATE 50 UG/ML
INJECTION, SOLUTION INTRAMUSCULAR; INTRAVENOUS PRN
Status: DISCONTINUED | OUTPATIENT
Start: 2024-05-31 | End: 2024-05-31

## 2024-05-31 RX ADMIN — HYDROMORPHONE HYDROCHLORIDE 0.2 MG: 0.2 INJECTION, SOLUTION INTRAMUSCULAR; INTRAVENOUS; SUBCUTANEOUS at 05:24

## 2024-05-31 RX ADMIN — HEPARIN SODIUM 3000 UNITS: 1000 INJECTION INTRAVENOUS; SUBCUTANEOUS at 17:06

## 2024-05-31 RX ADMIN — HYDROMORPHONE HYDROCHLORIDE 0.2 MG: 0.2 INJECTION, SOLUTION INTRAMUSCULAR; INTRAVENOUS; SUBCUTANEOUS at 10:48

## 2024-05-31 RX ADMIN — HEPARIN SODIUM 2000 UNITS: 1000 INJECTION INTRAVENOUS; SUBCUTANEOUS at 17:49

## 2024-05-31 RX ADMIN — FENTANYL CITRATE 50 MCG: 50 INJECTION INTRAMUSCULAR; INTRAVENOUS at 21:09

## 2024-05-31 RX ADMIN — FENTANYL CITRATE 100 MCG: 50 INJECTION INTRAMUSCULAR; INTRAVENOUS at 15:50

## 2024-05-31 RX ADMIN — LIDOCAINE HYDROCHLORIDE 100 MG: 20 INJECTION, SOLUTION INFILTRATION; PERINEURAL at 15:33

## 2024-05-31 RX ADMIN — PROPOFOL 100 MCG/KG/MIN: 10 INJECTION, EMULSION INTRAVENOUS at 15:33

## 2024-05-31 RX ADMIN — HYDROMORPHONE HYDROCHLORIDE 0.2 MG: 0.2 INJECTION, SOLUTION INTRAMUSCULAR; INTRAVENOUS; SUBCUTANEOUS at 03:27

## 2024-05-31 RX ADMIN — HEPARIN SODIUM 8000 UNITS: 1000 INJECTION INTRAVENOUS; SUBCUTANEOUS at 16:53

## 2024-05-31 RX ADMIN — SODIUM CHLORIDE, POTASSIUM CHLORIDE, SODIUM LACTATE AND CALCIUM CHLORIDE: 600; 310; 30; 20 INJECTION, SOLUTION INTRAVENOUS at 15:16

## 2024-05-31 RX ADMIN — HYDROMORPHONE HYDROCHLORIDE 0.2 MG: 0.2 INJECTION, SOLUTION INTRAMUSCULAR; INTRAVENOUS; SUBCUTANEOUS at 21:48

## 2024-05-31 RX ADMIN — Medication 2 G: at 19:52

## 2024-05-31 RX ADMIN — PROTAMINE SULFATE 50 MG: 10 INJECTION, SOLUTION INTRAVENOUS at 20:37

## 2024-05-31 RX ADMIN — HYDROMORPHONE HYDROCHLORIDE 0.4 MG: 0.2 INJECTION, SOLUTION INTRAMUSCULAR; INTRAVENOUS; SUBCUTANEOUS at 23:43

## 2024-05-31 RX ADMIN — HEPARIN SODIUM 3000 UNITS: 1000 INJECTION INTRAVENOUS; SUBCUTANEOUS at 19:19

## 2024-05-31 RX ADMIN — ACETAMINOPHEN 650 MG: 325 SOLUTION ORAL at 22:50

## 2024-05-31 RX ADMIN — SODIUM CHLORIDE, POTASSIUM CHLORIDE, SODIUM LACTATE AND CALCIUM CHLORIDE: 600; 310; 30; 20 INJECTION, SOLUTION INTRAVENOUS at 18:13

## 2024-05-31 RX ADMIN — HYDROMORPHONE HYDROCHLORIDE 0.2 MG: 0.2 INJECTION, SOLUTION INTRAMUSCULAR; INTRAVENOUS; SUBCUTANEOUS at 15:04

## 2024-05-31 RX ADMIN — Medication 2 G: at 15:55

## 2024-05-31 RX ADMIN — PROPOFOL 70 MG: 10 INJECTION, EMULSION INTRAVENOUS at 15:49

## 2024-05-31 RX ADMIN — PROPOFOL 30 MG: 10 INJECTION, EMULSION INTRAVENOUS at 16:08

## 2024-05-31 RX ADMIN — REMIFENTANIL HYDROCHLORIDE 0.15 MCG/KG/MIN: 1 INJECTION, POWDER, LYOPHILIZED, FOR SOLUTION INTRAVENOUS at 18:35

## 2024-05-31 RX ADMIN — FENTANYL CITRATE 50 MCG: 50 INJECTION INTRAMUSCULAR; INTRAVENOUS at 20:18

## 2024-05-31 RX ADMIN — ONDANSETRON 4 MG: 2 INJECTION INTRAMUSCULAR; INTRAVENOUS at 20:15

## 2024-05-31 RX ADMIN — PROPOFOL 100 MCG/KG/MIN: 10 INJECTION, EMULSION INTRAVENOUS at 15:59

## 2024-05-31 RX ADMIN — PROPOFOL 130 MG: 10 INJECTION, EMULSION INTRAVENOUS at 15:33

## 2024-05-31 RX ADMIN — HYDROMORPHONE HYDROCHLORIDE 0.2 MG: 0.2 INJECTION, SOLUTION INTRAMUSCULAR; INTRAVENOUS; SUBCUTANEOUS at 08:45

## 2024-05-31 RX ADMIN — SODIUM CHLORIDE, POTASSIUM CHLORIDE, SODIUM LACTATE AND CALCIUM CHLORIDE: 600; 310; 30; 20 INJECTION, SOLUTION INTRAVENOUS at 15:06

## 2024-05-31 RX ADMIN — REMIFENTANIL HYDROCHLORIDE 0.05 MCG/KG/MIN: 1 INJECTION, POWDER, LYOPHILIZED, FOR SOLUTION INTRAVENOUS at 15:42

## 2024-05-31 RX ADMIN — FENTANYL CITRATE 150 MCG: 50 INJECTION INTRAMUSCULAR; INTRAVENOUS at 15:33

## 2024-05-31 RX ADMIN — HEPARIN SODIUM 1000 UNITS: 1000 INJECTION INTRAVENOUS; SUBCUTANEOUS at 19:56

## 2024-05-31 RX ADMIN — HYDROMORPHONE HYDROCHLORIDE 0.2 MG: 0.2 INJECTION, SOLUTION INTRAMUSCULAR; INTRAVENOUS; SUBCUTANEOUS at 01:08

## 2024-05-31 RX ADMIN — SUCCINYLCHOLINE CHLORIDE 60 MG: 20 INJECTION, SOLUTION INTRAMUSCULAR; INTRAVENOUS; PARENTERAL at 15:33

## 2024-05-31 RX ADMIN — ESMOLOL HYDROCHLORIDE 250 MCG/KG/MIN: 20 INJECTION INTRAVENOUS at 10:46

## 2024-05-31 RX ADMIN — HYDROMORPHONE HYDROCHLORIDE 0.2 MG: 0.2 INJECTION, SOLUTION INTRAMUSCULAR; INTRAVENOUS; SUBCUTANEOUS at 12:45

## 2024-05-31 RX ADMIN — HYDROMORPHONE HYDROCHLORIDE 0.5 MG: 1 INJECTION, SOLUTION INTRAMUSCULAR; INTRAVENOUS; SUBCUTANEOUS at 20:43

## 2024-05-31 RX ADMIN — SODIUM PHOSPHATE, MONOBASIC, MONOHYDRATE AND SODIUM PHOSPHATE, DIBASIC, ANHYDROUS 9 MMOL: 142; 276 INJECTION, SOLUTION INTRAVENOUS at 10:12

## 2024-05-31 ASSESSMENT — ACTIVITIES OF DAILY LIVING (ADL)
ADLS_ACUITY_SCORE: 22

## 2024-05-31 NOTE — INTERVAL H&P NOTE
I have reviewed the surgical (or preoperative) H&P that is linked to this encounter, and examined the patient. There are no significant changes    Palp radials and DP/PT bilaterally    Motor exam normal in BUE/BLE    Clinical Conditions Present on Arrival:  Clinically Significant Risk Factors Present on Admission

## 2024-05-31 NOTE — ANESTHESIA PROCEDURE NOTES
Central Line/PA Catheter Placement    Pre-Procedure   Staff -        Anesthesiologist:  Carlotta Ratliff MD       Resident/Fellow: Charles Ocampo MD       Performed By: resident       Location: OB       Pre-Anesthestic Checklist: patient identified, IV checked, site marked, risks and benefits discussed, informed consent, monitors and equipment checked, pre-op evaluation and at physician/surgeon's request  Timeout:       Correct Patient: Yes        Correct Procedure: Yes        Correct Site: Yes        Correct Position: Yes        Correct Laterality: Yes   Line Placement:   This line was placed Post Induction starting at 5/31/2024 3:51 PM and ending at 5/31/2024 3:57 PM    Procedure   Procedure: central line       Diagnosis: Hemodynamic monitoring       Laterality: right       Insertion Site: internal jugular.       Patient Position: Trendelenburg  Sterile Prep        All elements of maximal sterile barrier technique followed       Patient Prep/Sterile Barriers: draped, hand hygiene, gloves , hat , mask , draped, gown, sterile gel and probe cover       Skin prep: Chloraprep  Insertion/Injection        Technique: ultrasound guided and Seldinger Technique        1. Ultrasound was used to evaluate the access site.       2. Vein evaluated via ultrasound for patency/adequacy.       3. Using real-time ultrasound the needle/catheter was observed entering the artery/vein.       Introducer Type: 9 Fr, 2-lumen MAC        Type: Introducer  Narrative         Secured by: suture       Tegaderm and Biopatch dressing used.       Complications: None apparent,        blood aspirated from all lumens,        All lumens flushed: Yes       Verification method: Ultrasound and Placement to be verified post-op   Comments:  Routine placement on first attempt. No apparent complications. SLIC placed in introducer lumen sterile.

## 2024-05-31 NOTE — OR NURSING
Patient came down with short pants on and wallet inside pocket. 's license, card and $6 cash. Anesthesia staff aware. Endorsed to circulator relief.

## 2024-05-31 NOTE — PROGRESS NOTES
I visited Mr. Lyons today in the hospital.  His blood pressure is much better controlled.  He is still having back pain, however, is tender to palpation over Paraspinous muscles, which is likely musculoskeletal. However, we cannot rule out concurrent aortic pain secondary to the rapid expansion of his post dissection aneurysm.  Now that his blood pressure is optimized we will plan for repair tomorrow.      We discussed proximal and distal thoracic stent graft extension. We discussed possible need for branched aortic graft for the left subclavian, subclavian stent, and false lumen embolization. We will plan for femoral and brachial access.  including but not limited to the risk of death, anesthetic or cardiopulmonary complications, bleeding, infection, myocardial infarction, stroke, renal insufficiency requiring temporary or permanent dialysis, bowel infarction necessitating bowel resection and colostomy, vessel injury, dissection, distal embolization, perforation, worsening ischemia with either compartment syndrome or limb loss and spinal cord injury.  Discussed possibility of interval aortic events including rupture.  Discussed the potential need for bank blood products, advanced directives, and the need for a team approach as well as the potential for medical photography. The patient understands the risks and would like to proceed with endovascular thoracic aneurysm repair.  Discussed and consented for possible therapeutic lumbar CSF drain if indicated.    Please feel free to contact the vascular surgery team/fellow on-call with any questions or concerns

## 2024-05-31 NOTE — PLAN OF CARE
Problem: Risk for Delirium  Goal: Optimal Coping  Outcome: Progressing  Intervention: Optimize Psychosocial Adjustment to Delirium  Recent Flowsheet Documentation  Taken 5/31/2024 1200 by Jorge Sampson, RN  Supportive Measures:   active listening utilized   positive reinforcement provided  Family/Support System Care:   self-care encouraged   support provided  Taken 5/31/2024 0800 by Jorge Sampson, RN  Supportive Measures:   active listening utilized   positive reinforcement provided  Family/Support System Care:   self-care encouraged   support provided   Goal Outcome Evaluation:       A. Awake alert pain  in back - arm . Medicated often for pain awaiting surg. Vss on GTTs -  I stable no change in status able to let kneeds be known.  P to operating room for aorta repair.

## 2024-05-31 NOTE — PLAN OF CARE
Major Shift Events:  Neuro intact. Up x independent/SBA for line management. C/o mild dizziness w/standing. Pain managed w/PRNs. Room air, lungs clar equal bilaterally. IS at bedside. Sinus rhythm. HR <70, SBP <120, Goal met w/ esmolol and clevidipine gtt. Reg diet, poor apetite today. Denies nasuea. Voids spontaneously. No BM overnight, bowel sounds present.   Plan: OR today. Monitor and treat per POC.   For vital signs and complete assessments, please see documentation flowsheets.

## 2024-05-31 NOTE — PROGRESS NOTES
SURGICAL ICU PROGRESS NOTE  05/31/2024      PRIMARY TEAM: Vascular Surgery  PRIMARY PHYSICIAN: Dr. Singh     REASON FOR CRITICAL CARE ADMISSION: Rapidly expanding descending aorta in setting of Type B dissection, anti  impulse control  ADMITTING PHYSICIAN: Dr. 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 Shaw and Dr. Singh. Post-op course complicated by TIA on 2/14/24 with evidence of bilateral, multifocal subacute infarcts and microhemorrhages in both frontal and posterior distributions and left internal jugular non-occlusive DVT started on ASA and Eliquis (now discontinued). Patient admitted to SICU on 5/29 for treatment of rapidly expanding descending aorta in setting of Type B dissection and anti impulse control with plan for surgical intervention with Vascular Surgery.     MAJOR CHANGES OR EVENTS:  OR with Vascular today       PLAN:   Neuro/ pain/ sedation:  -Monitor neurological status. Notify the MD for any acute changes in exam.  -PRN dilaudid, oxycodone, Tylenol for pain.     Pulmonary care:   -Supplemental oxygen to keep saturation above 92 %.  - Incentive spirometer every 15- 30 minutes when awake.      Cardiovascular:    #Type B aortic dissection s/p stented hemiarch repair  #Expanding descending aorta  #Refractory hypertension  5/29 CTA C/A/P demonstrates 1 mm (4.7 to 4.8) interval increase in descending aorta compared to 5/17.   -Monitor hemodynamic status.   -Acute Type B aortic dissection protocol  -- Goals: SBP <120, HR <70  -- First line IV infusion: Clevidipine, Esmolol  -- First line PO medications: amlodipine 10 mg PO daily (hold only if SBP >90), carvedilol 25 mg BID (hold only if HR <50)  -No arterial access LUE due to retrograde flow from brachial  artery into false lumen  -- Right radial arterial line removed due to pain, plan to replace today  -Plan for extension of TEVAR distally to stop retrograde flow vs subclavian intervention with Vascular Surgery on 5/31        GI care:   - NPO for OR on 5/31         Fluids/ Electrolytes/ Nutrition:   - IV fluid hydration PRN  -ICU electrolyte replacement protocol  -No indication for parenteral nutrition.       Renal/ Fluid Balance:    - Adequate urine output  - Will continue to monitor intake and output.         Endocrine:    -No management indication.      ID/ Antibiotics:  -No indication for antibiotics.       Heme:     -Hemoglobin stable.   - Type and screen ordered      Prophylaxis:    #Hx of TIA  #Hx of non-occlusive internal jugular DVT  - ASA and eliquis discontinue prior to admission  -Mechanical prophylaxis      MSK:    - No indication for PT/OT at this time.         Lines/ tubes/ drains:  -PIV x 2      Disposition:  -Surgical ICU.     Patient seen and discussed with staff, Dr. Gustavo Benitez MD  PGY-1    ====================================    TODAY'S SUBJECTIVE/INTERVAL HISTORY:   No acute events. Continues to have shoulder and back pain but controlled with PRN pain medications.     OBJECTIVE:     Temp:  [97  F (36.1  C)-98.7  F (37.1  C)] 97  F (36.1  C)  Pulse:  [60-76] 68  Resp:  [9-22] 16  BP: ()/(53-83) 121/65  SpO2:  [91 %-100 %] 96 %  Resp: 16      I/O last 3 completed shifts:  In: 1399.64 [P.O.:200; I.V.:1199.64]  Out: 850 [Urine:850]    General: Awake, alert, NAD  Neuro: Grossly intact, alert and oriented, follows commands, spontaneously moves all extremities  Pulm/Resp: Normal work of breathing on RA  CV: RRR  Abdomen: Soft, ntnd, no rebound tenderness or guarding  MSK/Extremities: No peripheral edema, extremities wwp    LABS:   Arterial Blood Gases   No lab results found in last 7 days.  Complete Blood Count   Recent Labs   Lab 05/31/24  0542 05/29/24  1541   WBC 9.5 10.1    HGB 9.7* 10.6*    264     Basic Metabolic Panel  Recent Labs   Lab 05/31/24  0545 05/31/24  0542 05/30/24  0414 05/30/24  0412 05/29/24  1541   NA  --  134*  --   --  137   POTASSIUM  --  4.2  --  4.3 4.4   CHLORIDE  --  103  --   --  103   CO2  --  21*  --   --  20*   BUN  --  12.6  --   --  15.9   CR  --  1.00  --   --  1.07   GLC 86 98 95  --  92     Liver Function Tests  Recent Labs   Lab 05/31/24  0542 05/29/24  1541   AST  --  23   ALT  --  6   ALKPHOS  --  76   BILITOTAL  --  0.6   ALBUMIN  --  3.7   INR 1.27*  --      Pancreatic Enzymes  No lab results found in last 7 days.  Coagulation Profile  Recent Labs   Lab 05/31/24  0542   INR 1.27*   PTT 38         IMAGING:   No results found for this or any previous visit (from the past 24 hour(s)).

## 2024-05-31 NOTE — ANESTHESIA PROCEDURE NOTES
Arterial Line Procedure Note    Pre-Procedure   Staff -        Anesthesiologist:  Carlotta Ratliff MD       Resident/Fellow: Jamin Claros       Performed By: resident       Location: OR       Pre-Anesthestic Checklist: patient identified, IV checked, risks and benefits discussed, informed consent, monitors and equipment checked, pre-op evaluation and at physician/surgeon's request  Timeout:       Correct Patient: Yes        Correct Procedure: Yes        Correct Site: Yes        Correct Position: Yes   Line Placement:   This line was placed Post Induction  Procedure   Procedure: arterial line       Laterality: right       Insertion Site: brachial.  Sterile Prep        Standard elements of sterile barrier followed       Skin prep: Chloraprep  Insertion/Injection        Technique: ultrasound guided        1. Ultrasound was used to evaluate the access site.       2. Artery evaluated via ultrasound for patency/adequacy.       3. Using real-time ultrasound the needle/catheter was observed entering the artery/vein.       Catheter Type/Size: 20 G, 12 cm  Narrative         Secured by: suture       Tegaderm dressing used.       Complications: None apparent,        Arterial waveform: Yes        IBP within 10% of NIBP: Yes

## 2024-05-31 NOTE — PROGRESS NOTES
Vascular Surgery Progress Note  05/31/2024       Subjective:  Resting comfortably in bed this morning, ready for OR.  BMP remarkable for sodium of 134, otherwise negative.  Continues on esmolol and nicardipine for SBP <120 and HR <70.     Objective:  Temp:  [98.2  F (36.8  C)-98.7  F (37.1  C)] 98.2  F (36.8  C)  Pulse:  [60-79] 65  Resp:  [11-22] 17  BP: ()/(53-89) 120/66  SpO2:  [91 %-100 %] 97 %    I/O last 3 completed shifts:  In: 1399.64 [P.O.:200; I.V.:1199.64]  Out: 850 [Urine:850]      Gen: Awake, alert, NAD  CV: RRR per telemetry  Resp: NLB on room air  Abd: soft, nondistended, nontender  Ext: WWP, no edema  Vascular: Palpable DP and PTs bilaterally, palpable +2 femoral pulses, strength 5/5 in all extremities.     Labs:  Recent Labs   Lab 05/31/24  0542 05/29/24  1541   WBC 9.5 10.1   HGB 9.7* 10.6*    264       Recent Labs   Lab 05/31/24  0545 05/31/24  0542 05/30/24  0414 05/30/24 0412 05/29/24  2216 05/29/24  1541   NA  --  134*  --   --   --  137   POTASSIUM  --  4.2  --  4.3  --  4.4   CHLORIDE  --  103  --   --   --  103   CO2  --  21*  --   --   --  20*   BUN  --  12.6  --   --   --  15.9   CR  --  1.00  --   --   --  1.07   GLC 86 98 95  --   --  92   YARON  --  8.6  --   --   --  8.8   MAG  --  2.3  --  2.0 1.9  --    PHOS  --  2.7  --  3.4 2.4*  --        Imaging:  CTA 5/29/24 at 6:27 PM:                                                               IMPRESSION:  1. Thoracic aorta continues to enlarge. 48 mm in the proximal descending (previously 47 mm on 5/17/2024 and 41 mm on 2/27/2024) and 41 mm in the mid descending (previously 38 mm on 2/27/2024).     2. Patent ascending aortic graft and thoracic aortic stent-graft in the true lumen from just distal to the left subclavian artery to T7.     3. Dissection unchanged in configuration. Left subclavian artery through axillary artery to T11.     [Access Center: Thoracic aorta continues to enlarge. 48 mm in the proximal descending  (previously 47 mm on 5/17/2024 and 41 mm on 2/27/2024) and 41 mm in the mid descending (previously 38 mm on 2/27/2024).     Assessment/Plan:   49 year old male with PMH of childhood seizures, prediabetes, uncontrolled HTN, and Type B aortic dissection with retrograde ascending aortic intramural hematoma s/p stented hemiarch repair with antegrade TEVAR on 1/23/24 with Dr. Shaw and Dr. Singh. Post-op course complicated by TIA on 2/14/24 with evidence of bilateral, multifocal subacute infarcts and microhemorrhages in both frontal and posterior distributions and left internal jugular non-occlusive DVT started on ASA and Eliquis. Patient admitted to SICU on 5/29 from clinic for treatment of rapidly expanding descending aorta in setting of Type B dissection and anti impulse control with plan for surgical intervention. Plan for extension of TEVAR distally to stop retrograde flow vs subclavian intervention with Vascular Surgery today on 5/31.     - Continue anti-impulse therapy SBP goal <120 and HR <70  - No p.o. antihypertensives, pending OR today second case  - Multimodal pain management IV opioids, therapeutic repositioning in bed, ice packs if needed.  If patient has lidocaine patch on back, please remove.  - No arterial access LUE due to retrograde flow from brachial artery into false lumen   - Keep patient n.p.o. for OR     Patient was seen and examined with vascular surgery fellow who will discuss with staff.    Candie Parkinson CNP  Vascular Surgery  Pager: [phone]  [email or web address]  Send message or 10 digit call back number Securely via [employer] with the [employer] Web Console (learn more here)

## 2024-05-31 NOTE — ANESTHESIA PROCEDURE NOTES
Airway       Patient location during procedure: OR       Procedure Start/Stop Times: 5/31/2024 3:41 PM  Staff -        CRNA: Yazmin Raymundo APRN CRNA       Performed By: CRNA  Consent for Airway        Urgency: elective  Indications and Patient Condition       Indications for airway management: jhoana-procedural       Induction type:intravenous       Mask difficulty assessment: 2 - vent by mask + OA or adjuvant +/- NMBA    Final Airway Details       Final airway type: endotracheal airway       Successful airway: ETT - single  Endotracheal Airway Details        ETT size (mm): 7.0       Cuffed: yes       Successful intubation technique: direct laryngoscopy       DL Blade Type: MAC 3       Grade View of Cords: 1       Adjucts: stylet       Position: Right       Measured from: lips       Bite block used: None    Post intubation assessment        Placement verified by: capnometry, equal breath sounds and chest rise        Number of attempts at approach: 1       Secured with: tape       Ease of procedure: easy       Dentition: Intact and Unchanged    Medication(s) Administered   Medication Administration Time: 5/31/2024 3:41 PM

## 2024-06-01 ENCOUNTER — APPOINTMENT (OUTPATIENT)
Dept: PHYSICAL THERAPY | Facility: CLINIC | Age: 49
End: 2024-06-01
Attending: SURGERY
Payer: COMMERCIAL

## 2024-06-01 LAB
ANION GAP SERPL CALCULATED.3IONS-SCNC: 12 MMOL/L (ref 7–15)
BUN SERPL-MCNC: 7.4 MG/DL (ref 6–20)
CALCIUM SERPL-MCNC: 8.4 MG/DL (ref 8.6–10)
CHLORIDE SERPL-SCNC: 103 MMOL/L (ref 98–107)
CREAT SERPL-MCNC: 0.81 MG/DL (ref 0.67–1.17)
DEPRECATED HCO3 PLAS-SCNC: 21 MMOL/L (ref 22–29)
EGFRCR SERPLBLD CKD-EPI 2021: >90 ML/MIN/1.73M2
ERYTHROCYTE [DISTWIDTH] IN BLOOD BY AUTOMATED COUNT: 16.3 % (ref 10–15)
GLUCOSE SERPL-MCNC: 100 MG/DL (ref 70–99)
HCT VFR BLD AUTO: 31 % (ref 40–53)
HGB BLD-MCNC: 9.8 G/DL (ref 13.3–17.7)
MAGNESIUM SERPL-MCNC: 1.8 MG/DL (ref 1.7–2.3)
MCH RBC QN AUTO: 19 PG (ref 26.5–33)
MCHC RBC AUTO-ENTMCNC: 31.6 G/DL (ref 31.5–36.5)
MCV RBC AUTO: 60 FL (ref 78–100)
PHOSPHATE SERPL-MCNC: 2.7 MG/DL (ref 2.5–4.5)
PLATELET # BLD AUTO: 226 10E3/UL (ref 150–450)
POTASSIUM SERPL-SCNC: 3.8 MMOL/L (ref 3.4–5.3)
RBC # BLD AUTO: 5.16 10E6/UL (ref 4.4–5.9)
SODIUM SERPL-SCNC: 136 MMOL/L (ref 135–145)
WBC # BLD AUTO: 11.1 10E3/UL (ref 4–11)

## 2024-06-01 PROCEDURE — 80048 BASIC METABOLIC PNL TOTAL CA: CPT | Performed by: NURSE PRACTITIONER

## 2024-06-01 PROCEDURE — 250N000011 HC RX IP 250 OP 636: Performed by: SURGERY

## 2024-06-01 PROCEDURE — 250N000013 HC RX MED GY IP 250 OP 250 PS 637: Performed by: STUDENT IN AN ORGANIZED HEALTH CARE EDUCATION/TRAINING PROGRAM

## 2024-06-01 PROCEDURE — 250N000011 HC RX IP 250 OP 636: Mod: JZ

## 2024-06-01 PROCEDURE — 250N000011 HC RX IP 250 OP 636

## 2024-06-01 PROCEDURE — 84100 ASSAY OF PHOSPHORUS: CPT | Performed by: SURGERY

## 2024-06-01 PROCEDURE — 97530 THERAPEUTIC ACTIVITIES: CPT | Mod: GP

## 2024-06-01 PROCEDURE — 85018 HEMOGLOBIN: CPT | Performed by: NURSE PRACTITIONER

## 2024-06-01 PROCEDURE — C9248 INJ, CLEVIDIPINE BUTYRATE: HCPCS | Mod: JZ

## 2024-06-01 PROCEDURE — 83735 ASSAY OF MAGNESIUM: CPT | Performed by: SURGERY

## 2024-06-01 PROCEDURE — 97162 PT EVAL MOD COMPLEX 30 MIN: CPT | Mod: GP

## 2024-06-01 PROCEDURE — 250N000013 HC RX MED GY IP 250 OP 250 PS 637

## 2024-06-01 PROCEDURE — 258N000003 HC RX IP 258 OP 636

## 2024-06-01 PROCEDURE — 250N000013 HC RX MED GY IP 250 OP 250 PS 637: Performed by: SURGERY

## 2024-06-01 PROCEDURE — 250N000011 HC RX IP 250 OP 636: Performed by: STUDENT IN AN ORGANIZED HEALTH CARE EDUCATION/TRAINING PROGRAM

## 2024-06-01 PROCEDURE — 200N000002 HC R&B ICU UMMC

## 2024-06-01 PROCEDURE — 999N000128 HC STATISTIC PERIPHERAL IV START W/O US GUIDANCE

## 2024-06-01 RX ORDER — HEPARIN SODIUM 5000 [USP'U]/.5ML
5000 INJECTION, SOLUTION INTRAVENOUS; SUBCUTANEOUS EVERY 8 HOURS SCHEDULED
Status: DISCONTINUED | OUTPATIENT
Start: 2024-06-01 | End: 2024-06-03 | Stop reason: HOSPADM

## 2024-06-01 RX ORDER — BENZOYL PEROXIDE 5 G/100G
GEL TOPICAL 2 TIMES DAILY
Status: DISCONTINUED | OUTPATIENT
Start: 2024-06-01 | End: 2024-06-03 | Stop reason: HOSPADM

## 2024-06-01 RX ORDER — AMLODIPINE BESYLATE 5 MG/1
5 TABLET ORAL 2 TIMES DAILY
Status: DISCONTINUED | OUTPATIENT
Start: 2024-06-01 | End: 2024-06-03 | Stop reason: HOSPADM

## 2024-06-01 RX ORDER — POTASSIUM CHLORIDE 750 MG/1
20 TABLET, EXTENDED RELEASE ORAL ONCE
Status: COMPLETED | OUTPATIENT
Start: 2024-06-01 | End: 2024-06-01

## 2024-06-01 RX ORDER — LIDOCAINE 4 G/G
2 PATCH TOPICAL
Status: DISCONTINUED | OUTPATIENT
Start: 2024-06-01 | End: 2024-06-03 | Stop reason: HOSPADM

## 2024-06-01 RX ORDER — ESMOLOL HYDROCHLORIDE 20 MG/ML
50-300 INJECTION, SOLUTION INTRAVENOUS CONTINUOUS
Status: DISCONTINUED | OUTPATIENT
Start: 2024-06-01 | End: 2024-06-02

## 2024-06-01 RX ORDER — ASPIRIN 81 MG/1
81 TABLET ORAL DAILY
Status: DISCONTINUED | OUTPATIENT
Start: 2024-06-01 | End: 2024-06-03 | Stop reason: HOSPADM

## 2024-06-01 RX ORDER — AMOXICILLIN 250 MG
1 CAPSULE ORAL AT BEDTIME
Status: DISCONTINUED | OUTPATIENT
Start: 2024-06-01 | End: 2024-06-02

## 2024-06-01 RX ORDER — DIAZEPAM 5 MG
5 TABLET ORAL ONCE
Status: COMPLETED | OUTPATIENT
Start: 2024-06-01 | End: 2024-06-01

## 2024-06-01 RX ORDER — ACETAMINOPHEN 325 MG/10.15ML
650 LIQUID ORAL EVERY 4 HOURS PRN
Status: DISCONTINUED | OUTPATIENT
Start: 2024-06-01 | End: 2024-06-01

## 2024-06-01 RX ORDER — MAGNESIUM SULFATE HEPTAHYDRATE 40 MG/ML
2 INJECTION, SOLUTION INTRAVENOUS ONCE
Status: COMPLETED | OUTPATIENT
Start: 2024-06-01 | End: 2024-06-01

## 2024-06-01 RX ORDER — METHOCARBAMOL 750 MG/1
750 TABLET, FILM COATED ORAL EVERY 6 HOURS PRN
Status: DISCONTINUED | OUTPATIENT
Start: 2024-06-01 | End: 2024-06-01

## 2024-06-01 RX ORDER — DIAZEPAM 5 MG
5 TABLET ORAL EVERY 6 HOURS PRN
Status: DISCONTINUED | OUTPATIENT
Start: 2024-06-01 | End: 2024-06-03 | Stop reason: HOSPADM

## 2024-06-01 RX ORDER — ACETAMINOPHEN 325 MG/1
650 TABLET ORAL EVERY 4 HOURS PRN
Status: DISCONTINUED | OUTPATIENT
Start: 2024-06-01 | End: 2024-06-03 | Stop reason: HOSPADM

## 2024-06-01 RX ORDER — POLYETHYLENE GLYCOL 3350 17 G/17G
17 POWDER, FOR SOLUTION ORAL DAILY
Status: DISCONTINUED | OUTPATIENT
Start: 2024-06-01 | End: 2024-06-03 | Stop reason: HOSPADM

## 2024-06-01 RX ORDER — METHOCARBAMOL 750 MG/1
750 TABLET, FILM COATED ORAL EVERY 6 HOURS
Status: DISCONTINUED | OUTPATIENT
Start: 2024-06-01 | End: 2024-06-02

## 2024-06-01 RX ADMIN — ACETAMINOPHEN 650 MG: 325 TABLET, FILM COATED ORAL at 17:33

## 2024-06-01 RX ADMIN — AMLODIPINE BESYLATE 5 MG: 5 TABLET ORAL at 10:07

## 2024-06-01 RX ADMIN — CLEVIPIDINE 5 MG/HR: 0.5 EMULSION INTRAVENOUS at 10:02

## 2024-06-01 RX ADMIN — OXYCODONE HYDROCHLORIDE 10 MG: 5 TABLET ORAL at 07:47

## 2024-06-01 RX ADMIN — CLEVIPIDINE 12 MG/HR: 0.5 EMULSION INTRAVENOUS at 05:35

## 2024-06-01 RX ADMIN — METHOCARBAMOL 750 MG: 750 TABLET ORAL at 22:39

## 2024-06-01 RX ADMIN — HYDROMORPHONE HYDROCHLORIDE 0.4 MG: 0.2 INJECTION, SOLUTION INTRAMUSCULAR; INTRAVENOUS; SUBCUTANEOUS at 10:33

## 2024-06-01 RX ADMIN — POTASSIUM CHLORIDE 20 MEQ: 750 TABLET, EXTENDED RELEASE ORAL at 05:31

## 2024-06-01 RX ADMIN — OXYCODONE HYDROCHLORIDE 10 MG: 5 TABLET ORAL at 21:40

## 2024-06-01 RX ADMIN — CARVEDILOL 25 MG: 25 TABLET, FILM COATED ORAL at 17:20

## 2024-06-01 RX ADMIN — DIAZEPAM 5 MG: 5 TABLET ORAL at 08:53

## 2024-06-01 RX ADMIN — POTASSIUM & SODIUM PHOSPHATES POWDER PACK 280-160-250 MG 1 PACKET: 280-160-250 PACK at 09:10

## 2024-06-01 RX ADMIN — HYDROMORPHONE HYDROCHLORIDE 0.4 MG: 0.2 INJECTION, SOLUTION INTRAMUSCULAR; INTRAVENOUS; SUBCUTANEOUS at 05:55

## 2024-06-01 RX ADMIN — HEPARIN SODIUM 5000 UNITS: 5000 INJECTION, SOLUTION INTRAVENOUS; SUBCUTANEOUS at 22:39

## 2024-06-01 RX ADMIN — LIDOCAINE 2 PATCH: 4 PATCH TOPICAL at 07:49

## 2024-06-01 RX ADMIN — HYDROMORPHONE HYDROCHLORIDE 0.4 MG: 0.2 INJECTION, SOLUTION INTRAMUSCULAR; INTRAVENOUS; SUBCUTANEOUS at 01:42

## 2024-06-01 RX ADMIN — OXYCODONE HYDROCHLORIDE 10 MG: 5 TABLET ORAL at 17:33

## 2024-06-01 RX ADMIN — CLEVIPIDINE 4 MG/HR: 0.5 EMULSION INTRAVENOUS at 10:37

## 2024-06-01 RX ADMIN — ACETAMINOPHEN 650 MG: 325 TABLET, FILM COATED ORAL at 07:47

## 2024-06-01 RX ADMIN — HEPARIN SODIUM 5000 UNITS: 5000 INJECTION, SOLUTION INTRAVENOUS; SUBCUTANEOUS at 07:49

## 2024-06-01 RX ADMIN — HYDRALAZINE HYDROCHLORIDE 5 MG: 20 INJECTION INTRAMUSCULAR; INTRAVENOUS at 07:49

## 2024-06-01 RX ADMIN — OXYCODONE HYDROCHLORIDE 10 MG: 5 TABLET ORAL at 04:18

## 2024-06-01 RX ADMIN — METHOCARBAMOL 750 MG: 750 TABLET ORAL at 09:59

## 2024-06-01 RX ADMIN — SODIUM CHLORIDE, POTASSIUM CHLORIDE, SODIUM LACTATE AND CALCIUM CHLORIDE: 600; 310; 30; 20 INJECTION, SOLUTION INTRAVENOUS at 01:16

## 2024-06-01 RX ADMIN — METHOCARBAMOL 750 MG: 750 TABLET ORAL at 16:22

## 2024-06-01 RX ADMIN — HYDROMORPHONE HYDROCHLORIDE 0.4 MG: 0.2 INJECTION, SOLUTION INTRAMUSCULAR; INTRAVENOUS; SUBCUTANEOUS at 03:50

## 2024-06-01 RX ADMIN — BENZOYL PEROXIDE 1 APPLICATOR: 50 GEL TOPICAL at 09:16

## 2024-06-01 RX ADMIN — POLYETHYLENE GLYCOL 3350 17 G: 17 POWDER, FOR SOLUTION ORAL at 17:34

## 2024-06-01 RX ADMIN — CARVEDILOL 25 MG: 25 TABLET, FILM COATED ORAL at 07:47

## 2024-06-01 RX ADMIN — HYDROMORPHONE HYDROCHLORIDE 0.4 MG: 0.2 INJECTION, SOLUTION INTRAMUSCULAR; INTRAVENOUS; SUBCUTANEOUS at 13:45

## 2024-06-01 RX ADMIN — OXYCODONE HYDROCHLORIDE 10 MG: 5 TABLET ORAL at 12:48

## 2024-06-01 RX ADMIN — HYDROMORPHONE HYDROCHLORIDE 0.4 MG: 0.2 INJECTION, SOLUTION INTRAMUSCULAR; INTRAVENOUS; SUBCUTANEOUS at 16:22

## 2024-06-01 RX ADMIN — HEPARIN SODIUM 5000 UNITS: 5000 INJECTION, SOLUTION INTRAVENOUS; SUBCUTANEOUS at 13:38

## 2024-06-01 RX ADMIN — POTASSIUM & SODIUM PHOSPHATES POWDER PACK 280-160-250 MG 1 PACKET: 280-160-250 PACK at 05:32

## 2024-06-01 RX ADMIN — DIAZEPAM 5 MG: 5 TABLET ORAL at 20:24

## 2024-06-01 RX ADMIN — HYDROMORPHONE HYDROCHLORIDE 0.4 MG: 0.2 INJECTION, SOLUTION INTRAMUSCULAR; INTRAVENOUS; SUBCUTANEOUS at 07:44

## 2024-06-01 RX ADMIN — OXYCODONE HYDROCHLORIDE 10 MG: 5 TABLET ORAL at 00:42

## 2024-06-01 RX ADMIN — BISACODYL 15 MG: 5 TABLET, COATED ORAL at 07:57

## 2024-06-01 RX ADMIN — AMLODIPINE BESYLATE 5 MG: 5 TABLET ORAL at 20:24

## 2024-06-01 RX ADMIN — CLEVIPIDINE 7 MG/HR: 0.5 EMULSION INTRAVENOUS at 08:47

## 2024-06-01 RX ADMIN — ASPIRIN 81 MG: 81 TABLET ORAL at 07:47

## 2024-06-01 RX ADMIN — MAGNESIUM SULFATE HEPTAHYDRATE 2 G: 2 INJECTION, SOLUTION INTRAVENOUS at 10:06

## 2024-06-01 ASSESSMENT — ACTIVITIES OF DAILY LIVING (ADL)
ADLS_ACUITY_SCORE: 35
ADLS_ACUITY_SCORE: 30
ADLS_ACUITY_SCORE: 35
ADLS_ACUITY_SCORE: 35
ADLS_ACUITY_SCORE: 34
ADLS_ACUITY_SCORE: 30
ADLS_ACUITY_SCORE: 35
ADLS_ACUITY_SCORE: 30
ADLS_ACUITY_SCORE: 35

## 2024-06-01 ASSESSMENT — VISUAL ACUITY
OU: NORMAL ACUITY

## 2024-06-01 NOTE — BRIEF OP NOTE
[facility]    Brief Operative Note    Pre-operative diagnosis: Aneurysm of descending thoracic aorta without rupture (H24) [I71.23]  Dissecting aneurysm of thoracic aorta, [place] type B (H) [I71.012]  Post-operative diagnosis Same as pre-operative diagnosis    Procedure: Endovascular repair of rapidly expanding post dissection thoracic aneurysm with proximal extension of thoracic endograft with retrograde branch to left subclavian utilizing [place] 37x150 mm Thoracic branch endoprothesis, 15 mm side branch. Distal thoracic extension with 40 x200 mm [place] cTAG. Embolization of aortic and left subcalvian false lumen.  Bilateral percutaneous ultrasound guided access. Brachial cutdown for endoprosthesis and embolization with complex primary repair., N/A - Abdomen    Surgeon: Surgeons and Role:     * Artur Singh MD - Primary     * Anastacia Spann MD - Assisting     * Jorge Dodge MD - Resident - Assisting  Anesthesia: General   Estimated Blood Loss: Less than 100 ml    Drains: None  Specimens: * No specimens in log *  Findings:   No endoleak seen at the end of the case following embolization of the false lumen .  Complications: None.  Implants:   Implant Name Type Inv. Item Serial No.  Lot No. LRB No. Used Action   GORE TAG THORACIC BRANCH ENDOPROSTHESIS 37MM X 15CM X 8MM Stent Graft  [id number] [employer]  N/A 1 Implanted   GORE TAG THORACIC BRANCH ENDOPROSTHESIS 15MM X 6CM X 8MM Stent Graft  [id number] [employer]  N/A 1 Implanted   gore thorasic stent graft 40mm x40mm x 20cm x22fr Stent Graft  [id number] GORE NA N/A 1 Implanted   COIL EMBOLIZATION 4CM 2MM AZUR CX HDRC [id number] - SNA Embolization Coil COIL EMBOLIZATION 4CM 2MM AZUR CX HDRC [id number] NA [employer] [id number] N/A 1 Implanted   azur hydropak 15 Embolization Coil    [id number] N/A 1 Implanted   hydropak 18 Embolization Coil    [id number] N/A 1 Implanted    hydropak 18 Embolization Coil  NA TERUMO [id number] N/A 1 Implanted   azur cxz 18 Embolization Coil    [id number] N/A 1 Implanted   hydropak 18 Embolization Coil   TERUMO [id number] N/A 1 Implanted   azur cx 18 Embolization Coil   TERUMO [id number] N/A 1 Implanted   COIL AZUR 18D CX 10MM X 32CM - [id number] Embolization Coil COIL AZUR 18D CX 10MM X 32CM  [facility] [id number] N/A 1 Implanted   azur hydropak 18 Embolization Coil   TERUMO [id number] N/A 1 Implanted   azur hydropak 18 Embolization Coil   TERUMO [id number] N/A 1 Implanted   azur cx 18 Embolization Coil   TERUMO [id number] N/A 1 Implanted   hydropak 18 Embolization Coil   TERUMO [id number] N/A 1 Implanted   azur hydropsck 18 Embolization Coil   TERUMO [id number] N/A 1 Implanted     Plan:   SICU  MAP >80, keep systolics <160, HR <80  Bedrest overnight  Q1h neuro checks - if any significant change in exam, please call vascular fellow as soon as possible

## 2024-06-01 NOTE — ANESTHESIA POSTPROCEDURE EVALUATION
Patient: Valdo Lyons    Procedure: Procedure(s):  Endovascular repair of rapidly expanding post dissection thoracic aneurysm with proximal extension of thoracic endograft with retrograde branch to left subclavian utilizing [place] 37x150 mm Thoracic branch endoprothesis, 15 mm side branch. Distal thoracic extension with 40 x200 mm [place] cTAG. Embolization of aortic and left subcalvian false lumen.  Bilateral percutaneous ultrasound guided access. Brachial cutdown for endoprosthesis and embolization with complex primary repair.       Anesthesia Type:  General    Note:  Disposition: ICU            ICU Sign Out: Unable to perform physician to physician sign out   Postop Pain Control: Uneventful            Sign Out: Well controlled pain   PONV: No   Neuro/Psych: Uneventful            Sign Out: Acceptable/Baseline neuro status   Airway/Respiratory: Uneventful            Sign Out: Acceptable/Baseline resp. status   CV/Hemodynamics: Uneventful            Sign Out: Acceptable CV status; No obvious hypovolemia; No obvious fluid overload   Other NRE: NONE   DID A NON-ROUTINE EVENT OCCUR? No           Last vitals:  Vitals:    05/31/24 2300 05/31/24 2315 05/31/24 2330   BP:      Pulse: 73 74 76   Resp:      Temp:      SpO2: 100% 100% 95%       Electronically Signed By: Carlotta Ratliff MD  May 31, 2024  11:38 PM

## 2024-06-01 NOTE — PROGRESS NOTES
SURGICAL ICU PROGRESS NOTE  06/01/2024      PRIMARY TEAM: Vascular Surgery  PRIMARY PHYSICIAN: Dr. Singh     REASON FOR CRITICAL CARE ADMISSION: Rapidly expanding descending aorta in setting of Type B dissection, anti  impulse control  ADMITTING PHYSICIAN: Dr. 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 hSaw and Dr. Singh. Post-op course complicated by TIA on 2/14/24 with evidence of bilateral, multifocal subacute infarcts and microhemorrhages in both frontal and posterior distributions and left internal jugular non-occlusive DVT started on ASA and Eliquis (now discontinued). Patient admitted to SICU on 5/29 for treatment of rapidly expanding descending aorta in setting of Type B dissection and anti impulse control now s/p TEVAR thoracic aneurysm with Vascular Surgery.        PLAN:  - Pain control  - Wean IV and transition to PO anti-hypertensives  - Goals: SBP <160, HR <80, MAP>80  - Q1H neuro checks x 24 hrs     Neuro/ pain/ sedation:  -Monitor neurological status. Notify the MD for any acute changes in exam.  -PRN dilaudid, oxycodone, Tylenol for pain.  - Scheduled robaxin and PRN valium     Pulmonary care:   -Supplemental oxygen to keep saturation above 92 %.  - Incentive spirometer every 15-30 minutes when awake.      Cardiovascular:    #Type B aortic dissection s/p stented hemiarch repair  #Expanding descending aorta s/p TEVAR   #Refractory hypertension  5/29 CTA C/A/P demonstrates 1 mm (4.7 to 4.8) interval increase in descending aorta compared to 5/17.   -Monitor hemodynamic status.   -Acute Type B aortic dissection protocol  -- Goals: SBP <160, HR <80, MAP>80  -- First line IV infusion: Clevidipine, Esmolol  --Transition to PO medications: amlodipine 10 mg PO  daily, carvedilol 25 mg BID  -No arterial access LUE due to retrograde flow from brachial artery into false lumen  - Q1H neuro checks x 24 hrs - if any significant change in exam, call vascular fellow      GI care:   - Advance diet as tolerated         Fluids/ Electrolytes/ Nutrition:   - IV fluid hydration PRN  -ICU electrolyte replacement protocol  -No indication for parenteral nutrition.       Renal/ Fluid Balance:    - Adequate urine output  - Will continue to monitor intake and output.         Endocrine:    -No management indication.      ID/ Antibiotics:  -No indication for antibiotics.       Heme:     -Hemoglobin stable 9.8  - Transfuse if hgb <7.0 or signs/symptoms of hypoperfusion. Monitor and trend.       Prophylaxis:    #Hx of TIA  #Hx of non-occlusive internal jugular DVT  - Eliquis discontinue prior to admission  - Mechanical prophylaxis  - Start ASA and subQ heparin      MSK:    - No indication for PT/OT at this time.        Lines/ tubes/ drains:  -PIV x 2  - R internal jugular CVC  - R Brachial arterial line      General Cares/Prophylaxis:    DVT Prophylaxis: Mechanical, subQ hep  GI Prophylaxis: None  Restraints: Restraints for medical healing needed: No      Disposition:  -Surgical ICU.     Patient seen and discussed with staff, Dr. Gustavo Benitez MD  PGY-1    ====================================    TODAY'S SUBJECTIVE/INTERVAL HISTORY:   Continues to have lower back pain and spasms. Has been off home Robaxin. Tolerating PO intake.    OBJECTIVE:     Temp:  [97  F (36.1  C)-98.2  F (36.8  C)] 98.2  F (36.8  C)  Pulse:  [63-92] 75  Resp:  [9-21] 16  BP: ()/(55-86) 134/62  MAP:  [77 mmHg-93 mmHg] 78 mmHg  Arterial Line BP: (135-168)/(46-63) 138/47  SpO2:  [90 %-100 %] 97 %  Resp: 16      I/O last 3 completed shifts:  In: 2937.28 [P.O.:100; I.V.:2837.28]  Out: 2690 [Urine:2690]    General: Awake, alert, NAD  Neuro: Grossly intact, alert and oriented, follows commands, spontaneously  moves all extremities  Pulm/Resp: Normal work of breathing on RA  CV: RRR, strong femoral and peripheral pulses bilaterally  Abdomen: Soft, ntnd, no rebound tenderness or guarding  MSK/Extremities: No peripheral edema, extremities wwp  Incision/skin: LUE and groin incision C/D/I, no drainage, no mass or swelling    LABS:   Arterial Blood Gases   Recent Labs   Lab 05/31/24 1949 05/31/24 1913 05/31/24 1811   PH 7.38 7.36 7.38   PCO2 38 39 37   PO2 112* 134* 231*   HCO3 22 22 22     Complete Blood Count   Recent Labs   Lab 06/01/24 0400 05/31/24 2231 05/31/24 1949 05/31/24 1913 05/31/24 1811 05/31/24 0542 05/29/24  1541   WBC 11.1* 9.6  --   --   --  9.5 10.1   HGB 9.8* 9.6* 8.6* 8.8*   < > 9.7* 10.6*    218  --   --   --  229 264    < > = values in this interval not displayed.     Basic Metabolic Panel  Recent Labs   Lab 06/01/24 0400 05/31/24 2231 05/31/24 2137 05/31/24 1949 05/31/24 1913 05/31/24 0545 05/31/24 0542 05/30/24 0412 05/29/24  1541    138  --  137 136   < > 134*  --  137   POTASSIUM 3.8 4.1  --  4.0 4.2   < > 4.2   < > 4.4   CHLORIDE 103 107  --   --   --   --  103  --  103   CO2 21* 21*  --   --   --   --  21*  --  20*   BUN 7.4 9.4  --   --   --   --  12.6  --  15.9   CR 0.81 0.86  --   --   --   --  1.00  --  1.07   * 96 86 86 87   < > 98   < > 92    < > = values in this interval not displayed.     Liver Function Tests  Recent Labs   Lab 05/31/24 2231 05/31/24 0542 05/29/24  1541   AST  --   --  23   ALT  --   --  6   ALKPHOS  --   --  76   BILITOTAL  --   --  0.6   ALBUMIN  --   --  3.7   INR 1.20* 1.27*  --      Pancreatic Enzymes  No lab results found in last 7 days.  Coagulation Profile  Recent Labs   Lab 05/31/24  2231 05/31/24  1707 05/31/24  0542   INR 1.20*  --  1.27*   PTT 26 >240* 38         IMAGING:   Recent Results (from the past 24 hour(s))   IR OR Angiogram    Narrative    This exam was marked as non-reportable because it will not be read by a    radiologist or a [place] non-radiologist provider.         XR Chest Port 1 View    Impression    RESIDENT PRELIMINARY INTERPRETATION  Impression:   Right IJ catheter tip at the innominate confluence.

## 2024-06-01 NOTE — PLAN OF CARE
Problem: Adult Inpatient Plan of Care  Goal: Absence of Hospital-Acquired Illness or Injury  Intervention: Identify and Manage Fall Risk  Recent Flowsheet Documentation  Taken 6/1/2024 1600 by Jorge Sampson RN  Safety Promotion/Fall Prevention:   activity supervised   clutter free environment maintained   increased rounding and observation   increase visualization of patient   room door open   room near nurse's station   safety round/check completed  Taken 6/1/2024 1200 by Jorge Sampson RN  Safety Promotion/Fall Prevention:   activity supervised   clutter free environment maintained   increased rounding and observation   increase visualization of patient   room door open   room near nurse's station   safety round/check completed  Taken 6/1/2024 0800 by Jorge Sampson RN  Safety Promotion/Fall Prevention:   activity supervised   clutter free environment maintained   increased rounding and observation   increase visualization of patient   room door open   room near nurse's station   safety round/check completed  Intervention: Prevent Skin Injury  Recent Flowsheet Documentation  Taken 6/1/2024 1600 by Jorge Sampson RN  Body Position: weight shifting  Taken 6/1/2024 1200 by Jorge Sampson RN  Body Position: weight shifting  Taken 6/1/2024 0800 by Jorge Sampson RN  Body Position: weight shifting  Intervention: Prevent and Manage VTE (Venous Thromboembolism) Risk  Recent Flowsheet Documentation  Taken 6/1/2024 1600 by Jorge Sampson RN  VTE Prevention/Management: SCDs (sequential compression devices) on  Taken 6/1/2024 1200 by Jorge Sampson RN  VTE Prevention/Management: SCDs (sequential compression devices) on  Taken 6/1/2024 0800 by Jorge Sampson RN  VTE Prevention/Management: SCDs (sequential compression devices) on  Intervention: Prevent Infection  Recent Flowsheet Documentation  Taken 6/1/2024 1600 by Jorge Sampson RN  Infection Prevention:   environmental surveillance  performed   equipment surfaces disinfected   hand hygiene promoted   rest/sleep promoted  Taken 6/1/2024 1200 by Jorge Sampson, RN  Infection Prevention:   environmental surveillance performed   equipment surfaces disinfected   hand hygiene promoted   rest/sleep promoted  Taken 6/1/2024 0800 by Jorge Sampson RN  Infection Prevention:   environmental surveillance performed   equipment surfaces disinfected   hand hygiene promoted   rest/sleep promoted  Goal: Optimal Comfort and Wellbeing  Intervention: Monitor Pain and Promote Comfort  Recent Flowsheet Documentation  Taken 6/1/2024 0800 by Jorge Sampson RN  Pain Management Interventions: medication (see MAR)  Intervention: Provide Person-Centered Care  Recent Flowsheet Documentation  Taken 6/1/2024 1600 by Jorge Sampson, RN  Trust Relationship/Rapport:   care explained   choices provided   questions answered   questions encouraged   reassurance provided   thoughts/feelings acknowledged   emotional support provided   empathic listening provided  Taken 6/1/2024 1200 by oJrge Sampson RN  Trust Relationship/Rapport:   care explained   choices provided   questions answered   questions encouraged   reassurance provided   thoughts/feelings acknowledged   emotional support provided   empathic listening provided  Taken 6/1/2024 0800 by Jorge Sampson, RN  Trust Relationship/Rapport:   care explained   choices provided   questions answered   questions encouraged   reassurance provided   thoughts/feelings acknowledged   emotional support provided   empathic listening provided     Problem: Risk for Delirium  Goal: Optimal Coping  Intervention: Optimize Psychosocial Adjustment to Delirium  Recent Flowsheet Documentation  Taken 6/1/2024 1600 by Jorge Sampson, RN  Supportive Measures:   active listening utilized   decision-making supported   positive reinforcement provided   self-care encouraged   self-reflection promoted   self-responsibility promoted    relaxation techniques promoted   verbalization of feelings encouraged  Taken 6/1/2024 1200 by Jorge Sampson, RN  Supportive Measures:   active listening utilized   decision-making supported   positive reinforcement provided   self-care encouraged   self-reflection promoted   self-responsibility promoted   relaxation techniques promoted   verbalization of feelings encouraged  Taken 6/1/2024 0800 by Jorge Sampson RN  Supportive Measures:   active listening utilized   decision-making supported   positive reinforcement provided   self-care encouraged   self-reflection promoted   self-responsibility promoted   relaxation techniques promoted   verbalization of feelings encouraged  Goal: Improved Behavioral Control  Intervention: Prevent and Manage Agitation  Recent Flowsheet Documentation  Taken 6/1/2024 1600 by Jorge Sampson RN  Environment Familiarity/Consistency: daily routine followed  Taken 6/1/2024 1200 by Jorge Sampson RN  Environment Familiarity/Consistency: daily routine followed  Taken 6/1/2024 0800 by Jorge Sampson RN  Environment Familiarity/Consistency: daily routine followed  Intervention: Minimize Safety Risk  Recent Flowsheet Documentation  Taken 6/1/2024 1600 by Jorge Sampson RN  Communication Enhancement Strategies: call light answered in person  Enhanced Safety Measures:   pain management   room near unit station  Trust Relationship/Rapport:   care explained   choices provided   questions answered   questions encouraged   reassurance provided   thoughts/feelings acknowledged   emotional support provided   empathic listening provided  Taken 6/1/2024 1200 by Jorge Sampson, RN  Communication Enhancement Strategies: call light answered in person  Enhanced Safety Measures:   pain management   room near unit station  Trust Relationship/Rapport:   care explained   choices provided   questions answered   questions encouraged   reassurance provided   thoughts/feelings acknowledged    emotional support provided   empathic listening provided  Taken 6/1/2024 0800 by Jorge Sampson, RN  Communication Enhancement Strategies: call light answered in person  Enhanced Safety Measures:   pain management   room near unit station  Trust Relationship/Rapport:   care explained   choices provided   questions answered   questions encouraged   reassurance provided   thoughts/feelings acknowledged   emotional support provided   empathic listening provided  Goal: Improved Attention and Thought Clarity  Intervention: Maximize Cognitive Function  Recent Flowsheet Documentation  Taken 6/1/2024 1600 by Jorge Sampson, RN  Sensory Stimulation Regulation:   care clustered   quiet environment promoted  Reorientation Measures: reorientation provided  Taken 6/1/2024 1200 by Jorge Sampson, RN  Sensory Stimulation Regulation:   care clustered   quiet environment promoted  Reorientation Measures: reorientation provided  Taken 6/1/2024 0800 by Jorge Sampson, RN  Sensory Stimulation Regulation:   care clustered   quiet environment promoted  Reorientation Measures: reorientation provided     Problem: Aortic Aneurysm/Dissection Repair  Goal: Anesthesia/Sedation Recovery  Intervention: Optimize Anesthesia Recovery  Recent Flowsheet Documentation  Taken 6/1/2024 1600 by Jorge Sampson, RN  Safety Promotion/Fall Prevention:   activity supervised   clutter free environment maintained   increased rounding and observation   increase visualization of patient   room door open   room near nurse's station   safety round/check completed  Administration (IS): instruction provided, initial  Reorientation Measures: reorientation provided  Patient Tolerance (IS): good  Taken 6/1/2024 1200 by Jorge Sampson, RN  Safety Promotion/Fall Prevention:   activity supervised   clutter free environment maintained   increased rounding and observation   increase visualization of patient   room door open   room near nurse's station   safety  round/check completed  Administration (IS): instruction provided, initial  Reorientation Measures: reorientation provided  Patient Tolerance (IS): good  Taken 6/1/2024 0800 by Jorge Sampson, RN  Safety Promotion/Fall Prevention:   activity supervised   clutter free environment maintained   increased rounding and observation   increase visualization of patient   room door open   room near nurse's station   safety round/check completed  Administration (IS): instruction provided, initial  Reorientation Measures: reorientation provided  Level Incentive Spirometer (mL): 2000  Incentive Spirometer Predicted Level (mL): 500  Number of Repetitions (IS): 10  Patient Tolerance (IS): good  Goal: Acceptable Pain Control  Intervention: Prevent or Manage Pain  Recent Flowsheet Documentation  Taken 6/1/2024 0800 by Jorge Sampson, RN  Pain Management Interventions: medication (see MAR)  Goal: Effective Oxygenation and Ventilation  Intervention: Optimize Oxygenation and Ventilation  Recent Flowsheet Documentation  Taken 6/1/2024 1600 by Jorge Sampson, RN  Administration (IS): instruction provided, initial  Cough And Deep Breathing: done with encouragement  Patient Tolerance (IS): good  Head of Bed (HOB) Positioning: HOB at 60 degrees  Taken 6/1/2024 1200 by Jorge Sampson, RN  Administration (IS): instruction provided, initial  Cough And Deep Breathing: done with encouragement  Patient Tolerance (IS): good  Head of Bed (HOB) Positioning: HOB at 60 degrees  Taken 6/1/2024 0800 by Jorge Sampson, RN  Administration (IS): instruction provided, initial  Level Incentive Spirometer (mL): 2000  Cough And Deep Breathing: done with encouragement  Incentive Spirometer Predicted Level (mL): 500  Number of Repetitions (IS): 10  Patient Tolerance (IS): good  Head of Bed (HOB) Positioning: HOB at 60 degrees  Goal: Effective Peripheral Tissue Perfusion  Intervention: Optimize Tissue Perfusion  Recent Flowsheet Documentation  Taken  6/1/2024 1600 by Jorge Sampson, RN  Activity Management: bedrest  Taken 6/1/2024 1200 by Jorge Sampson, RN  Activity Management: bedrest  Taken 6/1/2024 0800 by Jorge Sampson, RN  Activity Management: bedrest   Goal Outcome Evaluation:  D. Bp within ordered parameters-  afebrile- c/o severe back pain - restless- bp meds weaned as ordered, to off at 1200. Up in room with minimal assist . Weak no change in neuro status - groin injection sites bilat, no numbness/tingling - pain to back cont with medication often see med sheet.  Amb with therapy today( see note).   A stable post op day one aorta stent.   I cont to monitor  circulation . Bp ,  ext temp.

## 2024-06-01 NOTE — PROGRESS NOTES
"Vascular Surgery Progress Note    Subjective:  Patient doing OK. Pain controlled. No numbness/weakness.    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 Now with enlargement of descending thoracic aorta from 4.1 cm to 4.7 cm in the setting of left back/shoulder pain with hypertensive emergency.     Now s/p proximal and distal extension of TEVAR with TBE and c-TAG devices and subclavian false lumen coil embolization on 5/31.    - ASA/SQH  - Keep a-line and central line  - BP goal 140s today  - Coreg restarted  - Continue neurovascular exam  - Let us know with any changes or concerns    Anastacia Spann MD  Vascular Surgery Fellow        /88   Pulse 80   Temp 99.7  F (37.6  C) (Oral)   Resp 16   Ht 1.727 m (5' 8\")   Wt 75.4 kg (166 lb 3.6 oz)   SpO2 99%   BMI 25.27 kg/m       Intake/Output Summary (Last 24 hours) at 6/1/2024 1257  Last data filed at 6/1/2024 1130  Gross per 24 hour   Intake 3678.41 ml   Output 5500 ml   Net -1821.59 ml                "

## 2024-06-01 NOTE — PROGRESS NOTES
"   06/01/24 1622   Appointment Info   Signing Clinician's Name / Credentials (PT) Alanna Ross, PT, DPT   Living Environment   People in Home parent(s)  (mother)   Current Living Arrangements house   Home Accessibility stairs to enter home   Number of Stairs, Main Entrance 3   Stair Railings, Main Entrance none   Living Environment Comments Pt reports 3 stairs to enter with no railings, all needs met one level. Pt reports his mother lives with him & reports she will be able to assist with more taxing household tasks such as laundry, etc   Self-Care   Usual Activity Tolerance good   Current Activity Tolerance fair   Activity/Exercise/Self-Care Comment Pt is independent with mobility without AD at baseline.   General Information   Onset of Illness/Injury or Date of Surgery 05/29/24  (Admit to hospital 5/29, surgery on 5/31)   Referring Physician Jorge Dodge MD   Patient/Family Therapy Goals Statement (PT) not stated   Pertinent History of Current Problem (include personal factors and/or comorbidities that impact the POC) Per chart, \"Valdo Lyons is a 49 year-old-male with PMH of childhood seizures, prediabetes, uncontrolled HTN, and Type B aortic dissection with retrograde ascending aortic intramural hematoma s/p stented hemiarch repair with antegrade TEVAR on 1/23/24 with Dr. Shaw and Dr. Singh. Post-op course complicated by TIA on 2/14/24 with evidence of bilateral, multifocal subacute infarcts and microhemorrhages in both frontal and posterior distributions and left internal jugular non-occlusive DVT started on ASA and Eliquis (now discontinued). Patient admitted to SICU on 5/29 for treatment of rapidly expanding descending aorta in setting of Type B dissection and anti impulse control...\" \"now s/p proximal and distal extension of TEVAR with TBE and c-TAG devices and subclavian false lumen coil embolization on 5/31.\"   Existing Precautions/Restrictions   (SBP goal <140  HR <80, MAP>80)   Cognition "   Follows Commands (Cognition) WFL   Pain Assessment   Patient Currently in Pain Yes, see Vital Sign flowsheet  (reports significant groin pain with mobility, did not rate)   Integumentary/Edema   Integumentary/Edema Comments groin access sites not visualized, L brachial access site appears intact - pt does report it is tender/painful   Posture    Posture Forward head position;Protracted shoulders   Posture Comments flexed at hips & trunk for comfort 2/2 sharif pain   Range of Motion (ROM)   ROM Comment Pt able to flex at hips/knees in order to reach to don socks in bed but does demo some reduced LE ROM 2/2 groin pain   Strength (Manual Muscle Testing)   Strength (Manual Muscle Testing) strength is WF   Bed Mobility   Comment, (Bed Mobility) mod ind with HOB slightly elevated, without difficulty   Transfers   Comment, (Transfers) Sit>stand from EOB with SBA, bed in elevated position   Gait/Stairs (Locomotion)   Comment, (Gait/Stairs) Pt ambulated in room ~20 ft with no UE support, with flexed posture, slow pace, short steps, WBOS, with close SBA- CGA from PT   Balance   Balance Comments Pt demos good sitting balance, fair to good minus dynamic balance- initially mobilizing without UE support but benefits from some light support at trunk as he fatigues   Clinical Impression   Criteria for Skilled Therapeutic Intervention Yes, treatment indicated   PT Diagnosis (PT) decreased functional mobility and independence   Influenced by the following impairments pain, decreased activity tolerance, impaired dynamic balance, HR & BP parameters limiting mobility   Functional limitations due to impairments difficulty/ decreased independence with transfers, ambulation, stairs   Clinical Presentation (PT Evaluation Complexity) evolving  (vitals need close montioring, recent TEVAR in Jan 2024 & now needing extension of TEVAR)   Clinical Presentation Rationale clinical judgement   Clinical Decision Making (Complexity) moderate  complexity   Planned Therapy Interventions (PT) balance training;bed mobility training;cryotherapy;gait training;home exercise program;manual therapy techniques;patient/family education;ROM (range of motion);stair training;strengthening;stretching;neuromuscular re-education;motor coordination training;transfer training;progressive activity/exercise;home program guidelines   Risk & Benefits of therapy have been explained evaluation/treatment results reviewed;care plan/treatment goals reviewed;current/potential barriers reviewed;participants voiced agreement with care plan;risks/benefits reviewed;participants included;patient   Clinical Impression Comments Patient is mobilizing fairly well POD#1 extension of TEVAR, limited today primarily by pain & lightheadedness. Anticipate he will progress well with continued mobility with therapy and with nursing staff during hospitalization.   PT Total Evaluation Time   PT Eval, Moderate Complexity Minutes (97993) 8   Physical Therapy Goals   PT Frequency 5x/week   PT Predicted Duration/Target Date for Goal Attainment 06/15/24   PT Goals Transfers;Gait;Stairs   PT: Transfers Independent;Sit to/from stand;Bed to/from chair   PT: Gait Independent;Greater than 200 feet   PT: Stairs 3 stairs;Supervision/stand-by assist   PT Discharge Planning   PT Plan monitor BP/HR, flat bed mobility, progress IND with transfers and gait, trial 3 stairs no rails per home set up when approp   PT Discharge Recommendation (DC Rec) home with assist   PT Rationale for DC Rec Pt mobilizing fairly well, limited by pain and lightheadedness today 6/1 but anticipate he will progress well overall with continued mobility with therapy and with nursing throughout hospitalization. He will benefit from assist for more taxing household tasks, pt reports his mother will be able to assist with laundry, etc.   PT Brief overview of current status SBA transfers, CGA-light Ben steadying assist for ambulation ~130 ft with  no AD

## 2024-06-01 NOTE — ANESTHESIA CARE TRANSFER NOTE
Patient: Valdo Lyons    Procedure: Procedure(s):  Endovascular repair of rapidly expanding post dissection thoracic aneurysm with proximal extension of thoracic endograft with retrograde branch to left subclavian utilizing [place] 37x150 mm Thoracic branch endoprothesis, 15 mm side branch. Distal thoracic extension with 40 x200 mm [place] cTAG. Embolization of aortic and left subcalvian false lumen.  Bilateral percutaneous ultrasound guided access. Brachial cutdown for endoprosthesis and embolization with complex primary repair.       Diagnosis: Aneurysm of descending thoracic aorta without rupture (H24) [I71.23]  Dissecting aneurysm of thoracic aorta, Schriever type B (H) [I71.012]  Diagnosis Additional Information: No value filed.    Anesthesia Type:   General     Note:    Oropharynx: oropharynx clear of all foreign objects  Level of Consciousness: awake  Oxygen Supplementation: face mask  Level of Supplemental Oxygen (L/min / FiO2): 6  Independent Airway: airway patency satisfactory and stable  Dentition: dentition unchanged  Vital Signs Stable: post-procedure vital signs reviewed and stable    Patient transferred to: ICU    ICU Handoff: Call for PAUSE to initiate/utilize ICU HANDOFF, Identified Patient, Identified Responsible Provider, Reviewed the Pertinent Medical History, Discussed Surgical Course, Reviewed Intra-OP Anesthesia Management and Issues during Anesthesia, Set Expectations for Post Procedure Period and Allowed Opportunity for Questions and Acknowledgement of Understanding      Vitals:  Vitals Value Taken Time   BP     Temp     Pulse 71 05/31/24 2136   Resp     SpO2 100 % 05/31/24 2136   Vitals shown include unfiled device data.    Electronically Signed By: BRIANA Tinsley CRNA  May 31, 2024  9:37 PM

## 2024-06-01 NOTE — PROVIDER NOTIFICATION
Complaining of severe back spasm. All PRN meds given - pain cont -  A MD notified - order taken  I valium given po  P. Improved 15 min post valium ingestion.

## 2024-06-01 NOTE — PLAN OF CARE
Admitted/transferred from: OR to  at 2130   Reason for admission/transfer: Post-op ICU management  2 RN skin assessment: completed by: Carmen MCKEON And Jenna LEIVA   Result of skin assessment and interventions/actions: Bilateral groin sites LISA, WNL. LUE incision, LISA. R TL internal jugular. Blanchable redness to coccyx - sacral mepilex applied.   Height, weight, drug calc weight: Done  MDRO education added to care plan: N/A  ?     Major Shift Events: Arrived to unit at 2130. Q15 min neuros for 1 hour + Q1 hour neuros and pulse checks until otherwise specified. Neuro intact ex pain to RUE from art line. Forgetful at times. Denies N/T. IV dilaudid and oxycodone for pain. Titrating levo and esmolol to keep SBP <160, MAPs >80, HR <80. Levo ordered in case needed for MAPs. Afebrile. Pulses palpable throughout. 1L NC. LS clear. Regular diet, tolerating + denies nausea. Bundy in place, good UOP. LR at 100 ml/hr. R internal jugular with introducer. R PIV x1. Limb alert on LUE - do not use for lines/lab draws. R radial art line. Phos and K+ replaced this am.   Plan: Wean BP meds as tolerated, pain management.   For vital signs and complete assessments, please see documentation flowsheets.

## 2024-06-02 ENCOUNTER — APPOINTMENT (OUTPATIENT)
Dept: PHYSICAL THERAPY | Facility: CLINIC | Age: 49
End: 2024-06-02
Attending: SURGERY
Payer: COMMERCIAL

## 2024-06-02 LAB
ANION GAP SERPL CALCULATED.3IONS-SCNC: 8 MMOL/L (ref 7–15)
BUN SERPL-MCNC: 6.8 MG/DL (ref 6–20)
CALCIUM SERPL-MCNC: 8.2 MG/DL (ref 8.6–10)
CHLORIDE SERPL-SCNC: 100 MMOL/L (ref 98–107)
CREAT SERPL-MCNC: 0.92 MG/DL (ref 0.67–1.17)
DEPRECATED HCO3 PLAS-SCNC: 27 MMOL/L (ref 22–29)
EGFRCR SERPLBLD CKD-EPI 2021: >90 ML/MIN/1.73M2
ERYTHROCYTE [DISTWIDTH] IN BLOOD BY AUTOMATED COUNT: 16.2 % (ref 10–15)
GLUCOSE SERPL-MCNC: 103 MG/DL (ref 70–99)
HCT VFR BLD AUTO: 29.2 % (ref 40–53)
HGB BLD-MCNC: 8.9 G/DL (ref 13.3–17.7)
MAGNESIUM SERPL-MCNC: 1.9 MG/DL (ref 1.7–2.3)
MCH RBC QN AUTO: 18.8 PG (ref 26.5–33)
MCHC RBC AUTO-ENTMCNC: 30.5 G/DL (ref 31.5–36.5)
MCV RBC AUTO: 62 FL (ref 78–100)
PHOSPHATE SERPL-MCNC: 2.4 MG/DL (ref 2.5–4.5)
PLATELET # BLD AUTO: 193 10E3/UL (ref 150–450)
POTASSIUM SERPL-SCNC: 3.8 MMOL/L (ref 3.4–5.3)
RBC # BLD AUTO: 4.73 10E6/UL (ref 4.4–5.9)
SODIUM SERPL-SCNC: 135 MMOL/L (ref 135–145)
WBC # BLD AUTO: 7.8 10E3/UL (ref 4–11)

## 2024-06-02 PROCEDURE — 250N000013 HC RX MED GY IP 250 OP 250 PS 637: Performed by: SURGERY

## 2024-06-02 PROCEDURE — 120N000003 HC R&B IMCU UMMC

## 2024-06-02 PROCEDURE — 80048 BASIC METABOLIC PNL TOTAL CA: CPT | Performed by: NURSE PRACTITIONER

## 2024-06-02 PROCEDURE — 250N000013 HC RX MED GY IP 250 OP 250 PS 637: Performed by: STUDENT IN AN ORGANIZED HEALTH CARE EDUCATION/TRAINING PROGRAM

## 2024-06-02 PROCEDURE — 250N000011 HC RX IP 250 OP 636: Performed by: STUDENT IN AN ORGANIZED HEALTH CARE EDUCATION/TRAINING PROGRAM

## 2024-06-02 PROCEDURE — 250N000011 HC RX IP 250 OP 636

## 2024-06-02 PROCEDURE — 84100 ASSAY OF PHOSPHORUS: CPT | Performed by: SURGERY

## 2024-06-02 PROCEDURE — 97530 THERAPEUTIC ACTIVITIES: CPT | Mod: GP

## 2024-06-02 PROCEDURE — 97116 GAIT TRAINING THERAPY: CPT | Mod: GP

## 2024-06-02 PROCEDURE — 83735 ASSAY OF MAGNESIUM: CPT | Performed by: SURGERY

## 2024-06-02 PROCEDURE — 36415 COLL VENOUS BLD VENIPUNCTURE: CPT | Performed by: NURSE PRACTITIONER

## 2024-06-02 PROCEDURE — 85027 COMPLETE CBC AUTOMATED: CPT | Performed by: NURSE PRACTITIONER

## 2024-06-02 PROCEDURE — 250N000013 HC RX MED GY IP 250 OP 250 PS 637

## 2024-06-02 PROCEDURE — 999N000111 HC STATISTIC OT IP EVAL DEFER

## 2024-06-02 RX ORDER — AMOXICILLIN 250 MG
1 CAPSULE ORAL 2 TIMES DAILY
Status: DISCONTINUED | OUTPATIENT
Start: 2024-06-02 | End: 2024-06-03 | Stop reason: HOSPADM

## 2024-06-02 RX ORDER — HYDROMORPHONE HCL IN WATER/PF 6 MG/30 ML
.2-.4 PATIENT CONTROLLED ANALGESIA SYRINGE INTRAVENOUS EVERY 4 HOURS PRN
Status: DISCONTINUED | OUTPATIENT
Start: 2024-06-02 | End: 2024-06-03 | Stop reason: HOSPADM

## 2024-06-02 RX ORDER — POTASSIUM CHLORIDE 750 MG/1
20 TABLET, EXTENDED RELEASE ORAL ONCE
Qty: 2 TABLET | Refills: 0 | Status: COMPLETED | OUTPATIENT
Start: 2024-06-02 | End: 2024-06-02

## 2024-06-02 RX ORDER — GABAPENTIN 100 MG/1
100 CAPSULE ORAL EVERY 8 HOURS
Status: DISCONTINUED | OUTPATIENT
Start: 2024-06-02 | End: 2024-06-02

## 2024-06-02 RX ORDER — METHOCARBAMOL 500 MG/1
1000 TABLET, FILM COATED ORAL EVERY 6 HOURS
Status: DISCONTINUED | OUTPATIENT
Start: 2024-06-02 | End: 2024-06-03 | Stop reason: HOSPADM

## 2024-06-02 RX ORDER — MAGNESIUM OXIDE 400 MG/1
400 TABLET ORAL EVERY 4 HOURS
Qty: 2 TABLET | Refills: 0 | Status: COMPLETED | OUTPATIENT
Start: 2024-06-02 | End: 2024-06-02

## 2024-06-02 RX ADMIN — POLYETHYLENE GLYCOL 3350 17 G: 17 POWDER, FOR SOLUTION ORAL at 08:21

## 2024-06-02 RX ADMIN — AMLODIPINE BESYLATE 5 MG: 5 TABLET ORAL at 19:58

## 2024-06-02 RX ADMIN — POTASSIUM & SODIUM PHOSPHATES POWDER PACK 280-160-250 MG 1 PACKET: 280-160-250 PACK at 12:01

## 2024-06-02 RX ADMIN — DIAZEPAM 5 MG: 5 TABLET ORAL at 11:52

## 2024-06-02 RX ADMIN — POTASSIUM CHLORIDE 20 MEQ: 750 TABLET, EXTENDED RELEASE ORAL at 08:21

## 2024-06-02 RX ADMIN — METHOCARBAMOL 1000 MG: 500 TABLET ORAL at 21:52

## 2024-06-02 RX ADMIN — OXYCODONE HYDROCHLORIDE 10 MG: 5 TABLET ORAL at 01:45

## 2024-06-02 RX ADMIN — METHOCARBAMOL 1000 MG: 500 TABLET ORAL at 15:53

## 2024-06-02 RX ADMIN — ASPIRIN 81 MG: 81 TABLET ORAL at 08:21

## 2024-06-02 RX ADMIN — DOCUSATE SODIUM 50 MG AND SENNOSIDES 8.6 MG 1 TABLET: 8.6; 5 TABLET, FILM COATED ORAL at 09:36

## 2024-06-02 RX ADMIN — OXYCODONE HYDROCHLORIDE 10 MG: 5 TABLET ORAL at 20:03

## 2024-06-02 RX ADMIN — ONDANSETRON 4 MG: 2 INJECTION INTRAMUSCULAR; INTRAVENOUS at 14:40

## 2024-06-02 RX ADMIN — ACETAMINOPHEN 650 MG: 325 TABLET, FILM COATED ORAL at 14:45

## 2024-06-02 RX ADMIN — DIAZEPAM 5 MG: 5 TABLET ORAL at 23:46

## 2024-06-02 RX ADMIN — POTASSIUM & SODIUM PHOSPHATES POWDER PACK 280-160-250 MG 1 PACKET: 280-160-250 PACK at 08:21

## 2024-06-02 RX ADMIN — DOCUSATE SODIUM 50 MG AND SENNOSIDES 8.6 MG 1 TABLET: 8.6; 5 TABLET, FILM COATED ORAL at 19:58

## 2024-06-02 RX ADMIN — HEPARIN SODIUM 5000 UNITS: 5000 INJECTION, SOLUTION INTRAVENOUS; SUBCUTANEOUS at 14:26

## 2024-06-02 RX ADMIN — AMLODIPINE BESYLATE 5 MG: 5 TABLET ORAL at 08:20

## 2024-06-02 RX ADMIN — MAGNESIUM HYDROXIDE 30 ML: 400 SUSPENSION ORAL at 14:22

## 2024-06-02 RX ADMIN — HEPARIN SODIUM 5000 UNITS: 5000 INJECTION, SOLUTION INTRAVENOUS; SUBCUTANEOUS at 05:22

## 2024-06-02 RX ADMIN — HEPARIN SODIUM 5000 UNITS: 5000 INJECTION, SOLUTION INTRAVENOUS; SUBCUTANEOUS at 21:52

## 2024-06-02 RX ADMIN — OXYCODONE HYDROCHLORIDE 10 MG: 5 TABLET ORAL at 11:52

## 2024-06-02 RX ADMIN — OXYCODONE HYDROCHLORIDE 10 MG: 5 TABLET ORAL at 08:32

## 2024-06-02 RX ADMIN — CARVEDILOL 25 MG: 25 TABLET, FILM COATED ORAL at 17:41

## 2024-06-02 RX ADMIN — BENZOYL PEROXIDE 1 APPLICATOR: 50 GEL TOPICAL at 08:21

## 2024-06-02 RX ADMIN — METHOCARBAMOL 750 MG: 750 TABLET ORAL at 04:29

## 2024-06-02 RX ADMIN — METHOCARBAMOL 1000 MG: 500 TABLET ORAL at 09:35

## 2024-06-02 RX ADMIN — HYDROMORPHONE HYDROCHLORIDE 0.4 MG: 0.2 INJECTION, SOLUTION INTRAMUSCULAR; INTRAVENOUS; SUBCUTANEOUS at 05:22

## 2024-06-02 RX ADMIN — MAGNESIUM OXIDE TAB 400 MG (241.3 MG ELEMENTAL MG) 400 MG: 400 (241.3 MG) TAB at 08:21

## 2024-06-02 RX ADMIN — POTASSIUM & SODIUM PHOSPHATES POWDER PACK 280-160-250 MG 1 PACKET: 280-160-250 PACK at 15:53

## 2024-06-02 RX ADMIN — CARVEDILOL 25 MG: 25 TABLET, FILM COATED ORAL at 08:21

## 2024-06-02 RX ADMIN — MAGNESIUM OXIDE TAB 400 MG (241.3 MG ELEMENTAL MG) 400 MG: 400 (241.3 MG) TAB at 12:01

## 2024-06-02 ASSESSMENT — ACTIVITIES OF DAILY LIVING (ADL)
ADLS_ACUITY_SCORE: 27
ADLS_ACUITY_SCORE: 27
ADLS_ACUITY_SCORE: 25
ADLS_ACUITY_SCORE: 25
ADLS_ACUITY_SCORE: 27
ADLS_ACUITY_SCORE: 30
ADLS_ACUITY_SCORE: 27
ADLS_ACUITY_SCORE: 25
ADLS_ACUITY_SCORE: 27

## 2024-06-02 NOTE — PROGRESS NOTES
"Vascular Surgery Progress Note    Subjective:  Patient doing OK. Pain controlled. No numbness/weakness.    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 Now with enlargement of descending thoracic aorta from 4.1 cm to 4.7 cm in the setting of left back/shoulder pain with hypertensive emergency.     Now s/p proximal and distal extension of TEVAR with TBE and c-TAG devices and subclavian false lumen coil embolization on 5/31.    - ASA/SQH  - SBP no less than 120s  - Coreg/amlodipine restarted  - Continue neurovascular exam  - Let us know with any changes or concerns    Anastacia Spann MD  Vascular Surgery Fellow        /68   Pulse 77   Temp 98.9  F (37.2  C) (Axillary)   Resp 18   Ht 1.727 m (5' 8\")   Wt 75.4 kg (166 lb 3.6 oz)   SpO2 98%   BMI 25.27 kg/m       Intake/Output Summary (Last 24 hours) at 6/1/2024 1257  Last data filed at 6/1/2024 1130  Gross per 24 hour   Intake 3678.41 ml   Output 5500 ml   Net -1821.59 ml                "

## 2024-06-02 NOTE — PLAN OF CARE
Occupational Therapy: Orders received. Chart reviewed and discussed with care team.? Occupational Therapy not indicated due to pt mobilizing well with PT, completes BADLs with SBA-IND. Pt appropriate for one discipline to follow during IP stay.? Defer discharge recommendations to PT.? Will complete orders.

## 2024-06-02 NOTE — PLAN OF CARE
ICU End of Shift Summary. See flowsheets for vital signs and detailed assessment. Handoff report given to oncoming RN.     Major Events: Managed c/o back pain with scheduled robaxin and PRN meds. No new changes to report    Neuro: Pt A&O x4. Denies numbness or tingling. +CMS  CV: Normal sinus rhythm. Afebrile, SBP goal <160, no interventions needed  Resp: LS clear, on 2L O2 via NC overnight to keep O2 sats above 92  GI/: Voiding adequately, urine alcira. Last BM 05/29, passing flatus, BS+  Skin: Left upper arm incision, LISA. Bilateral groin sites, Left side more tender but no hematoma  Lines/Drains: Right PIV SL  Activity: up SBA in room  Plan: Manage pain. Encourage PO intake, and activity. Cont with current POC    Goal Outcome Evaluation:      Plan of Care Reviewed With: patient    Overall Patient Progress: improving

## 2024-06-02 NOTE — PROGRESS NOTES
SURGICAL ICU PROGRESS NOTE  06/02/2024      PRIMARY TEAM: Vascular Surgery  PRIMARY PHYSICIAN: Dr. Singh     REASON FOR CRITICAL CARE ADMISSION: Rapidly expanding descending aorta in setting of Type B dissection, anti  impulse control  ADMITTING PHYSICIAN: Dr. 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 Shaw and Dr. Singh. Post-op course complicated by TIA on 2/14/24 with evidence of bilateral, multifocal subacute infarcts and microhemorrhages in both frontal and posterior distributions and left internal jugular non-occlusive DVT started on ASA and Eliquis (now discontinued). Patient admitted to SICU on 5/29 for treatment of rapidly expanding descending aorta in setting of Type B dissection and anti impulse control now s/p TEVAR with TBE and c-TAG devices and subclavian false lumen coil embolization with Vascular Surgery on 5/31.        PLAN:  - Transfer to floor  - Transition to PO pain medications as tolerated     Neuro/ pain/ sedation:  -Monitor neurological status. Notify the MD for any acute changes in exam.  -PRN dilaudid, oxycodone, Tylenol for pain.  - Scheduled robaxin and PRN valium     Pulmonary care:   -Supplemental oxygen to keep saturation above 92%.  - Incentive spirometer every 15-30 minutes when awake.      Cardiovascular:    #Type B aortic dissection s/p stented hemiarch repair  #Expanding descending aorta s/p TEVAR   #Refractory hypertension  5/29 CTA C/A/P demonstrates 1 mm (4.7 to 4.8) interval increase in descending aorta compared to 5/17.   -Monitor hemodynamic status.   -- Goals: SBP <140  -- Discontinue Clevidipine, Esmolol  -- PO medications: amlodipine 10 mg PO daily, carvedilol 25 mg BID  -No arterial access LUE due to retrograde flow from  brachial artery into false lumen       GI care:   - Regular diet  - Bowel regimen scheduled         Fluids/ Electrolytes/ Nutrition:   - IV fluid hydration PRN  -ICU electrolyte replacement protocol  -No indication for parenteral nutrition.       Renal/ Fluid Balance:    - Adequate urine output  - Will continue to monitor intake and output.         Endocrine:    -No management indication.      ID/ Antibiotics:  -No indication for antibiotics.       Heme:     - Hemoglobin 8.9  - Transfuse if hgb <7.0 or signs/symptoms of hypoperfusion. Monitor and trend.       Prophylaxis:    - Mechanical prophylaxis  - Start ASA and subQ heparin    #Hx of TIA  #Hx of non-occlusive internal jugular DVT  - Eliquis discontinue prior to admission      MSK:    - No indication for PT/OT at this time.        Lines/ tubes/ drains:  -PIV x 2    General Cares/Prophylaxis:    DVT Prophylaxis: Mechanical, subQ heparin  GI Prophylaxis: None  Restraints: Restraints for medical healing needed: No      Disposition:  -Surgical ICU.     Patient seen and discussed with staff, Dr. Gustavo Benitez MD  PGY-1    ====================================    TODAY'S SUBJECTIVE/INTERVAL HISTORY:   No acute events. Continues to have back pain and pain at surgical sites. Controlled with scheduled and PRN pain medications. OOB to chair and worked with PT yesterday.     OBJECTIVE:     Temp:  [98.1  F (36.7  C)-99.7  F (37.6  C)] 98.9  F (37.2  C)  Pulse:  [63-93] 78  Resp:  [12-20] 18  BP: (104-139)/() 117/63  MAP:  [69 mmHg-246 mmHg] 88 mmHg  Arterial Line BP: (121-158)/(42-78) 144/59  SpO2:  [89 %-99 %] 94 %  Resp: 18      I/O last 3 completed shifts:  In: 3296.61 [P.O.:1850; I.V.:1446.61]  Out: 3935 [Urine:3935]    General: Awake, alert, NAD  Neuro: Grossly intact, alert and oriented, follows commands, spontaneously moves all extremities  Pulm/Resp: Normal work of breathing on RA  CV: RRR, strong femoral and peripheral pulses  bilaterally  Abdomen: Soft, ntnd, no rebound tenderness or guarding  MSK/Extremities: No peripheral edema, extremities wwp  Incision/skin: LUE and bilateral groin incision C/D/I, no drainage, no mass or swelling    LABS:   Arterial Blood Gases   Recent Labs   Lab 05/31/24 1949 05/31/24 1913 05/31/24 1811   PH 7.38 7.36 7.38   PCO2 38 39 37   PO2 112* 134* 231*   HCO3 22 22 22     Complete Blood Count   Recent Labs   Lab 06/02/24 0539 06/01/24 0400 05/31/24 2231 05/31/24 1949 05/31/24 1811 05/31/24  0542   WBC 7.8 11.1* 9.6  --   --  9.5   HGB 8.9* 9.8* 9.6* 8.6*   < > 9.7*    226 218  --   --  229    < > = values in this interval not displayed.     Basic Metabolic Panel  Recent Labs   Lab 06/02/24 0539 06/01/24 0400 05/31/24 2231 05/31/24 2137 05/31/24 1949 05/31/24  0545 05/31/24  0542    136 138  --  137   < > 134*   POTASSIUM 3.8 3.8 4.1  --  4.0   < > 4.2   CHLORIDE 100 103 107  --   --   --  103   CO2 27 21* 21*  --   --   --  21*   BUN 6.8 7.4 9.4  --   --   --  12.6   CR 0.92 0.81 0.86  --   --   --  1.00   * 100* 96 86 86   < > 98    < > = values in this interval not displayed.     Liver Function Tests  Recent Labs   Lab 05/31/24 2231 05/31/24  0542 05/29/24  1541   AST  --   --  23   ALT  --   --  6   ALKPHOS  --   --  76   BILITOTAL  --   --  0.6   ALBUMIN  --   --  3.7   INR 1.20* 1.27*  --      Pancreatic Enzymes  No lab results found in last 7 days.  Coagulation Profile  Recent Labs   Lab 05/31/24 2231 05/31/24 1707 05/31/24  0542   INR 1.20*  --  1.27*   PTT 26 >240* 38         IMAGING:   No results found for this or any previous visit (from the past 24 hour(s)).

## 2024-06-03 ENCOUNTER — APPOINTMENT (OUTPATIENT)
Dept: CT IMAGING | Facility: CLINIC | Age: 49
End: 2024-06-03
Attending: SURGERY
Payer: COMMERCIAL

## 2024-06-03 ENCOUNTER — TELEPHONE (OUTPATIENT)
Dept: VASCULAR SURGERY | Facility: CLINIC | Age: 49
End: 2024-06-03

## 2024-06-03 ENCOUNTER — APPOINTMENT (OUTPATIENT)
Dept: PHYSICAL THERAPY | Facility: CLINIC | Age: 49
End: 2024-06-03
Attending: SURGERY
Payer: COMMERCIAL

## 2024-06-03 VITALS
OXYGEN SATURATION: 95 % | HEIGHT: 68 IN | HEART RATE: 68 BPM | BODY MASS INDEX: 25.59 KG/M2 | DIASTOLIC BLOOD PRESSURE: 69 MMHG | SYSTOLIC BLOOD PRESSURE: 123 MMHG | RESPIRATION RATE: 18 BRPM | TEMPERATURE: 98.9 F | WEIGHT: 168.87 LBS

## 2024-06-03 DIAGNOSIS — Z98.890 POSTOPERATIVE STATE: ICD-10-CM

## 2024-06-03 DIAGNOSIS — I71.012 DISSECTION OF DESCENDING THORACIC AORTA (H): Primary | ICD-10-CM

## 2024-06-03 DIAGNOSIS — I71.03 THORACOABDOMINAL AORTIC DISSECTION (H): Primary | ICD-10-CM

## 2024-06-03 LAB
ANION GAP SERPL CALCULATED.3IONS-SCNC: 9 MMOL/L (ref 7–15)
ATRIAL RATE - MUSE: 83 BPM
BUN SERPL-MCNC: 7.7 MG/DL (ref 6–20)
CALCIUM SERPL-MCNC: 8.4 MG/DL (ref 8.6–10)
CHLORIDE SERPL-SCNC: 102 MMOL/L (ref 98–107)
CREAT SERPL-MCNC: 0.87 MG/DL (ref 0.67–1.17)
DEPRECATED HCO3 PLAS-SCNC: 24 MMOL/L (ref 22–29)
DIASTOLIC BLOOD PRESSURE - MUSE: NORMAL MMHG
EGFRCR SERPLBLD CKD-EPI 2021: >90 ML/MIN/1.73M2
ERYTHROCYTE [DISTWIDTH] IN BLOOD BY AUTOMATED COUNT: 16.2 % (ref 10–15)
GLUCOSE SERPL-MCNC: 111 MG/DL (ref 70–99)
HCT VFR BLD AUTO: 27.8 % (ref 40–53)
HGB BLD-MCNC: 8.3 G/DL (ref 13.3–17.7)
INTERPRETATION ECG - MUSE: NORMAL
MAGNESIUM SERPL-MCNC: 2.2 MG/DL (ref 1.7–2.3)
MCH RBC QN AUTO: 18.5 PG (ref 26.5–33)
MCHC RBC AUTO-ENTMCNC: 29.9 G/DL (ref 31.5–36.5)
MCV RBC AUTO: 62 FL (ref 78–100)
P AXIS - MUSE: 67 DEGREES
PHOSPHATE SERPL-MCNC: 2.6 MG/DL (ref 2.5–4.5)
PLATELET # BLD AUTO: 184 10E3/UL (ref 150–450)
POTASSIUM SERPL-SCNC: 4.2 MMOL/L (ref 3.4–5.3)
PR INTERVAL - MUSE: 170 MS
QRS DURATION - MUSE: 88 MS
QT - MUSE: 382 MS
QTC - MUSE: 448 MS
R AXIS - MUSE: 36 DEGREES
RBC # BLD AUTO: 4.48 10E6/UL (ref 4.4–5.9)
SODIUM SERPL-SCNC: 135 MMOL/L (ref 135–145)
SYSTOLIC BLOOD PRESSURE - MUSE: NORMAL MMHG
T AXIS - MUSE: 65 DEGREES
VENTRICULAR RATE- MUSE: 83 BPM
WBC # BLD AUTO: 6.9 10E3/UL (ref 4–11)

## 2024-06-03 PROCEDURE — 71275 CT ANGIOGRAPHY CHEST: CPT | Mod: 26 | Performed by: RADIOLOGY

## 2024-06-03 PROCEDURE — 83735 ASSAY OF MAGNESIUM: CPT

## 2024-06-03 PROCEDURE — 74174 CTA ABD&PLVS W/CONTRAST: CPT | Mod: 26 | Performed by: RADIOLOGY

## 2024-06-03 PROCEDURE — 250N000011 HC RX IP 250 OP 636: Performed by: RADIOLOGY

## 2024-06-03 PROCEDURE — 250N000013 HC RX MED GY IP 250 OP 250 PS 637: Performed by: SURGERY

## 2024-06-03 PROCEDURE — 250N000013 HC RX MED GY IP 250 OP 250 PS 637

## 2024-06-03 PROCEDURE — 36415 COLL VENOUS BLD VENIPUNCTURE: CPT | Performed by: NURSE PRACTITIONER

## 2024-06-03 PROCEDURE — 80048 BASIC METABOLIC PNL TOTAL CA: CPT | Performed by: NURSE PRACTITIONER

## 2024-06-03 PROCEDURE — 250N000013 HC RX MED GY IP 250 OP 250 PS 637: Performed by: STUDENT IN AN ORGANIZED HEALTH CARE EDUCATION/TRAINING PROGRAM

## 2024-06-03 PROCEDURE — 84100 ASSAY OF PHOSPHORUS: CPT

## 2024-06-03 PROCEDURE — 85027 COMPLETE CBC AUTOMATED: CPT | Performed by: NURSE PRACTITIONER

## 2024-06-03 PROCEDURE — 97116 GAIT TRAINING THERAPY: CPT | Mod: GP

## 2024-06-03 PROCEDURE — 250N000011 HC RX IP 250 OP 636: Performed by: STUDENT IN AN ORGANIZED HEALTH CARE EDUCATION/TRAINING PROGRAM

## 2024-06-03 PROCEDURE — 74174 CTA ABD&PLVS W/CONTRAST: CPT

## 2024-06-03 RX ORDER — POLYETHYLENE GLYCOL 3350 17 G/17G
17 POWDER, FOR SOLUTION ORAL DAILY
Qty: 510 G | Refills: 0 | Status: SHIPPED | OUTPATIENT
Start: 2024-06-03 | End: 2024-09-27

## 2024-06-03 RX ORDER — LIDOCAINE 4 G/G
2 PATCH TOPICAL EVERY 24 HOURS
Qty: 12 PATCH | Refills: 0 | Status: SHIPPED | OUTPATIENT
Start: 2024-06-03 | End: 2024-06-10

## 2024-06-03 RX ORDER — OXYCODONE HYDROCHLORIDE 5 MG/1
5 TABLET ORAL EVERY 4 HOURS PRN
Qty: 12 TABLET | Refills: 0 | Status: SHIPPED | OUTPATIENT
Start: 2024-06-03 | End: 2024-06-04

## 2024-06-03 RX ORDER — METHOCARBAMOL 500 MG/1
1000 TABLET, FILM COATED ORAL EVERY 6 HOURS PRN
Qty: 160 TABLET | Refills: 0 | Status: SHIPPED | OUTPATIENT
Start: 2024-06-03 | End: 2024-06-23

## 2024-06-03 RX ORDER — IOPAMIDOL 755 MG/ML
90 INJECTION, SOLUTION INTRAVASCULAR ONCE
Status: COMPLETED | OUTPATIENT
Start: 2024-06-03 | End: 2024-06-03

## 2024-06-03 RX ORDER — AMOXICILLIN 250 MG
1 CAPSULE ORAL 2 TIMES DAILY
Qty: 30 TABLET | Refills: 0 | Status: SHIPPED | OUTPATIENT
Start: 2024-06-03 | End: 2024-09-27

## 2024-06-03 RX ADMIN — ACETAMINOPHEN 650 MG: 325 TABLET, FILM COATED ORAL at 07:57

## 2024-06-03 RX ADMIN — POTASSIUM & SODIUM PHOSPHATES POWDER PACK 280-160-250 MG 1 PACKET: 280-160-250 PACK at 07:53

## 2024-06-03 RX ADMIN — OXYCODONE HYDROCHLORIDE 5 MG: 5 TABLET ORAL at 01:46

## 2024-06-03 RX ADMIN — CARVEDILOL 25 MG: 25 TABLET, FILM COATED ORAL at 07:50

## 2024-06-03 RX ADMIN — OXYCODONE HYDROCHLORIDE 10 MG: 5 TABLET ORAL at 06:13

## 2024-06-03 RX ADMIN — IOPAMIDOL 90 ML: 755 INJECTION, SOLUTION INTRAVENOUS at 10:27

## 2024-06-03 RX ADMIN — DIAZEPAM 5 MG: 5 TABLET ORAL at 07:57

## 2024-06-03 RX ADMIN — ASPIRIN 81 MG: 81 TABLET ORAL at 07:50

## 2024-06-03 RX ADMIN — METHOCARBAMOL 1000 MG: 500 TABLET ORAL at 03:45

## 2024-06-03 RX ADMIN — POLYETHYLENE GLYCOL 3350 17 G: 17 POWDER, FOR SOLUTION ORAL at 07:53

## 2024-06-03 RX ADMIN — METHOCARBAMOL 1000 MG: 500 TABLET ORAL at 10:12

## 2024-06-03 RX ADMIN — HEPARIN SODIUM 5000 UNITS: 5000 INJECTION, SOLUTION INTRAVENOUS; SUBCUTANEOUS at 06:14

## 2024-06-03 RX ADMIN — ACETAMINOPHEN 650 MG: 325 TABLET, FILM COATED ORAL at 01:46

## 2024-06-03 RX ADMIN — DOCUSATE SODIUM 50 MG AND SENNOSIDES 8.6 MG 1 TABLET: 8.6; 5 TABLET, FILM COATED ORAL at 07:50

## 2024-06-03 RX ADMIN — AMLODIPINE BESYLATE 5 MG: 5 TABLET ORAL at 07:50

## 2024-06-03 RX ADMIN — POTASSIUM & SODIUM PHOSPHATES POWDER PACK 280-160-250 MG 1 PACKET: 280-160-250 PACK at 10:14

## 2024-06-03 RX ADMIN — OXYCODONE HYDROCHLORIDE 10 MG: 5 TABLET ORAL at 10:13

## 2024-06-03 ASSESSMENT — ACTIVITIES OF DAILY LIVING (ADL)
ADLS_ACUITY_SCORE: 24
ADLS_ACUITY_SCORE: 25
ADLS_ACUITY_SCORE: 24
ADLS_ACUITY_SCORE: 25
ADLS_ACUITY_SCORE: 24

## 2024-06-03 NOTE — OP NOTE
VASCULAR SURGERY OPERATIVE REPORT     LOCATION:    [facility]    Valdo Lyons  Medical Record #:  [id number]  YOB: 1975  Age:  49 year old     Date of Service: 5/31/2024    Preoperative diagnosis    #1- Type B [0-5] Dissection with retrograde ascending intramural hematoma now status post stented hemiarch replacement with antegrade TEVAR in frozen elephant trunk configuration, Dr. 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   Other Assistant: Jorge Dodge MD (PGY 2 vascular surgery resident)    Procedures:  Endovascular repair of rapidly expanding post-dissection thoracic aortic aneurysm with 37x150 mm [place] Thoracic Branch Endoprosthesis incorporating the Left Subclavian Artery with a single retrograde inner branch utilizing a 15 mm Side Branch, deployed in zones 2-4  Distal thoracic extension using 40x200 mm conformable cTAG stent-graft deployed in Zone  4-5 .  Intentional disruption of the aortic dissection septum with irvin technique  Coil embolization of the aortic false lumen,  subclavian artery branch, and subclavian artery false lumen with Terumo Colbert detachable and hydropack coils  [place] fusion of computed tomography angiography.  Arch and thoracic angiography.  Selective left subclavian artery angiogram  Micro-catheterization of the subclavian artery false lumen  Intravascular ultrasound of the ascending, arch, and thoracoabdominal aorta.  Balloon dilatation of proximal landing zone and distal landing zones.  Rotational digital subtraction arch and thoracic angiography   Cone beam computed tomography with and without contrast-enhancement and radiologic interpretation  Total percutaneous bilateral femoral approach with Perclose device, 22-Fr sheath on the right groin, 6-Fr sheath on the left.  Left brachial exposure for delivery of an 8-Fr sheath. Complex primary repair of the brachial artery.  Intraoperative MEP/SSEP/cMAP neuromonitoring.    This case was substantially more difficult than usual because of complexity of anatomy requiring multiple techniques to achieve aneurysm seal, urgent nature of procedure as well as need for controlled aortic septum rupture.    Findings:   Initial entry flow 1B with mostly outflow via subclavian artery false lumen.  Reversal of flow into aneurysm sac after distal extension.  Successful coil embolization of aneurysm sac and false lumen of subclavian.    Indication for procedure:    Mr. Lyons is a 49 year old male with a history of Type B [0-5] aortic dissection initially managed with stented hemiarch replacement.  He had a difficult post operative course, however ultimately recovered well and had early aortic stabilization, however, was admitted with hypertensive urgency and rapidly expanding 49 mm post dissection thoracic aneurysm. Risks, benefits, alternatives and indications including but not limited to the risk of death, anesthetic or cardiopulmonary complications, bleeding, infection, myocardial infarction, stroke, renal insufficiency  requiring temporary or permanent dialysis, bowel infarction necessitating bowel resection and colostomy, vessel injury, dissection, distal embolization, perforation, worsening ischemia with either compartment syndrome or limb loss and spinal cord injury.  Discussed possibility of interval aortic events including rupture.  Discussed the potential need for bank blood products, advanced directives, and the need for a team approach as well as the potential for medical photography. The patient and his mother understand the risks and would like to proceed with endovascular repair of his rapidly expanding 4.9 cm post dissection thoracic aortic aneurysm.  he also consented for therapeutic CSF drain if indicated.    Description of procedure:    Operative details:    The patient was brought to the hybrid operating room.  Under satisfactory general anesthesia, he was placed in supine position with left arm out and all pressure points padded in standard fashion.  The chest, abdomen, groins, and thighs were widely prepped and draped in sterile, standard fashion.  A surgical pause was performed with all members of the surgical team to verify patient, medical record number, birth date, planned surgical procedure, surgical site, allergies, fire risk and perioperative antibiotics.    A left brachial cutdown was performed.  The brachial artery was isolated for several cm.  Under realtime ultrasound guidance, percutaneous bilateral retrograde transfemoral access was established with a micropuncture set and exchanged for 0.035-inch system.  The systems were upsized to a 6 [place] sheath.  The patient was systemically heparinized. An aPTT greater than 240 seconds was obtained given inadequate ACT response. The right femoral arterial puncture site was preclosed using two Perclose devices and an 8 Fr sheath was placed.  Left brachial access was obtained with direct visualization. An 8 Fr sheath was placed with a coaxial 5 Fr sheath. Access  was gained into the ascending aorta with a Kumpe catheter and a Glidewire, and a curly Lunderquist wire was advanced under protection of the Kumpe catheter. Intravascular ultrasound was obtained which revealed true lumen cannulation throughout the length and was used to size the distal seal zone. The sheath was upsized to a 22 Fr [place].  With assistance of a Glidewire and C2 catheter, access was gained to the descending thoracic aorta from the arm. An Tiffany snare was advanced over the Lunderquist.  A [place] wire was snared and through and through femoral-left radial access was secured. Marker catheter was advanced through the left femoral puncture and placed in the ascending aorta.  A 37x150 mm [place] TBE was advanced over both wires. An arch aortogram was performed and the fusion image calibrated. The systolic blood pressure was lowered to  mmHg. The device was positioned and deployed with the proximal edge distal to the left common carotid artery origin.  The delivery system was removed and the a 15 mm left subclavian branch was advanced via the femoral approach and deployed after subclavian angiogram identified the vertebral artery .  It was dilated at the cuff and distally with a Coda balloon. The proximal TBE was ballooned. The repair was extended distally with a 40x200 mm CTAG stent graft with excellent overlap with the TBE. Jonh technique was used to disrupt the distal aortic septum.  IVUS demonstrated infolding of the 40 mm cTAG within the compressed true lumen the dissection septum was disrupted from the TBE sidebranch portal to the distal seal zone with adequate result utilizing jonh technique through the overlap zones with a Trilobe balloon. Next I turned attention to the left subclavian artery. Selective angiogram demonstrated reversal of flow through the false lumen into the aortic sac which was expected.  Multiple attempts were made to cannulate the false lumen.  One subclavian  branch had a small perforation which was coiled.  Using a Progreat catheter, the false lume was successfully catheterized and confirmed with angiography.  Numerous coils were used to pack the pocket in the mid aneurysm sac extending into the subclavian false lumen to the mid portion of the TBE subclavian side branch stent.  Angiogram confirmed no persistent filling of the aneurysm sac. Completion rotational digital subtraction angiogram and cone beam CT scan with and without contrast were performed and demonstrated widely patent main body and left subclavian stent graft, no signs of retrograde dissection, rupture, or any other technical defect. There was no evidence of type I or III endoleaks.  All sheaths and wires were removed.  The Perclose devices were tightened and were hemostatic. Complex primary repair of the left brachial artery was performed with 7-0 prolene suture.  All needle and sponge counts were correct. Groin incisions were closed using running Monocryl sutures for the subcuticular layer.  The left arm was closed in two layers with 2-0 vicryl and 4-0 Monocryl suture. A sterile dressing was applied. The patient was awoken in the operating room and was able to move all 4 extremities to command with intact proximal strength. The patient was extubated and transferred to the ICU in stable condition.    The patient had palpable pulses at completion, stable from his baseline.    Contrast: 165 mL  Flouro time: 52 min  Radiation: 1724    Estimated blood loss: 100 cc    Specimens: none     Complications: None apparent    Plan:  -ASA 81 mg  -Flat for 6 hours, followed by bedrest  -Site checks  -Neurovascular checks, must be able to lift legs off bed  -MAP goal: >80 tonight. Ideally SBP <140-160 if able  -q6 CBC, BMP, Lactate, Fibrinogen, PT/INR  -Hgb > 9, Plt >100, Fibrinogen <200, INR <1.5  -Spinal drain at bedside          Artur Singh MD, VI  VASCULAR AND ENDOVASCULAR SURGERY   [facility]  Vascular Surgery

## 2024-06-03 NOTE — PLAN OF CARE
Goal Outcome Evaluation:    Neuro: A&O x4. Cooperative, pt inpatient about discharging. Walked with therapy.   CV: HR 60-80s. SBP goal <160, goal met. Afebrile.  Pulm: RA. Clear lungs.    GI: Regular. Bowel meds given, pt passing gas.   : Voiding independently.   IV/Gtt: PIVs removed.    Discharge: Patient discharged at 1210, pt picking up discharge medications and his mother is picking him up in private vehicle. All patient belongings sent with patient. All education given and questions answered, vascular at bedside this AM going over discharge instructions as well.     Hazel Yoder, RN

## 2024-06-03 NOTE — DISCHARGE SUMMARY
[facility]  Discharge Summary  Vascular Surgery     Valdo Lyons MRN# [id number]   YOB: 1975 Age: 49 year old     Date of Admission:  5/29/2024  Date of Discharge::  6/3/2024  Admitting Physician:  Artur Singh MD  Discharge Physician:  Jorge Dodge MD  Primary Care Physician:        Marjorie Mercedes          Admission Diagnoses:   Aortic dissection  Dissecting aneurysm of thoracic aorta, Negrito type B (H)          Discharge Diagnosis:   Same as admission diagnoses         Procedures:   Procedure(s):  Endovascular repair of rapidly expanding post dissection thoracic aneurysm with proximal extension of thoracic endograft with retrograde branch to left subclavian utilizing Panna Maria 37x150 mm Thoracic branch endoprothesis, 15 mm side branch. Distal thoracic extension with 40 x200 mm Panna Maria cTAG. Embolization of aortic and left subcalvian false lumen.  Bilateral percutaneous ultrasound guided access. Brachial cutdown for endoprosthesis and embolization with complex primary repair.          Consultations:   PHYSICAL THERAPY ADULT IP CONSULT  OCCUPATIONAL THERAPY ADULT IP CONSULT  NURSING TO CONSULT FOR VASCULAR ACCESS CARE IP CONSULT  CARE MANAGEMENT / SOCIAL WORK IP CONSULT  PHYSICAL THERAPY ADULT IP CONSULT  NURSING TO CONSULT FOR VASCULAR ACCESS CARE IP CONSULT          Brief History of Illness:   Valdo Lyons is a 49 year old male who was seen today in clinic for follow-up for type B dissection (s/p hemiarch and TEVAR). He was found to have left shoulder/chest/back pain, worsening progressively for 2 weeks and uncontrolled hypertension. He is now in ICU, reporting pain has subsided quite well after oxy and when he is in good position. No leg/arm pain/numbness/weakness. No abdominal pain.           Hospital Course:   After admission to the ICU, the patient's blood pressure was controlled using oral and IV medications, including clevidipine and esmolol gtts. On hospital  day 2, he was taken to OR.    The patient underwent the above procedure on 5/31, which he tolerated well without immediate complications. He was transferred back to his ICU bed for routine postoperative care. The remainder of his postoperative course was unremarkable. Bundy was removed on POD#1. On the day of discharge, he was tolerating a regular diet, his pain was well controlled with oral pain medications, he was voiding spontaneously, and ambulating independently. His pre-discharge CTA appeared stable with no new findings to warrant additional treatment. Patient with follow up with Dr. Singh in clinic in 4 weeks.           Imaging Studies:     Results for orders placed or performed during the hospital encounter of 05/29/24   CTA Chest Abdomen Pelvis w Contrast     Value    Radiologist flags Thoracic aorta continues to enlarge. 48 mm in the (Urgent)    Narrative    CTA CHEST, ABDOMEN, AND PELVIS WITH 3D AND MULTIPLANAR RECONSTRUCTIONS  5/29/2024 6:27 PM    CLINICAL HISTORY: aortic dissection. Ascending aortic repair with 30  mm Gelweave graft and placement of 37 mm thoracic aortic stent-graft  1/23/2024. Proximal descending aorta continued to enlarge over last  two CTAs.    COMPARISONS: CTA chest, abdomen, and pelvis 5/17/2024 and 2/27/2024.    REFERRING PROVIDER: GUILLE MILLER    TECHNIQUE: Unenhanced CT chest, abdomen, and pelvis. After the  uneventful administration of intravenous contrast, EKG gated CTA  chest, abdomen, and pelvis. After 3 minute delay, repeat CT through  the chest. Coronal and sagittal reconstructions were produced. Lung  MIP produced.    3D and multiplanar reconstructions were produced and reviewed.    CONTRAST: 90 mL Isovue 370    DOSE (DLP): 1310 mGy*cm    FINDINGS: CTA: Ascending aortic graft patent. No leak. Thoracic aortic  endograft in the true lumen extends from just distal to the left  subclavian artery to T7, unchanged. Endograft patent.    Dissection unchanged in  configuration from the left subclavian artery  to T11. 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    Dissection extends through the left subclavian artery to the axillary  artery. Left vertebral artery originates from the true lumen. Left  internal thoracic artery originates from the false.    Aortic measurements:   Sinuses of Valsalva: 37 mm  Inferior graft anastomosis: 28 mm  Mid ascending graft: 31 mm  Superior graft anastomosis: 26 mm  Arch: 25 mm  Proximal descendin mm (previously 47 mm on 2024 and 41 mm on  2024)  Mid descendin mm (previously 38 mm on 2024)  Diaphragm (T11): 33 mm, unchanged    Right innominate, subclavian, and carotid arteries patent. Left  carotid artery patent.     Left subclavian artery measures 17 mm in diameter at its base,  previously 18 mm.    Bilateral vertebral arteries patent.    Celiac, superior mesenteric, renal, and inferior mesenteric arteries  patent.    Bilateral common, internal, and external iliac arteries patent.  Bilateral corona mortis. Bilateral common femoral arteries patent.    CT: Sternal wires intact.     Stable calcified granulomas. No suspicious pulmonary nodule. Small  left effusion.    Gallstone.    Renal cysts.    Diverticulosis.      Impression    IMPRESSION:  1. Thoracic aorta continues to enlarge. 48 mm in the proximal  descending (previously 47 mm on 2024 and 41 mm on 2024) and  41 mm in the mid descending (previously 38 mm on 2024).    2. Patent ascending aortic graft and thoracic aortic stent-graft in  the true lumen from just distal to the left subclavian artery to T7.    3. Dissection unchanged in configuration. Left subclavian artery  through axillary artery to T11.    [Access Center: Thoracic aorta continues to enlarge. 48 mm in the  proximal descending (previously 47 mm on 2024 and 41 mm on  2024) and 41 mm in the mid descending (previously 38 mm on  2024).  ]    This report will be copied to the [place]  [facility] to ensure a  provider acknowledges the finding. Access Center is available Monday  through Friday 8am-3:30 pm.     I have personally reviewed the examination and initial interpretation  and I agree with the findings.    LIZBETH BEACH MD         SYSTEM ID:  [id number]   IR OR Angiogram    Narrative    This exam was marked as non-reportable because it will not be read by a   radiologist or a [place] non-radiologist provider.         XR Chest Port 1 View    Narrative    Exam: XR CHEST PORT 1 VIEW, 2024 10:10 PM    Indication: confirm central line placement    Comparison: CTA the 2024    Findings:   AP view of the chest. Median sternotomy. There is a right IJ catheter  with the tip at the expected innominate confluence. Extension of the  thoracic aortic stent graft to the level of the diaphragm. Increased  widening of the vascular pedicle compared to 2024. No pneumothorax  or pleural effusion. No consolidation.      Impression    Impression:   Right IJ catheter tip at the innominate confluence.    I have personally reviewed the examination and initial interpretation  and I agree with the findings.    MARIA E CONLEY MD         SYSTEM ID:  [id number]   Echo Complete     Value    LVEF  55-60%    Narrative    [id number]  [id number]  [id number]  [id number]^PAUL^GUILLE     [facility],[place]  Echocardiography Laboratory  [address] [place] [address]     Name: AME CHOW  MRN: [id number]  : 1975  Study Date: 2024 08:43 AM  Age: 49 yrs  Gender: Male  Patient Location: [facility]  Reason For Study: Aortic Aneurysm  Ordering Physician: GUILLE MILLER  Referring Physician: FRANK VALENCIA  Performed By: Radha Ann     BSA: 1.9 m2  Height: 68 in  Weight: 163 lb  BP: 134/69 mmHg  ______________________________________________________________________________  Procedure  Complete Portable Echo Adult. Contrast Optison. Patient was given 5 ml  mixture  of 3 ml Optison and 6 ml saline. 4 ml wasted.  ______________________________________________________________________________  Interpretation Summary  Left ventricular size, wall motion and function are normal. The ejection  fraction is 55-60%.  Right ventricular function, chamber size, wall motion, and thickness are  normal.  The inferior vena cava was normal in size with preserved respiratory  variability.  Dissection flap noted in the descending thoracic aorta  No pericardial effusion is present.     Compared to prior study dissection flap is now present  ______________________________________________________________________________  Left Ventricle  Left ventricular size, wall motion and function are normal. The ejection  fraction is 55-60%. Left ventricular diastolic function is normal. No regional  wall motion abnormalities are seen.     Right Ventricle  Right ventricular function, chamber size, wall motion, and thickness are  normal.     Atria  Both atria appear normal.     Mitral Valve  The mitral valve is normal.     Aortic Valve  Aortic valve is normal in structure and function. The aortic valve is  tricuspid. No aortic regurgitation is present.     Tricuspid Valve  The tricuspid valve is normal. Trace tricuspid insufficiency is present. The  right ventricular systolic pressure is approximated at 9.2 mmHg plus the right  atrial pressure.     Pulmonic Valve  The pulmonic valve is normal.     Vessels  The aorta root is normal. The inferior vena cava was normal in size with  preserved respiratory variability. Dissection flap noted in the descending  thoracic aorta.     Pericardium  No pericardial effusion is present.     Compared to Previous Study  Compared to prior study dissection flap is now present.  ______________________________________________________________________________  MMode/2D Measurements & Calculations     IVSd: 1.00 cm  LVIDd: 4.3 cm  LVIDs: 3.2 cm  LVPWd: 1.2 cm  FS: 26.0 %  LV  mass(C)d: 164.5 grams  LV mass(C)dI: 87.8 grams/m2  Ao root diam: 3.7 cm  asc Aorta Diam: 3.1 cm  LVOT diam: 2.3 cm  LVOT area: 4.3 cm2  Ao root diam index Ht(cm/m): 2.2  Ao root diam index BSA (cm/m2): 2.0  Asc Ao diam index BSA (cm/m2): 1.6  Asc Ao diam index Ht(cm/m): 1.8  LA Volume (BP): 30.9 ml     LA Volume Index (BP): 16.5 ml/m2  RWT: 0.57  TAPSE: 1.8 cm     Doppler Measurements & Calculations  MV E max sal: 65.5 cm/sec  MV A max sal: 76.5 cm/sec  MV E/A: 0.86  MV dec time: 0.19 sec  TR max sal: 151.3 cm/sec  TR max P.2 mmHg  E/E' av.1  Lateral E/e': 7.9  Medial E/e': 10.2  RV S Sal: 8.6 cm/sec     ______________________________________________________________________________  Report approved by: Steve ULRICH 2024 10:04 AM                    Medications Prior to Admission:     Medications Prior to Admission   Medication Sig Dispense Refill Last Dose    acetaminophen (TYLENOL) 325 MG tablet Take 3 tablets (975 mg) by mouth every 6 hours as needed for mild pain 100 tablet 0 2024 at 0800    amLODIPine (NORVASC) 5 MG tablet Take 1 tablet (5 mg) by mouth 2 times daily 60 tablet 1 2024 at 0800    aspirin (ASA) 81 MG chewable tablet 1 tablet (81 mg) by Oral or NG Tube route daily 90 tablet 0 2024 at 0800    carvedilol (COREG) 25 MG tablet Take 1 tablet (25 mg) by mouth 2 times daily 60 tablet 0 2024 at 0800    methocarbamol (ROBAXIN) 750 MG tablet Take 1 tablet (750 mg) by mouth every 6 hours as needed for muscle spasms 28 tablet 1 2024 at 0800              Discharge Medications:     Current Discharge Medication List        START taking these medications    Details   Lidocaine (LIDOCARE) 4 % Patch Place 2 patches onto the skin every 24 hours To prevent lidocaine toxicity, patient should be patch free for 12 hrs daily.  Qty: 12 patch, Refills: 0    Associated Diagnoses: Dissecting aneurysm of thoracic aorta, Negrito type B (H)      oxyCODONE (ROXICODONE) 5 MG tablet Take 1  tablet (5 mg) by mouth every 4 hours as needed for moderate pain  Qty: 12 tablet, Refills: 0    Associated Diagnoses: Dissecting aneurysm of thoracic aorta, Negrito type B (H)      polyethylene glycol (MIRALAX) 17 GM/Dose powder Take 17 g by mouth daily  Qty: 510 g, Refills: 0    Associated Diagnoses: Dissecting aneurysm of thoracic aorta, [place] type B (H)      senna-docusate (SENOKOT-S/PERICOLACE) 8.6-50 MG tablet Take 1 tablet by mouth 2 times daily  Qty: 30 tablet, Refills: 0    Associated Diagnoses: Dissecting aneurysm of thoracic aorta, Negrito type B (H)           CONTINUE these medications which have NOT CHANGED    Details   acetaminophen (TYLENOL) 325 MG tablet Take 3 tablets (975 mg) by mouth every 6 hours as needed for mild pain  Qty: 100 tablet, Refills: 0    Associated Diagnoses: Dissection of aorta, unspecified portion of aorta (H)      amLODIPine (NORVASC) 5 MG tablet Take 1 tablet (5 mg) by mouth 2 times daily  Qty: 60 tablet, Refills: 1    Associated Diagnoses: Hypertensive urgency      aspirin (ASA) 81 MG chewable tablet 1 tablet (81 mg) by Oral or NG Tube route daily  Qty: 90 tablet, Refills: 0    Associated Diagnoses: Dissection of aorta, unspecified portion of aorta (H)      carvedilol (COREG) 25 MG tablet Take 1 tablet (25 mg) by mouth 2 times daily  Qty: 60 tablet, Refills: 0    Associated Diagnoses: Hypertensive urgency      methocarbamol (ROBAXIN) 750 MG tablet Take 1 tablet (750 mg) by mouth every 6 hours as needed for muscle spasms  Qty: 28 tablet, Refills: 1    Associated Diagnoses: Chest wall pain                     Medications Discontinued or Adjusted During This Hospitalization:   New narcotics initiated: Oxycodone           Antibiotics Prescribed at Discharge:   No new antibiotics prescribed           Day of Discharge Physical Exam:   Temp:  [97  F (36.1  C)-100.7  F (38.2  C)] 98.9  F (37.2  C)  Pulse:  [66-90] 68  Resp:  [16-19] 18  BP: (103-138)/(54-75) 123/69  SpO2:  [82 %-99  %] 95 %    General: awake, alert, no acute distress, laying comfortably in bed   CV: warm, well perfused   Pulm: breathing comfortably on room air   Abdomen: soft, non-distended, appropriately tender in groins, no rebound or guarding;  Puncture sites c/d/i   Extremities: no edema, moving all extremities spontaneously and without apparent deficit, incision c/d/i            Final Pathology Result:   N/A           Discharge Instructions and Follow-Up:     Discharge Procedure Orders   CTA Chest Abdomen Pelvis w Contrast   Standing Status: Future Standing Exp. Date: 06/03/25     Order Specific Question Answer Comments   Is this for a pre-TAVR order? No - Continue with Order    Priority Routine [6]    To allow for insurance authorization, exam will be scheduled 7 days out to allow for this.  If this is not acceptable, choose one of the reasons accepted by payers for an expedited authorization; patients will be scheduled 5 days out. Acceptable to schedule 7 days for authorization      Reason for your hospital stay   Order Comments: Repair of aortic dissection     Activity   Order Comments: Your activity upon discharge: activity as tolerated, no heavy lifting, pushing, pulling for 1 month, and no driving while taking opioid pain medications     Order Specific Question Answer Comments   Is discharge order? Yes      Adult [facility] Follow-up and recommended labs and tests   Order Comments: Follow up with Dr. Singh , at [facility] in 1 month with repeat CT scan    Appointments on [place] and/or [facility] (with [facility] or [facility] provider or service). Call [phone] if you haven't heard regarding these appointments within 7 days of discharge.     When to contact your care team   Order Comments: Call your primary doctor if you have any of the following: temperature greater than 100 or less than 96,  increased shortness of breath, increased drainage, increased swelling, or increased pain (chest or back)     Wound care and  dressings   Order Comments: Instructions to care for your wound at home: keep wound clean and dry and may get incision wet in shower but do not soak or scrub.     Walker Order for DME - ONLY FOR DME     Order Specific Question Answer Comments   Medical Equipment (DME) Supplier: [employer]    PATIENT INSTRUCTIONS: If you did not receive this ordered item today, please contact [employer] for availability ([place] Locations: [phone], [place]: [phone]).    Start Date: 2024    Walker Type: Standard (2 Wheel)    Diagnosis: R26.89 - Impaired Gait and Mobility      Diet   Order Comments: Follow this diet upon discharge: Orders Placed This Encounter      Regular Diet Adult     Order Specific Question Answer Comments   Is discharge order? Yes               Home Health Care:     Not needed           Discharge Disposition:     Discharged to home      Condition at discharge: Good    Patient was seen, examined, and discussed on day of discharge with vascular fellow Dr. Spann, who discussed with staff surgeon Dr. Singh.    Jorge Dodge MD  Vascular Surgery Resident

## 2024-06-03 NOTE — TELEPHONE ENCOUNTER
[facility] Call Center    Phone Message    May a detailed message be left on voicemail: yes     Reason for Call: Medication Question or concern regarding medication   Prescription Clarification  Name of Medication: methocarbamol (ROBAXIN) 750 MG tablet   -Pt stated that the amount was upped to 1 gram every 6 hours instead of 750mg per every 6 hours.  Prescribing Provider: Dr. Singh   Pharmacy: [id number] [phone]    What on the order needs clarification? Pt said he just got out of the hospital and was told that the provider was going to send 40 pills to his pharm above. Pt's pharm has not received anything. Pt said it's muscle relaxer since he just got out of surgery. Pt request a call back once it has been sent to the pharm. Pt only has 2 pills left. Thanks       Action Taken: Message routed to:  [facility] OLIVIA    Travel Screening: Not Applicable     Date of Service:

## 2024-06-03 NOTE — PROGRESS NOTES
"Vascular Surgery Progress Note    Subjective:  NAEON. Feeling well, eating and drinking, ambulating. Denies any significant pain or discomfort.    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 Now with enlargement of descending thoracic aorta from 4.1 cm to 4.7 cm in the setting of left back/shoulder pain with hypertensive emergency.     Now s/p proximal and distal extension of TEVAR with TBE and c-TAG devices and subclavian false lumen coil embolization on 5/31.    - Repeat CTA this morning. If no significant changes, will plan for discharge later today vs tomorrow. Will follow-up in 1 month with CTA   - ASA/SQH  - BP goal 140s today  - Coreg restarted  - Continue neurovascular exam  - Let us know with any changes or concerns    Jorge Dodge MD  Vascular Surgery Resident        /62   Pulse 67   Temp 98.6  F (37  C) (Axillary)   Resp 18   Ht 1.727 m (5' 8\")   Wt 76.6 kg (168 lb 14 oz)   SpO2 97%   BMI 25.68 kg/m       Intake/Output Summary (Last 24 hours) at 6/1/2024 1257  Last data filed at 6/1/2024 1130  Gross per 24 hour   Intake 3678.41 ml   Output 5500 ml   Net -1821.59 ml                "

## 2024-06-03 NOTE — PLAN OF CARE
Problem: Aortic Aneurysm/Dissection Repair  Goal: Effective Peripheral Tissue Perfusion  Intervention: Optimize Tissue Perfusion  Recent Flowsheet Documentation  Taken 6/2/2024 1600 by Jorge Sampson, RN  Pressure Reduction Techniques: frequent weight shift encouraged  Activity Management: activity adjusted per tolerance  Taken 6/2/2024 1200 by Jorge Sampson, RN  Pressure Reduction Techniques: frequent weight shift encouraged  Activity Management: activity adjusted per tolerance  Taken 6/2/2024 0800 by Jorge Sampson, RN  Pressure Reduction Techniques: frequent weight shift encouraged  Activity Management: activity adjusted per tolerance   Goal Outcome Evaluation:  D. Awake alert follows commands up in room bp wnl as ordered- pain managed with po meds-   A stable post aorta.   I cont to monitor

## 2024-06-03 NOTE — PLAN OF CARE
Physical Therapy Discharge Summary    Reason for therapy discharge:    Discharged to home with outpatient therapy.    Progress towards therapy goal(s). See goals on Care Plan in [facility] electronic health record for goal details.  Goals partially met.  Barriers to achieving goals:   discharge from facility.    Therapy recommendation(s):    Continued therapy is recommended.  Rationale/Recommendations:  To progress activity tolerance and IND, pt refusing OP therapies and refused recommended FWW for home to reduce fall risk.

## 2024-06-03 NOTE — PROGRESS NOTES
I visited Mr. Lyons this morning. He is clinically doing well after his operation last evening.  Hemodynamics are appropriate.  He does have some residual back pain, which I suspect is musculoskeletal.  On exam his groins are soft as is his brachial incision.  Palpable radial and pedal pulses.  Neurologically intact.      We will have him out of bed today.  Plan to restart his beta blockade only to allow for permissive hypertension and spinal cord perfusion. SBP<140.  He may eat. We will monitor his neurologic status closely.    Please feel free to contact the vascular surgery team/fellow on-call with any questions or concerns

## 2024-06-03 NOTE — PLAN OF CARE
ICU End of Shift Summary. See flowsheets for vital signs and detailed assessment. Handoff report given to oncoming RN.      Major Events: Managed c/o back pain with scheduled robaxin and PRN meds.      Neuro: A&O x4. Denies numbness or tingling. +CMS  CV: Normal sinus rhythm - HR 70s. Afebrile. SBP goal <140, no interventions needed  Resp: LS clear, on 2L O2 via NC overnight  GI/: Voiding adequately. Last BM 05/29, passing flatus, BS+, taking bowel meds  Skin: Left upper arm incision, LISA. Bilateral groin sites without bleeding or hematoma, Left side more tender  Lines/Drains: Right PIV SL  Activity: up independent at bedside overnight   Plan: Manage pain. Transfer order for floor since yesterday, team wants to see scan today before deciding on discharge plans

## 2024-06-03 NOTE — PROGRESS NOTES
Mr. Lyons is doing well. His back pain is better. Pulses are stable on exam and no hematoma at any of his access sites.     We will plan for CTA today pre dismissal to establish new aortic baseline.  Home today.  Plan for more permissive BP goals now that dissection is repaired and to allow for spinal cord perfusion. SBP<140    Please feel free to contact the vascular surgery team/fellow on-call with any questions or concerns

## 2024-06-04 DIAGNOSIS — G89.18 ACUTE POST-OPERATIVE PAIN: Primary | ICD-10-CM

## 2024-06-04 DIAGNOSIS — M54.9 BACK PAIN: ICD-10-CM

## 2024-06-04 RX ORDER — OXYCODONE HYDROCHLORIDE 10 MG/1
10 TABLET ORAL EVERY 6 HOURS PRN
Qty: 20 TABLET | Refills: 0 | Status: SHIPPED | OUTPATIENT
Start: 2024-06-04 | End: 2024-06-09

## 2024-06-04 NOTE — TELEPHONE ENCOUNTER
Called pt and LVM letting him know updated script has been sent. Clinic callback provided.    ROBERT Braga, RN  RN Care Coordinator  [facility] Vascular Surgery - [facility] phone: [phone]  Fax: [phone]

## 2024-06-04 NOTE — TELEPHONE ENCOUNTER
Additional oxycodone sent to pt pharmacy by vascular VASU. MD updated.    ROBERT Braga, RN  RN Care Coordinator  [facility] Vascular Surgery - [facility] phone: [phone]  Fax: [phone]

## 2024-06-04 NOTE — TELEPHONE ENCOUNTER
[facility] Call Center    Phone Message    May a detailed message be left on voicemail: yes     Reason for Call: Other: Pt called and would like to speak to the provider if possible at his convenience. Pt would like to discuss pain meds. Please call him back. Thanks       Action Taken: Message routed to:  [facility] VAS    Travel Screening: Not Applicable     Date of Service:

## 2024-06-04 NOTE — TELEPHONE ENCOUNTER
"Called and spoke with pt. He continues to have significant back pain - note inpatient MD notes state this is likely musculoskeletal. Pt reports ongoing mid-back pain (\"behind my heart\") that is at times severe. He is very worried about this causing his BP to increase. We discussed that his AAA is now reinforced with the stent graft, but his BP should still be monitored and controlled as much as possible. He is concerned that he is going to run out of oxycodone in 3 days (12 tabs prescribed) and that his BP is going to spike and he is \"going to die\". He stated his BP upon waking today was 120s/70s.     Current pain regimen per pt:  - 975mg APAP every 6 hours scheduled  - Robaxin 1000mg every 6 hours (takes with APAP)  - Oxycodone 5mg every 4 hours (scheduled)    Note he is following up with his PCP on 6/10. Will discuss with team.    ROBERT Braga, RN  RN Care Coordinator  [facility] Surgery - [facility] phone: [phone]  Fax: [phone]      "

## 2024-06-10 ENCOUNTER — OFFICE VISIT (OUTPATIENT)
Dept: PHARMACY | Facility: CLINIC | Age: 49
End: 2024-06-10
Payer: COMMERCIAL

## 2024-06-10 ENCOUNTER — TELEPHONE (OUTPATIENT)
Dept: FAMILY MEDICINE | Facility: CLINIC | Age: 49
End: 2024-06-10

## 2024-06-10 VITALS — HEART RATE: 93 BPM | DIASTOLIC BLOOD PRESSURE: 65 MMHG | SYSTOLIC BLOOD PRESSURE: 97 MMHG

## 2024-06-10 DIAGNOSIS — G89.29 OTHER CHRONIC PAIN: ICD-10-CM

## 2024-06-10 DIAGNOSIS — G89.29 CHRONIC LOW BACK PAIN, UNSPECIFIED BACK PAIN LATERALITY, UNSPECIFIED WHETHER SCIATICA PRESENT: Primary | ICD-10-CM

## 2024-06-10 DIAGNOSIS — M54.50 CHRONIC LOW BACK PAIN, UNSPECIFIED BACK PAIN LATERALITY, UNSPECIFIED WHETHER SCIATICA PRESENT: Primary | ICD-10-CM

## 2024-06-10 DIAGNOSIS — I63.9 ACUTE CVA (CEREBROVASCULAR ACCIDENT) (H): Primary | ICD-10-CM

## 2024-06-10 DIAGNOSIS — I10 BENIGN ESSENTIAL HYPERTENSION: ICD-10-CM

## 2024-06-10 DIAGNOSIS — E78.5 DYSLIPIDEMIA: ICD-10-CM

## 2024-06-10 PROCEDURE — 99607 MTMS BY PHARM ADDL 15 MIN: CPT

## 2024-06-10 PROCEDURE — 99605 MTMS BY PHARM NP 15 MIN: CPT

## 2024-06-10 RX ORDER — OXYCODONE HYDROCHLORIDE 5 MG/1
5 TABLET ORAL EVERY 6 HOURS PRN
Qty: 20 TABLET | Refills: 0 | Status: SHIPPED | OUTPATIENT
Start: 2024-06-10 | End: 2024-09-27

## 2024-06-10 RX ORDER — AMLODIPINE BESYLATE 10 MG/1
10 TABLET ORAL DAILY
Qty: 30 TABLET | Refills: 3 | Status: SHIPPED | OUTPATIENT
Start: 2024-06-10 | End: 2024-09-27

## 2024-06-10 NOTE — PATIENT INSTRUCTIONS
"Recommendations from today's MTM visit:                                                    MTM (medication therapy management) is a service provided by a clinical pharmacist designed to help you get the most of out of your medicines.       oxycodone from [facility]   Switch from amlodipine 5 mg twice a day to 10 mg once a day    Follow-up: 7/15/2024 with pain management    It was great speaking with you today.  I value your experience and would be very thankful for your time in providing feedback in our clinic survey. In the next few days, you may receive an email or text message from [employer] with a link to a survey related to your  clinical pharmacist.\"     To schedule another MTM appointment, please call the clinic directly or you may call the MTM scheduling line at [phone] or toll-free at [phone].     My Clinical Pharmacist's contact information:                                                      Please feel free to contact me with any questions or concerns you have.      Valdez Worley, PharmD   "

## 2024-06-10 NOTE — PROGRESS NOTES
Take maximum 3 tabs per day  Plan to wean down to 2 tabs per day by next week  Will schedule followup at hospitals

## 2024-06-10 NOTE — TELEPHONE ENCOUNTER
RN called pt who stated that the script for oxycodone was not sent to the pharmacy only amlodipine was. RN discussed with Dr. Mercedes who sent script to [facility]. RN called pt back and let him know script was sent. Pt scheduled with PCP on Thursday at 3:40PM.    Isela Bolaños RN

## 2024-06-10 NOTE — Clinical Note
PharmD met with Valdo and conducted med rec. PharmD also discussed about potentially starting him on bempedoic acid for stroke prevention but he was not interested at the time. Also switched him from amlodipine 5 mg BID to 10 mg once a day

## 2024-06-10 NOTE — PROGRESS NOTES
Medication Therapy Management (MTM) Encounter    ASSESSMENT:                            Medication Adherence/Access: No issues identified    Hypertension:   Stable. Patient is meeting blood pressure goal of < 130/80mmHg.    Valdo is currently taking amlodipine 5 mg twice a day. He would benefit from changing from 5 mg twice a day to 10 mg to simplify his regimen. The half life of amlodipine is around 30 hours so difference in blood pressure should occur.    Stroke:  Not at goal    Given Valdo's history of stroke he is indicated to be on a statin. However he is intolerant to statins and has not been on lipid lowering therapy. Bempedoic acid can be considered for Valdo as it has demonstrated reduction in MACE for secondary prevention. However Valdo has concerns with the side effect of gout associated with bempedoic acid and refused.    Chronic Pain:  Not at goal    PharmD discussed with Valdo that I cannot send any prescriptions for any controlled substances. PharmD also discussed with Valdo about not going above 3000 mg of acetaminophen as there is a risk of liver toxicity associated.    PharmD to discuss with Dr. Mercedes regarding a new prescription for oxycodone and beginning to taper Valdo down.     PLAN:                            Switch from amlodipine 5 mg twice a day to 10 mg once a day  Discuss with Dr. Mercedes regarding oxycodone fill    Follow-up: 7/15/2024 with pain management    SUBJECTIVE/OBJECTIVE:                          Valdo Lyons is a 49 year old male coming in for a transitions of care visit. He was discharged from [facility] on 6/3/2024 for aneurysm of thoracic aorta. Today's visit is a co-visit with Dr. Mercedes.     Reason for visit: Medication therapy management.    Allergies/ADRs: Reviewed in chart  Past Medical History: Reviewed in chart  Tobacco: He reports that he has never smoked. He has never used smokeless tobacco.  Alcohol:  "none    Medication Adherence/Access: no issues reported    Hypertension   -Amlodipine 5 mg twice a day  -Carvedilol 25 mg once a day  Patient reports no current medication side effects  Patient self monitors blood pressure.  Home BP monitoring 130/80 on average .       Stroke:  -Aspirin 81 mg once a day    Chronic pain:  -Tylenol 325 mg 3 tablets 3 times a day  -Oxycodone 10 mg every 6 hours as needed    Is out of pain medications and would like refills on oxycodone. Takes maximum of 9 tablets of acetaminophen.    Recently had surgery which is causing him this much pain.       Today's Vitals:   BP Readings from Last 1 Encounters:   06/10/24 97/65     Pulse Readings from Last 1 Encounters:   06/10/24 93     Wt Readings from Last 1 Encounters:   06/03/24 168 lb 14 oz (76.6 kg)     Ht Readings from Last 1 Encounters:   05/29/24 5' 8\" (1.727 m)     Estimated body mass index is 25.68 kg/m  as calculated from the following:    Height as of 5/29/24: 5' 8\" (1.727 m).    Weight as of 6/3/24: 168 lb 14 oz (76.6 kg).    Temp Readings from Last 1 Encounters:   06/03/24 98.9  F (37.2  C) (Oral)     ----------------  Post Discharge Medication Reconciliation Status: discharge medications reconciled and changed, per note/orders.    I spent 30 minutes with this patient today. All changes were made via collaborative practice agreement with Dr. Wellington. A copy of the visit note was provided to the patient's provider(s).    A summary of these recommendations was given to the patient (see AVS from today's appointment with Dr. Mercedes).    Valdez Worley, PharmD         Medication Therapy Recommendations  Acute CVA (cerebrovascular accident) (H)    Rationale: Untreated condition - Needs additional medication therapy - Indication   Recommendation: Start Medication - BEMPEDOIC ACID   Status: Declined per Patient            "

## 2024-06-11 NOTE — PROGRESS NOTES
I have verified the content of the note, which accurately reflects my assessment of the patient and the plan of care.   Byron Stoner, [facility], PharmD

## 2024-06-15 DIAGNOSIS — I71.00 DISSECTION OF AORTA, UNSPECIFIED PORTION OF AORTA (H): ICD-10-CM

## 2024-06-15 DIAGNOSIS — I16.0 HYPERTENSIVE URGENCY: ICD-10-CM

## 2024-06-17 ENCOUNTER — TELEPHONE (OUTPATIENT)
Dept: FAMILY MEDICINE | Facility: CLINIC | Age: 49
End: 2024-06-17
Payer: COMMERCIAL

## 2024-06-17 DIAGNOSIS — I16.0 HYPERTENSIVE URGENCY: ICD-10-CM

## 2024-06-17 RX ORDER — CARVEDILOL 25 MG/1
25 TABLET ORAL 2 TIMES DAILY
Qty: 60 TABLET | Refills: 0 | Status: SHIPPED | OUTPATIENT
Start: 2024-06-17

## 2024-06-17 NOTE — TELEPHONE ENCOUNTER
"Request for medication refill:    acetaminophen (TYLENOL) 325 MG tablet     carvedilol (COREG) 25 MG tablet     Providers if patient needs an appointment and you are willing to give a one month supply please refill for one month and  send a letter/MyChart using \".SMILLIMITEDREFILL\" .smillimited and route chart to \"P [facility] \" (Giving one month refill in non controlled medications is strongly recommended before denial)    If refill has been denied, meaning absolutely no refills without visit, please complete the smart phrase \".smirxrefuse\" and route it to the \"P [facility] MED REFILLS\"  pool to inform the patient and the pharmacy.    Chiqui Elam MA      "

## 2024-06-17 NOTE — TELEPHONE ENCOUNTER
Mom called requesting refill be sent to [facility] since [facility] pharmacy is closed. RN sent per standing order.    Isela Bolaños RN

## 2024-06-18 RX ORDER — CARVEDILOL 25 MG/1
25 TABLET ORAL 2 TIMES DAILY
Qty: 60 TABLET | Refills: 0 | Status: SHIPPED | OUTPATIENT
Start: 2024-06-18 | End: 2024-07-30

## 2024-06-18 RX ORDER — ACETAMINOPHEN 325 MG/1
975 TABLET ORAL EVERY 6 HOURS PRN
Qty: 100 TABLET | Refills: 0 | Status: SHIPPED | OUTPATIENT
Start: 2024-06-18 | End: 2024-09-27

## 2024-07-01 ENCOUNTER — OFFICE VISIT (OUTPATIENT)
Dept: VASCULAR SURGERY | Facility: CLINIC | Age: 49
End: 2024-07-01
Attending: NURSE PRACTITIONER
Payer: COMMERCIAL

## 2024-07-01 ENCOUNTER — ANCILLARY PROCEDURE (OUTPATIENT)
Dept: CT IMAGING | Facility: CLINIC | Age: 49
End: 2024-07-01
Attending: NURSE PRACTITIONER
Payer: COMMERCIAL

## 2024-07-01 VITALS — DIASTOLIC BLOOD PRESSURE: 81 MMHG | OXYGEN SATURATION: 100 % | HEART RATE: 87 BPM | SYSTOLIC BLOOD PRESSURE: 127 MMHG

## 2024-07-01 DIAGNOSIS — I71.012 DISSECTION OF DESCENDING THORACIC AORTA (H): ICD-10-CM

## 2024-07-01 DIAGNOSIS — Z95.828 STATUS POST INSERTION OF ENDOVASCULAR THORACIC AORTIC STENT GRAFT: Primary | ICD-10-CM

## 2024-07-01 DIAGNOSIS — R11.0 NAUSEA: ICD-10-CM

## 2024-07-01 DIAGNOSIS — Z95.828 S/P ASCENDING AORTIC REPLACEMENT: ICD-10-CM

## 2024-07-01 DIAGNOSIS — I71.03 THORACOABDOMINAL AORTIC DISSECTION (H): ICD-10-CM

## 2024-07-01 PROCEDURE — 74174 CTA ABD&PLVS W/CONTRAST: CPT | Mod: GC | Performed by: RADIOLOGY

## 2024-07-01 PROCEDURE — 99024 POSTOP FOLLOW-UP VISIT: CPT | Mod: GC | Performed by: SURGERY

## 2024-07-01 PROCEDURE — 71275 CT ANGIOGRAPHY CHEST: CPT | Mod: GC | Performed by: RADIOLOGY

## 2024-07-01 RX ORDER — IOPAMIDOL 755 MG/ML
125 INJECTION, SOLUTION INTRAVASCULAR ONCE
Status: COMPLETED | OUTPATIENT
Start: 2024-07-01 | End: 2024-07-01

## 2024-07-01 RX ORDER — ONDANSETRON 4 MG/1
4 TABLET, ORALLY DISINTEGRATING ORAL EVERY 8 HOURS PRN
Qty: 6 TABLET | Refills: 0 | Status: SHIPPED | OUTPATIENT
Start: 2024-07-01 | End: 2024-07-03

## 2024-07-01 RX ORDER — METHOCARBAMOL 500 MG/1
1000 TABLET, FILM COATED ORAL 4 TIMES DAILY PRN
COMMUNITY
End: 2024-09-27

## 2024-07-01 RX ORDER — ONDANSETRON 4 MG/1
4 TABLET, ORALLY DISINTEGRATING ORAL EVERY 8 HOURS PRN
Status: CANCELLED | OUTPATIENT
Start: 2024-07-01

## 2024-07-01 RX ADMIN — IOPAMIDOL 125 ML: 755 INJECTION, SOLUTION INTRAVASCULAR at 13:11

## 2024-07-01 NOTE — LETTER
7/1/2024       RE: Valdo Lyons  [address]  [facility] [address]     Dear Colleague,    Thank you for referring your patient, Valdo Lyons, to the [facility] [place] at [facility]. Please see a copy of my visit note below.    Vascular & Endovascular Surgery Clinic Return Visit   Vascular Surgeon: Artur Singh MD, RPVI      Location:  [facility]    Valdo Lyons  Medical Record #:  [id number]  YOB: 1975  Age:  49 year old     Date of Service: 7/1/2024    Primary Care Provider: Marjorie Mercedes    Chief Complaint/Reason for Visit: Follow up of s/p Endovascular repair of rapidly expanding post dissection thoracic aneurysm with proximal extension of thoracic endograft with retrograde branch to left subclavian.     Interval History:  Mr. Lyons is a pleasant 49 year old male who returns today with his mother for post op follow up. He complains of nausea and vomiting, decreased appetite, some dizziness. Reports he is easily fatigued, otherwise he is improving day by day. He also reports chronic back pain.       Review of Systems:    A 12 point ROS was reviewed and is negative except for symptoms noted in HPI.     Medical/Surgical History:    Past Medical History:   Diagnosis Date     Dissecting aneurysm of thoracic aorta, [place]gr[place] type B (H)      HTN (hypertension)          Past Surgical History:   Procedure Laterality Date     ENDOVASCULAR REPAIR ANEURYSM THORACIC AORTIC N/A 1/23/2024    Procedure: Antegrade Thoracic Endovascular Aortic Repair (TEVAR) with [place] Conformable Thoracic Stent Graft 37mm x 10cm. Intravascular Ultrasound (IVUS), Aortography, Right common femoral access;  Surgeon: Artur Singh MD;  Location: [facility] OR     ENDOVASCULAR REPAIR ANEURYSM THORACIC AORTIC N/A 5/31/2024    Procedure: Endovascular repair of rapidly expanding post dissection thoracic  aneurysm with proximal extension of thoracic endograft with retrograde branch to left subclavian utilizing [place] 37x150 mm Thoracic branch endoprothesis, 15 mm side branch. Distal thoracic extension with 40 x200 mm [place] cTAG. Embolization of aortic and left subcalvian false lumen.  Bilateral percutaneous ultrasound guided a     IR OR ANGIOGRAM  1/23/2024     IR OR ANGIOGRAM  5/31/2024     PICC DOUBLE LUMEN PLACEMENT Right 01/16/2024    5FR DL PICC, basilic vein. L-43cm, 1cm out.     REPAIR ANEURYSM ASCENDING AORTA N/A 1/23/2024    Procedure: MEDIAN STERNOTOMY, RIGHT AXILLARY CUTDOWN, ON CARDIOPULMONARY BYPASS (CIRCULATORY ARREST), ASCENDING AORTA ANEURYSM REPAIR ([place] 30mm), INTRAOPERATIVE TRANSESOPHAGEAL ECHOCARDIOGRAM BY ANESTHESIA;  Surgeon: Addi Shaw MD;  Location: [facility] OR        Allergies:     Allergies   Allergen Reactions     Statins Muscle Pain (Myalgia)     Rosuvastatin Muscle Pain (Myalgia)     Took the statin for one month and then could not tolerate side effects.  Myalgias resolved within 2 days of stopping the medication        Medications:    Current Outpatient Medications   Medication Sig Dispense Refill     amLODIPine (NORVASC) 10 MG tablet Take 1 tablet (10 mg) by mouth daily 30 tablet 3     aspirin (ASA) 81 MG chewable tablet 1 tablet (81 mg) by Oral or NG Tube route daily 90 tablet 0     carvedilol (COREG) 25 MG tablet TAKE ONE TABLET BY MOUTH TWICE DAILY 60 tablet 0     carvedilol (COREG) 25 MG tablet Take 1 tablet (25 mg) by mouth 2 times daily 60 tablet 0     methocarbamol (ROBAXIN) 500 MG tablet Take 1,000 mg by mouth 4 times daily as needed for muscle spasms       ondansetron (ZOFRAN ODT) 4 MG ODT tab Take 1 tablet (4 mg) by mouth every 8 hours as needed for nausea 6 tablet 0     oxyCODONE (ROXICODONE) 5 MG tablet Take 1 tablet (5 mg) by mouth every 6 hours as needed for pain (Patient not taking: Reported on 7/1/2024) 20 tablet 0     PHARBETOL 325 MG tablet Take 3 tablets (645  mg) by mouth every 6 hours as needed for mild pain (Patient not taking: Reported on 7/1/2024) 100 tablet 0     polyethylene glycol (MIRALAX) 17 GM/Dose powder Take 17 g by mouth daily (Patient not taking: Reported on 6/10/2024) 510 g 0     senna-docusate (SENOKOT-S/PERICOLACE) 8.6-50 MG tablet Take 1 tablet by mouth 2 times daily (Patient not taking: Reported on 7/1/2024) 30 tablet 0     No current facility-administered medications for this visit.        Social Habits:    Tobacco Use      Smoking status: Never      Smokeless tobacco: Never       Alcohol Use: Not on file       History   Drug Use Not on file        Family History:  No family history on file.    Physical Examination:  /81 (BP Location: Right arm, Patient Position: Chair, Cuff Size: Adult Regular)   Pulse 87   SpO2 100%   Wt Readings from Last 1 Encounters:   06/03/24 76.6 kg (168 lb 14 oz)     There is no height or weight on file to calculate BMI.    Exam:  General: No apparent distress. Answers questions appropriately.   Neurologic: Focally intact, Alert and oriented x 3.   Eyes: Grossly normal.   Cardiac:  RRR  Pulmonary:  Non labored breathing with normal respiratory effort  Abdominal: Soft, non distended, non tender to palpation.  No pulsatile mass.   Musculoskeletal/Extremity: 5/5 gross bilateral upper and lower extremity strength.  edema.  No open wounds or abrasions.     Vascular Exam:       Radial: Left: 2+   Right: 2+        DP: Left: 2+   Right: 2+     PT:   Left: 2+   Right: 2+    Laboratory Findings:  Hemoglobin   Date Value Ref Range Status   06/03/2024 8.3 (L) 13.3 - 17.7 g/dL Final   06/02/2024 8.9 (L) 13.3 - 17.7 g/dL Final     WBC Count   Date Value Ref Range Status   06/03/2024 6.9 4.0 - 11.0 10e3/uL Final   06/02/2024 7.8 4.0 - 11.0 10e3/uL Final     Platelet Count   Date Value Ref Range Status   06/03/2024 184 150 - 450 10e3/uL Final   06/02/2024 193 150 - 450 10e3/uL Final     Creatinine   Date Value Ref Range Status    06/03/2024 0.87 0.67 - 1.17 mg/dL Final     Sodium   Date Value Ref Range Status   06/03/2024 135 135 - 145 mmol/L Final     Comment:     Reference intervals for this test were updated on 09/26/2023 to more accurately reflect our healthy population. There may be differences in the flagging of prior results with similar values performed with this method. Interpretation of those prior results can be made in the context of the updated reference intervals.      Glucose   Date Value Ref Range Status   06/03/2024 111 (H) 70 - 99 mg/dL Final   06/02/2024 103 (H) 70 - 99 mg/dL Final   08/20/2022 102 (H) 70 - 99 mg/dL Final     GLUCOSE BY METER POCT   Date Value Ref Range Status   05/31/2024 86 70 - 99 mg/dL Final   05/31/2024 86 70 - 99 mg/dL Final     Glucose Whole Blood POCT   Date Value Ref Range Status   05/31/2024 86 70 - 99 mg/dL Final   05/31/2024 87 70 - 99 mg/dL Final     Hemoglobin A1C   Date Value Ref Range Status   01/16/2024 6.1 (H) <5.7 % Final     Comment:     Normal <5.7%   Prediabetes 5.7-6.4%    Diabetes 6.5% or higher     Note: Adopted from ADA consensus guidelines.     INR   Date Value Ref Range Status   05/31/2024 1.20 (H) 0.85 - 1.15 Final         Imaging:  CTA Chest Abdomen 6/30/2024    IMPRESSION:  1. Interval extension of TEVAR proximally and distally, with placement  of left subclavian stent-graft snorkel, and coil embolization of the  false lumen in the proximal descending aorta.  A. TEVAR struts extend across left carotid origin. Right innominate  and left carotid arteries remain patent.  B. TEVAR extended distally to T12. Fenestration at T11-T12 closed.  Distal thoracic aorta conforms to the endograft. No dissection distal  to the endograft.   C. Left subclavian stent-graft snorkel patent. Proximal subclavian  artery conforms to the stent-graft.  D. Persistent dissection distal to the left subclavian stent-graft to  the left axillary artery.  E. Artifact from embolization coils in the false  lumen from the  proximal left subclavian artery to T5 may obscure a subtle endoleak.  No endoleak appreciated.  F. Proximal descending aorta slightly smaller, 44 mm from 48 mm.     2. Moderate left pleural effusion and left lower lobe consolidation.  Correlate for pneumonia.     3. Ascending aortic graft remains patent. No leak.    I personally reviewed the CTA chest abdomen from 6/30/2024.     Impression/Shared Medical Decision Making:    #1- s/p endovascular repair for expanding thoracic aneurysm  Mr. Lyons is a pleasant 49 year old male seen for s/p follow up of Endovascular repair of rapidly expanding post dissection thoracic aneurysm with proximal extension of thoracic endograft with retrograde branch to left subclavian.     Recommendations/Plan:  CTA and follow-up in 2 months  OMT   Follow up with primary care for nausea, dizziness, back pain.     Rahel Garrett, DO  Vascular & Endovascular Surgery          Again, thank you for allowing me to participate in the care of your patient.      Sincerely,    Artur Singh MD

## 2024-07-01 NOTE — DISCHARGE INSTRUCTIONS

## 2024-07-01 NOTE — NURSING NOTE
Chief Complaint   Patient presents with    Follow Up     RETURN VASCULAR PATIENT       Vitals were taken, medications reconciled.    Paige Thurner, Facilitator   1:44 PM

## 2024-07-01 NOTE — PROGRESS NOTES
Vascular & Endovascular Surgery Clinic Return Visit   Vascular Surgeon: Artur Singh MD, RPVI      Location:  [facility]    Valdo Lyons  Medical Record #:  [id number]  YOB: 1975  Age:  49 year old     Date of Service: 7/1/2024    Primary Care Provider: Marjorie Mercedes    Chief Complaint/Reason for Visit: Follow up of s/p Endovascular repair of rapidly expanding post dissection thoracic aneurysm with proximal extension of thoracic endograft with retrograde branch to left subclavian.     Interval History:  Mr. Lyons is a pleasant 49 year old male who returns today with his mother for post op follow up. He complains of nausea and vomiting, decreased appetite, some dizziness. Reports he is easily fatigued, otherwise he is improving day by day. He also reports chronic back pain.       Review of Systems:    A 12 point ROS was reviewed and is negative except for symptoms noted in HPI.     Medical/Surgical History:    Past Medical History:   Diagnosis Date    Dissecting aneurysm of thoracic aorta, Negrito type B (H)     HTN (hypertension)          Past Surgical History:   Procedure Laterality Date    ENDOVASCULAR REPAIR ANEURYSM THORACIC AORTIC N/A 1/23/2024    Procedure: Antegrade Thoracic Endovascular Aortic Repair (TEVAR) with Sharon Conformable Thoracic Stent Graft 37mm x 10cm. Intravascular Ultrasound (IVUS), Aortography, Right common femoral access;  Surgeon: Artur Singh MD;  Location: [facility] OR    ENDOVASCULAR REPAIR ANEURYSM THORACIC AORTIC N/A 5/31/2024    Procedure: Endovascular repair of rapidly expanding post dissection thoracic aneurysm with proximal extension of thoracic endograft with retrograde branch to left subclavian utilizing Sharon 37x150 mm Thoracic branch endoprothesis, 15 mm side branch. Distal thoracic extension with 40 x200 mm Sharon cTAG. Embolization of aortic and left subcalvian false lumen.  Bilateral percutaneous ultrasound  guided a    IR OR ANGIOGRAM  1/23/2024    IR OR ANGIOGRAM  5/31/2024    PICC DOUBLE LUMEN PLACEMENT Right 01/16/2024    5FR DL PICC, basilic vein. L-43cm, 1cm out.    REPAIR ANEURYSM ASCENDING AORTA N/A 1/23/2024    Procedure: MEDIAN STERNOTOMY, RIGHT AXILLARY CUTDOWN, ON CARDIOPULMONARY BYPASS (CIRCULATORY ARREST), ASCENDING AORTA ANEURYSM REPAIR (Gelweave 30mm), INTRAOPERATIVE TRANSESOPHAGEAL ECHOCARDIOGRAM BY ANESTHESIA;  Surgeon: Addi Shaw MD;  Location: [facility] OR        Allergies:     Allergies   Allergen Reactions    Statins Muscle Pain (Myalgia)    Rosuvastatin Muscle Pain (Myalgia)     Took the statin for one month and then could not tolerate side effects.  Myalgias resolved within 2 days of stopping the medication        Medications:    Current Outpatient Medications   Medication Sig Dispense Refill    amLODIPine (NORVASC) 10 MG tablet Take 1 tablet (10 mg) by mouth daily 30 tablet 3    aspirin (ASA) 81 MG chewable tablet 1 tablet (81 mg) by Oral or NG Tube route daily 90 tablet 0    carvedilol (COREG) 25 MG tablet TAKE ONE TABLET BY MOUTH TWICE DAILY 60 tablet 0    carvedilol (COREG) 25 MG tablet Take 1 tablet (25 mg) by mouth 2 times daily 60 tablet 0    methocarbamol (ROBAXIN) 500 MG tablet Take 1,000 mg by mouth 4 times daily as needed for muscle spasms      ondansetron (ZOFRAN ODT) 4 MG ODT tab Take 1 tablet (4 mg) by mouth every 8 hours as needed for nausea 6 tablet 0    oxyCODONE (ROXICODONE) 5 MG tablet Take 1 tablet (5 mg) by mouth every 6 hours as needed for pain (Patient not taking: Reported on 7/1/2024) 20 tablet 0    PHARBETOL 325 MG tablet Take 3 tablets (975 mg) by mouth every 6 hours as needed for mild pain (Patient not taking: Reported on 7/1/2024) 100 tablet 0    polyethylene glycol (MIRALAX) 17 GM/Dose powder Take 17 g by mouth daily (Patient not taking: Reported on 6/10/2024) 510 g 0    senna-docusate (SENOKOT-S/PERICOLACE) 8.6-50 MG tablet Take 1 tablet by mouth 2 times daily  (Patient not taking: Reported on 7/1/2024) 30 tablet 0     No current facility-administered medications for this visit.        Social Habits:    Tobacco Use      Smoking status: Never      Smokeless tobacco: Never       Alcohol Use: Not on file       History   Drug Use Not on file        Family History:  No family history on file.    Physical Examination:  /81 (BP Location: Right arm, Patient Position: Chair, Cuff Size: Adult Regular)   Pulse 87   SpO2 100%   Wt Readings from Last 1 Encounters:   06/03/24 76.6 kg (168 lb 14 oz)     There is no height or weight on file to calculate BMI.    Exam:  General: No apparent distress. Answers questions appropriately.   Neurologic: Focally intact, Alert and oriented x 3.   Eyes: Grossly normal.   Cardiac:  RRR  Pulmonary:  Non labored breathing with normal respiratory effort  Abdominal: Soft, non distended, non tender to palpation.  No pulsatile mass.   Musculoskeletal/Extremity: 5/5 gross bilateral upper and lower extremity strength.  edema.  No open wounds or abrasions.     Vascular Exam:       Radial: Left: 2+   Right: 2+        DP: Left: 2+   Right: 2+     PT:   Left: 2+   Right: 2+    Laboratory Findings:  Hemoglobin   Date Value Ref Range Status   06/03/2024 8.3 (L) 13.3 - 17.7 g/dL Final   06/02/2024 8.9 (L) 13.3 - 17.7 g/dL Final     WBC Count   Date Value Ref Range Status   06/03/2024 6.9 4.0 - 11.0 10e3/uL Final   06/02/2024 7.8 4.0 - 11.0 10e3/uL Final     Platelet Count   Date Value Ref Range Status   06/03/2024 184 150 - 450 10e3/uL Final   06/02/2024 193 150 - 450 10e3/uL Final     Creatinine   Date Value Ref Range Status   06/03/2024 0.87 0.67 - 1.17 mg/dL Final     Sodium   Date Value Ref Range Status   06/03/2024 135 135 - 145 mmol/L Final     Comment:     Reference intervals for this test were updated on 09/26/2023 to more accurately reflect our healthy population. There may be differences in the flagging of prior results with similar values  performed with this method. Interpretation of those prior results can be made in the context of the updated reference intervals.      Glucose   Date Value Ref Range Status   06/03/2024 111 (H) 70 - 99 mg/dL Final   06/02/2024 103 (H) 70 - 99 mg/dL Final   08/20/2022 102 (H) 70 - 99 mg/dL Final     GLUCOSE BY METER POCT   Date Value Ref Range Status   05/31/2024 86 70 - 99 mg/dL Final   05/31/2024 86 70 - 99 mg/dL Final     Glucose Whole Blood POCT   Date Value Ref Range Status   05/31/2024 86 70 - 99 mg/dL Final   05/31/2024 87 70 - 99 mg/dL Final     Hemoglobin A1C   Date Value Ref Range Status   01/16/2024 6.1 (H) <5.7 % Final     Comment:     Normal <5.7%   Prediabetes 5.7-6.4%    Diabetes 6.5% or higher     Note: Adopted from ADA consensus guidelines.     INR   Date Value Ref Range Status   05/31/2024 1.20 (H) 0.85 - 1.15 Final         Imaging:  CTA Chest Abdomen 6/30/2024    IMPRESSION:  1. Interval extension of TEVAR proximally and distally, with placement  of left subclavian stent-graft snorkel, and coil embolization of the  false lumen in the proximal descending aorta.  A. TEVAR struts extend across left carotid origin. Right innominate  and left carotid arteries remain patent.  B. TEVAR extended distally to T12. Fenestration at T11-T12 closed.  Distal thoracic aorta conforms to the endograft. No dissection distal  to the endograft.   C. Left subclavian stent-graft snorkel patent. Proximal subclavian  artery conforms to the stent-graft.  D. Persistent dissection distal to the left subclavian stent-graft to  the left axillary artery.  E. Artifact from embolization coils in the false lumen from the  proximal left subclavian artery to T5 may obscure a subtle endoleak.  No endoleak appreciated.  F. Proximal descending aorta slightly smaller, 44 mm from 48 mm.     2. Moderate left pleural effusion and left lower lobe consolidation.  Correlate for pneumonia.     3. Ascending aortic graft remains patent. No  leak.    I personally reviewed the CTA chest abdomen from 6/30/2024.     Impression/Shared Medical Decision Making:    #1- s/p endovascular repair for expanding thoracic aneurysm  Mr. Lyons is a pleasant 49 year old male seen for s/p follow up of Endovascular repair of rapidly expanding post dissection thoracic aneurysm with proximal extension of thoracic endograft with retrograde branch to left subclavian.     Recommendations/Plan:  CTA and follow-up in 2 months  OMT   Follow up with primary care for nausea, dizziness, back pain.     Rahel Garrett, DO  Vascular & Endovascular Surgery

## 2024-07-01 NOTE — PATIENT INSTRUCTIONS
Thank you so much for choosing us for your care. It was a pleasure to see you at the vascular clinic today.     Follow-up recommendations: We will see you back in 2 months. Please do not hesitate to reach out to us in the meantime with any concerns. Our schedulers will get in touch with you to set up your follow-up visit.    Additional testing/imaging ordered today: CTA in 2 months      Our scheduling team will get in touch with you to set up any follow-up testing/imaging and/or appointments. Please be aware that any testing/imaging recommended today will need to completed prior to your next visit with the provider. If testing/imaging is not completed prior to your next visit, your visit may be rescheduled.     If you have any questions, please contact our clinic directly at [phone] and ask for the nurse. We also encourage the use of [employer] to communicate with your healthcare provider.    If you have an urgent need after business hours (8:00 am to 4:30 pm) please call [phone], option 4, and ask for the vascular attending on call. For non-urgent after hours needs, please call the vascular clinic at [phone]. For scheduling needs, please call our clinic directly at [phone].

## 2024-07-30 ENCOUNTER — TELEPHONE (OUTPATIENT)
Dept: FAMILY MEDICINE | Facility: CLINIC | Age: 49
End: 2024-07-30
Payer: COMMERCIAL

## 2024-07-30 DIAGNOSIS — I16.0 HYPERTENSIVE URGENCY: ICD-10-CM

## 2024-07-30 RX ORDER — CARVEDILOL 25 MG/1
25 TABLET ORAL 2 TIMES DAILY
Qty: 60 TABLET | Refills: 0 | Status: SHIPPED | OUTPATIENT
Start: 2024-07-30 | End: 2024-09-03

## 2024-07-30 NOTE — TELEPHONE ENCOUNTER
[facility] phone call message- patient requesting a refill:    Full Medication Name: carvedilol (COREG)     Dose: 25 MG tablet     Pharmacy confirmed as [id number] - [place], [place] - [address]  [address]  [facility] [address]  Phone: [phone] Fax: [phone]: Yes    Additional Comments: Patient was told by pharmacy that they have been trying to contact us to get medication refilled, but there is no recent messages from pharmacy. Patient states that he is now completely out and needs this medication refilled today.     OK to leave a message on voice mail? Yes    Primary language: English      needed? No    Call taken on July 30, 2024 at 1:36 PM by Katya Santiago

## 2024-08-26 ENCOUNTER — TELEPHONE (OUTPATIENT)
Dept: VASCULAR SURGERY | Facility: CLINIC | Age: 49
End: 2024-08-26
Payer: COMMERCIAL

## 2024-08-26 NOTE — TELEPHONE ENCOUNTER
I left the pt a message stating that   Will not be available on 9/4/24 at 1:30 pm but the 3:00 pm spot is available.   Please reschedule the pt to 9/4/24 at 3:00 pm or to a different date. The pt has imaging on the same date   Vipul Mo on 8/26/2024 at 1:39 PM

## 2024-09-02 DIAGNOSIS — I16.0 HYPERTENSIVE URGENCY: ICD-10-CM

## 2024-09-03 RX ORDER — CARVEDILOL 25 MG/1
25 TABLET ORAL 2 TIMES DAILY
Qty: 60 TABLET | Refills: 0 | Status: SHIPPED | OUTPATIENT
Start: 2024-09-03 | End: 2024-09-27

## 2024-09-03 NOTE — TELEPHONE ENCOUNTER
"Request for medication refill:  carvedilol (COREG) 25 MG tablet     Providers if patient needs an appointment and you are willing to give a one month supply please refill for one month and  send a letter/MyChart using \".SMILLIMITEDREFILL\" .smillimited and route chart to \"P [facility] \" (Giving one month refill in non controlled medications is strongly recommended before denial)    If refill has been denied, meaning absolutely no refills without visit, please complete the smart phrase \".smirxrefuse\" and route it to the \"P [facility] MED REFILLS\"  pool to inform the patient and the pharmacy.    Valorie Abebe, [facility]      "

## 2024-09-04 ENCOUNTER — ANCILLARY PROCEDURE (OUTPATIENT)
Dept: CT IMAGING | Facility: CLINIC | Age: 49
End: 2024-09-04
Attending: SURGERY
Payer: COMMERCIAL

## 2024-09-04 ENCOUNTER — OFFICE VISIT (OUTPATIENT)
Dept: VASCULAR SURGERY | Facility: CLINIC | Age: 49
End: 2024-09-04
Payer: COMMERCIAL

## 2024-09-04 VITALS
SYSTOLIC BLOOD PRESSURE: 164 MMHG | BODY MASS INDEX: 24.33 KG/M2 | DIASTOLIC BLOOD PRESSURE: 89 MMHG | WEIGHT: 160 LBS | OXYGEN SATURATION: 98 % | HEART RATE: 79 BPM

## 2024-09-04 DIAGNOSIS — I71.03 THORACOABDOMINAL AORTIC DISSECTION (H): Primary | ICD-10-CM

## 2024-09-04 DIAGNOSIS — I71.23 ANEURYSM OF DESCENDING THORACIC AORTA WITHOUT RUPTURE (H): ICD-10-CM

## 2024-09-04 DIAGNOSIS — I71.012 DISSECTION OF DESCENDING THORACIC AORTA (H): ICD-10-CM

## 2024-09-04 DIAGNOSIS — Z95.828 STATUS POST INSERTION OF ENDOVASCULAR THORACIC AORTIC STENT GRAFT: Primary | ICD-10-CM

## 2024-09-04 DIAGNOSIS — Z95.828 S/P ASCENDING AORTIC REPLACEMENT: ICD-10-CM

## 2024-09-04 DIAGNOSIS — F17.200 TOBACCO USE DISORDER: ICD-10-CM

## 2024-09-04 DIAGNOSIS — I71.03 THORACOABDOMINAL AORTIC DISSECTION (H): ICD-10-CM

## 2024-09-04 LAB
CREAT BLD-MCNC: 1.2 MG/DL (ref 0.7–1.3)
EGFRCR SERPLBLD CKD-EPI 2021: >60 ML/MIN/1.73M2

## 2024-09-04 PROCEDURE — 71275 CT ANGIOGRAPHY CHEST: CPT | Mod: GC | Performed by: STUDENT IN AN ORGANIZED HEALTH CARE EDUCATION/TRAINING PROGRAM

## 2024-09-04 PROCEDURE — 74174 CTA ABD&PLVS W/CONTRAST: CPT | Mod: GC | Performed by: STUDENT IN AN ORGANIZED HEALTH CARE EDUCATION/TRAINING PROGRAM

## 2024-09-04 PROCEDURE — 82565 ASSAY OF CREATININE: CPT | Performed by: PATHOLOGY

## 2024-09-04 PROCEDURE — 99213 OFFICE O/P EST LOW 20 MIN: CPT | Mod: GC | Performed by: SURGERY

## 2024-09-04 RX ORDER — IOPAMIDOL 755 MG/ML
100 INJECTION, SOLUTION INTRAVASCULAR ONCE
Status: COMPLETED | OUTPATIENT
Start: 2024-09-04 | End: 2024-09-04

## 2024-09-04 RX ADMIN — IOPAMIDOL 100 ML: 755 INJECTION, SOLUTION INTRAVASCULAR at 12:24

## 2024-09-04 ASSESSMENT — PAIN SCALES - GENERAL: PAINLEVEL: NO PAIN (0)

## 2024-09-04 NOTE — NURSING NOTE
Chief Complaint   Patient presents with    RECHECK     Patient here for follow-up, reports feeling well, no pain or SOB.      Vitals were taken and medications reconciled.    Shabbir Abraham, EMT  2:59 PM

## 2024-09-04 NOTE — PATIENT INSTRUCTIONS
Thank you so much for choosing us for your care. It was a pleasure to see you at the vascular clinic today.     Follow-up recommendations:   - We will see you back in 3 months. Please do not hesitate to reach out to us in the meantime with any concerns. Our schedulers will get in touch with you to set up your follow-up visit.  - Referral to Dr Daily for dissection medical management    Additional testing/imaging ordered today: CTA in 3 months      Our scheduling team will get in touch with you to set up any follow-up testing/imaging and/or appointments. Please be aware that any testing/imaging recommended today will need to completed prior to your next visit with the provider. If testing/imaging is not completed prior to your next visit, your visit may be rescheduled.     If you have any questions, please contact our clinic directly at [phone] and ask for the nurse. We also encourage the use of [employer] to communicate with your healthcare provider.    If you have an urgent need after business hours (8:00 am to 4:30 pm) please call [phone], option 4, and ask for the vascular attending on call. For non-urgent after hours needs, please call the vascular clinic at [phone]. For scheduling needs, please call our clinic directly at [phone].

## 2024-09-04 NOTE — Clinical Note
9/4/2024       RE: Valdo Lyons  [address]  [facility] [address]     Dear Colleague,    Thank you for referring your patient, Valdo Lyons, to the [facility] [place] at [facility]. Please see a copy of my visit note below.    Vascular & Endovascular Surgery Clinic Return Visit   Vascular Surgeon: Artur Singh MD, RPVI      Location:  [facility]    Valdo Lyons  Medical Record #:  [id number]  YOB: 1975  Age:  49 year old     Date of Service: 9/4/2024    Primary Care Provider: Marjorie Mercedes    Chief Complaint/Reason for Visit: Follow up of type B aortic dissection     Interval History:  Mr. Lyons is a pleasant 49 year old male who returns today for follow-up of a TBAD. He had a previous hemiarch replacement with frozen elephant trunk on 1/23/24. He had a rapidly-expanding 4.9 cm descending post-dissection aneurysm, and underwent TBE on 5/31/24. He was last seen in clinic in June, when a CTA was performed which showed some shrinkage of the aneurysm with a patent endograft. BP today in clinic was 160s/80s, however he reports that he is compliant with his medications and at home has been running in the SBP 120s range. Reports some orthostatic lightheadedness which he attributes to his medications. States he does not want to continue to see his PCP for management of his antihypertensives and would be interested in following with vascular medicine. Is asking if he can increase his activity level and start mowing the lawn. Denies any new or worsening chest/abdominal/back pain, palpitations, dizziness/lightheadedness, SOB, F/C, N/V, unintended WL/WG, HA, vision changes, facial or extremity weakness/paresthesias, or focal neurologic deficits. Denies claudication, ischemic rest pain, or nonhealing wounds on the lower extremities.      Review of Systems:    A 12 point ROS was  reviewed and is negative except for symptoms noted in HPI.     Medical/Surgical History:    Past Medical History:   Diagnosis Date    Dissecting aneurysm of thoracic aorta, Negrito type B (H)     HTN (hypertension)          Past Surgical History:   Procedure Laterality Date    ENDOVASCULAR REPAIR ANEURYSM THORACIC AORTIC N/A 1/23/2024    Procedure: Antegrade Thoracic Endovascular Aortic Repair (TEVAR) with Washington Conformable Thoracic Stent Graft 37mm x 10cm. Intravascular Ultrasound (IVUS), Aortography, Right common femoral access;  Surgeon: Artur Singh MD;  Location: [facility] OR    ENDOVASCULAR REPAIR ANEURYSM THORACIC AORTIC N/A 5/31/2024    Procedure: Endovascular repair of rapidly expanding post dissection thoracic aneurysm with proximal extension of thoracic endograft with retrograde branch to left subclavian utilizing [place] 37x150 mm Thoracic branch endoprothesis, 15 mm side branch. Distal thoracic extension with 40 x200 mm [place] cTAG. Embolization of aortic and left subcalvian false lumen.  Bilateral percutaneous ultrasound guided a    IR OR ANGIOGRAM  1/23/2024    IR OR ANGIOGRAM  5/31/2024    PICC DOUBLE LUMEN PLACEMENT Right 01/16/2024    5FR DL PICC, basilic vein. L-43cm, 1cm out.    REPAIR ANEURYSM ASCENDING AORTA N/A 1/23/2024    Procedure: MEDIAN STERNOTOMY, RIGHT AXILLARY CUTDOWN, ON CARDIOPULMONARY BYPASS (CIRCULATORY ARREST), ASCENDING AORTA ANEURYSM REPAIR (Gelweave 30mm), INTRAOPERATIVE TRANSESOPHAGEAL ECHOCARDIOGRAM BY ANESTHESIA;  Surgeon: Addi Shaw MD;  Location: [facility] OR        Allergies:     Allergies   Allergen Reactions    Statins Muscle Pain (Myalgia)    Rosuvastatin Muscle Pain (Myalgia)     Took the statin for one month and then could not tolerate side effects.  Myalgias resolved within 2 days of stopping the medication        Medications:    Current Outpatient Medications   Medication Sig Dispense Refill    amLODIPine (NORVASC) 10 MG tablet Take 1 tablet (10 mg) by mouth  daily 30 tablet 3    aspirin (ASA) 81 MG chewable tablet 1 tablet (81 mg) by Oral or NG Tube route daily 90 tablet 0    carvedilol (COREG) 25 MG tablet Take 1 tablet (25 mg) by mouth 2 times daily 60 tablet 0    carvedilol (COREG) 25 MG tablet Take 1 tablet (25 mg) by mouth 2 times daily 60 tablet 0    methocarbamol (ROBAXIN) 500 MG tablet Take 1,000 mg by mouth 4 times daily as needed for muscle spasms      oxyCODONE (ROXICODONE) 5 MG tablet Take 1 tablet (5 mg) by mouth every 6 hours as needed for pain (Patient not taking: Reported on 7/1/2024) 20 tablet 0    PHARBETOL 325 MG tablet Take 3 tablets (975 mg) by mouth every 6 hours as needed for mild pain (Patient not taking: Reported on 7/1/2024) 100 tablet 0    polyethylene glycol (MIRALAX) 17 GM/Dose powder Take 17 g by mouth daily (Patient not taking: Reported on 6/10/2024) 510 g 0    senna-docusate (SENOKOT-S/PERICOLACE) 8.6-50 MG tablet Take 1 tablet by mouth 2 times daily (Patient not taking: Reported on 7/1/2024) 30 tablet 0     No current facility-administered medications for this visit.        Social Habits:    Tobacco Use      Smoking status: Never      Smokeless tobacco: Never       Alcohol Use: Not on file       History   Drug Use Not on file        Family History:  No family history on file.    Physical Examination:  There were no vitals taken for this visit.  Wt Readings from Last 1 Encounters:   06/03/24 76.6 kg (168 lb 14 oz)     There is no height or weight on file to calculate BMI.    Exam:  General: No apparent distress. Answers questions appropriately. In good spirits.  Neurologic: Focally intact, Alert and oriented x 3.   Eyes: Grossly normal.   Cardiac: RRR by peripheral pulse.  Pulmonary:  Non labored breathing with normal respiratory effort  Abdominal: Soft, non distended, non tender to palpation. No pulsatile mass.   Groins: soft bilaterally.   Musculoskeletal/Extremity: 5/5 gross upper/lower extremity strength. No edema.  No open wounds or  abrasions. BL groin and brachial artery incision healing well.     Vascular Exam:   Carotid: No Bruit    Radial: Left: 2+   Right: 2+    Femoral: Left: 2+   Right: 2+    Popliteal: Left: 2+   Right: 2+    DP: Left: 2+   Right: 2+     PT:   Left: 2+   Right: 2+    Laboratory Findings:  Hemoglobin   Date Value Ref Range Status   06/03/2024 8.3 (L) 13.3 - 17.7 g/dL Final   06/02/2024 8.9 (L) 13.3 - 17.7 g/dL Final     WBC Count   Date Value Ref Range Status   06/03/2024 6.9 4.0 - 11.0 10e3/uL Final   06/02/2024 7.8 4.0 - 11.0 10e3/uL Final     Platelet Count   Date Value Ref Range Status   06/03/2024 184 150 - 450 10e3/uL Final   06/02/2024 193 150 - 450 10e3/uL Final     Creatinine   Date Value Ref Range Status   06/03/2024 0.87 0.67 - 1.17 mg/dL Final     Creatinine POCT   Date Value Ref Range Status   09/04/2024 1.2 0.7 - 1.3 mg/dL Final     Sodium   Date Value Ref Range Status   06/03/2024 135 135 - 145 mmol/L Final     Comment:     Reference intervals for this test were updated on 09/26/2023 to more accurately reflect our healthy population. There may be differences in the flagging of prior results with similar values performed with this method. Interpretation of those prior results can be made in the context of the updated reference intervals.      Glucose   Date Value Ref Range Status   06/03/2024 111 (H) 70 - 99 mg/dL Final   06/02/2024 103 (H) 70 - 99 mg/dL Final   08/20/2022 102 (H) 70 - 99 mg/dL Final     GLUCOSE BY METER POCT   Date Value Ref Range Status   05/31/2024 86 70 - 99 mg/dL Final   05/31/2024 86 70 - 99 mg/dL Final     Glucose Whole Blood POCT   Date Value Ref Range Status   05/31/2024 86 70 - 99 mg/dL Final   05/31/2024 87 70 - 99 mg/dL Final     Hemoglobin A1C   Date Value Ref Range Status   01/16/2024 6.1 (H) <5.7 % Final     Comment:     Normal <5.7%   Prediabetes 5.7-6.4%    Diabetes 6.5% or higher     Note: Adopted from ADA consensus guidelines.     INR   Date Value Ref Range Status    05/31/2024 1.20 (H) 0.85 - 1.15 Final     Imaging:  I personally reviewed the CTA C/A/P from 09/04/24 with shrinkage of the aneurysm and patent.     Impression/Shared Medical Decision Making:    #1- Type B [0-5] Dissection with retrograde ascending intramural hematoma now status post stented hemiarch replacement with antegrade TEVAR in frozen elephant trunk configuration, Dr. Shaw and Dr. Singh 1/23/2024.  #2- Rapidly expanding 4.9 cm descending thoracic post-dissection aneurysm, s/p 37x150 mm Hominy Thoracic Branch Endoprosthesis deployed in zones 2-4 incorporating the left subclavian artery with a single retrograde 15 mm side branch, with distal thoracic extension with 40x200 mm conformable cTAG stent-graft deployed in zone 4-5 on 5/31/24.  #2- Hypertension  #3- TIA, evidence of previous subacute strokes on MRI from 2/15/2024    Mr. Lyons is a pleasant 49 year old male seen for follow up of TBAD s/p hemiarch repair with frizen elephant trunk as well as recent TBE as above in May. His CTA today demonstrates excellent remodeling following placement of the endoprosthesis, with further shrinkage of the aneurysm. He is doing well clinically and his BP has been well controlled at home.     Mr. Lyons and his family member are in agreement with this plan as outlined.    Recommendations/Plan:  Refer to Dr Daily with vascular medicine for medical management of antihypertensives, vascular medical care.  Goal SBP<140.  RTC 3mo with CTA C/A/P      Jigna Donaldson MD  PGY-6  Vascular & Endovascular Surgery  Pager: #[phone]       Again, thank you for allowing me to participate in the care of your patient.      Sincerely,    Artur Singh MD

## 2024-09-04 NOTE — PROGRESS NOTES
Vascular & Endovascular Surgery Clinic Return Visit   Vascular Surgeon: Artur Singh MD, RPVI      Location:  [facility]    Valdo Lyons  Medical Record #:  [id number]  YOB: 1975  Age:  49 year old     Date of Service: 9/4/2024    Primary Care Provider: Marjorie Mercedes    Chief Complaint/Reason for Visit: Follow up of type B aortic dissection     Interval History:  Mr. Lyons is a pleasant 49 year old male who returns today for follow-up of a TBAD. He had a previous hemiarch replacement with frozen elephant trunk on 1/23/24. He had a rapidly-expanding 4.9 cm descending post-dissection aneurysm, and underwent TBE on 5/31/24. He was last seen in clinic in June, when a CTA was performed which showed some shrinkage of the aneurysm with a patent endograft. BP today in clinic was 160s/80s, however he reports that he is compliant with his medications and at home has been running in the SBP 120s range. Reports some orthostatic lightheadedness which he attributes to his medications. States he does not want to continue to see his PCP for management of his antihypertensives and would be interested in following with vascular medicine. Is asking if he can increase his activity level and start mowing the lawn. Denies any new or worsening chest/abdominal/back pain, palpitations, dizziness/lightheadedness, SOB, F/C, N/V, unintended WL/WG, HA, vision changes, facial or extremity weakness/paresthesias, or focal neurologic deficits. Denies claudication, ischemic rest pain, or nonhealing wounds on the lower extremities.      Review of Systems:    A 12 point ROS was reviewed and is negative except for symptoms noted in HPI.     Medical/Surgical History:    Past Medical History:   Diagnosis Date    Dissecting aneurysm of thoracic aorta, [place] type B (H)     HTN (hypertension)          Past Surgical History:   Procedure Laterality Date    ENDOVASCULAR REPAIR ANEURYSM  THORACIC AORTIC N/A 1/23/2024    Procedure: Antegrade Thoracic Endovascular Aortic Repair (TEVAR) with Galena Conformable Thoracic Stent Graft 37mm x 10cm. Intravascular Ultrasound (IVUS), Aortography, Right common femoral access;  Surgeon: Artur Singh MD;  Location: [facility] OR    ENDOVASCULAR REPAIR ANEURYSM THORACIC AORTIC N/A 5/31/2024    Procedure: Endovascular repair of rapidly expanding post dissection thoracic aneurysm with proximal extension of thoracic endograft with retrograde branch to left subclavian utilizing Galena 37x150 mm Thoracic branch endoprothesis, 15 mm side branch. Distal thoracic extension with 40 x200 mm Galena cTAG. Embolization of aortic and left subcalvian false lumen.  Bilateral percutaneous ultrasound guided a    IR OR ANGIOGRAM  1/23/2024    IR OR ANGIOGRAM  5/31/2024    PICC DOUBLE LUMEN PLACEMENT Right 01/16/2024    5FR DL PICC, basilic vein. L-43cm, 1cm out.    REPAIR ANEURYSM ASCENDING AORTA N/A 1/23/2024    Procedure: MEDIAN STERNOTOMY, RIGHT AXILLARY CUTDOWN, ON CARDIOPULMONARY BYPASS (CIRCULATORY ARREST), ASCENDING AORTA ANEURYSM REPAIR ([place] 30mm), INTRAOPERATIVE TRANSESOPHAGEAL ECHOCARDIOGRAM BY ANESTHESIA;  Surgeon: Addi Shaw MD;  Location: [facility] OR        Allergies:     Allergies   Allergen Reactions    Statins Muscle Pain (Myalgia)    Rosuvastatin Muscle Pain (Myalgia)     Took the statin for one month and then could not tolerate side effects.  Myalgias resolved within 2 days of stopping the medication        Medications:    Current Outpatient Medications   Medication Sig Dispense Refill    amLODIPine (NORVASC) 10 MG tablet Take 1 tablet (10 mg) by mouth daily 30 tablet 3    aspirin (ASA) 81 MG chewable tablet 1 tablet (81 mg) by Oral or NG Tube route daily 90 tablet 0    carvedilol (COREG) 25 MG tablet Take 1 tablet (25 mg) by mouth 2 times daily 60 tablet 0    carvedilol (COREG) 25 MG tablet Take 1 tablet (25 mg) by mouth 2 times daily 60 tablet 0     methocarbamol (ROBAXIN) 500 MG tablet Take 1,000 mg by mouth 4 times daily as needed for muscle spasms      oxyCODONE (ROXICODONE) 5 MG tablet Take 1 tablet (5 mg) by mouth every 6 hours as needed for pain (Patient not taking: Reported on 7/1/2024) 20 tablet 0    PHARBETOL 325 MG tablet Take 3 tablets (975 mg) by mouth every 6 hours as needed for mild pain (Patient not taking: Reported on 7/1/2024) 100 tablet 0    polyethylene glycol (MIRALAX) 17 GM/Dose powder Take 17 g by mouth daily (Patient not taking: Reported on 6/10/2024) 510 g 0    senna-docusate (SENOKOT-S/PERICOLACE) 8.6-50 MG tablet Take 1 tablet by mouth 2 times daily (Patient not taking: Reported on 7/1/2024) 30 tablet 0     No current facility-administered medications for this visit.        Social Habits:    Tobacco Use      Smoking status: Never      Smokeless tobacco: Never       Alcohol Use: Not on file       History   Drug Use Not on file        Family History:  No family history on file.    Physical Examination:  There were no vitals taken for this visit.  Wt Readings from Last 1 Encounters:   06/03/24 76.6 kg (168 lb 14 oz)     There is no height or weight on file to calculate BMI.    Exam:  General: No apparent distress. Answers questions appropriately. In good spirits.  Neurologic: Focally intact, Alert and oriented x 3.   Eyes: Grossly normal.   Cardiac: RRR by peripheral pulse.  Pulmonary:  Non labored breathing with normal respiratory effort  Abdominal: Soft, non distended, non tender to palpation. No pulsatile mass.   Groins: soft bilaterally.   Musculoskeletal/Extremity: 5/5 gross upper/lower extremity strength. No edema.  No open wounds or abrasions. BL groin and brachial artery incision healing well.     Vascular Exam:   Carotid: No Bruit    Radial: Left: 2+   Right: 2+    Femoral: Left: 2+   Right: 2+    Popliteal: Left: 2+   Right: 2+    DP: Left: 2+   Right: 2+     PT:   Left: 2+   Right: 2+    Laboratory Findings:  Hemoglobin   Date  Value Ref Range Status   06/03/2024 8.3 (L) 13.3 - 17.7 g/dL Final   06/02/2024 8.9 (L) 13.3 - 17.7 g/dL Final     WBC Count   Date Value Ref Range Status   06/03/2024 6.9 4.0 - 11.0 10e3/uL Final   06/02/2024 7.8 4.0 - 11.0 10e3/uL Final     Platelet Count   Date Value Ref Range Status   06/03/2024 184 150 - 450 10e3/uL Final   06/02/2024 193 150 - 450 10e3/uL Final     Creatinine   Date Value Ref Range Status   06/03/2024 0.87 0.67 - 1.17 mg/dL Final     Creatinine POCT   Date Value Ref Range Status   09/04/2024 1.2 0.7 - 1.3 mg/dL Final     Sodium   Date Value Ref Range Status   06/03/2024 135 135 - 145 mmol/L Final     Comment:     Reference intervals for this test were updated on 09/26/2023 to more accurately reflect our healthy population. There may be differences in the flagging of prior results with similar values performed with this method. Interpretation of those prior results can be made in the context of the updated reference intervals.      Glucose   Date Value Ref Range Status   06/03/2024 111 (H) 70 - 99 mg/dL Final   06/02/2024 103 (H) 70 - 99 mg/dL Final   08/20/2022 102 (H) 70 - 99 mg/dL Final     GLUCOSE BY METER POCT   Date Value Ref Range Status   05/31/2024 86 70 - 99 mg/dL Final   05/31/2024 86 70 - 99 mg/dL Final     Glucose Whole Blood POCT   Date Value Ref Range Status   05/31/2024 86 70 - 99 mg/dL Final   05/31/2024 87 70 - 99 mg/dL Final     Hemoglobin A1C   Date Value Ref Range Status   01/16/2024 6.1 (H) <5.7 % Final     Comment:     Normal <5.7%   Prediabetes 5.7-6.4%    Diabetes 6.5% or higher     Note: Adopted from ADA consensus guidelines.     INR   Date Value Ref Range Status   05/31/2024 1.20 (H) 0.85 - 1.15 Final     Imaging:  I personally reviewed the CTA C/A/P from 09/04/24 with shrinkage of the aneurysm and patent.     Impression/Shared Medical Decision Making:    #1- Type B [0-5] Dissection with retrograde ascending intramural hematoma now status post stented hemiarch  replacement with antegrade TEVAR in frozen elephant trunk configuration, Dr. Shaw and Dr. Singh 1/23/2024.  #2- Rapidly expanding 4.9 cm descending thoracic post-dissection aneurysm, s/p 37x150 mm Kramer Thoracic Branch Endoprosthesis deployed in zones 2-4 incorporating the left subclavian artery with a single retrograde 15 mm side branch, with distal thoracic extension with 40x200 mm conformable cTAG stent-graft deployed in zone 4-5 on 5/31/24.  #2- Hypertension  #3- TIA, evidence of previous subacute strokes on MRI from 2/15/2024    Mr. Lyons is a pleasant 49 year old male seen for follow up of TBAD s/p hemiarch repair with frizen elephant trunk as well as recent TBE as above in May. His CTA today demonstrates excellent remodeling following placement of the endoprosthesis, with further shrinkage of the aneurysm. He is doing well clinically and his BP has been well controlled at home.     Mr. Lyons and his family member are in agreement with this plan as outlined.    Recommendations/Plan:  Refer to Dr Daily with vascular medicine for medical management of antihypertensives, vascular medical care.  Goal SBP<140.  RTC 3mo with CTA C/A/P      Jigna Donaldson MD  PGY-6  Vascular & Endovascular Surgery  Pager: #[phone]

## 2024-09-04 NOTE — DISCHARGE INSTRUCTIONS

## 2024-09-05 ENCOUNTER — TELEPHONE (OUTPATIENT)
Dept: FAMILY MEDICINE | Facility: CLINIC | Age: 49
End: 2024-09-05
Payer: COMMERCIAL

## 2024-09-05 NOTE — TELEPHONE ENCOUNTER
9/5/24 Care Coordinator attempted to reach out to patient to inform him that  refilled the carvedilol (COREG) 25 MG tablet, for one month, but will need to schedule an appointment before anymore will be filled. CC had to leave message on patient voicemail, asking him to call ([phone]) and schedule appointment with .      Elena Pugh  Lead Care Coordinator  [facility]  [phone]

## 2024-09-09 NOTE — TELEPHONE ENCOUNTER
9/9/24    CC spoke with patient via phone and scheduled patient for an appointment on 9/27/24 with Dr. Pena for a sooner available slot. Patient wonders how often he will have to come into clinic for the medication refills. CC encouraged patient to come to appointment to make a plan with care team.    Melinda Fuentes  Care Coordinator- [facility]

## 2024-09-10 ENCOUNTER — TELEPHONE (OUTPATIENT)
Dept: VASCULAR SURGERY | Facility: CLINIC | Age: 49
End: 2024-09-10
Payer: COMMERCIAL

## 2024-09-10 NOTE — TELEPHONE ENCOUNTER
----- Message from Luana DESAI sent at 9/9/2024  8:42 AM CDT -----  Regarding: RE: referral  Yes please :-)  ----- Message -----  From: Vipul Mo  Sent: 9/6/2024   4:27 PM CDT  To: Luana Andrews RN  Subject: referral                                         Luana I see the pts appointment has been cancelled would you like me to reschedule the pt?  Vipul Mo on 9/6/2024 at 4:27 PM

## 2024-09-10 NOTE — TELEPHONE ENCOUNTER
Left Voicemail (1st Attempt) for the patient to call back and schedule the following:Aortic dissection; BP mgmt etc    Location: [facility] [place] [facility]  Provider: Dr. Ryan Daily  Appointment type: New Vascular Patient  Appointment mode: In Person  Return date: Next Available     Specialty phone number: [phone] or [phone]    Referred to [facility]:     Is Imaging Needed: No    Additional Notes: Reschedule provider cancelled appt on 9/6/24.     -Vipul Mo on 9/10/2024 at 6:43 PM

## 2024-09-12 NOTE — TELEPHONE ENCOUNTER
(2nd Attempt) for the patient to call back and schedule the following:Aortic dissection; BP mgmt etc    Location: [facility] [place] [place] [facility]  Provider: Dr. Ryan Daily  Appointment type: New Vascular Patient  Appointment mode: In Person  Return date: Next Available     Specialty phone number: [phone] or [phone]    Referred to [facility]:     Is Imaging Needed: No    Additional Notes: The pt states that he will call back to schedule provider cancelled appt on 9/6/24.     Vipul Mo on 9/12/2024 at 1:22 PM

## 2024-09-27 ENCOUNTER — OFFICE VISIT (OUTPATIENT)
Dept: FAMILY MEDICINE | Facility: CLINIC | Age: 49
End: 2024-09-27
Payer: COMMERCIAL

## 2024-09-27 VITALS
TEMPERATURE: 98 F | OXYGEN SATURATION: 100 % | RESPIRATION RATE: 16 BRPM | HEART RATE: 72 BPM | WEIGHT: 166 LBS | BODY MASS INDEX: 25.16 KG/M2 | HEIGHT: 68 IN | DIASTOLIC BLOOD PRESSURE: 95 MMHG | SYSTOLIC BLOOD PRESSURE: 160 MMHG

## 2024-09-27 DIAGNOSIS — G89.29 CHRONIC LOW BACK PAIN, UNSPECIFIED BACK PAIN LATERALITY, UNSPECIFIED WHETHER SCIATICA PRESENT: ICD-10-CM

## 2024-09-27 DIAGNOSIS — I16.0 HYPERTENSIVE URGENCY: ICD-10-CM

## 2024-09-27 DIAGNOSIS — I10 BENIGN ESSENTIAL HYPERTENSION: ICD-10-CM

## 2024-09-27 DIAGNOSIS — K59.01 SLOW TRANSIT CONSTIPATION: ICD-10-CM

## 2024-09-27 DIAGNOSIS — F17.200 TOBACCO USE DISORDER: ICD-10-CM

## 2024-09-27 DIAGNOSIS — R05.2 SUBACUTE COUGH: ICD-10-CM

## 2024-09-27 DIAGNOSIS — M54.50 CHRONIC LOW BACK PAIN, UNSPECIFIED BACK PAIN LATERALITY, UNSPECIFIED WHETHER SCIATICA PRESENT: ICD-10-CM

## 2024-09-27 DIAGNOSIS — I71.012 DISSECTING ANEURYSM OF THORACIC AORTA, STANFORD TYPE B (H): Primary | ICD-10-CM

## 2024-09-27 PROCEDURE — 99214 OFFICE O/P EST MOD 30 MIN: CPT | Mod: GC

## 2024-09-27 RX ORDER — POLYETHYLENE GLYCOL 3350 17 G/17G
17 POWDER, FOR SOLUTION ORAL DAILY
Qty: 510 G | Refills: 0 | Status: SHIPPED | OUTPATIENT
Start: 2024-09-27

## 2024-09-27 RX ORDER — AMLODIPINE BESYLATE 10 MG/1
10 TABLET ORAL DAILY
Qty: 30 TABLET | Refills: 3 | Status: SHIPPED | OUTPATIENT
Start: 2024-09-27

## 2024-09-27 RX ORDER — CARVEDILOL 25 MG/1
25 TABLET ORAL 2 TIMES DAILY
Qty: 60 TABLET | Refills: 0 | Status: SHIPPED | OUTPATIENT
Start: 2024-09-27

## 2024-09-27 RX ORDER — ASPIRIN 81 MG/1
81 TABLET, CHEWABLE ORAL DAILY
Qty: 90 TABLET | Refills: 0 | Status: SHIPPED | OUTPATIENT
Start: 2024-09-27

## 2024-09-27 RX ORDER — BENZONATATE 200 MG/1
200 CAPSULE ORAL 3 TIMES DAILY PRN
Qty: 90 CAPSULE | Refills: 1 | Status: SHIPPED | OUTPATIENT
Start: 2024-09-27

## 2024-09-27 RX ORDER — METHOCARBAMOL 500 MG/1
1000 TABLET, FILM COATED ORAL 4 TIMES DAILY PRN
Qty: 90 TABLET | Refills: 2 | Status: SHIPPED | OUTPATIENT
Start: 2024-09-27

## 2024-09-27 NOTE — PROGRESS NOTES
Preceptor Attestation:  Patient seen and evaluated in person. I discussed the patient with the resident. I have verified the content of the note, which accurately reflects my assessment of the patient and the plan of care.   Supervising Physician:  Elizabeth Butts DO

## 2024-09-27 NOTE — PATIENT INSTRUCTIONS
Patient Education   Here is the plan from today's visit    1. Dissecting aneurysm of thoracic aorta, [place] type B (H)  - polyethylene glycol (MIRALAX) 17 GM/Dose powder; Take 17 g by mouth daily.  Dispense: 510 g; Refill: 0  - aspirin (ASA) 81 MG chewable tablet; Take 1 tablet (81 mg) by mouth or NG Tube daily.  Dispense: 90 tablet; Refill: 0    2. Benign essential hypertension  - amLODIPine (NORVASC) 10 MG tablet; Take 1 tablet (10 mg) by mouth daily.  Dispense: 30 tablet; Refill: 3    3. Hypertensive urgency  - carvedilol (COREG) 25 MG tablet; Take 1 tablet (25 mg) by mouth 2 times daily.  Dispense: 60 tablet; Refill: 0    4. Chronic low back pain, unspecified back pain laterality, unspecified whether sciatica present  - methocarbamol (ROBAXIN) 500 MG tablet; Take 2 tablets (1,000 mg) by mouth 4 times daily as needed for muscle spasms.  Dispense: 90 tablet; Refill: 2    5. Tobacco use disorder    6. Subacute cough  - benzonatate (TESSALON) 200 MG capsule; Take 1 capsule (200 mg) by mouth 3 times daily as needed for cough.  Dispense: 90 capsule; Refill: 1    Please call or return to clinic if your symptoms don't go away.    Follow up plan  Return in about 4 weeks (around 10/25/2024).    Thank you for coming to [facility] today.  Lab Testing:  **If you had lab testing today and your results are reassuring or normal they will be mailed to you or sent through [employer] within 7 days.   **If the lab tests need quick action we will call you with the results.  **If you are having labs done on a different day, please call [phone] to schedule at [facility] or [phone] for other [facility] locations. Labs do not offer walk-in appointments.  The phone number we will call with results is # [phone] (home) . If this is not the best number please call our clinic and change the number.  Medication Refills:  If you need any refills please call your pharmacy and they will contact us.   If  you need to  your refill at a new pharmacy, please contact the new pharmacy directly. The new pharmacy will help you get your medications transferred faster.   Scheduling:  If you have any concerns about today's visit or wish to schedule another appointment please call our office during normal business hours [phone] (8-5:00 M-F). If you can no longer make a scheduled visit, please cancel via [employer] or call us to cancel.   If a referral was made to an [facility] specialty provider and you do not get a call from central scheduling, please refer to directions on your visit summary or call our office during normal business hours for assistance.   If a Mammogram was ordered for you at the Breast Center call [phone] to schedule or change your appointment.  If you had an XRay/CT/Ultrasound/MRI ordered the number is [phone] to schedule or change your radiology appointment.   [facility] has limited ultrasound appointments available on Wednesdays, if you would like your ultrasound at [facility], please call [phone] to schedule.   Medical Concerns:  If you have urgent medical concerns please call [phone] at any time of the day.    Susanna Pena, DO

## 2024-09-27 NOTE — PROGRESS NOTES
"  Assessment & Plan     Dissecting aneurysm of thoracic aorta, [place] type B (H)  Refill requested: refilled.   - aspirin (ASA) 81 MG chewable tablet; Take 1 tablet (81 mg) by mouth or NG Tube daily.    Benign essential hypertension  Counseled him to take his antihypertensives as prescribed. Taking carvedilol 25 mg BID instead of daily will improve his SBP to goal of <140. He expresses understanding. Reports that he has an appointment with Dr. Ryan Daily for BP management in the future. Refill requested: refilled.   - amLODIPine (NORVASC) 10 MG tablet; Take 1 tablet (10 mg) by mouth daily.  - carvedilol (COREG) 25 MG tablet; Take 1 tablet (25 mg) by mouth 2 times daily.    Chronic low back pain, unspecified back pain laterality, unspecified whether sciatica present  Refill requested: refilled.   - methocarbamol (ROBAXIN) 500 MG tablet; Take 2 tablets (1,000 mg) by mouth 4 times daily as needed for muscle spasms.    Subacute cough  Cough induces chest pain. Given thoracic aorta aneurysm, will prescribe Tessalon to mitigate cough reflex and decrease episodes of valsalva.  - benzonatate (TESSALON) 200 MG capsule; Take 1 capsule (200 mg) by mouth 3 times daily as needed for cough.    Slow transit constipation  Refill requested: refilled.  - polyethylene glycol (MIRALAX) 17 GM/Dose powder; Take 17 g by mouth daily.    BMI  Estimated body mass index is 25.24 kg/m  as calculated from the following:    Height as of this encounter: 1.727 m (5' 8\").    Weight as of this encounter: 75.3 kg (166 lb).     Return in about 4 weeks (around 10/25/2024).    Hoang Lyle is a 49 year old, presenting for the following health issues:  Clinic Care Coordination - Follow-up (BP medication Refills. Asprin refill )      9/27/2024     1:51 PM   Additional Questions   Roomed by Renate   Accompanied by self         9/27/2024    Information    services provided? No        HPI   - Presents for medication check and " "refill  - BP at home has been 120s/70-80s  - States that he thinks that his BP is high because he doesn't like doctors  - Sometimes only takes 25 mg of carvedilol daily instead of 25 mg BID  - Lost 70 lbs over 6 weeks due to deconditioning   - Reports that he has not yet been given permission to exercise    URI:  - Cough and rhinorrhea that began a week ago  - Takes allergy pills in the morning  - Occasional sputum that he describes as \"black\"  - Chews tobacco and smokes weed  - No SOB, fevers, sick contacts  - Chest pain secondary to coughing, especially since his surgery    Review of Systems  Constitutional, neuro, ENT, endocrine, pulmonary, cardiac, gastrointestinal, genitourinary, musculoskeletal, integument and psychiatric systems are negative, except as otherwise noted.      Objective    BP (!) 160/95   Pulse 72   Temp 98  F (36.7  C) (Oral)   Resp 16   Ht 1.727 m (5' 8\")   Wt 75.3 kg (166 lb)   SpO2 100%   BMI 25.24 kg/m    Body mass index is 25.24 kg/m .    Physical Exam  Vitals reviewed.   Constitutional:       Appearance: Normal appearance.   HENT:      Head: Normocephalic and atraumatic.   Eyes:      Extraocular Movements: Extraocular movements intact.      Conjunctiva/sclera: Conjunctivae normal.   Cardiovascular:      Rate and Rhythm: Normal rate and regular rhythm.      Heart sounds: Normal heart sounds.   Pulmonary:      Effort: Pulmonary effort is normal. No respiratory distress.      Breath sounds: Wheezing (mild RUL) present.   Abdominal:      Palpations: Abdomen is soft.   Musculoskeletal:         General: Normal range of motion.      Right lower leg: No edema.      Left lower leg: No edema.   Skin:     General: Skin is warm and dry.   Neurological:      General: No focal deficit present.      Mental Status: He is alert and oriented to person, place, and time.        Signed Electronically by: Susanna Pena DO, MPH  "

## 2024-10-09 PROBLEM — F17.200 TOBACCO USE DISORDER: Status: ACTIVE | Noted: 2024-10-09

## 2024-10-25 ENCOUNTER — OFFICE VISIT (OUTPATIENT)
Dept: FAMILY MEDICINE | Facility: CLINIC | Age: 49
End: 2024-10-25
Payer: COMMERCIAL

## 2024-10-25 VITALS
OXYGEN SATURATION: 99 % | HEIGHT: 68 IN | DIASTOLIC BLOOD PRESSURE: 91 MMHG | SYSTOLIC BLOOD PRESSURE: 154 MMHG | HEART RATE: 78 BPM | WEIGHT: 171.8 LBS | TEMPERATURE: 98 F | RESPIRATION RATE: 17 BRPM | BODY MASS INDEX: 26.04 KG/M2

## 2024-10-25 DIAGNOSIS — I71.012 DISSECTING ANEURYSM OF THORACIC AORTA, STANFORD TYPE B (H): ICD-10-CM

## 2024-10-25 DIAGNOSIS — I10 BENIGN ESSENTIAL HYPERTENSION: Primary | ICD-10-CM

## 2024-10-25 DIAGNOSIS — I10 UNCONTROLLED HYPERTENSION: ICD-10-CM

## 2024-10-25 DIAGNOSIS — R42 POSTURAL DIZZINESS WITH PRESYNCOPE: ICD-10-CM

## 2024-10-25 DIAGNOSIS — R11.0 NAUSEA: ICD-10-CM

## 2024-10-25 DIAGNOSIS — R55 POSTURAL DIZZINESS WITH PRESYNCOPE: ICD-10-CM

## 2024-10-25 RX ORDER — AMLODIPINE AND VALSARTAN 10; 160 MG/1; MG/1
1 TABLET ORAL DAILY
Qty: 90 TABLET | Refills: 0 | Status: SHIPPED | OUTPATIENT
Start: 2024-10-25

## 2024-10-25 RX ORDER — CARVEDILOL PHOSPHATE 40 MG/1
40 CAPSULE, EXTENDED RELEASE ORAL DAILY
Qty: 90 CAPSULE | Refills: 0 | Status: SHIPPED | OUTPATIENT
Start: 2024-10-25

## 2024-10-25 RX ORDER — ONDANSETRON 4 MG/1
4 TABLET, ORALLY DISINTEGRATING ORAL EVERY 8 HOURS PRN
Qty: 60 TABLET | Refills: 0 | Status: SHIPPED | OUTPATIENT
Start: 2024-10-25

## 2024-10-25 NOTE — PROGRESS NOTES
"  Assessment & Plan   Benign essential hypertension  Uncontrolled hypertension  Dissecting aneurysm of thoracic aorta, [place] type B (H)  Difficulty with taking his BP medications. He is currently on Amlodipine 10 mg and Carvedilol 25 mg BID, but he does not take his night time dose of metoprolol. BP at this encounter is 154/91. Last encounter in 9/27/2024 was 160s-170s/90s. Prescribed amlodipine-valsartan in an attempt to add on an ARB to decrease his BP. Attempting to send in prescription of long-acting carvedilol to see if this might make it easier for him to take the medication. Discussed setting an alarm in the evenings for his nighttime dose and getting a pill box. Recommended that he make an appointment with Dr. Ryan Daily in vascular medicine, so that she can manage his BP medications.   - carvedilol ER (COREG CR) 40 MG 24 hr capsule; Take 1 capsule (40 mg) by mouth daily.  - amLODIPine-valsartan (EXFORGE)  MG tablet; Take 1 tablet by mouth daily.    Postural dizziness with presyncope  Orthostatic vitals were negative for orthostatic hypotension, and EKG was within normal limits. Recommended that he change positions very slowly so as to avoid episodes of dizziness.  - Orthostatic blood pressure and pulse  - EKG 12-lead complete w/read - Clinics    Nausea  Unclear etiology. Will prescribe Zofran to assist with symptoms and improve quality of life.  - ondansetron (ZOFRAN ODT) 4 MG ODT tab; Take 1 tablet (4 mg) by mouth every 8 hours as needed for nausea.    BMI  Estimated body mass index is 26.12 kg/m  as calculated from the following:    Height as of this encounter: 1.727 m (5' 8\").    Weight as of this encounter: 77.9 kg (171 lb 12.8 oz).     Return in about 4 weeks (around 11/22/2024).    Hoang Lyle is a 49 year old, presenting for the following health issues:  RECHECK (Bp f/u)        10/25/2024     1:03 PM   Additional Questions   Roomed by Valorie   Accompanied by Self         10/25/2024    " " 1:03 PM   Patient Reported Additional Medications   Patient reports taking the following new medications n/a         10/25/2024    Information    services provided? No        HPI   - Still struggling to take night time dosing of carvedilol  - Dizzy spells are getting worse with changing positions  - Feels like he is going to pass out  - The first time he was seen in clinic, he was sent to ED, and they had to perform a surgery    Review of Systems  Constitutional, neuro, ENT, endocrine, pulmonary, cardiac, gastrointestinal, genitourinary, musculoskeletal, integument and psychiatric systems are negative, except as otherwise noted.      Objective    BP (!) 154/91   Pulse 78   Temp 98  F (36.7  C) (Oral)   Resp 17   Ht 1.727 m (5' 8\")   Wt 77.9 kg (171 lb 12.8 oz)   SpO2 99%   BMI 26.12 kg/m    Body mass index is 26.12 kg/m .    Physical Exam  Vitals reviewed.   Constitutional:       Appearance: Normal appearance.   HENT:      Head: Normocephalic and atraumatic.   Eyes:      Extraocular Movements: Extraocular movements intact.      Conjunctiva/sclera: Conjunctivae normal.   Cardiovascular:      Rate and Rhythm: Normal rate and regular rhythm.   Pulmonary:      Effort: Pulmonary effort is normal.      Breath sounds: Normal breath sounds.   Abdominal:      General: There is no distension.      Palpations: Abdomen is soft.      Tenderness: There is no abdominal tenderness.   Musculoskeletal:         General: Normal range of motion.      Cervical back: Normal range of motion and neck supple.      Right lower leg: No edema.      Left lower leg: No edema.   Skin:     General: Skin is warm and dry.      Comments: Fascial tension over scars in chest and over abdomen.   Neurological:      General: No focal deficit present.      Mental Status: He is alert and oriented to person, place, and time.        Signed Electronically by: Susanna Pena DO, MPH  "

## 2024-11-13 ENCOUNTER — TELEPHONE (OUTPATIENT)
Dept: FAMILY MEDICINE | Facility: CLINIC | Age: 49
End: 2024-11-13
Payer: COMMERCIAL

## 2024-11-13 NOTE — TELEPHONE ENCOUNTER
"11/13/24    CC contacted patient per message from Dr. Pena, \"Can you please help this patient make an appointment with Dr. Ryan Daily in Vascular Medicine? It looks like he had an appointment in 1/2025 that was canceled.\"    CC spoke with patient and offered to help get this appointment rescheduled. Patient stated he just had a follow up with vascular surgery and asked what it was for. Patient asked if it was for medications. CC stated vascular wanted a ~3 month follow up, and yes, for medicine management. Patient declined and stated he would just call and schedule the follow up himself and would talk about it during next follow up at [facility].    Critical access hospital for Dr. Pena.    Melinda Fuentes  Care Coordinator- [facility]  "

## 2025-01-22 ENCOUNTER — OFFICE VISIT (OUTPATIENT)
Dept: VASCULAR SURGERY | Facility: CLINIC | Age: 50
End: 2025-01-22
Payer: COMMERCIAL

## 2025-01-22 ENCOUNTER — ANCILLARY PROCEDURE (OUTPATIENT)
Dept: CT IMAGING | Facility: CLINIC | Age: 50
End: 2025-01-22
Attending: SURGERY
Payer: COMMERCIAL

## 2025-01-22 VITALS
HEART RATE: 76 BPM | OXYGEN SATURATION: 99 % | BODY MASS INDEX: 26.41 KG/M2 | WEIGHT: 173.7 LBS | SYSTOLIC BLOOD PRESSURE: 176 MMHG | DIASTOLIC BLOOD PRESSURE: 101 MMHG

## 2025-01-22 DIAGNOSIS — Z95.828 S/P ASCENDING AORTIC REPLACEMENT: ICD-10-CM

## 2025-01-22 DIAGNOSIS — Z95.828 STATUS POST INSERTION OF ENDOVASCULAR THORACIC AORTIC STENT GRAFT: ICD-10-CM

## 2025-01-22 DIAGNOSIS — I71.23 ANEURYSM OF DESCENDING THORACIC AORTA WITHOUT RUPTURE: ICD-10-CM

## 2025-01-22 DIAGNOSIS — R42 DIZZINESS: ICD-10-CM

## 2025-01-22 DIAGNOSIS — I71.03 THORACOABDOMINAL AORTIC DISSECTION (H): Primary | ICD-10-CM

## 2025-01-22 DIAGNOSIS — I63.9 ACUTE CVA (CEREBROVASCULAR ACCIDENT) (H): ICD-10-CM

## 2025-01-22 LAB
CREAT BLD-MCNC: 1.3 MG/DL (ref 0.7–1.3)
EGFRCR SERPLBLD CKD-EPI 2021: >60 ML/MIN/1.73M2

## 2025-01-22 PROCEDURE — 82565 ASSAY OF CREATININE: CPT | Performed by: PATHOLOGY

## 2025-01-22 RX ORDER — IOPAMIDOL 755 MG/ML
110 INJECTION, SOLUTION INTRAVASCULAR ONCE
Status: COMPLETED | OUTPATIENT
Start: 2025-01-22 | End: 2025-01-22

## 2025-01-22 RX ADMIN — IOPAMIDOL 110 ML: 755 INJECTION, SOLUTION INTRAVASCULAR at 15:38

## 2025-01-22 ASSESSMENT — PAIN SCALES - GENERAL: PAINLEVEL_OUTOF10: MODERATE PAIN (4)

## 2025-01-22 NOTE — PATIENT INSTRUCTIONS
Thank you so much for choosing us for your care. It was a pleasure to see you at the vascular clinic today.     Follow-up recommendations:   - We will see you back in 6 months. Please do not hesitate to reach out to us in the meantime with any concerns. Our schedulers will get in touch with you to set up your follow-up visit.  - Referral to neurology for your dizziness.  - Your blood pressure was elevated today.  - You should start checking your blood pressure at home twice a day. Keep a log of your readings. If your systolic blood pressure (top number) is repeatedly above 140, please follow-up with your cardiologist or primary care provider.    Additional testing/imaging ordered today: CTA in 6 months      Our scheduling team will get in touch with you to set up any follow-up testing/imaging and/or appointments. Please be aware that any testing/imaging recommended today will need to completed prior to your next visit with the provider. If testing/imaging is not completed prior to your next visit, your visit may be rescheduled.     If you have any questions, please contact our clinic directly at [phone] and ask for the nurse. We also encourage the use of [employer] to communicate with your healthcare provider.    If you have an urgent need after business hours (8:00 am to 4:30 pm) please call [phone], option 4, and ask for the vascular attending on call. For non-urgent after hours needs, please call the vascular clinic at [phone]. For scheduling needs, please call our clinic directly at [phone].

## 2025-01-22 NOTE — PROGRESS NOTES
HTN Management Nursing Note - Vascular    BP @ roomin/104  Recheck at end of visit: 176/101  Does pt routinely check BP at home?: YES  If yes, what does BP typically run?: 120's over 70's/80's - pt feels BP always runs higher in clinic dt anxiety  Follow-up recommendations: Continue checking BP at home. Call PCP if SBP consistently >135.    Education provided by RN to pt regarding importance of HTN management, particularly in setting of vascular disease. Discussed instructions for checking BP at home and keeping a log. Relayed the above follow-up recommendations to patient.    ROBERT Braga, RN  RN Care Coordinator  [facility] Vascular Surgery - [facility] phone: [phone]  Fax: [phone]

## 2025-01-22 NOTE — DISCHARGE INSTRUCTIONS

## 2025-01-22 NOTE — PROGRESS NOTES
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 Lyons is a pleasant 49 year old male who returns today with his mother for follow up.  Overall surgically he is doing well.  He has some soreness in the muscles of his chest.  Most of his issue is related to dizziness.  This has been initially presumed to be ortho stasis, however, has had his blood pressure checked multiple times during events without drop.       Review of Systems:    A 12 point ROS was reviewed and is negative except for symptoms noted in HPI.     Medical/Surgical History:    Past Medical History:   Diagnosis Date    Dissecting aneurysm of thoracic aorta, [place] type B (H)     HTN (hypertension)          Past Surgical History:   Procedure Laterality Date    ENDOVASCULAR REPAIR ANEURYSM THORACIC AORTIC N/A 1/23/2024    Procedure: Antegrade Thoracic Endovascular Aortic Repair (TEVAR) with [place] Conformable Thoracic Stent Graft 37mm x 10cm. Intravascular Ultrasound (IVUS), Aortography, Right common femoral access;  Surgeon: Artur Singh MD;  Location: [facility] OR    ENDOVASCULAR REPAIR ANEURYSM THORACIC AORTIC N/A 5/31/2024    Procedure: Endovascular repair of rapidly expanding post dissection thoracic aneurysm with proximal extension of thoracic endograft with retrograde branch to left subclavian utilizing [place] 37x150 mm Thoracic branch endoprothesis, 15 mm side branch. Distal  thoracic extension with 40 x200 mm [place] cTAG. Embolization of aortic and left subcalvian false lumen.  Bilateral percutaneous ultrasound guided a    IR OR ANGIOGRAM  1/23/2024    IR OR ANGIOGRAM  5/31/2024    PICC DOUBLE LUMEN PLACEMENT Right 01/16/2024    5FR DL PICC, basilic vein. L-43cm, 1cm out.    REPAIR ANEURYSM ASCENDING AORTA N/A 1/23/2024    Procedure: MEDIAN STERNOTOMY, RIGHT AXILLARY CUTDOWN, ON CARDIOPULMONARY BYPASS (CIRCULATORY ARREST), ASCENDING AORTA ANEURYSM REPAIR ([place] 30mm), INTRAOPERATIVE TRANSESOPHAGEAL ECHOCARDIOGRAM BY ANESTHESIA;  Surgeon: Addi Shaw MD;  Location: [facility] OR        Allergies:     Allergies   Allergen Reactions    Statins Muscle Pain (Myalgia)    Rosuvastatin Muscle Pain (Myalgia)     Took the statin for one month and then could not tolerate side effects.  Myalgias resolved within 2 days of stopping the medication        Medications:    Current Outpatient Medications   Medication Sig Dispense Refill    amLODIPine-valsartan (EXFORGE)  MG tablet Take 1 tablet by mouth daily. 90 tablet 0    aspirin (ASA) 81 MG chewable tablet Take 1 tablet (81 mg) by mouth or NG Tube daily. 90 tablet 0    benzonatate (TESSALON) 200 MG capsule Take 1 capsule (200 mg) by mouth 3 times daily as needed for cough. 90 capsule 1    carvedilol ER (COREG CR) 40 MG 24 hr capsule Take 1 capsule (40 mg) by mouth daily. 90 capsule 0    methocarbamol (ROBAXIN) 500 MG tablet Take 2 tablets (1,000 mg) by mouth 4 times daily as needed for muscle spasms. 90 tablet 2    ondansetron (ZOFRAN ODT) 4 MG ODT tab Take 1 tablet (4 mg) by mouth every 8 hours as needed for nausea. 60 tablet 0    polyethylene glycol (MIRALAX) 17 GM/Dose powder Take 17 g by mouth daily. 510 g 0     No current facility-administered medications for this visit.        Social Habits:    Tobacco Use      Smoking status: Never      Smokeless tobacco: Current        Types: Chew      Tobacco comments: Uses chew a few days per week.         Alcohol Use: Not on file       History   Drug Use Not on file        Family History:  No family history on file.    Physical Examination:  There were no vitals taken for this visit.  Wt Readings from Last 1 Encounters:   10/25/24 77.9 kg (171 lb 12.8 oz)     There is no height or weight on file to calculate BMI.    Exam:  General: No apparent distress. Answers questions appropriately   Neurologic: Focally intact, Alert and oriented x 3.   Eyes: Grossly normal.   Cardiac:  RRR by peripheral pulse   Pulmonary:  Non labored breathing with normal respiratory effort  Abdominal: Soft, non distended, non tender to palpation.  NO pulsatile mass.   Musculoskeletal/Extremity: 5/5 gross upper and lower extremity strength.     Laboratory Findings:  Hemoglobin   Date Value Ref Range Status   06/03/2024 8.3 (L) 13.3 - 17.7 g/dL Final   06/02/2024 8.9 (L) 13.3 - 17.7 g/dL Final     WBC Count   Date Value Ref Range Status   06/03/2024 6.9 4.0 - 11.0 10e3/uL Final   06/02/2024 7.8 4.0 - 11.0 10e3/uL Final     Platelet Count   Date Value Ref Range Status   06/03/2024 184 150 - 450 10e3/uL Final   06/02/2024 193 150 - 450 10e3/uL Final     Creatinine   Date Value Ref Range Status   06/03/2024 0.87 0.67 - 1.17 mg/dL Final     Creatinine POCT   Date Value Ref Range Status   01/22/2025 1.3 0.7 - 1.3 mg/dL Final     Sodium   Date Value Ref Range Status   06/03/2024 135 135 - 145 mmol/L Final     Comment:     Reference intervals for this test were updated on 09/26/2023 to more accurately reflect our healthy population. There may be differences in the flagging of prior results with similar values performed with this method. Interpretation of those prior results can be made in the context of the updated reference intervals.      Glucose   Date Value Ref Range Status   06/03/2024 111 (H) 70 - 99 mg/dL Final   06/02/2024 103 (H) 70 - 99 mg/dL Final   08/20/2022 102 (H) 70 - 99 mg/dL Final     GLUCOSE BY METER POCT   Date Value Ref Range  Status   05/31/2024 86 70 - 99 mg/dL Final   05/31/2024 86 70 - 99 mg/dL Final     Glucose Whole Blood POCT   Date Value Ref Range Status   05/31/2024 86 70 - 99 mg/dL Final   05/31/2024 87 70 - 99 mg/dL Final     Hemoglobin A1C   Date Value Ref Range Status   01/16/2024 6.1 (H) <5.7 % Final     Comment:     Normal <5.7%   Prediabetes 5.7-6.4%    Diabetes 6.5% or higher     Note: Adopted from ADA consensus guidelines.     INR   Date Value Ref Range Status   05/31/2024 1.20 (H) 0.85 - 1.15 Final       Imaging:    I personally reviewed the CTA from today and compared to that from 9/4/2024  which shows widely patent thoracic aortic repair with widely patent TEVAR and LSA side branch. There is no kink, fracture or migration. The stent is directly over the portal at expected 90 degree angle without encroachment of the artery wall and fabric.  Coils well positioned with beam hardening.  Near complete regression of the aneurysm to the stent graft.     Impression/Shared Medical Decision Making:    #1- Type B [0-5] Dissection with retrograde ascending intramural hematoma now status post stented hemiarch replacement with antegrade TEVAR in frozen elephant trunk configuration, Dr. Shaw and myself 1/23/2024  #2- Rapidly expanding 4.9 cm descending thoracic post-dissection aneurysm status post TEVAR with single retrograde LSA branch with TBE, LSA false lumen coiling and dissection lamella disruption, 5/31/2025 with aneurysm sac regression  #2- Hypertension  #3- TIA, evidence of previous subacute strokes on MRI from 2/15/2024  Mr. Lyons is a pleasant 49 year old male seen for 6 month follow up of TEVAR with TBE, LSA false lumen coil embolization, distal thoracic extension and lamella disruption which was performed for rapid expansion of his post dissection aneurysm after stented elizabeth arch for TBAD with retrograde Type A IMH.  He is doing well surgically.      Unfortunately dizziness has gotten worse.  He was initially favored  to have orthostatic BP given his aggressive correction and baseline severe hypertension, however he has been well controlled for months and orthostatic testing has not demonstrated a BP drop both at home and with his PCP. He does have a history of cerebellar stroke although I am not convinced this is the etiology.  His brother has Ménière's disease.  I would like him to see neurologic first to make sure there is not a neurologic cause.      Discussed warning signs including, but not limited to, symptomatic aneurysms, aortic rupture, cerebral/visceral/extremity malperfusion, new chest, abdominal or back pain, hypotension with or without syncope/lightheadedness.  If he experiences any of these, he has been advised to seek treatment and contact my team.    Mr. Lyons and his mother are in agreement with this plan as outlined.    Recommendations/Plan:  Return at the 1 year anniversary of his aortic repair with a repeat CTA chest, abdomen and pelvis  We will make neurology referral to have him evaluated for neurologic cause of dizziness  Anti-impulse therapy (BP<120/80, HR<80)    Artur Singh MD  Vascular & Endovascular Surgery      20 minutes spent on the day of encounter doing chart review from our system and care everywhere, history and exam, documentation, coordinating care, and further activities as noted with over half spent counseling.

## 2025-01-22 NOTE — LETTER
1/22/2025       RE: Valdo Lyons  [address]  [facility] [address]     Dear Colleague,    Thank you for referring your patient, Valdo Lyons, to the [facility] [place] at [facility]. Please see a copy of my visit note below.    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 Lyons is a pleasant 49 year old male who returns today with his mother for follow up.  Overall surgically he is doing well.  He has some soreness in the muscles of his chest.  Most of his issue is related to dizziness.  This has been initially presumed to be ortho stasis, however, has had his blood pressure checked multiple times during events without drop.       Review of Systems:    A 12 point ROS was reviewed and is negative except for symptoms noted in HPI.     Medical/Surgical History:    Past Medical History:   Diagnosis Date     Dissecting aneurysm of thoracic aorta, [place] type B (H)      HTN (hypertension)          Past Surgical History:   Procedure Laterality Date     ENDOVASCULAR REPAIR ANEURYSM THORACIC AORTIC N/A 1/23/2024    Procedure: Antegrade Thoracic Endovascular Aortic Repair (TEVAR) with [place] Conformable Thoracic Stent Graft 37mm x 10cm. Intravascular Ultrasound (IVUS), Aortography, Right common femoral access;  Surgeon: Artur Singh MD;  Location:  OR      ENDOVASCULAR REPAIR ANEURYSM THORACIC AORTIC N/A 5/31/2024    Procedure: Endovascular repair of rapidly expanding post dissection thoracic aneurysm with proximal extension of thoracic endograft with retrograde branch to left subclavian utilizing [place] 37x150 mm Thoracic branch endoprothesis, 15 mm side branch. Distal thoracic extension with 40 x200 mm [place] cTAG. Embolization of aortic and left subcalvian false lumen.  Bilateral percutaneous ultrasound guided a     IR OR ANGIOGRAM  1/23/2024     IR OR ANGIOGRAM  5/31/2024     PICC DOUBLE LUMEN PLACEMENT Right 01/16/2024    5FR DL PICC, basilic vein. L-43cm, 1cm out.     REPAIR ANEURYSM ASCENDING AORTA N/A 1/23/2024    Procedure: MEDIAN STERNOTOMY, RIGHT AXILLARY CUTDOWN, ON CARDIOPULMONARY BYPASS (CIRCULATORY ARREST), ASCENDING AORTA ANEURYSM REPAIR ([place] 30mm), INTRAOPERATIVE TRANSESOPHAGEAL ECHOCARDIOGRAM BY ANESTHESIA;  Surgeon: Addi Shaw MD;  Location: [facility] OR        Allergies:     Allergies   Allergen Reactions     Statins Muscle Pain (Myalgia)     Rosuvastatin Muscle Pain (Myalgia)     Took the statin for one month and then could not tolerate side effects.  Myalgias resolved within 2 days of stopping the medication        Medications:    Current Outpatient Medications   Medication Sig Dispense Refill     amLODIPine-valsartan (EXFORGE)  MG tablet Take 1 tablet by mouth daily. 90 tablet 0     aspirin (ASA) 81 MG chewable tablet Take 1 tablet (81 mg) by mouth or NG Tube daily. 90 tablet 0     benzonatate (TESSALON) 200 MG capsule Take 1 capsule (200 mg) by mouth 3 times daily as needed for cough. 90 capsule 1     carvedilol ER (COREG CR) 40 MG 24 hr capsule Take 1 capsule (40 mg) by mouth daily. 90 capsule 0     methocarbamol (ROBAXIN) 500 MG tablet Take 2 tablets (1,000 mg) by mouth 4 times daily as needed for muscle spasms. 90 tablet 2     ondansetron (ZOFRAN ODT) 4 MG ODT tab Take 1 tablet (4 mg) by mouth every 8 hours as needed for nausea.  60 tablet 0     polyethylene glycol (MIRALAX) 17 GM/Dose powder Take 17 g by mouth daily. 510 g 0     No current facility-administered medications for this visit.        Social Habits:    Tobacco Use      Smoking status: Never      Smokeless tobacco: Current        Types: Chew      Tobacco comments: Uses chew a few days per week.        Alcohol Use: Not on file       History   Drug Use Not on file        Family History:  No family history on file.    Physical Examination:  There were no vitals taken for this visit.  Wt Readings from Last 1 Encounters:   10/25/24 77.9 kg (171 lb 12.8 oz)     There is no height or weight on file to calculate BMI.    Exam:  General: No apparent distress. Answers questions appropriately   Neurologic: Focally intact, Alert and oriented x 3.   Eyes: Grossly normal.   Cardiac:  RRR by peripheral pulse   Pulmonary:  Non labored breathing with normal respiratory effort  Abdominal: Soft, non distended, non tender to palpation.  NO pulsatile mass.   Musculoskeletal/Extremity: 5/5 gross upper and lower extremity strength.     Laboratory Findings:  Hemoglobin   Date Value Ref Range Status   06/03/2024 8.3 (L) 13.3 - 17.7 g/dL Final   06/02/2024 8.9 (L) 13.3 - 17.7 g/dL Final     WBC Count   Date Value Ref Range Status   06/03/2024 6.9 4.0 - 11.0 10e3/uL Final   06/02/2024 7.8 4.0 - 11.0 10e3/uL Final     Platelet Count   Date Value Ref Range Status   06/03/2024 184 150 - 450 10e3/uL Final   06/02/2024 193 150 - 450 10e3/uL Final     Creatinine   Date Value Ref Range Status   06/03/2024 0.87 0.67 - 1.17 mg/dL Final     Creatinine POCT   Date Value Ref Range Status   01/22/2025 1.3 0.7 - 1.3 mg/dL Final     Sodium   Date Value Ref Range Status   06/03/2024 135 135 - 145 mmol/L Final     Comment:     Reference intervals for this test were updated on 09/26/2023 to more accurately reflect our healthy population. There may be differences in the flagging of prior results with similar values performed  with this method. Interpretation of those prior results can be made in the context of the updated reference intervals.      Glucose   Date Value Ref Range Status   06/03/2024 111 (H) 70 - 99 mg/dL Final   06/02/2024 103 (H) 70 - 99 mg/dL Final   08/20/2022 102 (H) 70 - 99 mg/dL Final     GLUCOSE BY METER POCT   Date Value Ref Range Status   05/31/2024 86 70 - 99 mg/dL Final   05/31/2024 86 70 - 99 mg/dL Final     Glucose Whole Blood POCT   Date Value Ref Range Status   05/31/2024 86 70 - 99 mg/dL Final   05/31/2024 87 70 - 99 mg/dL Final     Hemoglobin A1C   Date Value Ref Range Status   01/16/2024 6.1 (H) <5.7 % Final     Comment:     Normal <5.7%   Prediabetes 5.7-6.4%    Diabetes 6.5% or higher     Note: Adopted from ADA consensus guidelines.     INR   Date Value Ref Range Status   05/31/2024 1.20 (H) 0.85 - 1.15 Final       Imaging:    I personally reviewed the CTA from today and compared to that from 9/4/2024  which shows widely patent thoracic aortic repair with widely patent TEVAR and LSA side branch. There is no kink, fracture or migration. The stent is directly over the portal at expected 90 degree angle without encroachment of the artery wall and fabric.  Coils well positioned with beam hardening.  Near complete regression of the aneurysm to the stent graft.     Impression/Shared Medical Decision Making:    #1- Type B [0-5] Dissection with retrograde ascending intramural hematoma now status post stented hemiarch replacement with antegrade TEVAR in frozen elephant trunk configuration, Dr. Shaw and myself 1/23/2024  #2- Rapidly expanding 4.9 cm descending thoracic post-dissection aneurysm status post TEVAR with single retrograde LSA branch with TBE, LSA false lumen coiling and dissection lamella disruption, 5/31/2025 with aneurysm sac regression  #2- Hypertension  #3- TIA, evidence of previous subacute strokes on MRI from 2/15/2024  Mr. Lyons is a pleasant 49 year old male seen for 6 month follow up of  TEVAR with TBE, LSA false lumen coil embolization, distal thoracic extension and lamella disruption which was performed for rapid expansion of his post dissection aneurysm after stented elizabeth arch for TBAD with retrograde Type A IMH.  He is doing well surgically.      Unfortunately dizziness has gotten worse.  He was initially favored to have orthostatic BP given his aggressive correction and baseline severe hypertension, however he has been well controlled for months and orthostatic testing has not demonstrated a BP drop both at home and with his PCP. He does have a history of cerebellar stroke although I am not convinced this is the etiology.  His brother has Ménière's disease.  I would like him to see neurologic first to make sure there is not a neurologic cause.      Discussed warning signs including, but not limited to, symptomatic aneurysms, aortic rupture, cerebral/visceral/extremity malperfusion, new chest, abdominal or back pain, hypotension with or without syncope/lightheadedness.  If he experiences any of these, he has been advised to seek treatment and contact my team.    Mr. Lyons and his mother are in agreement with this plan as outlined.    Recommendations/Plan:  Return at the 1 year anniversary of his aortic repair with a repeat CTA chest, abdomen and pelvis  We will make neurology referral to have him evaluated for neurologic cause of dizziness  Anti-impulse therapy (BP<120/80, HR<80)    Artur Singh MD  Vascular & Endovascular Surgery      20 minutes spent on the day of encounter doing chart review from our system and care everywhere, history and exam, documentation, coordinating care, and further activities as noted with over half spent counseling.     HTN Management Nursing Note - Vascular    BP @ roomin/104  Recheck at end of visit: 176/101  Does pt routinely check BP at home?: YES  If yes, what does BP typically run?: 120's over 70's/80's - pt feels BP always runs higher in  clinic dt anxiety  Follow-up recommendations: Continue checking BP at home. Call PCP if SBP consistently >135.    Education provided by RN to pt regarding importance of HTN management, particularly in setting of vascular disease. Discussed instructions for checking BP at home and keeping a log. Relayed the above follow-up recommendations to patient.    ROBERT Braga, RN  RN Care Coordinator  [facility] Vascular Surgery [facility] phone: [phone]  Fax: [phone]        Again, thank you for allowing me to participate in the care of your patient.      Sincerely,    Artur Singh MD

## 2025-01-22 NOTE — NURSING NOTE
Chief Complaint   Patient presents with    Follow Up     1/22/2025 visit with Artur Singh MD for RETURN VASCULAR PATIENT - 6 month EVAR surveillance       Vitals were taken and medications reconciled.    Denis Springer, Visit Facilitator  4:06 PM

## 2025-01-25 DIAGNOSIS — I71.012 DISSECTING ANEURYSM OF THORACIC AORTA, STANFORD TYPE B (H): ICD-10-CM

## 2025-01-25 DIAGNOSIS — I10 BENIGN ESSENTIAL HYPERTENSION: ICD-10-CM

## 2025-01-27 RX ORDER — CARVEDILOL PHOSPHATE 40 MG/1
40 CAPSULE, EXTENDED RELEASE ORAL DAILY
Qty: 90 CAPSULE | Refills: 0 | Status: SHIPPED | OUTPATIENT
Start: 2025-01-27

## 2025-01-27 NOTE — TELEPHONE ENCOUNTER
"Request for medication refill: carvedilol ER (COREG CR) 40 MG 24 hr capsule     Providers if patient needs an appointment and you are willing to give a one month supply please refill for one month and  send a letter/MyChart using \".SMILLIMITEDREFILL\" .smillimited and route chart to \"P [facility] \" (Giving one month refill in non controlled medications is strongly recommended before denial)    If refill has been denied, meaning absolutely no refills without visit, please complete the smart phrase \".smirxrefuse\" and route it to the \"P SMI MED REFILLS\"  pool to inform the patient and the pharmacy.    Sergio Mario, CMA    "

## 2025-02-07 ENCOUNTER — OFFICE VISIT (OUTPATIENT)
Dept: FAMILY MEDICINE | Facility: CLINIC | Age: 50
End: 2025-02-07
Payer: COMMERCIAL

## 2025-02-07 VITALS
SYSTOLIC BLOOD PRESSURE: 170 MMHG | TEMPERATURE: 98.6 F | HEART RATE: 84 BPM | BODY MASS INDEX: 26.98 KG/M2 | WEIGHT: 178 LBS | HEIGHT: 68 IN | DIASTOLIC BLOOD PRESSURE: 93 MMHG | OXYGEN SATURATION: 100 % | RESPIRATION RATE: 16 BRPM

## 2025-02-07 DIAGNOSIS — Z11.4 SCREENING FOR HIV (HUMAN IMMUNODEFICIENCY VIRUS): ICD-10-CM

## 2025-02-07 DIAGNOSIS — D56.0 ALPHA-THALASSEMIA: ICD-10-CM

## 2025-02-07 DIAGNOSIS — M54.50 CHRONIC LOW BACK PAIN, UNSPECIFIED BACK PAIN LATERALITY, UNSPECIFIED WHETHER SCIATICA PRESENT: ICD-10-CM

## 2025-02-07 DIAGNOSIS — I10 ESSENTIAL HYPERTENSION: ICD-10-CM

## 2025-02-07 DIAGNOSIS — Z11.59 NEED FOR HEPATITIS C SCREENING TEST: ICD-10-CM

## 2025-02-07 DIAGNOSIS — G89.29 CHRONIC LOW BACK PAIN, UNSPECIFIED BACK PAIN LATERALITY, UNSPECIFIED WHETHER SCIATICA PRESENT: ICD-10-CM

## 2025-02-07 DIAGNOSIS — R11.0 NAUSEA: ICD-10-CM

## 2025-02-07 DIAGNOSIS — I71.012 DISSECTING ANEURYSM OF THORACIC AORTA, STANFORD TYPE B (H): Primary | ICD-10-CM

## 2025-02-07 DIAGNOSIS — F43.21 ADJUSTMENT DISORDER WITH DEPRESSED MOOD: ICD-10-CM

## 2025-02-07 DIAGNOSIS — R53.83 OTHER FATIGUE: ICD-10-CM

## 2025-02-07 LAB
ERYTHROCYTE [DISTWIDTH] IN BLOOD BY AUTOMATED COUNT: 17.8 % (ref 10–15)
HCT VFR BLD AUTO: 44 % (ref 40–53)
HCV AB SERPL QL IA: NONREACTIVE
HGB BLD-MCNC: 13.6 G/DL (ref 13.3–17.7)
HOLD SPECIMEN: NORMAL
MCH RBC QN AUTO: 19.7 PG (ref 26.5–33)
MCHC RBC AUTO-ENTMCNC: 30.9 G/DL (ref 31.5–36.5)
MCV RBC AUTO: 64 FL (ref 78–100)
PLATELET # BLD AUTO: 230 10E3/UL (ref 150–450)
RBC # BLD AUTO: 6.9 10E6/UL (ref 4.4–5.9)
WBC # BLD AUTO: 10 10E3/UL (ref 4–11)

## 2025-02-07 RX ORDER — AMLODIPINE AND VALSARTAN 10; 160 MG/1; MG/1
1 TABLET ORAL DAILY
Qty: 90 TABLET | Refills: 0 | Status: SHIPPED | OUTPATIENT
Start: 2025-02-07

## 2025-02-07 RX ORDER — METHOCARBAMOL 500 MG/1
1000 TABLET, FILM COATED ORAL 4 TIMES DAILY PRN
Qty: 90 TABLET | Refills: 2 | Status: SHIPPED | OUTPATIENT
Start: 2025-02-07

## 2025-02-07 RX ORDER — ONDANSETRON 4 MG/1
4 TABLET, ORALLY DISINTEGRATING ORAL EVERY 8 HOURS PRN
Qty: 60 TABLET | Refills: 0 | Status: SHIPPED | OUTPATIENT
Start: 2025-02-07

## 2025-02-07 ASSESSMENT — PATIENT HEALTH QUESTIONNAIRE - PHQ9: SUM OF ALL RESPONSES TO PHQ QUESTIONS 1-9: 17

## 2025-02-07 NOTE — PROGRESS NOTES
Assessment & Plan     Dissecting aneurysm of thoracic aorta, Negrito type B (H)  Essential hypertension  Reports that blood pressure at home is well-managed.  States that systolic blood pressure at home is in 120s and that diastolic blood pressure is between 70 to 80s.  Notes that he becomes anxious at the doctor's office because he is nervous about potentially undergoing more procedures.  Will refill blood pressure medications without change to regimen.  - amLODIPine-valsartan (EXFORGE)  MG tablet; Take 1 tablet by mouth daily.    Adjustment disorder with depressed mood  Struggling to adapt to physical changes that have made it difficult for him to continue working.  States that he was told by his surgeon that he will never be able to work again.  Feels like he is physically deconditioned and cannot participate in his life in the same way.  He is now unemployed and living with his mother.  Declined mental health counseling despite thorough discussion. Denies suicidal ideation, intent, or plan.  Will start sertraline 50 mg tablets and follow-up in 4 weeks.  - sertraline (ZOLOFT) 50 MG tablet; Take 1 tablet (50 mg) by mouth daily.    Other fatigue  Alpha-thalassemia  Reports he has been laying in bed for most of each day.  This could certainly be secondary to his depressive symptoms but will workup possible medical etiologies such as thyroid dysfunction, electrolyte derangements, or anemia.  - TSH with free T4 reflex; Future  - Comprehensive metabolic panel; Future  - TSH with free T4 reflex  - Comprehensive metabolic panel  - CBC with Platelets and Reflex to Iron Studies; Future  - CBC with Platelets and Reflex to Iron Studies    Chronic low back pain, unspecified back pain laterality, unspecified whether sciatica present  Refill requested: Refilled.  - methocarbamol (ROBAXIN) 500 MG tablet; Take 2 tablets (1,000 mg) by mouth 4 times daily as needed for muscle spasms.    Nausea  Unclear etiology.  Could be  "secondary to fascial restriction in the setting of multiple procedures on his chest causing diffuse scar tissue.  Comprehensive labs ordered today. Refill requested: Refilled.  - ondansetron (ZOFRAN ODT) 4 MG ODT tab; Take 1 tablet (4 mg) by mouth every 8 hours as needed for nausea.    Screening for HIV (human immunodeficiency virus)  - HIV Antigen Antibody Combo; Future  - HIV Antigen Antibody Combo    Need for hepatitis C screening test  - Hepatitis C Screen Reflex to HCV RNA Quant and Genotype; Future  - Hepatitis C Screen Reflex to HCV RNA Quant and Genotype    BMI  Estimated body mass index is 27.06 kg/m  as calculated from the following:    Height as of this encounter: 1.727 m (5' 8\").    Weight as of this encounter: 80.7 kg (178 lb).       Depression Screening Follow Up        2/7/2025     2:18 PM   PHQ   PHQ-9 Total Score 17   Q9: Thoughts of better off dead/self-harm past 2 weeks Not at all       Follow Up Actions Taken  Crisis resource information provided in After Visit Summary  Patient declined referral.  Started patient on anti-depressant.     Return in about 4 weeks (around 3/7/2025).    Hoang Recinos is a 49 year old, presenting for the following health issues:  Follow Up (Depression. 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  - Family history of depression  - Brother has bipolar disorder  - No personal history of leonard   - Has had depressive episodes in the " "past but never this bad   - No history of suicide attempts  - Rare passive suicidal ideations  - More irritable, cannot sleep before doctor's appointments, very anxious about doctor  - At home, BP is 120s/70-80  - Needs refill of Zofran and amlodipine-valsartan    Review of Systems  Constitutional, neuro, ENT, endocrine, pulmonary, cardiac, gastrointestinal, genitourinary, musculoskeletal, integument and psychiatric systems are negative, except as otherwise noted.      Objective    BP (!) 170/93   Pulse 84   Temp 98.6  F (37  C) (Oral)   Resp 16   Ht 1.727 m (5' 8\")   Wt 80.7 kg (178 lb)   SpO2 100%   BMI 27.06 kg/m    Body mass index is 27.06 kg/m .    Physical Exam  Constitutional:       Appearance: Normal appearance.   HENT:      Head: Normocephalic and atraumatic.      Nose: Nose normal.   Eyes:      Extraocular Movements: Extraocular movements intact.      Conjunctiva/sclera: Conjunctivae normal.   Pulmonary:      Effort: Pulmonary effort is normal.   Musculoskeletal:         General: Normal range of motion.   Skin:     General: Skin is warm and dry.   Neurological:      General: No focal deficit present.      Mental Status: He is alert and oriented to person, place, and time.   Psychiatric:         Attention and Perception: Attention and perception normal.         Mood and Affect: Mood is depressed.         Speech: Speech normal.         Behavior: Behavior is cooperative.         Thought Content: Thought content normal.         Cognition and Memory: Cognition normal.           Signed Electronically by: Susanna Pena DO, MPH  "

## 2025-02-07 NOTE — LETTER
2025    Valdo Lyons   1975        To Whom it May Concern;    Please excuse Valdo Lyons from jury duty. Valdo is currently undergoing extensive work up and management for an aortic dissection that is still not under optimal control and requires frequent doctor's visits. This condition has largely disabled him, and he will not be able to attend jury duty.      Sincerely,        Susanna Pena, DO

## 2025-02-08 LAB
ALBUMIN SERPL BCG-MCNC: 4.2 G/DL (ref 3.5–5.2)
ALP SERPL-CCNC: 87 U/L (ref 40–150)
ALT SERPL W P-5'-P-CCNC: 15 U/L (ref 0–70)
ANION GAP SERPL CALCULATED.3IONS-SCNC: 11 MMOL/L (ref 7–15)
AST SERPL W P-5'-P-CCNC: 18 U/L (ref 0–45)
BILIRUB SERPL-MCNC: 0.4 MG/DL
BUN SERPL-MCNC: 21.6 MG/DL (ref 6–20)
CALCIUM SERPL-MCNC: 9.1 MG/DL (ref 8.8–10.4)
CHLORIDE SERPL-SCNC: 104 MMOL/L (ref 98–107)
CREAT SERPL-MCNC: 1.18 MG/DL (ref 0.67–1.17)
EGFRCR SERPLBLD CKD-EPI 2021: 76 ML/MIN/1.73M2
GLUCOSE SERPL-MCNC: 106 MG/DL (ref 70–99)
HCO3 SERPL-SCNC: 24 MMOL/L (ref 22–29)
HIV 1+2 AB+HIV1 P24 AG SERPL QL IA: NONREACTIVE
POTASSIUM SERPL-SCNC: 4.8 MMOL/L (ref 3.4–5.3)
PROT SERPL-MCNC: 7.4 G/DL (ref 6.4–8.3)
SODIUM SERPL-SCNC: 139 MMOL/L (ref 135–145)
TSH SERPL DL<=0.005 MIU/L-ACNC: 1.32 UIU/ML (ref 0.3–4.2)

## 2025-02-11 NOTE — PATIENT INSTRUCTIONS
Patient Education   Here is the plan from today's visit    1. Dissecting aneurysm of thoracic aorta, [place] type B (H) (Primary)    2. Essential hypertension  - amLODIPine-valsartan (EXFORGE)  MG tablet; Take 1 tablet by mouth daily.  Dispense: 90 tablet; Refill: 0    3. Adjustment disorder with depressed mood  - sertraline (ZOLOFT) 50 MG tablet; Take 1 tablet (50 mg) by mouth daily.  Dispense: 30 tablet; Refill: 1    4. Other fatigue  - TSH with free T4 reflex; Future  - Comprehensive metabolic panel; Future  - TSH with free T4 reflex  - Comprehensive metabolic panel    5. Alpha-thalassemia  - CBC with Platelets and Reflex to Iron Studies; Future  - CBC with Platelets and Reflex to Iron Studies    6. Chronic low back pain, unspecified back pain laterality, unspecified whether sciatica present  - methocarbamol (ROBAXIN) 500 MG tablet; Take 2 tablets (1,000 mg) by mouth 4 times daily as needed for muscle spasms.  Dispense: 90 tablet; Refill: 2    7. Nausea  - ondansetron (ZOFRAN ODT) 4 MG ODT tab; Take 1 tablet (4 mg) by mouth every 8 hours as needed for nausea.  Dispense: 60 tablet; Refill: 0    8. Screening for HIV (human immunodeficiency virus)  - HIV Antigen Antibody Combo; Future  - HIV Antigen Antibody Combo    9. Need for hepatitis C screening test  - Hepatitis C Screen Reflex to HCV RNA Quant and Genotype; Future  - Hepatitis C Screen Reflex to HCV RNA Quant and Genotype    Please call or return to clinic if your symptoms don't go away.    Follow up plan  Return in about 4 weeks (around 3/7/2025).    Thank you for coming to [facility] today.  Lab Testing:  **If you had lab testing today and your results are reassuring or normal they will be mailed to you or sent through [employer] within 7 days.   **If the lab tests need quick action we will call you with the results.  **If you are having labs done on a different day, please call [phone] to schedule at [facility] or [phone] for other  [facility] locations. Labs do not offer walk-in appointments.  The phone number we will call with results is # [phone] (home) . If this is not the best number please call our clinic and change the number.  Medication Refills:  If you need any refills please call your pharmacy and they will contact us.   If you need to  your refill at a new pharmacy, please contact the new pharmacy directly. The new pharmacy will help you get your medications transferred faster.   Scheduling:  If you have any concerns about today's visit or wish to schedule another appointment please call our office during normal business hours [phone] (8-5:00 M-F). If you can no longer make a scheduled visit, please cancel via [employer] [facility] or call us to cancel.   If a referral was made to an [facility] specialty provider and you do not get a call from central scheduling, please refer to directions on your visit summary or call our office during normal business hours for assistance.   If a Mammogram was ordered for you at the Breast Center call [phone] to schedule or change your appointment.  If you had an XRay/CT/Ultrasound/MRI ordered the number is [phone] to schedule or change your radiology appointment.   [facility] has limited ultrasound appointments available on Wednesdays, if you would like your ultrasound at [facility], please call [phone] to schedule.   Medical Concerns:  If you have urgent medical concerns please call [phone] at any time of the day.    Susanna Pena, DO

## 2025-02-12 NOTE — PROGRESS NOTES
Preceptor Attestation:   Patient seen, evaluated and discussed with the resident. I have verified the content of the note, which accurately reflects my assessment of the patient and the plan of care.   Supervising Physician:  Austin Harmon MD

## 2025-02-19 ENCOUNTER — TELEPHONE (OUTPATIENT)
Dept: FAMILY MEDICINE | Facility: CLINIC | Age: 50
End: 2025-02-19
Payer: COMMERCIAL

## 2025-02-19 NOTE — TELEPHONE ENCOUNTER
"RN called and spoke to pt who stated he has been feeling \"OK\" and states that he has been drinking \"plenty of water\". He reports that he has been checking his BP at home and reports that it has been \"good\". Pt is scheduled for a follow up with Dr. Pena on 3/5/25.     Isela Bolaños RN        ----- Message from Susanna Pena sent at 2/18/2025  1:47 PM CST -----  Regarding: MIKA  Hi! This patient hasn't set up his MyChart, so I can't send him a message. I am just catching up on lab results after a busy week, and it looks like he had an MIKA during our last visit.     Would it be possible for someone to call him and see how he is feeling? Can you check on his water intake as well? He is a high risk patient, and I want to be extra careful. I already messaged CC to get him back in clinic for follow up.     Thank you so much, and please let me know if you have any questions!  Susanna"

## 2025-03-03 ENCOUNTER — TELEPHONE (OUTPATIENT)
Dept: VASCULAR SURGERY | Facility: CLINIC | Age: 50
End: 2025-03-03
Payer: COMMERCIAL

## 2025-03-03 NOTE — TELEPHONE ENCOUNTER
Patient confirmed scheduled appointment:     Date: 7/23/25  Time: 2:30 PM  Location: [facility]  Provider: Dr. Artur Singh  Appt type: Return Vascular Patient  Appt mode: In Person or Virtual Visit  Return date: Approx.     Specialty phone number:  [phone] or [phone]     Is Imaging or labs needed?: Yes, CTA scheduled prior (Warm transfer to Imaging)

## 2025-03-31 ENCOUNTER — TELEPHONE (OUTPATIENT)
Dept: FAMILY MEDICINE | Facility: CLINIC | Age: 50
End: 2025-03-31

## 2025-03-31 ENCOUNTER — OFFICE VISIT (OUTPATIENT)
Dept: FAMILY MEDICINE | Facility: CLINIC | Age: 50
End: 2025-03-31
Payer: COMMERCIAL

## 2025-03-31 VITALS
OXYGEN SATURATION: 99 % | RESPIRATION RATE: 16 BRPM | WEIGHT: 171 LBS | TEMPERATURE: 97.8 F | HEIGHT: 68 IN | SYSTOLIC BLOOD PRESSURE: 154 MMHG | HEART RATE: 92 BPM | BODY MASS INDEX: 25.91 KG/M2 | DIASTOLIC BLOOD PRESSURE: 94 MMHG

## 2025-03-31 DIAGNOSIS — I10 BENIGN ESSENTIAL HYPERTENSION: ICD-10-CM

## 2025-03-31 DIAGNOSIS — R53.81 PHYSICAL DECONDITIONING: ICD-10-CM

## 2025-03-31 DIAGNOSIS — I71.012 DISSECTING ANEURYSM OF THORACIC AORTA, STANFORD TYPE B (H): Primary | ICD-10-CM

## 2025-03-31 DIAGNOSIS — I63.9 ACUTE CVA (CEREBROVASCULAR ACCIDENT) (H): ICD-10-CM

## 2025-03-31 DIAGNOSIS — I10 ESSENTIAL HYPERTENSION: ICD-10-CM

## 2025-03-31 DIAGNOSIS — F43.21 ADJUSTMENT DISORDER WITH DEPRESSED MOOD: ICD-10-CM

## 2025-03-31 RX ORDER — AMLODIPINE AND VALSARTAN 10; 320 MG/1; MG/1
1 TABLET ORAL DAILY
Qty: 90 TABLET | Refills: 1 | Status: SHIPPED | OUTPATIENT
Start: 2025-03-31 | End: 2025-03-31

## 2025-03-31 RX ORDER — AMLODIPINE AND VALSARTAN 10; 160 MG/1; MG/1
1 TABLET ORAL DAILY
Qty: 90 TABLET | Refills: 3 | Status: SHIPPED | OUTPATIENT
Start: 2025-03-31

## 2025-03-31 RX ORDER — SERTRALINE HYDROCHLORIDE 100 MG/1
100 TABLET, FILM COATED ORAL DAILY
Qty: 90 TABLET | Refills: 3 | Status: SHIPPED | OUTPATIENT
Start: 2025-03-31

## 2025-03-31 RX ORDER — CARVEDILOL PHOSPHATE 80 MG/1
80 CAPSULE, EXTENDED RELEASE ORAL DAILY
Qty: 90 CAPSULE | Refills: 3 | Status: SHIPPED | OUTPATIENT
Start: 2025-03-31

## 2025-03-31 ASSESSMENT — PATIENT HEALTH QUESTIONNAIRE - PHQ9: SUM OF ALL RESPONSES TO PHQ QUESTIONS 1-9: 13

## 2025-03-31 NOTE — TELEPHONE ENCOUNTER
CC attempted to contact patient to give a reminder call for appointment today with Dr. Pena at 3:20pm. CC left a voicemail and provided clinic call back number if patient had any questions or needed to reschedule.    Melinda Fuentes  Care CoordinatorMedical Center Enterprise   [phone]

## 2025-03-31 NOTE — LETTER
April 1, 2025      Jorje Lyons  [address] [place] [address]        Dear ,    We are writing to inform you of your test results.    Your kidney function is gradually decreasing with one of the medications you are taking called Valsartan. It is still within an acceptable window, but if it worsens even more at our next visit, we are going to have to switch the Valsartan to a different medication, so your kidneys can recover. We can check it at our next visit in May!    Resulted Orders   Basic metabolic panel   Result Value Ref Range    Sodium 141 135 - 145 mmol/L    Potassium 4.9 3.4 - 5.3 mmol/L    Chloride 104 98 - 107 mmol/L    Carbon Dioxide (CO2) 26 22 - 29 mmol/L    Anion Gap 11 7 - 15 mmol/L    Urea Nitrogen 20.9 (H) 6.0 - 20.0 mg/dL    Creatinine 1.26 (H) 0.67 - 1.17 mg/dL    GFR Estimate 70 >60 mL/min/1.73m2      Comment:      eGFR calculated using 2021 CKD-EPI equation.    Calcium 9.8 8.8 - 10.4 mg/dL    Glucose 105 (H) 70 - 99 mg/dL       If you have any questions or concerns, please call the clinic at the number listed above.       Sincerely,      Susanna Pena DO, MPH    Electronically signed

## 2025-03-31 NOTE — PROGRESS NOTES
Assessment & Plan     Adjustment disorder with depressed mood  Mood greatly improved from February on 50mg sertraline. PHQ-9 today was 13, down from 17. Interested in increasing to 100 to further treat depressed mood symptoms. Explained that the clinic was unable to find the disability paperwork that was sent and to have his  send it in again. We are happy to share our fax number with his  if needed to ensure that Jorje gets the care that he needs.   - sertraline (ZOLOFT) 100 MG tablet  Dispense: 90 tablet; Refill: 3    Essential hypertension  Dissecting aneurysm of thoracic aorta, Negrito type B (H)  Blood pressures at home 150s/100s, accompanied by headache. Treating at home with extra 25mg carvedilol, works to bring down to 120s/80s. Recommend increasing carvedilol from 40 mg to 80 mg. Continue with amlodipine 10 mg-valsartan 160 mg daily. BP goal < 120/80 and HR goal < 60. Follow up in 4 weeks. If still elevated, recommend renin/aldosterone panel and consider either increasing amlodipine-valsartan to  mg or starting a CCB such as verapamil or diltiazem for anti-impulse control.   - Carvedilol ER (Coreg CR) 80 MG 24 hr capsule  Dispense: 90 tablet; Refill: 3  - Basic metabolic panel    Physical deconditioning  Acute CVA (cerebrovascular accident) (H)  Never been in PT despite persistent left-sided weakness after stroke, and current deconditioning.   - Physical Therapy  Referral    Return in about 4 weeks (around 4/28/2025).  - Sertraline check-in (increased from 50 to 100 today)  - Follow up about sleep symptoms, consider CBT-I  - BP recheck. See how he is doing on Carvedilol ER 80 mg  - Follow up on disability paperwork    Subjective   Jorje is a 50 year old, presenting for the following health issues:  Clinic Care Coordination - Follow-up (Blood pressure and depression)        3/31/2025     3:21 PM   Additional Questions   Roomed by Renate   Accompanied by self         3/31/2025  "   Information    services provided? No     HPI    Blood pressure  - at home BPs 150s/100s over last >2 wks  - 25mg extra of carvedilol brings it back down  - associated with throbbing back of neck and head  - can't go for walks now - dizzy spells. Every day. Falling occasionally. Sometimes occurs with rest.  - tried cleaning yesterday, got super dizzy  - has appointment with neurologist scheduled for July. Saw scars in brain, wondering if stroke has anything to do with dizziness  - never tried PT, even after stroke    Depression  - started sertraline 2/7/25  - \"it's been great!\"  - huge difference  - Either sleeping too much or not at all    - Waiting on disability, thinking about getting a         Objective    BP (!) 154/94   Pulse 92   Temp 97.8  F (36.6  C) (Temporal)   Resp 16   Ht 1.727 m (5' 8\")   Wt 77.6 kg (171 lb)   SpO2 99%   BMI 26.00 kg/m      Physical Exam  Vitals reviewed.   Constitutional:       General: He is not in acute distress.     Appearance: Normal appearance. He is not ill-appearing.   HENT:      Head: Normocephalic and atraumatic.   Eyes:      Conjunctiva/sclera: Conjunctivae normal.   Cardiovascular:      Rate and Rhythm: Normal rate and regular rhythm.   Pulmonary:      Effort: Pulmonary effort is normal. No respiratory distress.      Comments: Bony ridges along sternum.  Skin:     General: Skin is warm and dry.   Neurological:      General: No focal deficit present.      Mental Status: He is alert.   Psychiatric:         Mood and Affect: Mood normal.              2/7/2025     2:18 PM 3/31/2025     3:54 PM   PHQ   PHQ-9 Total Score 17 13   Q9: Thoughts of better off dead/self-harm past 2 weeks Not at all Not at all          Lakisha Lares, MS3  [employer]    I was present with the medical student who participated in the service and in the documentation of this note. I have verified the history and personally performed the " physical exam and medical decision making, and have verified the content of the note, which accurately reflects my assessment of the patient and the plan of care.   Susanna Pena DO, MPH

## 2025-03-31 NOTE — PATIENT INSTRUCTIONS
Patient Education   Here is the plan from today's visit    1. Adjustment disorder with depressed mood  - sertraline (ZOLOFT) 100 MG tablet; Take 1 tablet (100 mg) by mouth daily.  Dispense: 90 tablet; Refill: 3    2. Dissecting aneurysm of thoracic aorta, Negrito type B (H) (Primary)  - amLODIPine-valsartan (EXFORGE)  MG tablet; Take 1 tablet by mouth daily.  Dispense: 90 tablet; Refill: 1  - Physical Therapy  Referral; Future    3. Essential hypertension  - amLODIPine-valsartan (EXFORGE)  MG tablet; Take 1 tablet by mouth daily.  Dispense: 90 tablet; Refill: 1    4. Physical deconditioning  - Physical Therapy  Referral; Future    5. Acute CVA (cerebrovascular accident) (H)  - Physical Therapy  Referral; Future    Please call or return to clinic if your symptoms don't go away.    Follow up plan  Return in about 4 weeks (around 4/28/2025).    Thank you for coming to [facility] today.  Lab Testing:  **If you had lab testing today and your results are reassuring or normal they will be mailed to you or sent through [employer] within 7 days.   **If the lab tests need quick action we will call you with the results.  **If you are having labs done on a different day, please call [phone] to schedule at [facility] or [phone] for other [facility] locations. Labs do not offer walk-in appointments.  The phone number we will call with results is # [phone] (home) . If this is not the best number please call our clinic and change the number.  Medication Refills:  If you need any refills please call your pharmacy and they will contact us.   If you need to  your refill at a new pharmacy, please contact the new pharmacy directly. The new pharmacy will help you get your medications transferred faster.   Scheduling:  If you have any concerns about today's visit or wish to schedule another appointment please call our office during normal business hours  [phone] (8-5:00 M-F). If you can no longer make a scheduled visit, please cancel via [employer] or call us to cancel.   If a referral was made to an [facility] specialty provider and you do not get a call from central scheduling, please refer to directions on your visit summary or call our office during normal business hours for assistance.   If a Mammogram was ordered for you at the Breast Center call [phone] to schedule or change your appointment.  If you had an XRay/CT/Ultrasound/MRI ordered the number is [phone] to schedule or change your radiology appointment.   [facility] has limited ultrasound appointments available on Wednesdays, if you would like your ultrasound at [facility], please call [phone] to schedule.   Medical Concerns:  If you have urgent medical concerns please call [phone] at any time of the day.    Susanna Pena, DO

## 2025-04-01 LAB
ANION GAP SERPL CALCULATED.3IONS-SCNC: 11 MMOL/L (ref 7–15)
BUN SERPL-MCNC: 20.9 MG/DL (ref 6–20)
CALCIUM SERPL-MCNC: 9.8 MG/DL (ref 8.8–10.4)
CHLORIDE SERPL-SCNC: 104 MMOL/L (ref 98–107)
CREAT SERPL-MCNC: 1.26 MG/DL (ref 0.67–1.17)
EGFRCR SERPLBLD CKD-EPI 2021: 70 ML/MIN/1.73M2
GLUCOSE SERPL-MCNC: 105 MG/DL (ref 70–99)
HCO3 SERPL-SCNC: 26 MMOL/L (ref 22–29)
POTASSIUM SERPL-SCNC: 4.9 MMOL/L (ref 3.4–5.3)
SODIUM SERPL-SCNC: 141 MMOL/L (ref 135–145)

## 2025-04-01 NOTE — RESULT ENCOUNTER NOTE
eGFR gradually decreasing likely secondary to use of valsartan. Still within 30% of prior GFR. If his eGFR becomes 63 or less, we must stop his valsartan and replace with diltiazem or verapamil to due intolerance.

## 2025-05-02 ENCOUNTER — HOSPITAL ENCOUNTER (EMERGENCY)
Facility: CLINIC | Age: 50
Discharge: HOME | End: 2025-05-02
Attending: EMERGENCY MEDICINE | Admitting: EMERGENCY MEDICINE
Payer: COMMERCIAL

## 2025-05-02 ENCOUNTER — APPOINTMENT (OUTPATIENT)
Dept: CT IMAGING | Facility: CLINIC | Age: 50
End: 2025-05-02
Attending: EMERGENCY MEDICINE
Payer: COMMERCIAL

## 2025-05-02 ENCOUNTER — APPOINTMENT (OUTPATIENT)
Dept: MRI IMAGING | Facility: CLINIC | Age: 50
End: 2025-05-02
Attending: EMERGENCY MEDICINE
Payer: COMMERCIAL

## 2025-05-02 VITALS
SYSTOLIC BLOOD PRESSURE: 162 MMHG | TEMPERATURE: 98.3 F | RESPIRATION RATE: 15 BRPM | OXYGEN SATURATION: 99 % | HEART RATE: 61 BPM | DIASTOLIC BLOOD PRESSURE: 90 MMHG

## 2025-05-02 DIAGNOSIS — M54.9 BACK PAIN, UNSPECIFIED BACK LOCATION, UNSPECIFIED BACK PAIN LATERALITY, UNSPECIFIED CHRONICITY: ICD-10-CM

## 2025-05-02 DIAGNOSIS — R42 LIGHT HEADEDNESS: ICD-10-CM

## 2025-05-02 DIAGNOSIS — R20.0 NUMBNESS: ICD-10-CM

## 2025-05-02 LAB
ALBUMIN SERPL BCG-MCNC: 4.3 G/DL (ref 3.5–5.2)
ALP SERPL-CCNC: 79 U/L (ref 40–150)
ALT SERPL W P-5'-P-CCNC: 16 U/L (ref 0–70)
ANION GAP SERPL CALCULATED.3IONS-SCNC: 11 MMOL/L (ref 7–15)
AST SERPL W P-5'-P-CCNC: 20 U/L (ref 0–45)
BASOPHILS # BLD AUTO: 0.1 10E3/UL (ref 0–0.2)
BASOPHILS NFR BLD AUTO: 1 %
BILIRUB SERPL-MCNC: 0.7 MG/DL
BUN SERPL-MCNC: 16.1 MG/DL (ref 6–20)
CALCIUM SERPL-MCNC: 9.4 MG/DL (ref 8.8–10.4)
CHLORIDE SERPL-SCNC: 105 MMOL/L (ref 98–107)
CREAT SERPL-MCNC: 1.23 MG/DL (ref 0.67–1.17)
EGFRCR SERPLBLD CKD-EPI 2021: 72 ML/MIN/1.73M2
ELLIPTOCYTES BLD QL SMEAR: SLIGHT
EOSINOPHIL # BLD AUTO: 0.6 10E3/UL (ref 0–0.7)
EOSINOPHIL NFR BLD AUTO: 5 %
ERYTHROCYTE [DISTWIDTH] IN BLOOD BY AUTOMATED COUNT: 17.6 % (ref 10–15)
GLUCOSE SERPL-MCNC: 103 MG/DL (ref 70–99)
HCO3 SERPL-SCNC: 22 MMOL/L (ref 22–29)
HCT VFR BLD AUTO: 43.3 % (ref 40–53)
HGB BLD-MCNC: 13.2 G/DL (ref 13.3–17.7)
IMM GRANULOCYTES # BLD: 0.1 10E3/UL
IMM GRANULOCYTES NFR BLD: 1 %
LYMPHOCYTES # BLD AUTO: 1.6 10E3/UL (ref 0.8–5.3)
LYMPHOCYTES NFR BLD AUTO: 15 %
MAGNESIUM SERPL-MCNC: 1.9 MG/DL (ref 1.7–2.3)
MCH RBC QN AUTO: 19.3 PG (ref 26.5–33)
MCHC RBC AUTO-ENTMCNC: 30.5 G/DL (ref 31.5–36.5)
MCV RBC AUTO: 63 FL (ref 78–100)
MONOCYTES # BLD AUTO: 0.8 10E3/UL (ref 0–1.3)
MONOCYTES NFR BLD AUTO: 7 %
NEUTROPHILS # BLD AUTO: 8.1 10E3/UL (ref 1.6–8.3)
NEUTROPHILS NFR BLD AUTO: 72 %
NRBC # BLD AUTO: 0 10E3/UL
NRBC BLD AUTO-RTO: 0 /100
PLAT MORPH BLD: ABNORMAL
PLATELET # BLD AUTO: 226 10E3/UL (ref 150–450)
POTASSIUM SERPL-SCNC: 4.5 MMOL/L (ref 3.4–5.3)
PROT SERPL-MCNC: 7.4 G/DL (ref 6.4–8.3)
RADIOLOGIST FLAGS: ABNORMAL
RBC # BLD AUTO: 6.83 10E6/UL (ref 4.4–5.9)
RBC MORPH BLD: ABNORMAL
SODIUM SERPL-SCNC: 138 MMOL/L (ref 135–145)
TARGETS BLD QL SMEAR: SLIGHT
TROPONIN T SERPL HS-MCNC: <6 NG/L
WBC # BLD AUTO: 11.3 10E3/UL (ref 4–11)

## 2025-05-02 PROCEDURE — A9585 GADOBUTROL INJECTION: HCPCS | Performed by: EMERGENCY MEDICINE

## 2025-05-02 PROCEDURE — 70553 MRI BRAIN STEM W/O & W/DYE: CPT | Mod: 26 | Performed by: RADIOLOGY

## 2025-05-02 PROCEDURE — 84484 ASSAY OF TROPONIN QUANT: CPT | Performed by: EMERGENCY MEDICINE

## 2025-05-02 PROCEDURE — 70553 MRI BRAIN STEM W/O & W/DYE: CPT

## 2025-05-02 PROCEDURE — 74174 CTA ABD&PLVS W/CONTRAST: CPT | Mod: 26 | Performed by: STUDENT IN AN ORGANIZED HEALTH CARE EDUCATION/TRAINING PROGRAM

## 2025-05-02 PROCEDURE — 82040 ASSAY OF SERUM ALBUMIN: CPT | Performed by: EMERGENCY MEDICINE

## 2025-05-02 PROCEDURE — 71275 CT ANGIOGRAPHY CHEST: CPT

## 2025-05-02 PROCEDURE — 36415 COLL VENOUS BLD VENIPUNCTURE: CPT | Performed by: EMERGENCY MEDICINE

## 2025-05-02 PROCEDURE — 85004 AUTOMATED DIFF WBC COUNT: CPT | Performed by: EMERGENCY MEDICINE

## 2025-05-02 PROCEDURE — 85018 HEMOGLOBIN: CPT | Performed by: EMERGENCY MEDICINE

## 2025-05-02 PROCEDURE — 99284 EMERGENCY DEPT VISIT MOD MDM: CPT | Performed by: EMERGENCY MEDICINE

## 2025-05-02 PROCEDURE — 250N000011 HC RX IP 250 OP 636: Performed by: EMERGENCY MEDICINE

## 2025-05-02 PROCEDURE — 71275 CT ANGIOGRAPHY CHEST: CPT | Mod: 26 | Performed by: STUDENT IN AN ORGANIZED HEALTH CARE EDUCATION/TRAINING PROGRAM

## 2025-05-02 PROCEDURE — 74174 CTA ABD&PLVS W/CONTRAST: CPT

## 2025-05-02 PROCEDURE — 255N000002 HC RX 255 OP 636: Performed by: EMERGENCY MEDICINE

## 2025-05-02 PROCEDURE — 83735 ASSAY OF MAGNESIUM: CPT | Performed by: EMERGENCY MEDICINE

## 2025-05-02 PROCEDURE — 99285 EMERGENCY DEPT VISIT HI MDM: CPT | Mod: 25 | Performed by: EMERGENCY MEDICINE

## 2025-05-02 RX ORDER — IOPAMIDOL 755 MG/ML
110 INJECTION, SOLUTION INTRAVASCULAR ONCE
Status: COMPLETED | OUTPATIENT
Start: 2025-05-02 | End: 2025-05-02

## 2025-05-02 RX ORDER — GADOBUTROL 604.72 MG/ML
10 INJECTION INTRAVENOUS ONCE
Status: COMPLETED | OUTPATIENT
Start: 2025-05-02 | End: 2025-05-02

## 2025-05-02 RX ADMIN — GADOBUTROL 8 ML: 604.72 INJECTION INTRAVENOUS at 17:37

## 2025-05-02 RX ADMIN — IOPAMIDOL 110 ML: 755 INJECTION, SOLUTION INTRAVENOUS at 17:43

## 2025-05-02 ASSESSMENT — ACTIVITIES OF DAILY LIVING (ADL)
ADLS_ACUITY_SCORE: 58

## 2025-05-02 NOTE — PROGRESS NOTES
Pt seen in clinic and sent here for new on chronic L sided numbness now progressing to weakness. Pt hx aortic dissection and HTN.

## 2025-05-02 NOTE — ED PROVIDER NOTES
ED Provider Note  [facility]      History   No chief complaint on file.    HPI  Valdo Lyons is a 50 year old male who presents for left-sided numbness.  Patient reports that he has had a few months of numbness to his left foot, left hand, the left lower part of the face around the lips.  This has been persistent.  He also reports some feeling of weakness in the left hand and arm.  No head injury or trauma.  No headaches.    Patient was complains of some back pain.  He states he awoke with some increased back pain he does report history of chronic back pain chronic chest pain but states this feels different.    The patient also reports some episodes of lightheadedness, near syncope.  No associated chest pain or shortness of breath.  States this occurs when trying to walk at times.    Past Medical History  Past Medical History:   Diagnosis Date    Dissecting aneurysm of thoracic aorta, [place] type B (H)     HTN (hypertension)      Past Surgical History:   Procedure Laterality Date    ENDOVASCULAR REPAIR ANEURYSM THORACIC AORTIC N/A 1/23/2024    Procedure: Antegrade Thoracic Endovascular Aortic Repair (TEVAR) with [place] Conformable Thoracic Stent Graft 37mm x 10cm. Intravascular Ultrasound (IVUS), Aortography, Right common femoral access;  Surgeon: Artur Singh MD;  Location: [facility] OR    ENDOVASCULAR REPAIR ANEURYSM THORACIC AORTIC N/A 5/31/2024    Procedure: Endovascular repair of rapidly expanding post dissection thoracic aneurysm with proximal extension of thoracic endograft with retrograde branch to left subclavian utilizing [place] 37x150 mm Thoracic branch endoprothesis, 15 mm side branch. Distal thoracic extension with 40 x200 mm [place] cTAG. Embolization of aortic and left subcalvian false lumen.  Bilateral percutaneous ultrasound guided a    IR OR ANGIOGRAM  1/23/2024    IR OR ANGIOGRAM  5/31/2024    PICC DOUBLE LUMEN PLACEMENT Right 01/16/2024    5FR DL PICC, basilic vein.  L-43cm, 1cm out.    REPAIR ANEURYSM ASCENDING AORTA N/A 1/23/2024    Procedure: MEDIAN STERNOTOMY, RIGHT AXILLARY CUTDOWN, ON CARDIOPULMONARY BYPASS (CIRCULATORY ARREST), ASCENDING AORTA ANEURYSM REPAIR ([place] 30mm), INTRAOPERATIVE TRANSESOPHAGEAL ECHOCARDIOGRAM BY ANESTHESIA;  Surgeon: Addi Shaw MD;  Location: [facility] OR     amLODIPine-valsartan (EXFORGE)  MG tablet  aspirin (ASA) 81 MG chewable tablet  carvedilol ER (COREG CR) 80 MG 24 hr capsule  chlorthalidone (HYGROTON) 25 MG tablet  methocarbamol (ROBAXIN) 500 MG tablet  ondansetron (ZOFRAN ODT) 4 MG ODT tab  polyethylene glycol (MIRALAX) 17 GM/Dose powder  sertraline (ZOLOFT) 100 MG tablet      Allergies   Allergen Reactions    Statins Muscle Pain (Myalgia)    Rosuvastatin Muscle Pain (Myalgia)     Took the statin for one month and then could not tolerate side effects.  Myalgias resolved within 2 days of stopping the medication     Family History  No family history on file.  Social History   Social History     Tobacco Use    Smoking status: Never    Smokeless tobacco: Current     Types: Chew    Tobacco comments:     Uses chew a few days per week.       A medically appropriate review of systems was performed with pertinent positives and negatives noted in the HPI, and all other systems negative.    Physical Exam      Physical Exam  Constitutional:       General: He is not in acute distress.     Appearance: He is not toxic-appearing.   HENT:      Head: Normocephalic and atraumatic.      Nose: Nose normal.      Mouth/Throat:      Mouth: Mucous membranes are moist.      Pharynx: Oropharynx is clear.   Eyes:      Conjunctiva/sclera: Conjunctivae normal.      Pupils: Pupils are equal, round, and reactive to light.   Cardiovascular:      Rate and Rhythm: Normal rate and regular rhythm.      Heart sounds: No murmur heard.  Pulmonary:      Effort: Pulmonary effort is normal. No respiratory distress.      Breath sounds: No wheezing or rales.    Musculoskeletal:         General: Normal range of motion.   Skin:     General: Skin is warm and dry.   Neurological:      Mental Status: He is alert and oriented to person, place, and time.      Gait: Gait normal.      Comments: Patient reports decreased sensation to the distal fingers of the left hand compared to right           ED Course, Procedures, & Data      Procedures                No results found for any visits on 05/02/25.  Medications - No data to display  Labs Ordered and Resulted from Time of ED Arrival to Time of ED Departure - No data to display  CTA CHEST ABDOMEN PELVIS WITH CONTRAST PANEL    (Results Pending)   MR Brain w/o & w Contrast    (Results Pending)          Critical care was not performed.     Medical Decision Making  The patient's presentation was of moderate complexity (an undiagnosed new problem with uncertain prognosis).    The patient's evaluation involved:  review of external note(s) from 3+ sources (clinic notes, nursing notes, progress notes)  review of 3+ test result(s) ordered prior to this encounter (cbc, bmp, prior CT)  ordering and/or review of 3+ test(s) in this encounter (see separate area of note for details)  discussion of management or test interpretation with another health professional (see separate area of note for details)    The patient's management necessitated moderate risk (IV contrast administration) and further care after sign-out to Dr. Cronin (see their note for further management).    Assessment & Plan    This is a 50yoM with history or aortic repair here with multiple symptoms including parasthesias, back pain, and light headedness. On arrival vitals were reassuring. He noted some decreased sensation to the fingers and toes on the left and the left lip. Symptoms have been ongoing for several months so less suggestive of acute cause. A MRI was done which preliminarily showed no acute findings but did show some chronic small vessel findings.    With his  reported back pain and aortic history a CTA was done. Radiology called and said that preliminarily showed no acute dissection there may be an internal jugular thrombus vs artifact from the contrast. The radiologist recommended a US which was ordered.    EKG had shown no acute ischemic findings and troponin was undetectable.    Results were discussed with the patient and he was agreeable with plan for US. He was signed out pending this study and final reads for his MRI and CTA.     I have reviewed the nursing notes. I have reviewed the findings, diagnosis, plan and need for follow up with the patient.    New Prescriptions    No medications on file       Final diagnoses:   None       Jamin Leon  [facility] EMERGENCY DEPARTMENT  5/2/2025     Jamin Leon MD  05/02/25 [id number]

## 2025-05-03 LAB
ATRIAL RATE - MUSE: 65 BPM
DIASTOLIC BLOOD PRESSURE - MUSE: NORMAL MMHG
INTERPRETATION ECG - MUSE: NORMAL
P AXIS - MUSE: 59 DEGREES
PR INTERVAL - MUSE: 162 MS
QRS DURATION - MUSE: 80 MS
QT - MUSE: 402 MS
QTC - MUSE: 418 MS
R AXIS - MUSE: 17 DEGREES
SYSTOLIC BLOOD PRESSURE - MUSE: NORMAL MMHG
T AXIS - MUSE: 51 DEGREES
VENTRICULAR RATE- MUSE: 65 BPM

## 2025-05-03 ASSESSMENT — ACTIVITIES OF DAILY LIVING (ADL)
ADLS_ACUITY_SCORE: 58

## 2025-05-03 NOTE — ED NOTES
"Patient walking down hallway with security calling  \"faggot. Get the fuck away from me you fucken cesarot.\" Escorted out with security.   "

## 2025-05-03 NOTE — PROGRESS NOTES
"Per security patient was pacing in hallway by bedroom A, daughters of another patient were concerned for safety and asked security to monitor the situation. Patient became agitated when asked by security if there was anything they could do to help. Patient responded with \"fuck you I have been for over 2 hours.\" Patient then began to walk to the exit and security followed. Patient called security \"faggot\" and attempted to make a phone call. Security did not allow patient to make a phone call due to agitation and asked patient to leave the premises. Patient yelled \"fuck you faggot,\" and left premises.   "

## 2025-05-03 NOTE — ED PROVIDER NOTES
"Emergency Department I-PASS Sign-out      Illness Severity: Stable    Patient Summary:  50 year old male with pertinent PMH of aortic section postrepair who presented with multiple plaints.  He presented with episodic lightheadedness, near syncope with exertion as well as complaint of back pain and also paresthesias in the left hand, feet, on the left side of his lips    ED Course/treatment plan: MRIs did not show acute findings in the brain.  Some chronic small vessel disease noted.  Numbness could be from neuropathy given his very peripheral.  CTA did not show signs of dissection; back pain could be musculoskeletal.  CTA did show possible IJ thrombus versus artifact.  Radiology recommending ultrasound which was ordered    Clinical Impression:  No diagnosis found.    Edited by: Jamin Leon MD at 5/2/2025 1955    Action List:  -To do:  Imaging: Ultrasound    Situational Awareness & Contingency Planning:  Code Status (Most recent):  Prior    Disposition:  to be determined  If imaging was negative; plan to reevaluate patient's symptoms and discussed observation stay for workup of lightheadedness and near syncope versus outpatient follow-up.    Edited by: Jamin Leon MD at 5/2/2025 1955    Synthesis & Events after sign-out:  CTA with possible internal jugular thrombus vs artifact, patient is currently pending UE ultrasound to better evaluate.  Nursing staff had called ultrasound tech a few times with no response to calls or paging.  Patient becoming inpatient at the wait, however agreeable to wait a little longer for ultrasound.  He said he would wait if he could \"get the ultrasound right now.\"  Patient was walking around without difficulty or lightheadedness.  He was not altered/impared and has capacity to make his own medical decisions.      US was again called.  Shortly after, I was informed by nursing staff that patient was getting aggressive with staff and upset that he is still waiting to complete " "his ED workup.  Nursing stated that he began calling security \"faggot\" and exhibiting threatening behavior.  And was escorted out by security.        Marnie Cronin DO   Emergency Medicine       Marnie Cronin DO  05/02/25 [id number]    "

## 2025-05-05 ENCOUNTER — TELEPHONE (OUTPATIENT)
Dept: FAMILY MEDICINE | Facility: CLINIC | Age: 50
End: 2025-05-05
Payer: COMMERCIAL

## 2025-05-05 DIAGNOSIS — R42 DIZZINESS: Primary | ICD-10-CM

## 2025-05-05 NOTE — TELEPHONE ENCOUNTER
RN called pt to get him scheduled for a STAT US per provider request. [place] [facility] [place] He prefers afternoon appt. RN called and got him scheduled for 5/7 at 1:40PM with an arrival time of 1:25PM. RN attempted to call pt back to relay appt info. No answer lmtcb.    Isela Bolaños RN

## 2025-05-05 NOTE — TELEPHONE ENCOUNTER
Pt called back and stated he needs to reschedule the appt on 5/7 for Thursday 5/8. RN called and rescheduled appt.      Isela Bolaños RN

## 2025-05-24 ENCOUNTER — HEALTH MAINTENANCE LETTER (OUTPATIENT)
Age: 50
End: 2025-05-24

## 2025-07-31 ENCOUNTER — OFFICE VISIT (OUTPATIENT)
Dept: NEUROLOGY | Facility: CLINIC | Age: 50
End: 2025-07-31
Attending: SURGERY
Payer: COMMERCIAL

## 2025-07-31 VITALS
BODY MASS INDEX: 26.52 KG/M2 | HEIGHT: 68 IN | SYSTOLIC BLOOD PRESSURE: 146 MMHG | DIASTOLIC BLOOD PRESSURE: 94 MMHG | WEIGHT: 175 LBS | HEART RATE: 67 BPM

## 2025-07-31 DIAGNOSIS — H81.10 BENIGN PAROXYSMAL POSITIONAL VERTIGO, UNSPECIFIED LATERALITY: Primary | ICD-10-CM

## 2025-07-31 DIAGNOSIS — I63.9 ACUTE CVA (CEREBROVASCULAR ACCIDENT) (H): ICD-10-CM

## 2025-07-31 DIAGNOSIS — R42 DIZZINESS: ICD-10-CM

## 2025-07-31 DIAGNOSIS — R29.898 LEFT ARM WEAKNESS: ICD-10-CM

## 2025-07-31 RX ORDER — AMLODIPINE BESYLATE 10 MG/1
TABLET ORAL
COMMUNITY
Start: 2025-05-02

## 2025-07-31 NOTE — LETTER
7/31/2025      Valdo Lyons  [address]  [facility] [address]      Dear Colleague,    Thank you for referring your patient, Valdo Lyons, to the [facility] [place]. Please see a copy of my visit note below.    NEUROLOGY OUTPATIENT CONSULT NOTE   Jul 31, 2025     CHIEF COMPLAINT/REASON FOR VISIT/REASON FOR CONSULT  Patient presents with:  Stroke: Pt states he had a stroke 2024. Numbness and tingling on left side. Pt has dizziness.     REASON FOR CONSULTATION-dizzy episodes    REFERRAL SOURCE  Dr. Artur Singh  CC Dr. Artur Singh    HISTORY OF PRESENT ILLNESS  Valdo Lyons is a 50 year old male seen today for evaluation of left-sided numbness and dizzy episodes.  In January 2024 he had an ascending aortic dissection.  This was related to uncontrolled hypertension.  He needed revision in May 2024.  Since then he is noticing weakness in the left arm and leg along with some numbness.  He was identified to have some strokes in the left thalamus/cerebellar and the right temporal occipital region at the time of the revision.  He is also been having some dizzy episodes since then.    The tipsy episodes can happen when he is moving around or stands up too fast.  These are more of a vibration sensation in the head.  Does get double vision and a shaky vision/nystagmus.  There is a warm sensation in his body.  He will feel nauseous with the spells.  These do get better with sitting for some time.  He does have headaches though they always do not correlate with these dizzy spells.  Does report some chronic hearing loss and ringing in the ears.    He further reports ongoing left arm numbness weakness since the stroke.  This is not improved.  Does have neck pain.  Recent MRI done did not show any new strokes.    Does have a history of grand mal seizures though has not had a seizure for over 30 years.    Previous history is reviewed and this is unchanged.    PAST  MEDICAL/SURGICAL HISTORY  Past Medical History:   Diagnosis Date     Dissecting aneurysm of thoracic aorta, [place] type B (H)      HTN (hypertension)      Patient Active Problem List   Diagnosis     Hypertensive urgency     Aortic dissection (H)     Acute kidney failure, unspecified (H)     Aortic thrombus (H)     Acute CVA (cerebrovascular accident) (H)     Dissecting aneurysm of thoracic aorta, [place] type B (H)     Tobacco use disorder     Alpha-thalassemia   Significant stroke, heart attack, high blood pressure, epilepsy, tremors, mental illness, memory loss, vision loss, depression, sleep disorder    FAMILY HISTORY  No family history on file.  Positive for hypertension/heart issues    SOCIAL HISTORY  Social History     Tobacco Use     Smoking status: Never     Smokeless tobacco: Current     Types: Chew     Tobacco comments:     Uses chew a few days per week.        SYSTEMS REVIEW  Twelve-system ROS was done and other than the HPI this was negative/positive for neck pain, back pain, numbness/tingling, weakness/paralysis, difficulty walking, falling, balance/coordination problems, movement disorder, dizziness, speech disturbance, difficulty swallowing, ringing in ears, hearing loss, vision symptoms, sleepy during the day, sleeping problems, headaches, blackout spells, memory loss, anxiety, depression, chest pain, palpitations, cardiac/heart problems, bloating, stomach pain, bowel problems, respiratory problems, weight gain, appetite problems, snoring loudly, difficulty breathing during sleep.    MEDICATIONS  Current Outpatient Medications   Medication Sig Dispense Refill     amLODIPine (NORVASC) 10 MG tablet        amLODIPine-valsartan (EXFORGE)  MG tablet Take 1 tablet by mouth daily.       aspirin (ASA) 81 MG chewable tablet Take 1 tablet (81 mg) by mouth or NG Tube daily. 90 tablet 0     carvedilol ER (COREG CR) 80 MG 24 hr capsule Take 1 capsule (80 mg) by mouth daily. 90 capsule 3      "chlorthalidone (HYGROTON) 25 MG tablet Take 1 tablet (25 mg) by mouth daily. 90 tablet 0     methocarbamol (ROBAXIN) 500 MG tablet Take 2 tablets (1,000 mg) by mouth 4 times daily as needed for muscle spasms. 90 tablet 2     ondansetron (ZOFRAN ODT) 4 MG ODT tab Take 1 tablet (4 mg) by mouth every 8 hours as needed for nausea. 60 tablet 0     polyethylene glycol (MIRALAX) 17 GM/Dose powder Take 17 g by mouth daily. 510 g 0     sertraline (ZOLOFT) 100 MG tablet Take 1 tablet (100 mg) by mouth daily. 90 tablet 3     No current facility-administered medications for this visit.        PHYSICAL EXAMINATION  VITALS: BP (!) 146/94 (BP Location: Right arm, Patient Position: Sitting)   Pulse 67   Ht 1.727 m (5' 8\")   Wt 79.4 kg (175 lb)   BMI 26.61 kg/m    GENERAL: Healthy appearing, alert, no acute distress, normal habitus.  CARDIOVASCULAR: Extremities warm and well perfused. Pulses present.   NEUROLOGICAL:  Patient is awake and oriented to self, place and time.  Attention span is normal.  Memory is grossly intact.  Language is fluent and follows commands appropriately.  Appropriate fund of knowledge. Cranial nerves 2-12 are intact. There is no pronator drift.  Motor exam shows 5/5 strength in all extremities except left proximal arm and leg weakness.  Tone is symmetric bilaterally in upper and lower extremities.  Reflexes are 2+ in upper extremities and lower extremities slightly increased on the left side. Sensory exam is grossly intact to light touch, pin prick and vibration except decreased subjectively on the left side.  Finger to nose and heel to shin is without dysmetria.  Romberg is negative.  Gait is slightly wide-based/antalgic.  Has difficulty doing tandem walking.      DIAGNOSTICS  MRI brain  IMPRESSION:  1. No acute intracranial pathology.  2. Similar appearing chronic infarcts in the bilateral centrum  semiovale, left thalamus, and left peritrigonal white matter.  3. Moderate to advanced sequela of chronic " small vessel ischemic  disease, greater than expected for patient's age.    MRI 2024  IMPRESSION:  1.  7 mm focus of acute to subacute ischemic infarction at the right temporal/occipital white matter. Punctate focus of acute to early subacute ischemic infarction in the superior left cerebellum.  2.  Small subacute infarct suspected in the left cerebellum, left corona radiata and left parietal white matter.  3.  Advanced small vessel ischemic disease for age with multifocal chronic lacunar infarctions as described.      CTA  HEAD CT:  1.  No CT evidence for acute intracranial process.  2.  Brain atrophy and advanced chronic microvascular ischemic changes as above.     HEAD CTA:   1.  No significant stenosis, aneurysm, or high flow vascular malformation identified.  2.  Variant [place] anatomy as above.     NECK CTA:  1.  No carotid or vertebral artery stenosis.  2.  Aortic dissection described separately. Dissection extends along the proximal left subclavian artery resulting in moderate to severe stenosis.      ECHO  Left ventricular size, wall motion and function are normal. The ejection  fraction is 55-60%.  Right ventricular function, chamber size, wall motion, and thickness are  normal.  The inferior vena cava was normal in size with preserved respiratory  variability.  Dissection flap noted in the descending thoracic aorta  No pericardial effusion is present.      CTA 2024  IMPRESSION:  1. Patent ascending aortic graft. No leak. Postoperative collection  with air bubbles around the graft measures up to 19 mm in thickness.     2. Patent thoracic aortic endograft in the true lumen from the left  subclavian artery origin to T7. Dissection extends through the left  subclavian artery. Its terminus in the left subclavian artery is not  well visualized. Left vertebral artery is thought to originate from  the true lumen. Dissection ends in the supraceliac aorta.     3. Right chest tube in place. Small apical  right pneumothorax.        RELEVANT LABS  Component      Latest Ref Rng 2/7/2025  1:44 PM 5/2/2025  3:53 PM   WBC      4.0 - 11.0 10e3/uL  11.3 (H)    RBC Count      4.40 - 5.90 10e6/uL  6.83 (H)    Hemoglobin      13.3 - 17.7 g/dL  13.2 (L)    Hematocrit      40.0 - 53.0 %  43.3    MCV      78 - 100 fL  63 (L)    MCH      26.5 - 33.0 pg  19.3 (L)    MCHC      31.5 - 36.5 g/dL  30.5 (L)    RDW      10.0 - 15.0 %  17.6 (H)    Platelet Count      150 - 450 10e3/uL  226    % Neutrophils      %  72    % Lymphocytes      %  15    % Monocytes      %  7    % Eosinophils      %  5    % Basophils      %  1    % Immature Granulocytes      %  1    NRBC/W      <1 /100  0    Absolute Neutrophil      1.6 - 8.3 10e3/uL  8.1    Absolute Lymphocytes      0.8 - 5.3 10e3/uL  1.6    Absolute Monocytes      0.0 - 1.3 10e3/uL  0.8    Absolute Eosinophils      0.0 - 0.7 10e3/uL  0.6    Absolute Basophils      0.0 - 0.2 10e3/uL  0.1    Absolute Immature Granulocytes      <=0.4 10e3/uL  0.1    Absolute NRBCs      10e3/uL  0.0    Sodium      135 - 145 mmol/L  138    Potassium      3.4 - 5.3 mmol/L  4.5    Carbon Dioxide (CO2)      22 - 29 mmol/L  22    Anion Gap      7 - 15 mmol/L  11    Urea Nitrogen      6.0 - 20.0 mg/dL  16.1    Creatinine      0.67 - 1.17 mg/dL  1.23 (H)    GFR Estimate      >60 mL/min/1.73m2  72    Calcium      8.8 - 10.4 mg/dL  9.4    Chloride      98 - 107 mmol/L  105    Glucose      70 - 99 mg/dL  103 (H)    Alkaline Phosphatase      40 - 150 U/L  79    AST      0 - 45 U/L  20    ALT      0 - 70 U/L  16    Protein Total      6.4 - 8.3 g/dL  7.4    Albumin      3.5 - 5.2 g/dL  4.3    Bilirubin Total      <=1.2 mg/dL  0.7    RBC Morphology  Confirmed RBC Indices    Platelet Morphology      Automated Count Confirmed. Platelet morphology is normal.   Automated Count Confirmed. Platelet morphology is normal.    Elliptocytes      None Seen   Slight !    Target Cells      None Seen   Slight !    TSH      0.30 - 4.20 uIU/mL  1.32        Legend:  (H) High  (L) Low  ! Abnormal    OUTSIDE RECORDS  Outside referral notes and chart notes were reviewed and pertinent information has been summarized (in addition to the HPI):-    May 2024        IMPRESSION/REPORT/PLAN  Benign paroxysmal positional vertigo, unspecified laterality/dizzy episodes  Acute CVA (cerebrovascular accident) (H)  Left arm weakness    This is a 50 year old male with history of ascending thoracic dissection and possibly related multiple strokes versus hypertension related to strokes.  MRI brain shows strokes in the left thalamus, right occipital temporal region, left cerebellum.  His symptoms would not be consistent with the strokes.    He complains of dizzy episodic spells.  These are more suggestive vertiginous spells.  Moving the head can bring on the spells.  Could be benign positional paroxysmal vertigo.  Will set him up with physical therapy to see if that helps.  Could consider VNG in the future.  Recent MRI does not show any new strokes.  He does have headaches send could be vestibular migraines also though he does not have headaches all the time when he has dizziness.    On exam he is weak and numb on the left side in the arm and leg.  This would not fit with the strokes.  Will check MRI C-spine/EMG to evaluate for cervical myelopathy versus radiculopathy/entrapment neuropathy.  Can he can also do physical therapy for this.    Can see him back after the testing in 3 months    -     Physical Therapy  Referral; Future  -     MR Cervical Spine w/o Contrast; Future  -     EMG; Future    Return in about 3 months (around 10/31/2025) for In-Clinic Visit (must), Add on PAOR, After testing.    Over 60 minutes were spent coordinating the care for the patient on the day of the encounter.  This includes previsit, during visit and post visit activities as documented above.  Counseling patient.  Complex case.  Multiple symptoms assessed.  Multiple test ordered.  High  risk.  (Activities include but not inclusive of reviewing chart, reviewing outside records, reviewing labs and imaging study results as well as the images, patient visit time including getting history and exam,  use if applicable, review of test results with the patient and coming up with a plan in a shared model, counseling patient and family, education and answering patient questions, EMR , EMR diagnosis entry and problem list management, medication reconciliation and prescription management if applicable, paperwork if applicable, printing documents and documentation of the visit activities.)        Keenan Tirado MD  Neurologist  [facility]  Tel:- [phone]    This note was dictated using voice recognition software.  Any grammatical or context distortions are unintentional and inherent to the software.    The longitudinal plan of care for the diagnosis(es)/condition(s) as documented were addressed during this visit. Due to the added complexity in care, I will continue to support Jorje in the subsequent management and with ongoing continuity of care.      Again, thank you for allowing me to participate in the care of your patient.        Sincerely,        Keenan Tirado MD    Electronically signed

## 2025-07-31 NOTE — NURSING NOTE
Chief Complaint   Patient presents with    Stroke     Pt states he had a stroke 2024. Numbness and tingling on left side. Pt has dizziness.      Leigha Barger MA on 7/31/2025 at 9:12 AM

## 2025-07-31 NOTE — PROGRESS NOTES
NEUROLOGY OUTPATIENT CONSULT NOTE   Jul 31, 2025     CHIEF COMPLAINT/REASON FOR VISIT/REASON FOR CONSULT  Patient presents with:  Stroke: Pt states he had a stroke 2024. Numbness and tingling on left side. Pt has dizziness.     REASON FOR CONSULTATION-dizzy episodes    REFERRAL SOURCE  Dr. Artur Singh  CC Dr. Artur Singh    HISTORY OF PRESENT ILLNESS  Valdo Lyons is a 50 year old male seen today for evaluation of left-sided numbness and dizzy episodes.  In January 2024 he had an ascending aortic dissection.  This was related to uncontrolled hypertension.  He needed revision in May 2024.  Since then he is noticing weakness in the left arm and leg along with some numbness.  He was identified to have some strokes in the left thalamus/cerebellar and the right temporal occipital region at the time of the revision.  He is also been having some dizzy episodes since then.    The tipsy episodes can happen when he is moving around or stands up too fast.  These are more of a vibration sensation in the head.  Does get double vision and a shaky vision/nystagmus.  There is a warm sensation in his body.  He will feel nauseous with the spells.  These do get better with sitting for some time.  He does have headaches though they always do not correlate with these dizzy spells.  Does report some chronic hearing loss and ringing in the ears.    He further reports ongoing left arm numbness weakness since the stroke.  This is not improved.  Does have neck pain.  Recent MRI done did not show any new strokes.    Does have a history of grand mal seizures though has not had a seizure for over 30 years.    Previous history is reviewed and this is unchanged.    PAST MEDICAL/SURGICAL HISTORY  Past Medical History:   Diagnosis Date    Dissecting aneurysm of thoracic aorta, [place] type B (H)     HTN (hypertension)      Patient Active Problem List   Diagnosis    Hypertensive urgency    Aortic dissection (H)    Acute kidney  failure, unspecified (H)    Aortic thrombus (H)    Acute CVA (cerebrovascular accident) (H)    Dissecting aneurysm of thoracic aorta, [place] type B (H)    Tobacco use disorder    Alpha-thalassemia   Significant stroke, heart attack, high blood pressure, epilepsy, tremors, mental illness, memory loss, vision loss, depression, sleep disorder    FAMILY HISTORY  No family history on file.  Positive for hypertension/heart issues    SOCIAL HISTORY  Social History     Tobacco Use    Smoking status: Never    Smokeless tobacco: Current     Types: Chew    Tobacco comments:     Uses chew a few days per week.        SYSTEMS REVIEW  Twelve-system ROS was done and other than the HPI this was negative/positive for neck pain, back pain, numbness/tingling, weakness/paralysis, difficulty walking, falling, balance/coordination problems, movement disorder, dizziness, speech disturbance, difficulty swallowing, ringing in ears, hearing loss, vision symptoms, sleepy during the day, sleeping problems, headaches, blackout spells, memory loss, anxiety, depression, chest pain, palpitations, cardiac/heart problems, bloating, stomach pain, bowel problems, respiratory problems, weight gain, appetite problems, snoring loudly, difficulty breathing during sleep.    MEDICATIONS  Current Outpatient Medications   Medication Sig Dispense Refill    amLODIPine (NORVASC) 10 MG tablet       amLODIPine-valsartan (EXFORGE)  MG tablet Take 1 tablet by mouth daily.      aspirin (ASA) 81 MG chewable tablet Take 1 tablet (81 mg) by mouth or NG Tube daily. 90 tablet 0    carvedilol ER (COREG CR) 80 MG 24 hr capsule Take 1 capsule (80 mg) by mouth daily. 90 capsule 3    chlorthalidone (HYGROTON) 25 MG tablet Take 1 tablet (25 mg) by mouth daily. 90 tablet 0    methocarbamol (ROBAXIN) 500 MG tablet Take 2 tablets (1,000 mg) by mouth 4 times daily as needed for muscle spasms. 90 tablet 2    ondansetron (ZOFRAN ODT) 4 MG ODT tab Take 1 tablet (4 mg) by mouth  "every 8 hours as needed for nausea. 60 tablet 0    polyethylene glycol (MIRALAX) 17 GM/Dose powder Take 17 g by mouth daily. 510 g 0    sertraline (ZOLOFT) 100 MG tablet Take 1 tablet (100 mg) by mouth daily. 90 tablet 3     No current facility-administered medications for this visit.        PHYSICAL EXAMINATION  VITALS: BP (!) 146/94 (BP Location: Right arm, Patient Position: Sitting)   Pulse 67   Ht 1.727 m (5' 8\")   Wt 79.4 kg (175 lb)   BMI 26.61 kg/m    GENERAL: Healthy appearing, alert, no acute distress, normal habitus.  CARDIOVASCULAR: Extremities warm and well perfused. Pulses present.   NEUROLOGICAL:  Patient is awake and oriented to self, place and time.  Attention span is normal.  Memory is grossly intact.  Language is fluent and follows commands appropriately.  Appropriate fund of knowledge. Cranial nerves 2-12 are intact. There is no pronator drift.  Motor exam shows 5/5 strength in all extremities except left proximal arm and leg weakness.  Tone is symmetric bilaterally in upper and lower extremities.  Reflexes are 2+ in upper extremities and lower extremities slightly increased on the left side. Sensory exam is grossly intact to light touch, pin prick and vibration except decreased subjectively on the left side.  Finger to nose and heel to shin is without dysmetria.  Romberg is negative.  Gait is slightly wide-based/antalgic.  Has difficulty doing tandem walking.      DIAGNOSTICS  MRI brain  IMPRESSION:  1. No acute intracranial pathology.  2. Similar appearing chronic infarcts in the bilateral centrum  semiovale, left thalamus, and left peritrigonal white matter.  3. Moderate to advanced sequela of chronic small vessel ischemic  disease, greater than expected for patient's age.    MRI 2024  IMPRESSION:  1.  7 mm focus of acute to subacute ischemic infarction at the right temporal/occipital white matter. Punctate focus of acute to early subacute ischemic infarction in the superior left " cerebellum.  2.  Small subacute infarct suspected in the left cerebellum, left corona radiata and left parietal white matter.  3.  Advanced small vessel ischemic disease for age with multifocal chronic lacunar infarctions as described.      CTA  HEAD CT:  1.  No CT evidence for acute intracranial process.  2.  Brain atrophy and advanced chronic microvascular ischemic changes as above.     HEAD CTA:   1.  No significant stenosis, aneurysm, or high flow vascular malformation identified.  2.  Variant [place] of [place] anatomy as above.     NECK CTA:  1.  No carotid or vertebral artery stenosis.  2.  Aortic dissection described separately. Dissection extends along the proximal left subclavian artery resulting in moderate to severe stenosis.      ECHO  Left ventricular size, wall motion and function are normal. The ejection  fraction is 55-60%.  Right ventricular function, chamber size, wall motion, and thickness are  normal.  The inferior vena cava was normal in size with preserved respiratory  variability.  Dissection flap noted in the descending thoracic aorta  No pericardial effusion is present.      CTA 2024  IMPRESSION:  1. Patent ascending aortic graft. No leak. Postoperative collection  with air bubbles around the graft measures up to 19 mm in thickness.     2. Patent thoracic aortic endograft in the true lumen from the left  subclavian artery origin to T7. Dissection extends through the left  subclavian artery. Its terminus in the left subclavian artery is not  well visualized. Left vertebral artery is thought to originate from  the true lumen. Dissection ends in the supraceliac aorta.     3. Right chest tube in place. Small apical right pneumothorax.        RELEVANT LABS  Component      Latest Ref Rng 2/7/2025  1:44 PM 5/2/2025  3:53 PM   WBC      4.0 - 11.0 10e3/uL  11.3 (H)    RBC Count      4.40 - 5.90 10e6/uL  6.83 (H)    Hemoglobin      13.3 - 17.7 g/dL  13.2 (L)    Hematocrit      40.0 - 53.0 %  43.3     MCV      78 - 100 fL  63 (L)    MCH      26.5 - 33.0 pg  19.3 (L)    MCHC      31.5 - 36.5 g/dL  30.5 (L)    RDW      10.0 - 15.0 %  17.6 (H)    Platelet Count      150 - 450 10e3/uL  226    % Neutrophils      %  72    % Lymphocytes      %  15    % Monocytes      %  7    % Eosinophils      %  5    % Basophils      %  1    % Immature Granulocytes      %  1    NRBC/W      <1 /100  0    Absolute Neutrophil      1.6 - 8.3 10e3/uL  8.1    Absolute Lymphocytes      0.8 - 5.3 10e3/uL  1.6    Absolute Monocytes      0.0 - 1.3 10e3/uL  0.8    Absolute Eosinophils      0.0 - 0.7 10e3/uL  0.6    Absolute Basophils      0.0 - 0.2 10e3/uL  0.1    Absolute Immature Granulocytes      <=0.4 10e3/uL  0.1    Absolute NRBCs      10e3/uL  0.0    Sodium      135 - 145 mmol/L  138    Potassium      3.4 - 5.3 mmol/L  4.5    Carbon Dioxide (CO2)      22 - 29 mmol/L  22    Anion Gap      7 - 15 mmol/L  11    Urea Nitrogen      6.0 - 20.0 mg/dL  16.1    Creatinine      0.67 - 1.17 mg/dL  1.23 (H)    GFR Estimate      >60 mL/min/1.73m2  72    Calcium      8.8 - 10.4 mg/dL  9.4    Chloride      98 - 107 mmol/L  105    Glucose      70 - 99 mg/dL  103 (H)    Alkaline Phosphatase      40 - 150 U/L  79    AST      0 - 45 U/L  20    ALT      0 - 70 U/L  16    Protein Total      6.4 - 8.3 g/dL  7.4    Albumin      3.5 - 5.2 g/dL  4.3    Bilirubin Total      <=1.2 mg/dL  0.7    RBC Morphology  Confirmed RBC Indices    Platelet Morphology      Automated Count Confirmed. Platelet morphology is normal.   Automated Count Confirmed. Platelet morphology is normal.    Elliptocytes      None Seen   Slight !    Target Cells      None Seen   Slight !    TSH      0.30 - 4.20 uIU/mL 1.32        Legend:  (H) High  (L) Low  ! Abnormal    OUTSIDE RECORDS  Outside referral notes and chart notes were reviewed and pertinent information has been summarized (in addition to the HPI):-    May 2024        IMPRESSION/REPORT/PLAN  Benign paroxysmal positional vertigo,  unspecified laterality/dizzy episodes  Acute CVA (cerebrovascular accident) (H)  Left arm/leg weakness and numbness  History of thoracic dissection  History of uncontrolled hypertension    This is a 50 year old male with history of ascending thoracic dissection and possibly related multiple strokes versus hypertension related to strokes.  MRI brain shows strokes in the left thalamus, right occipital temporal region, left cerebellum.  His symptoms would not be consistent with the strokes.    He complains of dizzy episodic spells.  These are more suggestive vertiginous spells.  Moving the head can bring on the spells.  Could be benign positional paroxysmal vertigo.  Will set him up with physical therapy to see if that helps.  Could consider VNG in the future.  Recent MRI does not show any new strokes.  He does have headaches send could be vestibular migraines also though he does not have headaches all the time when he has dizziness.    On exam he is weak and numb on the left side in the arm and leg.  This would not fit with the strokes.  Will check MRI C-spine/EMG to evaluate for cervical myelopathy versus radiculopathy/entrapment neuropathy.  Can he can also do physical therapy for this.    Can see him back after the testing in 3 months    -     Physical Therapy  Referral; Future  -     MR Cervical Spine w/o Contrast; Future  -     EMG; Future    Return in about 3 months (around 10/31/2025) for In-Clinic Visit (must), Add on PAOR, After testing.    Over 60 minutes were spent coordinating the care for the patient on the day of the encounter.  This includes previsit, during visit and post visit activities as documented above.  Counseling patient.  Complex case.  Multiple symptoms assessed.  Multiple test ordered.  High risk.  (Activities include but not inclusive of reviewing chart, reviewing outside records, reviewing labs and imaging study results as well as the images, patient visit time including getting  history and exam,  use if applicable, review of test results with the patient and coming up with a plan in a shared model, counseling patient and family, education and answering patient questions, EMR , EMR diagnosis entry and problem list management, medication reconciliation and prescription management if applicable, paperwork if applicable, printing documents and documentation of the visit activities.)        Keenan Tirado MD  Neurologist  [facility]  Tel:- [phone]    This note was dictated using voice recognition software.  Any grammatical or context distortions are unintentional and inherent to the software.    The longitudinal plan of care for the diagnosis(es)/condition(s) as documented were addressed during this visit. Due to the added complexity in care, I will continue to support Jorje in the subsequent management and with ongoing continuity of care.

## 2025-08-03 DIAGNOSIS — I1A.0 RESISTANT HYPERTENSION: ICD-10-CM

## 2025-08-04 ENCOUNTER — ANCILLARY PROCEDURE (OUTPATIENT)
Dept: CT IMAGING | Facility: CLINIC | Age: 50
End: 2025-08-04
Attending: SURGERY
Payer: COMMERCIAL

## 2025-08-04 ENCOUNTER — OFFICE VISIT (OUTPATIENT)
Dept: VASCULAR SURGERY | Facility: CLINIC | Age: 50
End: 2025-08-04
Payer: COMMERCIAL

## 2025-08-04 VITALS
DIASTOLIC BLOOD PRESSURE: 80 MMHG | WEIGHT: 175.3 LBS | HEART RATE: 64 BPM | BODY MASS INDEX: 26.65 KG/M2 | SYSTOLIC BLOOD PRESSURE: 134 MMHG | OXYGEN SATURATION: 98 %

## 2025-08-04 DIAGNOSIS — Z95.828 STATUS POST INSERTION OF ENDOVASCULAR THORACIC AORTIC STENT GRAFT: ICD-10-CM

## 2025-08-04 DIAGNOSIS — I1A.0 RESISTANT HYPERTENSION: ICD-10-CM

## 2025-08-04 DIAGNOSIS — I71.03 THORACOABDOMINAL AORTIC DISSECTION (H): ICD-10-CM

## 2025-08-04 DIAGNOSIS — I71.23 ANEURYSM OF DESCENDING THORACIC AORTA WITHOUT RUPTURE: ICD-10-CM

## 2025-08-04 DIAGNOSIS — I71.012 DISSECTION OF DESCENDING THORACIC AORTA (H): ICD-10-CM

## 2025-08-04 DIAGNOSIS — Z95.828 S/P ASCENDING AORTIC REPLACEMENT: ICD-10-CM

## 2025-08-04 DIAGNOSIS — I71.23 ANEURYSM OF DESCENDING THORACIC AORTA WITHOUT RUPTURE: Primary | ICD-10-CM

## 2025-08-04 LAB
CREAT BLD-MCNC: 1.4 MG/DL (ref 0.7–1.2)
EGFRCR SERPLBLD CKD-EPI 2021: >60 ML/MIN/1.73M2

## 2025-08-04 PROCEDURE — 71275 CT ANGIOGRAPHY CHEST: CPT | Mod: GC | Performed by: RADIOLOGY

## 2025-08-04 PROCEDURE — 99213 OFFICE O/P EST LOW 20 MIN: CPT | Performed by: SURGERY

## 2025-08-04 PROCEDURE — 1126F AMNT PAIN NOTED NONE PRSNT: CPT | Performed by: SURGERY

## 2025-08-04 PROCEDURE — 82565 ASSAY OF CREATININE: CPT | Performed by: PATHOLOGY

## 2025-08-04 PROCEDURE — 74174 CTA ABD&PLVS W/CONTRAST: CPT | Mod: GC | Performed by: RADIOLOGY

## 2025-08-04 PROCEDURE — 3075F SYST BP GE 130 - 139MM HG: CPT | Performed by: SURGERY

## 2025-08-04 PROCEDURE — 3079F DIAST BP 80-89 MM HG: CPT | Performed by: SURGERY

## 2025-08-04 RX ORDER — IOPAMIDOL 755 MG/ML
110 INJECTION, SOLUTION INTRAVASCULAR ONCE
Status: COMPLETED | OUTPATIENT
Start: 2025-08-04 | End: 2025-08-04

## 2025-08-04 RX ORDER — CHLORTHALIDONE 25 MG/1
25 TABLET ORAL DAILY
Qty: 14 TABLET | Refills: 0 | Status: SHIPPED | OUTPATIENT
Start: 2025-08-04

## 2025-08-04 RX ORDER — CHLORTHALIDONE 25 MG/1
25 TABLET ORAL DAILY
Qty: 90 TABLET | Refills: 0 | Status: SHIPPED | OUTPATIENT
Start: 2025-08-04 | End: 2025-08-04

## 2025-08-04 RX ADMIN — IOPAMIDOL 110 ML: 755 INJECTION, SOLUTION INTRAVASCULAR at 14:10

## 2025-08-04 ASSESSMENT — PAIN SCALES - GENERAL: PAINLEVEL_OUTOF10: NO PAIN (0)

## 2025-08-18 DIAGNOSIS — I16.0 HYPERTENSIVE URGENCY: Primary | ICD-10-CM

## 2025-08-18 DIAGNOSIS — I71.012 DISSECTING ANEURYSM OF THORACIC AORTA, STANFORD TYPE B (H): ICD-10-CM

## 2025-08-18 RX ORDER — AMLODIPINE BESYLATE 10 MG/1
TABLET ORAL
OUTPATIENT
Start: 2025-08-18

## 2025-08-23 ENCOUNTER — ANCILLARY PROCEDURE (OUTPATIENT)
Dept: MRI IMAGING | Facility: CLINIC | Age: 50
End: 2025-08-23
Attending: PSYCHIATRY & NEUROLOGY
Payer: COMMERCIAL

## 2025-08-23 DIAGNOSIS — R29.898 LEFT ARM WEAKNESS: ICD-10-CM

## 2025-08-23 PROCEDURE — 72141 MRI NECK SPINE W/O DYE: CPT | Performed by: RADIOLOGY

## 2025-08-26 RX ORDER — AMLODIPINE BESYLATE 10 MG/1
10 TABLET ORAL DAILY
Qty: 90 TABLET | Refills: 0 | Status: SHIPPED | OUTPATIENT
Start: 2025-08-26

## (undated) DEVICE — Device

## (undated) DEVICE — LINEN TOWEL PACK X6 WHITE 5487

## (undated) DEVICE — WIRE GUIDE LUNDERQUIST 0.035"X260CM DBL CVD

## (undated) DEVICE — CLIP HORIZON SM RED WIDE SLOT 001201

## (undated) DEVICE — LEAD PACER MYOCARDIAL BIPOLAR TEMPORARY 53CM 6495F

## (undated) DEVICE — SUCTION CATH AIRLIFE TRI-FLO W/CONTROL PORT 14FR  T60C

## (undated) DEVICE — ESU GROUND PAD ADULT REM W/15' CORD E7507DB

## (undated) DEVICE — INTRO TERUMO 8FRX25CM W/MARKER RSB803

## (undated) DEVICE — RX SURGIFLO HEMOSTATIC MATRIX W/THROMBIN 8ML 2994

## (undated) DEVICE — ESU PENCIL SMOKE EVAC W/ROCKER SWITCH 0703-047-000

## (undated) DEVICE — CATH IMAGING ULTRASOUND VISIONS PV.035 7FRX90CM 88901

## (undated) DEVICE — SU PROLENE 3-0 SHDA 36" 8522H

## (undated) DEVICE — TAPE MEDIPORE 4"X2YD 2864

## (undated) DEVICE — SHEATH INTRODUCER DRYSEAL FLEX W/HYDRO CT 22FRX33CM DSF2233

## (undated) DEVICE — CATH ANGIO C2 GLIDECATH 5FRX100CM CG503

## (undated) DEVICE — CLIP HORIZON MED BLUE 002200

## (undated) DEVICE — GLIDEWIRE TERUMO RADIOFOCUS .035X260CM ANG EXCHANGE GR3509

## (undated) DEVICE — DRSG ABDOMINAL 07 1/2X8" 7197D

## (undated) DEVICE — SU VICRYL 0 CT-1 27" UND J260H

## (undated) DEVICE — SU DERMABOND ADVANCED .7ML DNX12

## (undated) DEVICE — SOL WATER IRRIG 1000ML BOTTLE 2F7114

## (undated) DEVICE — SU ETHIBOND 2-0 SHDA 30" X563H

## (undated) DEVICE — STRAP UNIVERSAL POSITIONING 2-PIECE 4X47X76" 91-287

## (undated) DEVICE — COVER CAMERA VIDEO LASER 9X96" 04-CC227

## (undated) DEVICE — IOM SUPPLIES/CASE FEE

## (undated) DEVICE — DRAPE FLUID WARMING 52 X 60" ORS-321

## (undated) DEVICE — BLANKET BAIR HUGGER UNDERBODY AU63500

## (undated) DEVICE — BONE WAX 2.5GM W31G

## (undated) DEVICE — SU MONOCRYL 4-0 PS-2 27" UND Y426H

## (undated) DEVICE — CATH ANGIO MARINER KUMPE 5FRX100CM 11732703

## (undated) DEVICE — SOL NACL 0.9% IRRIG 3000ML BAG 2B7477

## (undated) DEVICE — SU VICRYL 2-0 CT-1 27" UND J259H

## (undated) DEVICE — COVER CAMERA IN-LIGHT DISP LT-C02

## (undated) DEVICE — SUTURE BOOTS 051003PBX

## (undated) DEVICE — SU STEEL MYO/WIRE II STERNOTOMY 8 BE-1 3X14" 048-217

## (undated) DEVICE — TUBING INSUFFLATION PNEUMOCLEAR 0620050100

## (undated) DEVICE — SUCTION MANIFOLD NEPTUNE 2 SYS 4 PORT 0702-020-000

## (undated) DEVICE — SOL NACL 0.9% INJ 1000ML BAG 2B1324X

## (undated) DEVICE — COVER ULTRASOUND PROBE W/GEL FLEXI-FEEL 6"X58" LF  25-FF658

## (undated) DEVICE — SU VICRYL 3-0 SH 27" UND J416H

## (undated) DEVICE — BLADE CLIPPER SGL USE 9680

## (undated) DEVICE — PATCH SURGICAL EVARREST FIBRIN SEALANT 4X2IN EVT5024

## (undated) DEVICE — DRAIN CHEST TUBE RIGHT ANGLED 32FR 8132

## (undated) DEVICE — SOL NACL 0.9% 10ML VIAL 0409-4888-02

## (undated) DEVICE — DRAPE C-ARM W/STRAPS 42X72" 07-CA104

## (undated) DEVICE — KIT MICRO-INTRODUCER STIFFEN 4FR 7274V

## (undated) DEVICE — GUIDEWIRE 0.018"X150CM STEERABLE V-18 CONTROL M001468500

## (undated) DEVICE — DRAPE SHEET REV FOLD 3/4 9349

## (undated) DEVICE — INTRO SHEATH BRITE TIP 6FRX11CM 401-611M

## (undated) DEVICE — DRSG TEGADERM 8X12" 1629

## (undated) DEVICE — SU SILK 0 TIE 6X30" A306H

## (undated) DEVICE — SU PROLENE 4-0 SHDA 36" 8521H

## (undated) DEVICE — DRSG PRIMAPORE 02X3" 7133

## (undated) DEVICE — SOL NACL 0.9% IRRIG 1000ML BOTTLE 2F7124

## (undated) DEVICE — CLOSURE DEVICE 6FR VASC PROGLIDE MEDICATED SUTURE 12673-03

## (undated) DEVICE — DRAIN CHEST TUBE 32FR STR 8032

## (undated) DEVICE — VESSEL LOOPS RED MINI 24000-01R

## (undated) DEVICE — SU PROLENE 4-0 RB-1DA 36" 8557H

## (undated) DEVICE — SPONGE SURGIFOAM 100 1974

## (undated) DEVICE — CLIP HORIZON LG ORANGE 004200

## (undated) DEVICE — DRSG DRAIN 4X4" 7086

## (undated) DEVICE — PREP CHLORAPREP 26ML TINTED HI-LITE ORANGE 930815

## (undated) DEVICE — INTRO SHEATH BRITE TIP 8FRX11CM 401-811M

## (undated) DEVICE — NDL COUNTER 20CT 31142493

## (undated) DEVICE — STOPCOCK ANGIO 1-WAY MARQUIS MLL  H1RC

## (undated) DEVICE — SU ETHIBOND 3-0 BBDA 36" X588H

## (undated) DEVICE — DEVICE MULTI TORQUE TD01

## (undated) DEVICE — TIES BANDING T50R

## (undated) DEVICE — STOPCOCK 3-WAY HIGH PRESSURE (NAMIC) 70055009

## (undated) DEVICE — SU PROLENE 7-0 BV-1DA 24" 8702H

## (undated) DEVICE — SUCTION DRY CHEST DRAIN OASIS 3600-100

## (undated) DEVICE — TUBING SUCTION DRAINAGE PLEURAL DUAL 8884714200

## (undated) DEVICE — VESSEL LOOPS YELLOW MAXI 31145694

## (undated) DEVICE — SYR MEDRAD 150ML MARK 7 ARTERION ART700SYR

## (undated) DEVICE — SURGICEL HEMOSTAT 4X8" 1952

## (undated) DEVICE — TUBING SUCTION 10'X3/16" N510

## (undated) DEVICE — ESU EYE LOW TEMP AA17

## (undated) DEVICE — RAD CATH PIGTAIL 5FRX100CM SIZING

## (undated) DEVICE — SU PROLENE 5-0 RB-2DA 30" 8710H

## (undated) DEVICE — PACK ADULT HEART UMMC PV15CG92D

## (undated) DEVICE — SHEATH INTRODUCER DRYSEAL FLEX W/HYDRO CT 24FRX33CM DSF2433

## (undated) DEVICE — LINEN TOWEL PACK X30 5481

## (undated) DEVICE — SU PROLENE 6-0 BV-1DA 18" 8709H

## (undated) DEVICE — SU PROLENE 6-0 C-1DA 30" 8706H

## (undated) DEVICE — DECANTER BAG 2002S

## (undated) DEVICE — SU ETHIBOND 0 CT-1 CR 8X18" CX21D

## (undated) DEVICE — SU PLEDGET SOFT TFE 3/8"X3/26"X1/16" PCP40

## (undated) DEVICE — TUBING MEDRAD 48" HIGH PRESSURE MX694

## (undated) DEVICE — WIPES FOLEY CARE SURESTEP PROVON DFC100

## (undated) DEVICE — DRAPE IOBAN INCISE 23X17" 6650EZ

## (undated) DEVICE — CATH TRAY TEMP SURESTEP 16FR NEURO LF

## (undated) DEVICE — PROTECTOR ARM ONE-STEP TRENDELENBURG 40418

## (undated) DEVICE — SU SILK 3-0 TIE 12X30" A304H

## (undated) DEVICE — BLADE SAW STERNAL 20X30MM KM-32

## (undated) DEVICE — DRAPE MAYO STAND 23X54 8337

## (undated) DEVICE — SU STEEL 6 CCS 4X18" M654G

## (undated) DEVICE — ESU HOLSTER PLASTIC DISP E2400

## (undated) DEVICE — SU VICRYL 3-0 FS-1 27" J442H

## (undated) DEVICE — PACK SET-UP STD 9102

## (undated) DEVICE — TOURNIQUET VASCULAR KIT 7 1/2" 79012

## (undated) DEVICE — BLADE KNIFE SURG 15 371115

## (undated) DEVICE — SPONGE RAY-TEC 4X8" 7318

## (undated) DEVICE — GLOVE BIOGEL PI MICRO SZ 8.0 48580

## (undated) DEVICE — SU VICRYL 0 CTX 36" J370H

## (undated) DEVICE — PACK GOWN 3/PK DISP XL SBA32GPFCB

## (undated) DEVICE — SU SILK 2-0 TIE 12X30" A305H

## (undated) DEVICE — DEFIB PRO-PADZ LVP LQD GEL ADULT 8900-2105-01

## (undated) DEVICE — SU SILK 4-0 TIE 12X30" A303H

## (undated) RX ORDER — PROPOFOL 10 MG/ML
INJECTION, EMULSION INTRAVENOUS
Status: DISPENSED
Start: 2024-05-31

## (undated) RX ORDER — HEPARIN SODIUM 1000 [USP'U]/ML
INJECTION, SOLUTION INTRAVENOUS; SUBCUTANEOUS
Status: DISPENSED
Start: 2024-05-31

## (undated) RX ORDER — HYDROMORPHONE HYDROCHLORIDE 1 MG/ML
INJECTION, SOLUTION INTRAMUSCULAR; INTRAVENOUS; SUBCUTANEOUS
Status: DISPENSED
Start: 2024-01-23

## (undated) RX ORDER — HYDROMORPHONE HYDROCHLORIDE 1 MG/ML
INJECTION, SOLUTION INTRAMUSCULAR; INTRAVENOUS; SUBCUTANEOUS
Status: DISPENSED
Start: 2024-05-31

## (undated) RX ORDER — HEPARIN SODIUM 1000 [USP'U]/ML
INJECTION, SOLUTION INTRAVENOUS; SUBCUTANEOUS
Status: DISPENSED
Start: 2024-01-23

## (undated) RX ORDER — IODIXANOL 320 MG/ML
INJECTION, SOLUTION INTRAVASCULAR
Status: DISPENSED
Start: 2024-05-31

## (undated) RX ORDER — IODIXANOL 320 MG/ML
INJECTION, SOLUTION INTRAVASCULAR
Status: DISPENSED
Start: 2024-01-23

## (undated) RX ORDER — PROPOFOL 10 MG/ML
INJECTION, EMULSION INTRAVENOUS
Status: DISPENSED
Start: 2024-01-23

## (undated) RX ORDER — FENTANYL CITRATE 50 UG/ML
INJECTION, SOLUTION INTRAMUSCULAR; INTRAVENOUS
Status: DISPENSED
Start: 2024-05-31

## (undated) RX ORDER — FENTANYL CITRATE 50 UG/ML
INJECTION, SOLUTION INTRAMUSCULAR; INTRAVENOUS
Status: DISPENSED
Start: 2024-01-23

## (undated) RX ORDER — FENTANYL CITRATE-0.9 % NACL/PF 10 MCG/ML
PLASTIC BAG, INJECTION (ML) INTRAVENOUS
Status: DISPENSED
Start: 2024-01-23

## (undated) RX ORDER — CEFAZOLIN SODIUM 1 G/3ML
INJECTION, POWDER, FOR SOLUTION INTRAMUSCULAR; INTRAVENOUS
Status: DISPENSED
Start: 2024-01-23

## (undated) RX ORDER — CEFAZOLIN SODIUM 1 G/3ML
INJECTION, POWDER, FOR SOLUTION INTRAMUSCULAR; INTRAVENOUS
Status: DISPENSED
Start: 2024-05-31

## (undated) RX ORDER — ONDANSETRON 2 MG/ML
INJECTION INTRAMUSCULAR; INTRAVENOUS
Status: DISPENSED
Start: 2024-05-31

## (undated) RX ORDER — ESMOLOL HYDROCHLORIDE 10 MG/ML
INJECTION INTRAVENOUS
Status: DISPENSED
Start: 2024-01-23